# Patient Record
Sex: MALE | Race: WHITE | NOT HISPANIC OR LATINO | Employment: OTHER | URBAN - METROPOLITAN AREA
[De-identification: names, ages, dates, MRNs, and addresses within clinical notes are randomized per-mention and may not be internally consistent; named-entity substitution may affect disease eponyms.]

---

## 2017-10-13 ENCOUNTER — TELEPHONE (OUTPATIENT)
Dept: MEDICAL GROUP | Facility: PHYSICIAN GROUP | Age: 72
End: 2017-10-13

## 2017-10-13 NOTE — TELEPHONE ENCOUNTER
Patient dropped off a list of providers that we need to get records from prior to him establishing care with .  I have printed releases for patient to complete and sign and left them at the  for him.  LVM informing patient this needs to be done.

## 2017-10-25 ENCOUNTER — OFFICE VISIT (OUTPATIENT)
Dept: MEDICAL GROUP | Facility: PHYSICIAN GROUP | Age: 72
End: 2017-10-25
Payer: MEDICARE

## 2017-10-25 VITALS
HEART RATE: 70 BPM | WEIGHT: 200 LBS | RESPIRATION RATE: 16 BRPM | TEMPERATURE: 98.4 F | BODY MASS INDEX: 25.67 KG/M2 | DIASTOLIC BLOOD PRESSURE: 80 MMHG | OXYGEN SATURATION: 93 % | HEIGHT: 74 IN | SYSTOLIC BLOOD PRESSURE: 118 MMHG

## 2017-10-25 DIAGNOSIS — Z12.11 SCREENING FOR COLON CANCER: ICD-10-CM

## 2017-10-25 DIAGNOSIS — Z12.5 SCREENING FOR PROSTATE CANCER: ICD-10-CM

## 2017-10-25 DIAGNOSIS — E78.5 DYSLIPIDEMIA: ICD-10-CM

## 2017-10-25 DIAGNOSIS — I49.9 CARDIAC ARRHYTHMIA, UNSPECIFIED CARDIAC ARRHYTHMIA TYPE: ICD-10-CM

## 2017-10-25 DIAGNOSIS — M17.0 PRIMARY OSTEOARTHRITIS OF BOTH KNEES: ICD-10-CM

## 2017-10-25 PROCEDURE — 99204 OFFICE O/P NEW MOD 45 MIN: CPT | Performed by: INTERNAL MEDICINE

## 2017-10-25 ASSESSMENT — PATIENT HEALTH QUESTIONNAIRE - PHQ9: CLINICAL INTERPRETATION OF PHQ2 SCORE: 0

## 2017-10-25 NOTE — LETTER
[facility]  Hellen Ayala M.D.  [address]  [place] [address]  Fax: [phone]   Authorization for Release/Disclosure of   Protected Health Information   Name: BOO CAVANAUGH : 1945 SSN: [id number]   Address: [address] [place] [address]  [place] [address] Phone:    [phone] (home)    I authorize the entity listed below to release/disclose the PHI below to:   [facility]/Hellen Ayala M.D. and Hellen Ayala M.D.   Provider or Entity Name:     Address   City, State, Zip   Phone:      Fax:     Reason for request: continuity of care   Information to be released:    [  ] LAST COLONOSCOPY,  including any PATH REPORT and follow-up  [  ] LAST FIT/COLOGUARD RESULT [  ] LAST DEXA  [  ] LAST MAMMOGRAM  [  ] LAST PAP  [  ] LAST LABS [  ] RETINA EXAM REPORT  [  ] IMMUNIZATION RECORDS  [  ] Release all info      [  ] Check here and initial the line next to each item to release ALL health information INCLUDING  _____ Care and treatment for drug and / or alcohol abuse  _____ HIV testing, infection status, or AIDS  _____ Genetic Testing    DATES OF SERVICE OR TIME PERIOD TO BE DISCLOSED: _____________  I understand and acknowledge that:  * This Authorization may be revoked at any time by you in writing, except if your health information has already been used or disclosed.  * Your health information that will be used or disclosed as a result of you signing this authorization could be re-disclosed by the recipient. If this occurs, your re-disclosed health information may no longer be protected by State or Federal laws.  * You may refuse to sign this Authorization. Your refusal will not affect your ability to obtain treatment.  * This Authorization becomes effective upon signing and will  on (date) __________.      If no date is indicated, this Authorization will  one (1) year from the signature date.    Name: Boo Cavanaugh    Signature:   Date:     10/25/2017       PLEASE FAX REQUESTED RECORDS BACK TO:  [phone]

## 2017-10-25 NOTE — PROGRESS NOTES
"Subjective:  Boo is a 72 y.o. male with the following   Past Medical History:   Diagnosis Date   • Arrhythmia    • OA (osteoarthritis) of knee     bilateral    • Prostate enlargement       Family History   Problem Relation Age of Onset   • Cancer Father    • Cancer Brother 50     prostate cancer     The patient is on the following medications: No current outpatient prescriptions on file.    HPI; 72-year-old male patient is here to establish care he just moved from [place], has had chronic bilateral severe knee osteoarthritis, requesting referral to orthopedic for possible surgery, denies having recent fall or injuries. Per patient he has had history of intermittent cardiac skip beats for number of years has had a follow-up normal cardiac stress test, currently denies any palpitation or chest pain.    ROS:  See HPI    Blood pressure 118/80, pulse 70, temperature 36.9 °C (98.4 °F), resp. rate 16, height 1.873 m (6' 1.75\"), weight 90.7 kg (200 lb), SpO2 93 %.on RA  Objective:  Patient is well appearing and in no acute distress.  Pharynx is clear.  Neck is soft and supple with no cervical or supraclavicular lymphadenopathy, thyromegaly or masses, no JVD.  Lungs clear to auscultation bilaterally with normal respiratory effort. Abdomen soft, non-tender on palpation,not distended. Heart regular rate and rhythm without murmur. Extremities without any clubbing, cyanosis, or edema.    Assessment and Plan:  1. BPH/ elevated PSA; per patient has had urology consultation in [place] , he has refused biopsy.       2. bilateral knee osteoarthritis; per patient was diagnosed with severe osteoarthritis of the knees, suggested knee surgery, would like to have a referral.      3. Arrhythmia; nonspecific, occasional skipped beats, history of normal cardiac stress test, currently cardiac rhythm and rate are within normal range.      4.History of Dyslipidemia   Patient is advised on preventive and supportive care regarding cardiac " arrhythmia, BPH, ...., alternative therapies, referral to orthopedic.  Patient is advised on diet and lifestyle modification, daily walking ,exercise and weight loss, check basic labs as instructed and return for physical in 4 weeks..   Please note that this dictation was created using voice recognition software. I have worked with consultants from the vendor as well as technical experts from [facility] to optimize the interface. I have made every reasonable attempt to correct obvious errors, but I expect that there are errors of grammar and possibly content that I did not discover before finalizing the note.

## 2017-10-28 ENCOUNTER — HOSPITAL ENCOUNTER (OUTPATIENT)
Facility: MEDICAL CENTER | Age: 72
End: 2017-10-28
Attending: INTERNAL MEDICINE
Payer: MEDICARE

## 2017-10-28 ENCOUNTER — HOSPITAL ENCOUNTER (OUTPATIENT)
Dept: LAB | Facility: MEDICAL CENTER | Age: 72
End: 2017-10-28
Attending: INTERNAL MEDICINE
Payer: MEDICARE

## 2017-10-28 DIAGNOSIS — M17.0 PRIMARY OSTEOARTHRITIS OF BOTH KNEES: ICD-10-CM

## 2017-10-28 DIAGNOSIS — E78.5 DYSLIPIDEMIA: ICD-10-CM

## 2017-10-28 DIAGNOSIS — I49.9 CARDIAC ARRHYTHMIA, UNSPECIFIED CARDIAC ARRHYTHMIA TYPE: ICD-10-CM

## 2017-10-28 DIAGNOSIS — Z12.11 SCREENING FOR COLON CANCER: ICD-10-CM

## 2017-10-28 LAB
25(OH)D3 SERPL-MCNC: 19 NG/ML (ref 30–100)
ALBUMIN SERPL BCP-MCNC: 4.4 G/DL (ref 3.2–4.9)
ALBUMIN/GLOB SERPL: 1.6 G/DL
ALP SERPL-CCNC: 62 U/L (ref 30–99)
ALT SERPL-CCNC: 18 U/L (ref 2–50)
ANION GAP SERPL CALC-SCNC: 8 MMOL/L (ref 0–11.9)
AST SERPL-CCNC: 20 U/L (ref 12–45)
BASOPHILS # BLD AUTO: 1.1 % (ref 0–1.8)
BASOPHILS # BLD: 0.08 K/UL (ref 0–0.12)
BILIRUB SERPL-MCNC: 1.2 MG/DL (ref 0.1–1.5)
BUN SERPL-MCNC: 22 MG/DL (ref 8–22)
CALCIUM SERPL-MCNC: 9.7 MG/DL (ref 8.5–10.5)
CHLORIDE SERPL-SCNC: 109 MMOL/L (ref 96–112)
CHOLEST SERPL-MCNC: 239 MG/DL (ref 100–199)
CO2 SERPL-SCNC: 26 MMOL/L (ref 20–33)
CREAT SERPL-MCNC: 1.22 MG/DL (ref 0.5–1.4)
EOSINOPHIL # BLD AUTO: 0.2 K/UL (ref 0–0.51)
EOSINOPHIL NFR BLD: 2.9 % (ref 0–6.9)
ERYTHROCYTE [DISTWIDTH] IN BLOOD BY AUTOMATED COUNT: 46.3 FL (ref 35.9–50)
GFR SERPL CREATININE-BSD FRML MDRD: 58 ML/MIN/1.73 M 2
GLOBULIN SER CALC-MCNC: 2.8 G/DL (ref 1.9–3.5)
GLUCOSE SERPL-MCNC: 83 MG/DL (ref 65–99)
HCT VFR BLD AUTO: 49.6 % (ref 42–52)
HDLC SERPL-MCNC: 55 MG/DL
HGB BLD-MCNC: 16.8 G/DL (ref 14–18)
IMM GRANULOCYTES # BLD AUTO: 0.01 K/UL (ref 0–0.11)
IMM GRANULOCYTES NFR BLD AUTO: 0.1 % (ref 0–0.9)
LDLC SERPL CALC-MCNC: 166 MG/DL
LYMPHOCYTES # BLD AUTO: 1.97 K/UL (ref 1–4.8)
LYMPHOCYTES NFR BLD: 28.2 % (ref 22–41)
MAGNESIUM SERPL-MCNC: 2.3 MG/DL (ref 1.5–2.5)
MCH RBC QN AUTO: 32.2 PG (ref 27–33)
MCHC RBC AUTO-ENTMCNC: 33.9 G/DL (ref 33.7–35.3)
MCV RBC AUTO: 95.2 FL (ref 81.4–97.8)
MONOCYTES # BLD AUTO: 0.76 K/UL (ref 0–0.85)
MONOCYTES NFR BLD AUTO: 10.9 % (ref 0–13.4)
NEUTROPHILS # BLD AUTO: 3.97 K/UL (ref 1.82–7.42)
NEUTROPHILS NFR BLD: 56.8 % (ref 44–72)
NRBC # BLD AUTO: 0 K/UL
NRBC BLD AUTO-RTO: 0 /100 WBC
PLATELET # BLD AUTO: 224 K/UL (ref 164–446)
PMV BLD AUTO: 11.3 FL (ref 9–12.9)
POTASSIUM SERPL-SCNC: 4.5 MMOL/L (ref 3.6–5.5)
PROT SERPL-MCNC: 7.2 G/DL (ref 6–8.2)
RBC # BLD AUTO: 5.21 M/UL (ref 4.7–6.1)
SODIUM SERPL-SCNC: 143 MMOL/L (ref 135–145)
T4 FREE SERPL-MCNC: 0.86 NG/DL (ref 0.53–1.43)
TRIGL SERPL-MCNC: 90 MG/DL (ref 0–149)
TSH SERPL DL<=0.005 MIU/L-ACNC: 3.09 UIU/ML (ref 0.3–3.7)
WBC # BLD AUTO: 7 K/UL (ref 4.8–10.8)

## 2017-10-28 PROCEDURE — 80053 COMPREHEN METABOLIC PANEL: CPT

## 2017-10-28 PROCEDURE — 85025 COMPLETE CBC W/AUTO DIFF WBC: CPT

## 2017-10-28 PROCEDURE — 83735 ASSAY OF MAGNESIUM: CPT | Mod: GA

## 2017-10-28 PROCEDURE — 82274 ASSAY TEST FOR BLOOD FECAL: CPT

## 2017-10-28 PROCEDURE — 83695 ASSAY OF LIPOPROTEIN(A): CPT

## 2017-10-28 PROCEDURE — 80061 LIPID PANEL: CPT

## 2017-10-28 PROCEDURE — 84443 ASSAY THYROID STIM HORMONE: CPT

## 2017-10-28 PROCEDURE — 84439 ASSAY OF FREE THYROXINE: CPT

## 2017-10-28 PROCEDURE — 36415 COLL VENOUS BLD VENIPUNCTURE: CPT | Mod: GA

## 2017-10-28 PROCEDURE — 82306 VITAMIN D 25 HYDROXY: CPT | Mod: GA

## 2017-10-31 LAB — LPA SERPL-MCNC: 14 MG/DL

## 2017-11-01 ENCOUNTER — TELEPHONE (OUTPATIENT)
Dept: MEDICAL GROUP | Facility: PHYSICIAN GROUP | Age: 72
End: 2017-11-01

## 2017-11-01 LAB — HEMOCCULT STL QL IA: NEGATIVE

## 2017-11-01 NOTE — TELEPHONE ENCOUNTER
----- Message from Hellen Ayala M.D. sent at 11/1/2017  4:24 PM PDT -----  Please inform patient of negative stool test.

## 2017-11-06 ENCOUNTER — OFFICE VISIT (OUTPATIENT)
Dept: MEDICAL GROUP | Facility: PHYSICIAN GROUP | Age: 72
End: 2017-11-06
Payer: MEDICARE

## 2017-11-06 ENCOUNTER — HOSPITAL ENCOUNTER (OUTPATIENT)
Facility: MEDICAL CENTER | Age: 72
End: 2017-11-06
Attending: NURSE PRACTITIONER
Payer: MEDICARE

## 2017-11-06 VITALS
TEMPERATURE: 98.2 F | WEIGHT: 201 LBS | HEIGHT: 74 IN | RESPIRATION RATE: 16 BRPM | HEART RATE: 92 BPM | OXYGEN SATURATION: 93 % | BODY MASS INDEX: 25.8 KG/M2 | SYSTOLIC BLOOD PRESSURE: 120 MMHG | DIASTOLIC BLOOD PRESSURE: 68 MMHG

## 2017-11-06 DIAGNOSIS — N30.01 ACUTE CYSTITIS WITH HEMATURIA: ICD-10-CM

## 2017-11-06 LAB
APPEARANCE UR: CLEAR
BILIRUB UR STRIP-MCNC: NORMAL MG/DL
COLOR UR AUTO: YELLOW
GLUCOSE UR STRIP.AUTO-MCNC: NORMAL MG/DL
KETONES UR STRIP.AUTO-MCNC: NORMAL MG/DL
LEUKOCYTE ESTERASE UR QL STRIP.AUTO: NORMAL
NITRITE UR QL STRIP.AUTO: NORMAL
PH UR STRIP.AUTO: 5.5 [PH] (ref 5–8)
PROT UR QL STRIP: NORMAL MG/DL
RBC UR QL AUTO: NORMAL
SP GR UR STRIP.AUTO: 1.01
UROBILINOGEN UR STRIP-MCNC: 0.2 MG/DL

## 2017-11-06 PROCEDURE — 81002 URINALYSIS NONAUTO W/O SCOPE: CPT | Performed by: NURSE PRACTITIONER

## 2017-11-06 PROCEDURE — 99214 OFFICE O/P EST MOD 30 MIN: CPT | Performed by: NURSE PRACTITIONER

## 2017-11-06 PROCEDURE — 99000 SPECIMEN HANDLING OFFICE-LAB: CPT | Performed by: NURSE PRACTITIONER

## 2017-11-06 PROCEDURE — 87086 URINE CULTURE/COLONY COUNT: CPT

## 2017-11-06 RX ORDER — SULFAMETHOXAZOLE AND TRIMETHOPRIM 800; 160 MG/1; MG/1
1 TABLET ORAL EVERY 12 HOURS
Qty: 28 TAB | Refills: 0 | Status: SHIPPED | OUTPATIENT
Start: 2017-11-06 | End: 2017-11-07

## 2017-11-06 ASSESSMENT — PAIN SCALES - GENERAL: PAINLEVEL: NO PAIN

## 2017-11-06 NOTE — PROGRESS NOTES
"Chief Complaint   Patient presents with   • UTI     x 1 day        HISTORY OF PRESENT ILLNESS: Patient is a 72 y.o. male established patient who presents today to discuss UTI.    Acute cystitis with hematuria  This is a new problem. Started 7 days ago. Denies burning, fever, chills, myalgia, n/v. +frequency and \"full\" feeling or pressure in his bladder. States he had a UTI in July treated with bactrim.      Patient Active Problem List    Diagnosis Date Noted   • Acute cystitis with hematuria 2017       Allergies:Penicillins    Current Outpatient Prescriptions   Medication Sig Dispense Refill   • sulfamethoxazole-trimethoprim (BACTRIM DS) 800-160 MG tablet Take 1 Tab by mouth every 12 hours for 14 days. 28 Tab 0     No current facility-administered medications for this visit.        Social History   Substance Use Topics   • Smoking status: Never Smoker   • Smokeless tobacco: Never Used   • Alcohol use 4.2 oz/week     7 Shots of liquor per week      Comment: A drink per night.        Family Status   Relation Status   • Mother    • Father    • Brother Alive     Family History   Problem Relation Age of Onset   • Cancer Father    • Cancer Brother 50     prostate cancer       Review of Systems:   Constitutional:  Negative for fever, chills, weight loss and malaise/fatigue.   HEENT:  Negative for ear pain, nosebleeds, congestion, sore throat and neck pain.    Eyes:  Negative for blurred vision.   Respiratory:  Negative for cough, sputum production, shortness of breath and wheezing.    Cardiovascular:  Negative for chest pain, palpitations, orthopnea and leg swelling.   Gastrointestinal:  Negative for heartburn, nausea, vomiting and abdominal pain.   Genitourinary:  Negative for dysuria, positive urgency and frequency.   Musculoskeletal:  Negative for myalgias, back pain and joint pain.   Skin:  Negative for rash and itching.   Neurological:  Negative for dizziness, tingling, tremors, sensory change, " "focal weakness and headaches.   Endo/Heme/Allergies:  Does not bruise/bleed easily.   Psychiatric/Behavioral:  Negative for depression, suicidal ideas and memory loss.  The patient is not nervous/anxious and does not have insomnia.    All other systems reviewed and are negative except as in HPI.    Exam:  Blood pressure 120/68, pulse 92, temperature 36.8 °C (98.2 °F), resp. rate 16, height 1.873 m (6' 1.74\"), weight 91.2 kg (201 lb), SpO2 93 %.  General:  Normal appearing. No distress.  Pulmonary:  Clear to ausculation.  Normal effort. No rales, ronchi, or wheezing.  Cardiovascular:  Regular rate and rhythm without murmur. Carotid and radial pulses are intact and equal bilaterally.  Abdomen:  Soft, nontender, nondistended. Normal bowel sounds. Liver and spleen are not palpable. +right cva tenderness, +suprapubic tenderness.  Neurologic:  Grossly nonfocal  Skin:  Warm and dry.  No obvious lesions.  Musculoskeletal:  Normal gait. No extremity cyanosis, clubbing, or edema.  Psych:  Normal mood and affect. Alert and oriented x3. Judgment and insight is normal.      PLAN:    1. Acute cystitis with hematuria  Patient is clinically take Bactrim as he has approximately 2 weeks' worth of medication left at home. Plan to culture urine. Point-of-care urinalysis was positive for leukocytes, nitrates, blood.  Patient is not currently sexually active and has not been for multiple years as such we will not test him for sexually transmitted diseases. Patient does have history of BPH.  - Urine Culture; Future  - sulfamethoxazole-trimethoprim (BACTRIM DS) 800-160 MG tablet; Take 1 Tab by mouth every 12 hours for 14 days.  Dispense: 28 Tab; Refill: 0    Follow-up as needed. Patient is encouraged to be seen in the emergency room for chest pain, palpitations, shortness of breath, dizziness, severe abdominal pain or other concerning symptoms.      Please note that this dictation was created using voice recognition software. I have made " every reasonable attempt to correct obvious errors, but I expect that there are errors of grammar and possibly content that I did not discover before finalizing the note.    Assessment/Plan:  1. Acute cystitis with hematuria  Urine Culture    sulfamethoxazole-trimethoprim (BACTRIM DS) 800-160 MG tablet          I have placed the below orders and discussed them with an approved delegating provider. The MA is performing the below orders under the direction of Dr. Kerns.

## 2017-11-06 NOTE — ASSESSMENT & PLAN NOTE
"This is a new problem. Started 7 days ago. Denies burning, fever, chills, myalgia, n/v. +frequency and \"full\" feeling or pressure in his bladder. States he had a UTI in July treated with bactrim.  "

## 2017-11-06 NOTE — PATIENT INSTRUCTIONS
Urinary Tract Infection  A urinary tract infection (UTI) can occur any place along the urinary tract. The tract includes the kidneys, ureters, bladder, and urethra. A type of germ called bacteria often causes a UTI. UTIs are often helped with antibiotic medicine.   HOME CARE   · If given, take antibiotics as told by your doctor. Finish them even if you start to feel better.  · Drink enough fluids to keep your pee (urine) clear or pale yellow.  · Avoid tea, drinks with caffeine, and bubbly (carbonated) drinks.  · Pee often. Avoid holding your pee in for a long time.  · Pee before and after having sex (intercourse).  · Wipe from front to back after you poop (bowel movement) if you are a woman. Use each tissue only once.  GET HELP RIGHT AWAY IF:   · You have back pain.  · You have lower belly (abdominal) pain.  · You have chills.  · You feel sick to your stomach (nauseous).  · You throw up (vomit).  · Your burning or discomfort with peeing does not go away.  · You have a fever.  · Your symptoms are not better in 3 days.  MAKE SURE YOU:   · Understand these instructions.  · Will watch your condition.  · Will get help right away if you are not doing well or get worse.     This information is not intended to replace advice given to you by your health care provider. Make sure you discuss any questions you have with your health care provider.     Document Released: 06/05/2009 Document Revised: 01/08/2016 Document Reviewed: 07/18/2013  [employer] Interactive Patient Education ©2016 [employer]

## 2017-11-07 ENCOUNTER — OFFICE VISIT (OUTPATIENT)
Dept: MEDICAL GROUP | Facility: PHYSICIAN GROUP | Age: 72
End: 2017-11-07
Payer: MEDICARE

## 2017-11-07 VITALS
RESPIRATION RATE: 16 BRPM | HEIGHT: 74 IN | WEIGHT: 201 LBS | OXYGEN SATURATION: 93 % | HEART RATE: 82 BPM | TEMPERATURE: 101.6 F | BODY MASS INDEX: 25.8 KG/M2 | DIASTOLIC BLOOD PRESSURE: 70 MMHG | SYSTOLIC BLOOD PRESSURE: 126 MMHG

## 2017-11-07 DIAGNOSIS — R10.9 FLANK PAIN: ICD-10-CM

## 2017-11-07 DIAGNOSIS — R09.81 NOSE CONGESTED: ICD-10-CM

## 2017-11-07 DIAGNOSIS — R50.9 FEVER, UNSPECIFIED FEVER CAUSE: ICD-10-CM

## 2017-11-07 DIAGNOSIS — N39.0 RECURRENT UTI: ICD-10-CM

## 2017-11-07 DIAGNOSIS — N30.01 ACUTE CYSTITIS WITH HEMATURIA: ICD-10-CM

## 2017-11-07 PROCEDURE — 99214 OFFICE O/P EST MOD 30 MIN: CPT | Performed by: INTERNAL MEDICINE

## 2017-11-07 RX ORDER — CIPROFLOXACIN 500 MG/1
500 TABLET, FILM COATED ORAL 2 TIMES DAILY
Qty: 28 TAB | Refills: 0 | Status: ON HOLD | OUTPATIENT
Start: 2017-11-07 | End: 2017-11-12

## 2017-11-07 RX ORDER — TAMSULOSIN HYDROCHLORIDE 0.4 MG/1
0.4 CAPSULE ORAL
COMMUNITY
End: 2017-11-27 | Stop reason: SDUPTHER

## 2017-11-07 RX ORDER — MAGNESIUM OXIDE 400 MG/1
400 TABLET ORAL EVERY EVENING
COMMUNITY
End: 2018-06-07

## 2017-11-08 LAB
BACTERIA UR CULT: NORMAL
SIGNIFICANT IND 70042: NORMAL
SOURCE SOURCE: NORMAL

## 2017-11-09 ENCOUNTER — TELEPHONE (OUTPATIENT)
Dept: MEDICAL GROUP | Facility: PHYSICIAN GROUP | Age: 72
End: 2017-11-09

## 2017-11-09 NOTE — TELEPHONE ENCOUNTER
----- Message from ZARIA Guajardo sent at 11/9/2017 11:23 AM PST -----  Please call pt and give lab results: Urine culture was negative for infection.

## 2017-11-10 ENCOUNTER — HOSPITAL ENCOUNTER (INPATIENT)
Facility: MEDICAL CENTER | Age: 72
LOS: 3 days | DRG: 726 | End: 2017-11-13
Attending: EMERGENCY MEDICINE | Admitting: HOSPITALIST
Payer: MEDICARE

## 2017-11-10 ENCOUNTER — TELEPHONE (OUTPATIENT)
Dept: MEDICAL GROUP | Facility: PHYSICIAN GROUP | Age: 72
End: 2017-11-10

## 2017-11-10 ENCOUNTER — RESOLUTE PROFESSIONAL BILLING HOSPITAL PROF FEE (OUTPATIENT)
Dept: HOSPITALIST | Facility: MEDICAL CENTER | Age: 72
End: 2017-11-10
Payer: MEDICARE

## 2017-11-10 ENCOUNTER — APPOINTMENT (OUTPATIENT)
Dept: RADIOLOGY | Facility: MEDICAL CENTER | Age: 72
DRG: 726 | End: 2017-11-10
Attending: EMERGENCY MEDICINE
Payer: MEDICARE

## 2017-11-10 DIAGNOSIS — N13.39 OTHER HYDRONEPHROSIS: ICD-10-CM

## 2017-11-10 DIAGNOSIS — N17.9 ACUTE KIDNEY INJURY (HCC): ICD-10-CM

## 2017-11-10 DIAGNOSIS — R33.9 URINARY RETENTION: ICD-10-CM

## 2017-11-10 DIAGNOSIS — N30.01 ACUTE CYSTITIS WITH HEMATURIA: ICD-10-CM

## 2017-11-10 PROBLEM — N13.9 OBSTRUCTIVE UROPATHY: Status: ACTIVE | Noted: 2017-11-10

## 2017-11-10 LAB
ALBUMIN SERPL BCP-MCNC: 4.1 G/DL (ref 3.2–4.9)
ALBUMIN/GLOB SERPL: 1.4 G/DL
ALP SERPL-CCNC: 134 U/L (ref 30–99)
ALT SERPL-CCNC: 42 U/L (ref 2–50)
ANION GAP SERPL CALC-SCNC: 11 MMOL/L (ref 0–11.9)
APPEARANCE UR: CLEAR
AST SERPL-CCNC: 33 U/L (ref 12–45)
BACTERIA #/AREA URNS HPF: NEGATIVE /HPF
BASOPHILS # BLD AUTO: 0.3 % (ref 0–1.8)
BASOPHILS # BLD: 0.03 K/UL (ref 0–0.12)
BILIRUB SERPL-MCNC: 0.8 MG/DL (ref 0.1–1.5)
BILIRUB UR QL STRIP.AUTO: NEGATIVE
BUN SERPL-MCNC: 26 MG/DL (ref 8–22)
CALCIUM SERPL-MCNC: 9.4 MG/DL (ref 8.5–10.5)
CHLORIDE SERPL-SCNC: 101 MMOL/L (ref 96–112)
CO2 SERPL-SCNC: 24 MMOL/L (ref 20–33)
COLOR UR: YELLOW
CREAT SERPL-MCNC: 2.04 MG/DL (ref 0.5–1.4)
CULTURE IF INDICATED INDCX: NO UA CULTURE
EOSINOPHIL # BLD AUTO: 0.25 K/UL (ref 0–0.51)
EOSINOPHIL NFR BLD: 2.8 % (ref 0–6.9)
EPI CELLS #/AREA URNS HPF: NEGATIVE /HPF
ERYTHROCYTE [DISTWIDTH] IN BLOOD BY AUTOMATED COUNT: 43.6 FL (ref 35.9–50)
GFR SERPL CREATININE-BSD FRML MDRD: 32 ML/MIN/1.73 M 2
GLOBULIN SER CALC-MCNC: 2.9 G/DL (ref 1.9–3.5)
GLUCOSE SERPL-MCNC: 97 MG/DL (ref 65–99)
GLUCOSE UR STRIP.AUTO-MCNC: NEGATIVE MG/DL
HCT VFR BLD AUTO: 47.2 % (ref 42–52)
HGB BLD-MCNC: 16.3 G/DL (ref 14–18)
HYALINE CASTS #/AREA URNS LPF: ABNORMAL /LPF
IMM GRANULOCYTES # BLD AUTO: 0.04 K/UL (ref 0–0.11)
IMM GRANULOCYTES NFR BLD AUTO: 0.5 % (ref 0–0.9)
KETONES UR STRIP.AUTO-MCNC: NEGATIVE MG/DL
LEUKOCYTE ESTERASE UR QL STRIP.AUTO: NEGATIVE
LYMPHOCYTES # BLD AUTO: 1.51 K/UL (ref 1–4.8)
LYMPHOCYTES NFR BLD: 17.1 % (ref 22–41)
MCH RBC QN AUTO: 32.2 PG (ref 27–33)
MCHC RBC AUTO-ENTMCNC: 34.5 G/DL (ref 33.7–35.3)
MCV RBC AUTO: 93.3 FL (ref 81.4–97.8)
MICRO URNS: ABNORMAL
MONOCYTES # BLD AUTO: 1.36 K/UL (ref 0–0.85)
MONOCYTES NFR BLD AUTO: 15.4 % (ref 0–13.4)
NEUTROPHILS # BLD AUTO: 5.66 K/UL (ref 1.82–7.42)
NEUTROPHILS NFR BLD: 63.9 % (ref 44–72)
NITRITE UR QL STRIP.AUTO: NEGATIVE
NRBC # BLD AUTO: 0 K/UL
NRBC BLD AUTO-RTO: 0 /100 WBC
PH UR STRIP.AUTO: 5.5 [PH]
PLATELET # BLD AUTO: 163 K/UL (ref 164–446)
PMV BLD AUTO: 10.9 FL (ref 9–12.9)
POTASSIUM SERPL-SCNC: 4.1 MMOL/L (ref 3.6–5.5)
PROT SERPL-MCNC: 7 G/DL (ref 6–8.2)
PROT UR QL STRIP: NEGATIVE MG/DL
RBC # BLD AUTO: 5.06 M/UL (ref 4.7–6.1)
RBC # URNS HPF: ABNORMAL /HPF
RBC UR QL AUTO: ABNORMAL
SODIUM SERPL-SCNC: 136 MMOL/L (ref 135–145)
SP GR UR STRIP.AUTO: 1.01
UROBILINOGEN UR STRIP.AUTO-MCNC: 0.2 MG/DL
WBC # BLD AUTO: 8.9 K/UL (ref 4.8–10.8)
WBC #/AREA URNS HPF: ABNORMAL /HPF

## 2017-11-10 PROCEDURE — 36415 COLL VENOUS BLD VENIPUNCTURE: CPT

## 2017-11-10 PROCEDURE — 84300 ASSAY OF URINE SODIUM: CPT

## 2017-11-10 PROCEDURE — 82570 ASSAY OF URINE CREATININE: CPT

## 2017-11-10 PROCEDURE — 84156 ASSAY OF PROTEIN URINE: CPT

## 2017-11-10 PROCEDURE — 81001 URINALYSIS AUTO W/SCOPE: CPT

## 2017-11-10 PROCEDURE — 96360 HYDRATION IV INFUSION INIT: CPT

## 2017-11-10 PROCEDURE — 80053 COMPREHEN METABOLIC PANEL: CPT

## 2017-11-10 PROCEDURE — 303105 HCHG CATHETER EXTRA

## 2017-11-10 PROCEDURE — 82436 ASSAY OF URINE CHLORIDE: CPT

## 2017-11-10 PROCEDURE — 51702 INSERT TEMP BLADDER CATH: CPT

## 2017-11-10 PROCEDURE — 99223 1ST HOSP IP/OBS HIGH 75: CPT | Mod: AI | Performed by: HOSPITALIST

## 2017-11-10 PROCEDURE — 770006 HCHG ROOM/CARE - MED/SURG/GYN SEMI*

## 2017-11-10 PROCEDURE — 84133 ASSAY OF URINE POTASSIUM: CPT

## 2017-11-10 PROCEDURE — 700105 HCHG RX REV CODE 258: Performed by: EMERGENCY MEDICINE

## 2017-11-10 PROCEDURE — 74176 CT ABD & PELVIS W/O CONTRAST: CPT

## 2017-11-10 PROCEDURE — 99285 EMERGENCY DEPT VISIT HI MDM: CPT

## 2017-11-10 PROCEDURE — 85025 COMPLETE CBC W/AUTO DIFF WBC: CPT

## 2017-11-10 RX ORDER — ACETAMINOPHEN 325 MG/1
650 TABLET ORAL EVERY 6 HOURS PRN
Status: DISCONTINUED | OUTPATIENT
Start: 2017-11-10 | End: 2017-11-13 | Stop reason: HOSPADM

## 2017-11-10 RX ORDER — BISACODYL 10 MG
10 SUPPOSITORY, RECTAL RECTAL
Status: DISCONTINUED | OUTPATIENT
Start: 2017-11-10 | End: 2017-11-13 | Stop reason: HOSPADM

## 2017-11-10 RX ORDER — SODIUM CHLORIDE 9 MG/ML
1000 INJECTION, SOLUTION INTRAVENOUS ONCE
Status: COMPLETED | OUTPATIENT
Start: 2017-11-10 | End: 2017-11-11

## 2017-11-10 RX ORDER — POLYETHYLENE GLYCOL 3350 17 G/17G
1 POWDER, FOR SOLUTION ORAL
Status: DISCONTINUED | OUTPATIENT
Start: 2017-11-10 | End: 2017-11-13 | Stop reason: HOSPADM

## 2017-11-10 RX ORDER — TAMSULOSIN HYDROCHLORIDE 0.4 MG/1
0.4 CAPSULE ORAL
Status: DISCONTINUED | OUTPATIENT
Start: 2017-11-11 | End: 2017-11-13 | Stop reason: HOSPADM

## 2017-11-10 RX ORDER — AMOXICILLIN 250 MG
2 CAPSULE ORAL 2 TIMES DAILY
Status: DISCONTINUED | OUTPATIENT
Start: 2017-11-10 | End: 2017-11-13 | Stop reason: HOSPADM

## 2017-11-10 RX ADMIN — SODIUM CHLORIDE 1000 ML: 9 INJECTION, SOLUTION INTRAVENOUS at 21:45

## 2017-11-10 ASSESSMENT — PAIN SCALES - GENERAL
PAINLEVEL_OUTOF10: 3
PAINLEVEL_OUTOF10: 6

## 2017-11-10 ASSESSMENT — ENCOUNTER SYMPTOMS
DIARRHEA: 0
SORE THROAT: 0
COUGH: 0
FEVER: 0
NAUSEA: 0
VOMITING: 0
FLANK PAIN: 1

## 2017-11-10 NOTE — TELEPHONE ENCOUNTER
Pt came into the office with extreme pain saying its not getting better. I spoke with Dr. Carnes, Dr. Carnes said he should go to the hospital since pt is not getting better on the antibiotics. Pt stated he will go to the emergency room

## 2017-11-11 ENCOUNTER — APPOINTMENT (OUTPATIENT)
Dept: RADIOLOGY | Facility: MEDICAL CENTER | Age: 72
DRG: 726 | End: 2017-11-11
Attending: INTERNAL MEDICINE
Payer: MEDICARE

## 2017-11-11 ENCOUNTER — TELEPHONE (OUTPATIENT)
Dept: CARDIOLOGY | Facility: MEDICAL CENTER | Age: 72
End: 2017-11-11

## 2017-11-11 PROBLEM — I48.92 ATRIAL FLUTTER (HCC): Status: ACTIVE | Noted: 2017-11-11

## 2017-11-11 LAB
ANION GAP SERPL CALC-SCNC: 11 MMOL/L (ref 0–11.9)
BASOPHILS # BLD AUTO: 0.2 % (ref 0–1.8)
BASOPHILS # BLD: 0.02 K/UL (ref 0–0.12)
BUN SERPL-MCNC: 23 MG/DL (ref 8–22)
CALCIUM SERPL-MCNC: 9.1 MG/DL (ref 8.5–10.5)
CHLORIDE SERPL-SCNC: 105 MMOL/L (ref 96–112)
CHLORIDE UR-SCNC: 37 MMOL/L
CO2 SERPL-SCNC: 24 MMOL/L (ref 20–33)
CREAT SERPL-MCNC: 1.69 MG/DL (ref 0.5–1.4)
CREAT UR-MCNC: 65.8 MG/DL
EKG IMPRESSION: NORMAL
EKG IMPRESSION: NORMAL
EOSINOPHIL # BLD AUTO: 0.2 K/UL (ref 0–0.51)
EOSINOPHIL NFR BLD: 2.3 % (ref 0–6.9)
ERYTHROCYTE [DISTWIDTH] IN BLOOD BY AUTOMATED COUNT: 42.8 FL (ref 35.9–50)
ERYTHROCYTE [DISTWIDTH] IN BLOOD BY AUTOMATED COUNT: 43.4 FL (ref 35.9–50)
GFR SERPL CREATININE-BSD FRML MDRD: 40 ML/MIN/1.73 M 2
GLUCOSE SERPL-MCNC: 86 MG/DL (ref 65–99)
HCT VFR BLD AUTO: 42.8 % (ref 42–52)
HCT VFR BLD AUTO: 44.8 % (ref 42–52)
HGB BLD-MCNC: 14.7 G/DL (ref 14–18)
HGB BLD-MCNC: 15.4 G/DL (ref 14–18)
IMM GRANULOCYTES # BLD AUTO: 0.07 K/UL (ref 0–0.11)
IMM GRANULOCYTES NFR BLD AUTO: 0.8 % (ref 0–0.9)
LV EJECT FRACT  99904: 65
LV EJECT FRACT MOD 2C 99903: 56.06
LV EJECT FRACT MOD 4C 99902: 52.94
LV EJECT FRACT MOD BP 99901: 56.5
LYMPHOCYTES # BLD AUTO: 1.38 K/UL (ref 1–4.8)
LYMPHOCYTES NFR BLD: 16 % (ref 22–41)
MCH RBC QN AUTO: 31.8 PG (ref 27–33)
MCH RBC QN AUTO: 32.1 PG (ref 27–33)
MCHC RBC AUTO-ENTMCNC: 34.3 G/DL (ref 33.7–35.3)
MCHC RBC AUTO-ENTMCNC: 34.4 G/DL (ref 33.7–35.3)
MCV RBC AUTO: 92.6 FL (ref 81.4–97.8)
MCV RBC AUTO: 93.3 FL (ref 81.4–97.8)
MONOCYTES # BLD AUTO: 1.46 K/UL (ref 0–0.85)
MONOCYTES NFR BLD AUTO: 17 % (ref 0–13.4)
NEUTROPHILS # BLD AUTO: 5.48 K/UL (ref 1.82–7.42)
NEUTROPHILS NFR BLD: 63.7 % (ref 44–72)
NRBC # BLD AUTO: 0 K/UL
NRBC BLD AUTO-RTO: 0 /100 WBC
PLATELET # BLD AUTO: 164 K/UL (ref 164–446)
PLATELET # BLD AUTO: 175 K/UL (ref 164–446)
PMV BLD AUTO: 10.7 FL (ref 9–12.9)
PMV BLD AUTO: 10.7 FL (ref 9–12.9)
POTASSIUM SERPL-SCNC: 4 MMOL/L (ref 3.6–5.5)
POTASSIUM UR-SCNC: 21.6 MMOL/L
PROT UR-MCNC: 5.5 MG/DL (ref 0–15)
RBC # BLD AUTO: 4.62 M/UL (ref 4.7–6.1)
RBC # BLD AUTO: 4.8 M/UL (ref 4.7–6.1)
SODIUM SERPL-SCNC: 140 MMOL/L (ref 135–145)
SODIUM UR-SCNC: 33 MMOL/L
WBC # BLD AUTO: 6.9 K/UL (ref 4.8–10.8)
WBC # BLD AUTO: 8.6 K/UL (ref 4.8–10.8)

## 2017-11-11 PROCEDURE — 93306 TTE W/DOPPLER COMPLETE: CPT

## 2017-11-11 PROCEDURE — 93010 ELECTROCARDIOGRAM REPORT: CPT | Mod: 77 | Performed by: INTERNAL MEDICINE

## 2017-11-11 PROCEDURE — 93005 ELECTROCARDIOGRAM TRACING: CPT | Performed by: HOSPITALIST

## 2017-11-11 PROCEDURE — 99233 SBSQ HOSP IP/OBS HIGH 50: CPT | Performed by: INTERNAL MEDICINE

## 2017-11-11 PROCEDURE — 85027 COMPLETE CBC AUTOMATED: CPT

## 2017-11-11 PROCEDURE — 85025 COMPLETE CBC W/AUTO DIFF WBC: CPT

## 2017-11-11 PROCEDURE — 80048 BASIC METABOLIC PNL TOTAL CA: CPT

## 2017-11-11 PROCEDURE — 93010 ELECTROCARDIOGRAM REPORT: CPT | Performed by: INTERNAL MEDICINE

## 2017-11-11 PROCEDURE — 93306 TTE W/DOPPLER COMPLETE: CPT | Mod: 26 | Performed by: INTERNAL MEDICINE

## 2017-11-11 PROCEDURE — 51798 US URINE CAPACITY MEASURE: CPT

## 2017-11-11 PROCEDURE — 93005 ELECTROCARDIOGRAM TRACING: CPT | Performed by: INTERNAL MEDICINE

## 2017-11-11 PROCEDURE — 700111 HCHG RX REV CODE 636 W/ 250 OVERRIDE (IP): Performed by: HOSPITALIST

## 2017-11-11 PROCEDURE — 36415 COLL VENOUS BLD VENIPUNCTURE: CPT

## 2017-11-11 PROCEDURE — 770020 HCHG ROOM/CARE - TELE (206)

## 2017-11-11 PROCEDURE — 76775 US EXAM ABDO BACK WALL LIM: CPT

## 2017-11-11 PROCEDURE — 700102 HCHG RX REV CODE 250 W/ 637 OVERRIDE(OP): Performed by: HOSPITALIST

## 2017-11-11 PROCEDURE — A9270 NON-COVERED ITEM OR SERVICE: HCPCS | Performed by: HOSPITALIST

## 2017-11-11 PROCEDURE — 700105 HCHG RX REV CODE 258: Performed by: HOSPITALIST

## 2017-11-11 RX ORDER — DILTIAZEM HYDROCHLORIDE 5 MG/ML
0.25 INJECTION INTRAVENOUS ONCE
Status: COMPLETED | OUTPATIENT
Start: 2017-11-11 | End: 2017-11-11

## 2017-11-11 RX ORDER — MORPHINE SULFATE 4 MG/ML
2 INJECTION, SOLUTION INTRAMUSCULAR; INTRAVENOUS
Status: DISCONTINUED | OUTPATIENT
Start: 2017-11-11 | End: 2017-11-13 | Stop reason: HOSPADM

## 2017-11-11 RX ORDER — SODIUM CHLORIDE 9 MG/ML
500 INJECTION, SOLUTION INTRAVENOUS ONCE
Status: COMPLETED | OUTPATIENT
Start: 2017-11-11 | End: 2017-11-11

## 2017-11-11 RX ADMIN — STANDARDIZED SENNA CONCENTRATE AND DOCUSATE SODIUM 2 TABLET: 8.6; 5 TABLET, FILM COATED ORAL at 21:43

## 2017-11-11 RX ADMIN — METOPROLOL TARTRATE 25 MG: 25 TABLET ORAL at 04:57

## 2017-11-11 RX ADMIN — DILTIAZEM HYDROCHLORIDE 22.15 MG: 5 INJECTION INTRAVENOUS at 04:58

## 2017-11-11 RX ADMIN — SODIUM CHLORIDE 1000 ML: 9 INJECTION, SOLUTION INTRAVENOUS at 05:03

## 2017-11-11 RX ADMIN — SODIUM CHLORIDE 500 ML: 9 INJECTION, SOLUTION INTRAVENOUS at 03:22

## 2017-11-11 RX ADMIN — ACETAMINOPHEN 650 MG: 325 TABLET, FILM COATED ORAL at 21:43

## 2017-11-11 RX ADMIN — METOPROLOL TARTRATE 25 MG: 25 TABLET ORAL at 21:43

## 2017-11-11 RX ADMIN — TAMSULOSIN HYDROCHLORIDE 0.4 MG: 0.4 CAPSULE ORAL at 07:42

## 2017-11-11 ASSESSMENT — PAIN SCALES - GENERAL
PAINLEVEL_OUTOF10: 0
PAINLEVEL_OUTOF10: 2
PAINLEVEL_OUTOF10: 0
PAINLEVEL_OUTOF10: 2
PAINLEVEL_OUTOF10: 0

## 2017-11-11 ASSESSMENT — ENCOUNTER SYMPTOMS
BACK PAIN: 1
DIAPHORESIS: 0
TINGLING: 0
HEADACHES: 0
MYALGIAS: 0
DIARRHEA: 0
SORE THROAT: 0
CHILLS: 0
NAUSEA: 0
SHORTNESS OF BREATH: 0
BRUISES/BLEEDS EASILY: 1
CHILLS: 1
FOCAL WEAKNESS: 0
ABDOMINAL PAIN: 0
VOMITING: 1
HEARTBURN: 0
FEVER: 0
DEPRESSION: 0
PALPITATIONS: 0
CONSTIPATION: 0
DIZZINESS: 0
COUGH: 0
PHOTOPHOBIA: 0
WHEEZING: 0
FEVER: 1

## 2017-11-11 ASSESSMENT — PATIENT HEALTH QUESTIONNAIRE - PHQ9
SUM OF ALL RESPONSES TO PHQ QUESTIONS 1-9: 0
2. FEELING DOWN, DEPRESSED, IRRITABLE, OR HOPELESS: NOT AT ALL
SUM OF ALL RESPONSES TO PHQ9 QUESTIONS 1 AND 2: 0
SUM OF ALL RESPONSES TO PHQ9 QUESTIONS 1 AND 2: 0
SUM OF ALL RESPONSES TO PHQ QUESTIONS 1-9: 0
2. FEELING DOWN, DEPRESSED, IRRITABLE, OR HOPELESS: NOT AT ALL
1. LITTLE INTEREST OR PLEASURE IN DOING THINGS: NOT AT ALL
1. LITTLE INTEREST OR PLEASURE IN DOING THINGS: NOT AT ALL

## 2017-11-11 ASSESSMENT — LIFESTYLE VARIABLES
EVER_SMOKED: NEVER
ALCOHOL_USE: NO
DO YOU DRINK ALCOHOL: NO

## 2017-11-11 NOTE — PROGRESS NOTES
"Report received from ED. Pt arrived shortly after to unit on crystal- walked to bed with a steady gait.     AOx4. No complaints on pain at this time.   States that he feels \"uncomfortable\" with savage in.     Lungs sound diminished. No reports of SOB or chest pain.     Bowels sound- normoactive x4.   LBM- 11-10 in the morning.     Savage assessed- stat lock in place- putting out small amount of yellow, clear urine.     Skin assessed- see other skin check note.     VS unstable- /103  afebrile and on room air.     Pt oriented to room and call light. POC discussed and all questions answered at this time. Bed in lowest position, treaded socks on     "

## 2017-11-11 NOTE — CARE PLAN
Problem: Urinary Elimination:  Goal: Ability to reestablish a normal urinary elimination pattern will improve  Noyola in place draining bloody urine    Problem: Knowledge Deficit  Goal: Knowledge of disease process/condition, treatment plan, diagnostic tests, and medications will improve  POC discussed with the patient and family. All questions and concerns addressed and answered. Patient is involved in POC and verbalizes the understanding of the disease process, medications, tests and treatments. Questions on POC encouraged throughout care.

## 2017-11-11 NOTE — CONSULTS
DATE OF SERVICE:  11/11/2017    REFERRING PHYSICIAN:  Jazmine Mccall MD    REASON FOR CONSULT:  Atrial flutter.    HISTORY OF PRESENT ILLNESS:  This is a 72-year-old white male came in with   obstructive uropathy, has had some hematuria.  The patient currently has a   Noyola that is draining some blood-stained urine.  He currently has improved   his symptoms of right flank pain.  He has had some mild dyspnea on exertion.    Denies any palpitations, denies any chest pain or shortness of breath.  Not on   anticoagulation long-term.  He was noted to be in atrial flutter with   controlled ventricular response, has not been on anticoagulation.  CHADS-VASc   score is 1 based on age.  Denies hypertension, diabetes, stroke, or congestive   heart failure.    PAST MEDICAL HISTORY:  Unspecified arrhythmia, has not seen a cardiologist,   history of osteoarthritis, history of prostate enlargement, history of   obstructive uropathy.  He has had a cyst removed on his wrist.    OUTPATIENT MEDICATIONS:  Include Flomax, magnesium oxide and Cipro.    ALLERGIES:  HE HAS ALLERGIES TO PENICILLIN.    SOCIAL HISTORY:  The patient is a therapist, works part time.  Denies   significant alcohol use.  Denies smoking.  He has 2 children and is not   .    REVIEW OF SYSTEMS:  CONSTITUTIONAL:  No fever, chills or sweat.  PULMONARY:  No chronic cough or hemoptysis.  GASTROINTESTINAL:  No change in bowel movements, diarrhea or constipation.  CARDIAC:  Denies any palpitations, denies any chest pain, some mild dyspnea on   exertion.  RHEUMATOLOGIC:  Has some arthritis in his knees.  UROLOGIC:  Some BPH symptoms and now some hematuria.    CURRENT INPATIENT MEDICATIONS:  Include Flomax, magnesium. Was on Lovenox and   Miralax.    PHYSICAL EXAMINATION:  GENERAL:  A white male in no acute distress.  VITAL SIGNS:  Blood pressure is 120/80, pulse 81, respirations 16.  NECK:  JVD is flat.  Carotids 2+, no bruits.  LUNGS:  Clear.  CARDIAC:  Irregularly  irregular, normal S1, S2 without murmurs, rubs or   gallops.  ABDOMEN:  Soft, nontender without hepatosplenomegaly.  EXTREMITIES:  Without clubbing, cyanosis or edema.  NEUROLOGIC:  Nonfocal.  PSYCHIATRIC:  Alert and oriented x3.  Mood and affect are appropriate.  SKIN:  Turgor is normal.  PSYCHIATRIC:  Normal.  RHEUMATOLOGIC:  Some arthritis in the knees.    LABORATORY DATA:  Showed chemistry, sodium 140, potassium 4.0, chloride 101,   bicarb 24, BUN 23, creatinine 1.69.  Previously when he came in, creatinine   was 2.04.  LFTs, mild elevation of alkaline phosphatase.  CBC, white count   6.9, hemoglobin 15.4, hematocrit 44.8, platelet count 164,000.  TFTs are   unremarkable.  Negative urine culture.  Small amount of blood initially on his   UA.    Electrocardiogram shows typical atrial flutter with some PVCs.  Nonspecific   ST-T wave changes.  Rate of 114.    CT scan shows moderate hydroureteronephrosis at the level of the bladder   consistent with bladder outlet obstruction.    ASSESSMENT:  Atrial flutter, unknown length of being in atrial flutter,   minimally symptomatic, although does have some dyspnea on exertion.    RECOMMENDATIONS:  At this point would be:  1.  Atrial flutter plan to rate control with beta blockers.  Start anticoagulation when the   patient's hematuria resolves.  If the patient continues to be in atrial   flutter, consideration for outpatient cardiac electrophysiology study for   ablation of atrial flutter.  2.  Obstructive uropathy with some hematuria.  3.  Check echocardiogram.  4.  TFTs were normal.       ____________________________________     MD FOREIGN MENJIVAR / NTS    DD:  11/11/2017 09:05:21  DT:  11/11/2017 09:48:06    D#:  [id number]  Job#:  [id number]

## 2017-11-11 NOTE — PROGRESS NOTES
Patient transported to unit by this nurse and CNA on zoll monitor.  Patient stable, increased heart rate and BP.  Skin check complete.  Medications administered per MAR.  IV is running NS @ 100 mL/hour, on room air.  Will continue to monitor.

## 2017-11-11 NOTE — PROGRESS NOTES
[facility] Hospitalist Progress Note    Date of Service: 2017    Chief Complaint  72 y.o. male admitted 11/10/2017 with obstructive uropathy and inability to void.    Interval Problem Update  Renal function is better with savage in place  He has blood in his urine on lovenox  He had atrial flutter overnight    Consultants/Specialty  Cardiology    Disposition  home        Review of Systems   Constitutional: Negative for chills, diaphoresis and fever.   HENT: Negative for congestion.    Respiratory: Negative for cough, shortness of breath and wheezing.    Cardiovascular: Negative for chest pain and leg swelling.   Gastrointestinal: Negative for abdominal pain, constipation, diarrhea and heartburn.   Genitourinary: Positive for dysuria and hematuria.   Skin: Negative for itching and rash.   Neurological: Negative for dizziness and headaches.   Endo/Heme/Allergies: Bruises/bleeds easily.      Physical Exam  Laboratory/Imaging   Hemodynamics  Temp (24hrs), Av.6 °C (97.8 °F), Min:36 °C (96.8 °F), Max:37.2 °C (99 °F)   Temperature: 36 °C (96.8 °F)  Pulse  Av  Min: 75  Max: 128    Blood Pressure : 120/80      Respiratory      Respiration: 18, Pulse Oximetry: 95 %        RUL Breath Sounds: Clear, RML Breath Sounds: Diminished, RLL Breath Sounds: Clear, SCOTTY Breath Sounds: Diminished, LLL Breath Sounds: Diminished    Fluids    Intake/Output Summary (Last 24 hours) at 17 1555  Last data filed at 17 1300   Gross per 24 hour   Intake              800 ml   Output             3400 ml   Net            -2600 ml       Nutrition  Orders Placed This Encounter   Procedures   • Diet Order     Standing Status:   Standing     Number of Occurrences:   1     Order Specific Question:   Diet:     Answer:   Regular [1]     Physical Exam   Constitutional: He is oriented to person, place, and time. No distress.   HENT:   Mouth/Throat: Oropharynx is clear and moist.   Eyes: No scleral icterus.   Neck: Neck supple.    Cardiovascular: Normal rate and intact distal pulses.  Exam reveals gallop and S3.    No murmur heard.  Pulmonary/Chest: No stridor.   Abdominal: Bowel sounds are normal. He exhibits no distension. There is no tenderness. There is no rebound.   Musculoskeletal: He exhibits no edema.   Neurological: He is alert and oriented to person, place, and time. Coordination normal.   Skin: Skin is dry. No rash noted. He is not diaphoretic. No erythema.   Psychiatric: His behavior is normal.   Nursing note and vitals reviewed.      Recent Labs      11/10/17   1737  11/11/17   0357   WBC  8.9  6.9   RBC  5.06  4.80   HEMOGLOBIN  16.3  15.4   HEMATOCRIT  47.2  44.8   MCV  93.3  93.3   MCH  32.2  32.1   MCHC  34.5  34.4   RDW  43.6  43.4   PLATELETCT  163*  164   MPV  10.9  10.7     Recent Labs      11/10/17   1737  11/11/17   0357   SODIUM  136  140   POTASSIUM  4.1  4.0   CHLORIDE  101  105   CO2  24  24   GLUCOSE  97  86   BUN  26*  23*   CREATININE  2.04*  1.69*   CALCIUM  9.4  9.1                      Assessment/Plan     * Obstructive uropathy- (present on admission)   Assessment & Plan    Noyola placed with hematuria on lovenox weight based  Creatinine improving, will follow labs  No anemia present  Will call urology in AM        Atrial flutter (CMS-HCC)   Assessment & Plan    Discussed with cardiology Dr. Mendoza today, metoprolol for rate control working well  Will follow up echocardiogram results        KAILEE (acute kidney injury) (CMS-HCC)- (present on admission)   Assessment & Plan    Improving with catheter in place            Reviewed items::  Labs reviewed and Medications reviewed  Noyola catheter::  Urinary Tract Retention or Urinary Tract Obstruction  DVT prophylaxis pharmacological::  Contraindicated - High bleeding risk  DVT prophylaxis - mechanical:  SCDs  Ulcer Prophylaxis::  Not indicated      Hematuria has worsened today, DR. Cr will see the patient, will order a renal ultrasound and start continuous bladder  irrigation and check hemoglobin q 12 hours

## 2017-11-11 NOTE — ED PROVIDER NOTES
"ED Provider Note    Scribed for Saravanan Camarillo M.D. by Luis Min. 11/10/2017, 9:12 PM.    Primary care provider: Hellen Ayala M.D.  Means of arrival: Walk In  History obtained from: Patient  History limited by: None     CHIEF COMPLAINT  Chief Complaint   Patient presents with   • Flank Pain     Right flank pain, seen by PCP on Tuesday and given oral abx. Seen again on Wed by PCP and oral abx changes. States symptoms are worsening.      HPI  Boo Cavanaugh is a 72 y.o. male who presents to the Emergency Department with right flank pain. The patient had an onset of non radiating right flank pain five days ago. He describes his pain as \"dull\" with occasional \"sharp\" shooting pain. His right flank pain has been progressively worsening in severity in the last five days. Last night, the patient's right pain became severe and he rates his current pain as 8/10 in severity.  His right flank pain is exacerbated by eating and by drinking large amounts of fluids.   He saw his primary care physician earlier this week for his right flank pain, who prescribed the patient 14 day regime of Bactrim. He has taken the Bactrim as prescribed.  The patient reports a history of similar symptoms present over his left flank in July 2017 that was treated with Bactrim.    The patient denies any leg pain, leg swelling, nausea, vomiting, diarrhea, dysuria, fever, cough, or sore throat.     REVIEW OF SYSTEMS  Review of Systems   Constitutional: Negative for fever.   HENT: Negative for sore throat.    Respiratory: Negative for cough.    Cardiovascular: Negative for leg swelling.   Gastrointestinal: Negative for diarrhea, nausea and vomiting.   Genitourinary: Positive for flank pain (Right ). Negative for dysuria.   All other systems reviewed and are negative.    PAST MEDICAL HISTORY   has a past medical history of Arrhythmia; OA (osteoarthritis) of knee; and Prostate enlargement.    SURGICAL HISTORY  patient denies any surgical " "history    SOCIAL HISTORY  Social History   Substance Use Topics   • Smoking status: Never Smoker   • Smokeless tobacco: Never Used   • Alcohol use 4.2 oz/week     7 Shots of liquor per week      Comment: A drink per night.       History   Drug Use No     FAMILY HISTORY  Family History   Problem Relation Age of Onset   • Cancer Father    • Cancer Brother 50     prostate cancer     CURRENT MEDICATIONS  Magnesium   Bactrim     ALLERGIES  Allergies   Allergen Reactions   • Penicillins Rash     Rash as a child.      PHYSICAL EXAM  VITAL SIGNS: /97   Pulse 80   Temp 36.6 °C (97.8 °F)   Resp 16   Ht 1.88 m (6' 2\")   Wt 88.5 kg (195 lb)   SpO2 95%   BMI 25.04 kg/m²   Vitals reviewed.  Constitutional: Well developed, Well nourished, No acute distress, Non-toxic appearance.   HENT: Normocephalic, Atraumatic, Bilateral external ears normal, Oropharynx moist, No oral exudates, Nose normal.   Eyes: PERRL, EOMI, Conjunctiva normal, No discharge.   Neck: Normal range of motion, No tenderness, Supple, No stridor.   Cardiovascular: Normal heart rate, Normal rhythm, No murmurs, No rubs, No gallops.   Thorax & Lungs: Normal breath sounds, No respiratory distress, No wheezing, No chest tenderness.   Abdomen: Bowel sounds normal, Soft, Right periumbilical and right lower quadrant tenderness  Skin: Warm, Dry, No erythema, No rash.   Back: No tenderness, No CVA tenderness.   Musculoskeletal: Good range of motion in all major joints. No edema. No tenderness to palpation or major deformities noted.   Neurologic: Alert, Normal motor function, Normal sensory function, No focal deficits noted.   Psychiatric: Affect normal    LABS  Results for orders placed or performed during the hospital encounter of 11/10/17   CBC WITH DIFFERENTIAL   Result Value Ref Range    WBC 8.9 4.8 - 10.8 K/uL    RBC 5.06 4.70 - 6.10 M/uL    Hemoglobin 16.3 14.0 - 18.0 g/dL    Hematocrit 47.2 42.0 - 52.0 %    MCV 93.3 81.4 - 97.8 fL    MCH 32.2 27.0 - " 33.0 pg    MCHC 34.5 33.7 - 35.3 g/dL    RDW 43.6 35.9 - 50.0 fL    Platelet Count 163 (L) 164 - 446 K/uL    MPV 10.9 9.0 - 12.9 fL    Neutrophils-Polys 63.90 44.00 - 72.00 %    Lymphocytes 17.10 (L) 22.00 - 41.00 %    Monocytes 15.40 (H) 0.00 - 13.40 %    Eosinophils 2.80 0.00 - 6.90 %    Basophils 0.30 0.00 - 1.80 %    Immature Granulocytes 0.50 0.00 - 0.90 %    Nucleated RBC 0.00 /100 WBC    Neutrophils (Absolute) 5.66 1.82 - 7.42 K/uL    Lymphs (Absolute) 1.51 1.00 - 4.80 K/uL    Monos (Absolute) 1.36 (H) 0.00 - 0.85 K/uL    Eos (Absolute) 0.25 0.00 - 0.51 K/uL    Baso (Absolute) 0.03 0.00 - 0.12 K/uL    Immature Granulocytes (abs) 0.04 0.00 - 0.11 K/uL    NRBC (Absolute) 0.00 K/uL   URINALYSIS CULTURE, IF INDICATED   Result Value Ref Range    Color Yellow     Character Clear     Specific Gravity 1.007 <1.035    Ph 5.5 5.0 - 8.0    Glucose Negative Negative mg/dL    Ketones Negative Negative mg/dL    Protein Negative Negative mg/dL    Bilirubin Negative Negative    Urobilinogen, Urine 0.2 Negative    Nitrite Negative Negative    Leukocyte Esterase Negative Negative    Occult Blood Moderate (A) Negative    Micro Urine Req Microscopic     Culture Indicated No UA Culture   CMP   Result Value Ref Range    Sodium 136 135 - 145 mmol/L    Potassium 4.1 3.6 - 5.5 mmol/L    Chloride 101 96 - 112 mmol/L    Co2 24 20 - 33 mmol/L    Anion Gap 11.0 0.0 - 11.9    Glucose 97 65 - 99 mg/dL    Bun 26 (H) 8 - 22 mg/dL    Creatinine 2.04 (H) 0.50 - 1.40 mg/dL    Calcium 9.4 8.5 - 10.5 mg/dL    AST(SGOT) 33 12 - 45 U/L    ALT(SGPT) 42 2 - 50 U/L    Alkaline Phosphatase 134 (H) 30 - 99 U/L    Total Bilirubin 0.8 0.1 - 1.5 mg/dL    Albumin 4.1 3.2 - 4.9 g/dL    Total Protein 7.0 6.0 - 8.2 g/dL    Globulin 2.9 1.9 - 3.5 g/dL    A-G Ratio 1.4 g/dL   URINE MICROSCOPIC (W/UA)   Result Value Ref Range    WBC 0-2 (A) /hpf    RBC 2-5 (A) /hpf    Bacteria Negative None /hpf    Epithelial Cells Negative /hpf    Hyaline Cast 0-2 /lpf    ESTIMATED GFR   Result Value Ref Range    GFR If  39 (A) >60 mL/min/1.73 m 2    GFR If Non  32 (A) >60 mL/min/1.73 m 2      All labs reviewed by me.    RADIOLOGY  CT-RENAL COLIC EVALUATION(A/P W/O)   Final Result      1.  Moderate hydroureteronephrosis to the level of the bladder. Bladder distention. Findings are suggestive of bladder outlet obstruction, possibly related to prostate enlargement.      2.  Cholelithiasis.      3.  Diverticulosis.      4.  Atherosclerosis.        The radiologist's interpretation of all radiological studies have been reviewed by me.    COURSE & MEDICAL DECISION MAKING  Pertinent Labs & Imaging studies reviewed. (See chart for details)    Reviewed patient's old chart from baseline labs.    9:12 PM Patient seen and examined at bedside. The patient presents with right flank pain, and the differential diagnosis includes but is not limited to renal colic, pyelonephritis, urinary retention, colitis, musculoskeletal pain, shingles. Ordered for urine microscopic, CBC, GFR, urinalysis, CMP, CT Renal to evaluate. Patient was resuscitated with IV fluids for dehydration.     10:42 PM Recheck: Patient re-evaluated at beside. The patient was updated of his lab results. Noyola catheter will be placed.       History of present urinary retention with hydronephrosis and hydroureter.  His bladder distention.  Urinalysis is negative.  He has an elevated BUN/creatinine suggestive of acute kidney injury.  Urinalysis does not show signs of acute UTI is not certain on antibiotics.  He is updated to the plan.  Started on IV fluids and will be admitted for further workup and treatment.    Care transferred to the hospitalist.    FINAL IMPRESSION  1. Urinary retention    2. Other hydronephrosis    3. Acute kidney injury (CMS-[facility])          I, Luis Min (Scrmallika), am scribing for, and in the presence of, Saravanan Camarillo M.D..    Electronically signed by: Luis Min (Mariano),  11/10/2017    ISaravanan M.D. personally performed the services described in this documentation, as scribed by Luis Min in my presence, and it is both accurate and complete.    The note accurately reflects work and decisions made by me.  Saravanan Camarillo  11/10/2017  11:23 PM

## 2017-11-11 NOTE — ED NOTES
Pt aox4, c/o right flank pain x 5 days worsening yesterday. Pt denies n/v, abdominal pain, cp, so at time of assessment. Pt reports normal Bms and urinary frequency r/t bph. Pt sitting comfortably in a chair at bedside.

## 2017-11-11 NOTE — PROGRESS NOTES
Pt c/o slight discomfort and distention in the bladder area despite emptying 1800 ml from the Noyola. Urine output is still bloody with clots. Ordered the bladder scan. Notified Dr. Mccall.

## 2017-11-11 NOTE — ASSESSMENT & PLAN NOTE
Noyola placed with hematuria on lovenox weight based  Creatinine improving, will follow labs  No anemia present  Will call urology in AM

## 2017-11-11 NOTE — PROGRESS NOTES
Pt VS still unstable with HR at 128.     Upon palpating pt's pulse- this RN felt an irregular beat.    Hospitals paged- STAT EKG ordered

## 2017-11-11 NOTE — ED NOTES
Ambulatory to triage with .   Chief Complaint   Patient presents with   • Flank Pain     Right flank pain, seen by PCP on Tuesday and given oral abx. Seen again on Wed by PCP and oral abx changes. States symptoms are worsening.    oral temp at home of 101.

## 2017-11-11 NOTE — ASSESSMENT & PLAN NOTE
Improved with savage catheter  Will discontinue savage today  Will need follow up for creatinine monitoring

## 2017-11-11 NOTE — PROGRESS NOTES
Patient states that in ED he was told he will have the urology consult his case. Patient still has bloody output and discomfort in the lower abdomen. Pt also states that he had difficulty with passing a BM due to pain.   Spoke to Dr. Mccall concerning this. Per Dr. Mccall, patient's cardiac condition needs to be stabilized first and then possibly see urology in the outpatient setting.

## 2017-11-11 NOTE — PROGRESS NOTES
Notified by RN that patient developed new tachycardia since admission, EKG showed flutter waves and rates in the 120s sustained.  Will transfer to telemetry, give one time weight based diltiazem, start on BB, and lovenox.

## 2017-11-11 NOTE — CARE PLAN
Problem: Communication  Goal: The ability to communicate needs accurately and effectively will improve  Outcome: PROGRESSING AS EXPECTED    Intervention: [place] patient and significant other/support system to call light to alert staff of needs  Patient oriented to surroundings and unit policies/routines.  Educated and understands the use of the call button.  Patient calls appropriately.      Problem: Infection  Goal: Will remain free from infection  Outcome: PROGRESSING AS EXPECTED    Intervention: Implement standard precautions and perform hand washing before and after patient contact  Patient educated and understands the importance of proper hand hygiene for himself, his visitors, and his care team.

## 2017-11-11 NOTE — PROGRESS NOTES
STAT EKG showed Atrial Fib/Flutter    Hospitalist paged again with rhythm updates- transfer orders put in.     Report given to TELE 7 RN, RN and tech to floor and transferred pt in his bed. Belongings at bedside

## 2017-11-11 NOTE — H&P
Hospital Medicine History and Physical    Date of Service  11/10/2017    Chief Complaint  Chief Complaint   Patient presents with   • Flank Pain     Right flank pain, seen by PCP on Tuesday and given oral abx. Seen again on Wed by PCP and oral abx changes. States symptoms are worsening.        History of Presenting Illness  72 y.o. male who presented on 11/10/2017 with Right lower back pain. The patient reports that he has a history of BPH and was followed by urology in [place] but has not yet established care with a specialist here in [facility]. Approximately one week ago, he began to have right sided flank pain. This is associated with subjective fevers, chills, and vomiting. He was seen by his primary care physician and was started on antibiotic therapy for presumed urinary tract infection. Despite this, the patient did not improve. His pain worsened overnight and became quite sharp. Intensity was an 8 out of 10. He has had occasional hematuria with greater difficulty passing urine and does not feel like he empties his bladder completely. He's also had a headache and poor appetite. Upon assessment in the ER, radiologic imaging shows hydronephrosis and there is laboratory evidence of acute kidney injury.      Primary Care Physician  Hellen Ayala M.D.    Consultants  None    Code Status  Full    Review of Systems  Review of Systems   Constitutional: Positive for chills, fever and malaise/fatigue.   HENT: Negative for congestion and sore throat.    Eyes: Negative for photophobia.   Respiratory: Negative for cough, shortness of breath and wheezing.    Cardiovascular: Negative for chest pain and palpitations.   Gastrointestinal: Positive for vomiting. Negative for abdominal pain, diarrhea and nausea.   Genitourinary: Negative for dysuria.   Musculoskeletal: Positive for back pain. Negative for myalgias.   Skin: Negative.    Neurological: Negative for dizziness, tingling, focal weakness and headaches.    Psychiatric/Behavioral: Negative for depression and suicidal ideas.        Past Medical History  Past Medical History:   Diagnosis Date   • Arrhythmia    • OA (osteoarthritis) of knee     bilateral    • Prostate enlargement        Surgical History  No past surgical history on file.    Medications  No current facility-administered medications on file prior to encounter.      Current Outpatient Prescriptions on File Prior to Encounter   Medication Sig Dispense Refill   • tamsulosin (FLOMAX) 0.4 MG capsule Take 0.4 mg by mouth ONE-HALF HOUR AFTER BREAKFAST.     • magnesium oxide (MAG-OX) 400 MG Tab Take 400 mg by mouth every day.     • ciprofloxacin (CIPRO) 500 MG Tab Take 1 Tab by mouth 2 times a day. 28 Tab 0       Family History  Family History   Problem Relation Age of Onset   • Cancer Father    • Cancer Brother 50     prostate cancer       Social History  Social History   Substance Use Topics   • Smoking status: Never Smoker   • Smokeless tobacco: Never Used   • Alcohol use 4.2 oz/week     7 Shots of liquor per week      Comment: A drink per night.        Allergies  Allergies   Allergen Reactions   • Penicillins Rash     Rash as a child.         Physical Exam  Laboratory   Hemodynamics  Temp (24hrs), Av.6 °C (97.9 °F), Min:36.6 °C (97.8 °F), Max:36.7 °C (98 °F)   Temperature: 36.6 °C (97.8 °F)  Pulse  Av.5  Min: 75  Max: 80    Blood Pressure : 122/97      Respiratory      Respiration: 16, Pulse Oximetry: 95 %             Physical Exam   Constitutional: He is oriented to person, place, and time. No distress.   HENT:   Head: Normocephalic and atraumatic.   Right Ear: External ear normal.   Left Ear: External ear normal.   Eyes: EOM are normal. Right eye exhibits no discharge. Left eye exhibits no discharge.   Neck: Neck supple. No JVD present.   Cardiovascular: Normal rate, regular rhythm and normal heart sounds.    Pulmonary/Chest: Effort normal and breath sounds normal. No respiratory distress. He  exhibits no tenderness.   Abdominal: Soft. Bowel sounds are normal. He exhibits no distension. There is no tenderness.   Musculoskeletal: He exhibits no edema.   Neurological: He is alert and oriented to person, place, and time. No cranial nerve deficit.   Skin: Skin is dry. He is not diaphoretic. No erythema.   Psychiatric: He has a normal mood and affect. His behavior is normal.   Nursing note and vitals reviewed.      Recent Labs      11/10/17   1737   WBC  8.9   RBC  5.06   HEMOGLOBIN  16.3   HEMATOCRIT  47.2   MCV  93.3   MCH  32.2   MCHC  34.5   RDW  43.6   PLATELETCT  163*   MPV  10.9     Recent Labs      11/10/17   1737   SODIUM  136   POTASSIUM  4.1   CHLORIDE  101   CO2  24   GLUCOSE  97   BUN  26*   CREATININE  2.04*   CALCIUM  9.4     Recent Labs      11/10/17   1737   ALTSGPT  42   ASTSGOT  33   ALKPHOSPHAT  134*   TBILIRUBIN  0.8   GLUCOSE  97                 No results found for: TROPONINI    Imaging  Ct-renal Colic Evaluation(a/p W/o)    Result Date: 11/10/2017  11/10/2017 9:44 PM HISTORY/REASON FOR EXAM:  FLANK PAIN. Right flank pain TECHNIQUE/EXAM DESCRIPTION AND NUMBER OF VIEWS: CT scan renal/colic without contrast. 5 mm helical images of the abdomen and pelvis were obtained from the diaphragmatic domes through the pubic symphysis. Low dose optimization technique was utilized for this CT exam including automated exposure control and adjustment of the mA and/or kV according to patient size. COMPARISON: None. FINDINGS: The visualized lung bases are unremarkable. The unopacified liver, spleen, adrenal glands, and pancreas are unremarkable. There is moderate bilateral hydroureteronephrosis, right slightly greater then left. A hypodense lesion in the lower pole the left kidney is consistent with a simple cyst. The ureters are dilated to the level of the bladder. No renal or ureteral calculi are identified. There are several layering calculi in the gallbladder. There are scattered diverticula in the  descending and sigmoid colon. The appendix is normal in appearance. There are scattered arterial calcifications. The bladder is distended. The prostate gland appears enlarged. No significant free fluid is identified. No adenopathy. Degenerative changes are in the spine and acromioclavicular joints.     1.  Moderate hydroureteronephrosis to the level of the bladder. Bladder distention. Findings are suggestive of bladder outlet obstruction, possibly related to prostate enlargement. 2.  Cholelithiasis. 3.  Diverticulosis. 4.  Atherosclerosis.     Assessment/Plan     Anticipate that patient will needGreater than 2 midnights for management of the discussed medical issues.    This dictation was created using voice recognition software. The accuracy of the dictation is limited to the abilities of the software. I expect there may be some errors of grammar and possibly content.    * Obstructive uropathy- (present on admission)   Assessment & Plan    Patient has a known history of BPH and is on Flomax at home. He has been followed by a urologist in [facility] but has not yet established care with one in [place]. CT imaging is consistent with postobstructive uropathy. Patient is feeling improved since the Noyola catheter was placed although he does now have some discomfort in the penis itself. I will continue to monitor urine output with Noyola catheter in place. I will also continue his home Flomax. Patient will be receiving gentle fluids overnight. We will plan to discuss this patient with urology in the morning, patient may potentially be able to go home with a Noyola catheter in place for urology follow-up if his renal function improves. I will continue to offer him symptomatic management for pain and nausea.        KAILEE (acute kidney injury) (CMS-HCC)- (present on admission)   Assessment & Plan    CT imaging is consistent with postobstructive uropathy. Review of the medical records show only minimal decrease in function on a  recent chemistry from last month and no indication of chronic disease. However, I will check urine electrolytes for completeness. His urinalysis is negative and I have low suspicion for active infection. Noyola catheter has been placed and I expect improvement as the obstruction has now been alleviated. I will continue him on IV fluids overnight and repeat renal function in the morning. I will avoid nephrotoxic medications for now.          Prophylaxis: Sequential compression devices for DVT prophylaxis, no PPI indicated, stool softeners ordered

## 2017-11-11 NOTE — ED NOTES
Med rec completed by interviewing patient at bedside  Allergies reviewed and updated  No ABX within last 30 days

## 2017-11-11 NOTE — PROGRESS NOTES
Pt states there is intense intermediate pain in the bladder area like if if he tries to urinate but it's painful. Bloody with clots discharge still. No pain meds besides Tylenol. Pt insists on re-addressing this concern with MD. Ned qiu.

## 2017-11-11 NOTE — PROGRESS NOTES
Received bedside report from PM nurse. Assumed patient care. Chart reviewed. Pt was resting in bed. A&O x 4. Noyola in place draining to gravity with red with clots output. Patient denies pain. Assisted to bathroom for a BM. POC updated with pt and on the patient communication board. Bed locked and in the lowest position. Call light within reach. Tele box on. Will continue to monitor.

## 2017-11-12 ENCOUNTER — PATIENT OUTREACH (OUTPATIENT)
Dept: HEALTH INFORMATION MANAGEMENT | Facility: OTHER | Age: 72
End: 2017-11-12

## 2017-11-12 PROBLEM — N13.9 OBSTRUCTIVE UROPATHY: Status: RESOLVED | Noted: 2017-11-10 | Resolved: 2017-11-12

## 2017-11-12 LAB
BASOPHILS # BLD AUTO: 0.2 % (ref 0–1.8)
BASOPHILS # BLD AUTO: 0.9 % (ref 0–1.8)
BASOPHILS # BLD: 0.03 K/UL (ref 0–0.12)
BASOPHILS # BLD: 0.12 K/UL (ref 0–0.12)
EOSINOPHIL # BLD AUTO: 0.24 K/UL (ref 0–0.51)
EOSINOPHIL # BLD AUTO: 0.41 K/UL (ref 0–0.51)
EOSINOPHIL NFR BLD: 1.8 % (ref 0–6.9)
EOSINOPHIL NFR BLD: 3.2 % (ref 0–6.9)
ERYTHROCYTE [DISTWIDTH] IN BLOOD BY AUTOMATED COUNT: 41.8 FL (ref 35.9–50)
ERYTHROCYTE [DISTWIDTH] IN BLOOD BY AUTOMATED COUNT: 42.8 FL (ref 35.9–50)
HCT VFR BLD AUTO: 41.9 % (ref 42–52)
HCT VFR BLD AUTO: 41.9 % (ref 42–52)
HGB BLD-MCNC: 14.5 G/DL (ref 14–18)
HGB BLD-MCNC: 14.9 G/DL (ref 14–18)
IMM GRANULOCYTES # BLD AUTO: 0.04 K/UL (ref 0–0.11)
IMM GRANULOCYTES NFR BLD AUTO: 0.3 % (ref 0–0.9)
LYMPHOCYTES # BLD AUTO: 2.01 K/UL (ref 1–4.8)
LYMPHOCYTES # BLD AUTO: 2.08 K/UL (ref 1–4.8)
LYMPHOCYTES NFR BLD: 14.9 % (ref 22–41)
LYMPHOCYTES NFR BLD: 16.4 % (ref 22–41)
MANUAL DIFF BLD: NORMAL
MCH RBC QN AUTO: 31.9 PG (ref 27–33)
MCH RBC QN AUTO: 32.7 PG (ref 27–33)
MCHC RBC AUTO-ENTMCNC: 34.6 G/DL (ref 33.7–35.3)
MCHC RBC AUTO-ENTMCNC: 35.6 G/DL (ref 33.7–35.3)
MCV RBC AUTO: 92.1 FL (ref 81.4–97.8)
MCV RBC AUTO: 92.3 FL (ref 81.4–97.8)
MONOCYTES # BLD AUTO: 1.3 K/UL (ref 0–0.85)
MONOCYTES # BLD AUTO: 1.7 K/UL (ref 0–0.85)
MONOCYTES NFR BLD AUTO: 13.4 % (ref 0–13.4)
MONOCYTES NFR BLD AUTO: 9.6 % (ref 0–13.4)
MORPHOLOGY BLD-IMP: NORMAL
NEUTROPHILS # BLD AUTO: 8.39 K/UL (ref 1.82–7.42)
NEUTROPHILS # BLD AUTO: 9.83 K/UL (ref 1.82–7.42)
NEUTROPHILS NFR BLD: 66.5 % (ref 44–72)
NEUTROPHILS NFR BLD: 72.8 % (ref 44–72)
NRBC # BLD AUTO: 0 K/UL
NRBC # BLD AUTO: 0 K/UL
NRBC BLD AUTO-RTO: 0 /100 WBC
NRBC BLD AUTO-RTO: 0 /100 WBC
PLATELET # BLD AUTO: 163 K/UL (ref 164–446)
PLATELET # BLD AUTO: 184 K/UL (ref 164–446)
PLATELET BLD QL SMEAR: NORMAL
PMV BLD AUTO: 10.7 FL (ref 9–12.9)
PMV BLD AUTO: 10.8 FL (ref 9–12.9)
RBC # BLD AUTO: 4.54 M/UL (ref 4.7–6.1)
RBC # BLD AUTO: 4.55 M/UL (ref 4.7–6.1)
RBC BLD AUTO: PRESENT
SMUDGE CELLS BLD QL SMEAR: NORMAL
VARIANT LYMPHS BLD QL SMEAR: NORMAL
WBC # BLD AUTO: 12.7 K/UL (ref 4.8–10.8)
WBC # BLD AUTO: 13.5 K/UL (ref 4.8–10.8)

## 2017-11-12 PROCEDURE — 99232 SBSQ HOSP IP/OBS MODERATE 35: CPT | Performed by: INTERNAL MEDICINE

## 2017-11-12 PROCEDURE — 85027 COMPLETE CBC AUTOMATED: CPT

## 2017-11-12 PROCEDURE — 51798 US URINE CAPACITY MEASURE: CPT

## 2017-11-12 PROCEDURE — A4338 INDWELLING CATHETER LATEX: HCPCS | Performed by: INTERNAL MEDICINE

## 2017-11-12 PROCEDURE — A9270 NON-COVERED ITEM OR SERVICE: HCPCS | Performed by: HOSPITALIST

## 2017-11-12 PROCEDURE — 36415 COLL VENOUS BLD VENIPUNCTURE: CPT

## 2017-11-12 PROCEDURE — 90670 PCV13 VACCINE IM: CPT | Performed by: INTERNAL MEDICINE

## 2017-11-12 PROCEDURE — 700102 HCHG RX REV CODE 250 W/ 637 OVERRIDE(OP): Performed by: HOSPITALIST

## 2017-11-12 PROCEDURE — 770020 HCHG ROOM/CARE - TELE (206)

## 2017-11-12 PROCEDURE — 3E0234Z INTRODUCTION OF SERUM, TOXOID AND VACCINE INTO MUSCLE, PERCUTANEOUS APPROACH: ICD-10-PCS | Performed by: INTERNAL MEDICINE

## 2017-11-12 PROCEDURE — 85025 COMPLETE CBC W/AUTO DIFF WBC: CPT

## 2017-11-12 PROCEDURE — 700111 HCHG RX REV CODE 636 W/ 250 OVERRIDE (IP): Performed by: INTERNAL MEDICINE

## 2017-11-12 PROCEDURE — 85007 BL SMEAR W/DIFF WBC COUNT: CPT

## 2017-11-12 PROCEDURE — 90471 IMMUNIZATION ADMIN: CPT

## 2017-11-12 RX ADMIN — METOPROLOL TARTRATE 25 MG: 25 TABLET ORAL at 09:04

## 2017-11-12 RX ADMIN — PNEUMOCOCCAL 13-VALENT CONJUGATE VACCINE 0.5 ML: 2.2; 2.2; 2.2; 2.2; 2.2; 4.4; 2.2; 2.2; 2.2; 2.2; 2.2; 2.2; 2.2 INJECTION, SUSPENSION INTRAMUSCULAR at 03:04

## 2017-11-12 RX ADMIN — STANDARDIZED SENNA CONCENTRATE AND DOCUSATE SODIUM 2 TABLET: 8.6; 5 TABLET, FILM COATED ORAL at 09:04

## 2017-11-12 RX ADMIN — METOPROLOL TARTRATE 25 MG: 25 TABLET ORAL at 20:34

## 2017-11-12 RX ADMIN — ACETAMINOPHEN 650 MG: 325 TABLET, FILM COATED ORAL at 20:34

## 2017-11-12 RX ADMIN — TAMSULOSIN HYDROCHLORIDE 0.4 MG: 0.4 CAPSULE ORAL at 09:04

## 2017-11-12 ASSESSMENT — PAIN SCALES - GENERAL
PAINLEVEL_OUTOF10: 0

## 2017-11-12 ASSESSMENT — ENCOUNTER SYMPTOMS
SHORTNESS OF BREATH: 0
HEADACHES: 0
DIZZINESS: 0
HEMOPTYSIS: 0
ORTHOPNEA: 0
SPUTUM PRODUCTION: 0
SORE THROAT: 0
PALPITATIONS: 0
CLAUDICATION: 0
HEARTBURN: 0
PND: 0
ABDOMINAL PAIN: 0
WHEEZING: 0
NAUSEA: 0
BRUISES/BLEEDS EASILY: 0
FEVER: 0
COUGH: 0
CONSTIPATION: 0

## 2017-11-12 ASSESSMENT — LIFESTYLE VARIABLES: DO YOU DRINK ALCOHOL: NO

## 2017-11-12 NOTE — PROGRESS NOTES
[facility] Hospitalist Progress Note    Date of Service: 2017    Chief Complaint  72 y.o. male admitted 11/10/2017 with obstructive uropathy and inability to void.    Interval Problem Update  Renal function is better with savage in place  Hematuria is now resolved today  Urology has consulted and agrees to try voiding trials    Consultants/Specialty  Cardiology    Disposition  home        Review of Systems   Constitutional: Negative for fever.   HENT: Negative for congestion and sore throat.    Respiratory: Negative for cough and wheezing.    Cardiovascular: Negative for chest pain and leg swelling.   Gastrointestinal: Negative for abdominal pain, constipation, heartburn and nausea.   Genitourinary: Negative for dysuria and hematuria.   Skin: Negative for itching.   Neurological: Negative for dizziness and headaches.   Endo/Heme/Allergies: Does not bruise/bleed easily.      Physical Exam  Laboratory/Imaging   Hemodynamics  Temp (24hrs), Av.6 °C (97.9 °F), Min:36.4 °C (97.6 °F), Max:36.9 °C (98.5 °F)   Temperature: 36.7 °C (98 °F)  Pulse  Av.1  Min: 66  Max: 128    Blood Pressure : 141/90      Respiratory      Respiration: 17, Pulse Oximetry: 96 %        RUL Breath Sounds: Clear, RML Breath Sounds: Clear, RLL Breath Sounds: Clear, SCOTTY Breath Sounds: Clear, LLL Breath Sounds: Clear    Fluids    Intake/Output Summary (Last 24 hours) at 17 1545  Last data filed at 17 1251   Gross per 24 hour   Intake             2490 ml   Output             2950 ml   Net             -460 ml       Nutrition  Orders Placed This Encounter   Procedures   • Diet Order     Standing Status:   Standing     Number of Occurrences:   1     Order Specific Question:   Diet:     Answer:   Regular [1]     Physical Exam   Constitutional: He is oriented to person, place, and time. No distress.   HENT:   Mouth/Throat: Oropharynx is clear and moist.   Eyes: Conjunctivae are normal. No scleral icterus.   Neck: Neck supple.    Cardiovascular: Normal rate and intact distal pulses.  Exam reveals gallop and S3.    No murmur heard.  Pulmonary/Chest: No stridor. No respiratory distress. He has no wheezes. He has no rales.   Abdominal: Soft. Bowel sounds are normal. There is no tenderness. There is no rebound.   Musculoskeletal: He exhibits no edema.   Neurological: He is alert and oriented to person, place, and time. Coordination normal.   Skin: Skin is warm and dry. No rash noted. He is not diaphoretic. No erythema.   Psychiatric: His behavior is normal.   Nursing note and vitals reviewed.      Recent Labs      11/11/17   0357  11/11/17   1620  11/12/17   0350   WBC  6.9  8.6  12.7*   RBC  4.80  4.62*  4.54*   HEMOGLOBIN  15.4  14.7  14.5   HEMATOCRIT  44.8  42.8  41.9*   MCV  93.3  92.6  92.3   MCH  32.1  31.8  31.9   MCHC  34.4  34.3  34.6   RDW  43.4  42.8  42.8   PLATELETCT  164  175  163*   MPV  10.7  10.7  10.8     Recent Labs      11/10/17   1737  11/11/17   0357   SODIUM  136  140   POTASSIUM  4.1  4.0   CHLORIDE  101  105   CO2  24  24   GLUCOSE  97  86   BUN  26*  23*   CREATININE  2.04*  1.69*   CALCIUM  9.4  9.1                      Assessment/Plan     Atrial flutter (CMS-HCC)   Assessment & Plan    Continue metoprolol, rate well controlled on this  Will restart xarelto as hematuria now resolved        KAILEE (acute kidney injury) (CMS-HCC)- (present on admission)   Assessment & Plan    Improved with savage catheter  Will discontinue savage today  Will need follow up for creatinine monitoring            Reviewed items::  Labs reviewed and Medications reviewed  Savage catheter::  Urinary Tract Retention or Urinary Tract Obstruction  DVT prophylaxis pharmacological::  Contraindicated - High bleeding risk  DVT prophylaxis - mechanical:  SCDs  Ulcer Prophylaxis::  Not indicated      Hematuria has worsened today, DR. Cr will see the patient, will order a renal ultrasound and start continuous bladder irrigation and check hemoglobin q 12  hours

## 2017-11-12 NOTE — PROGRESS NOTES
Discontinued savage per active order. Output of 1200 noted. Balloon intact with 6 ml residual. Pt complaints of slight discomfort with removal, urinal bedside. Pt educated to notify nurse when urinating.

## 2017-11-12 NOTE — PROGRESS NOTES
Cardiology Progress Note               Author: Leonie Prescott Date & Time created: 2017  10:42 AM     Interval History:  72 years old male presented with obstructive uropathy, has had some hematuria. Never seen a cardiologist, denies any hypertension, diabetes, or CAD.     Consultation: atrial flutter    17  /86  HR SR  66-83       Labs reviewed    Echocardiography Laboratory  2017  No prior study is available for comparison.   Normal left ventricular chamber size.  Normal left ventricular wall thickness.  Normal left ventricular systolic function.  Left ventricular ejection fraction is visually estimated to be 65%.  Trace mitral regurgitation.  Trace tricuspid regurgitation.      Review of Systems   Respiratory: Negative for cough, hemoptysis, sputum production, shortness of breath and wheezing.    Cardiovascular: Negative for chest pain, palpitations, orthopnea, claudication, leg swelling and PND.   Genitourinary: Positive for hematuria (clearing up).   Neurological: Negative for dizziness.       Physical Exam   Constitutional: He is oriented to person, place, and time. He appears well-developed and well-nourished.   HENT:   Head: Normocephalic.   Neck: Normal range of motion. No JVD present.   Cardiovascular: Normal rate, regular rhythm and normal heart sounds.    No murmur heard.  Pulmonary/Chest: Effort normal and breath sounds normal. No respiratory distress. He has no wheezes.   Abdominal: Bowel sounds are normal.   Genitourinary:   Genitourinary Comments: Noyola in place     Musculoskeletal: He exhibits no edema or tenderness.   Neurological: He is alert and oriented to person, place, and time.   Skin: Skin is warm and dry.   Psychiatric: His behavior is normal. Judgment normal.   Nursing note and vitals reviewed.      Hemodynamics:  Temp (24hrs), Av.6 °C (97.9 °F), Min:36.4 °C (97.6 °F), Max:36.9 °C (98.5 °F)  Temperature: 36.7 °C (98 °F)  Pulse  Av.6  Min: 66  Max: 128    Blood Pressure : 142/86     Respiratory:    Respiration: 17, Pulse Oximetry: 96 %        RUL Breath Sounds: Clear, RML Breath Sounds: Clear, RLL Breath Sounds: Clear, SCOTTY Breath Sounds: Clear, LLL Breath Sounds: Clear  Fluids:     Weight: 89.7 kg (197 lb 12 oz)  GI/Nutrition:  Orders Placed This Encounter   Procedures   • Diet Order     Standing Status:   Standing     Number of Occurrences:   1     Order Specific Question:   Diet:     Answer:   Regular [1]     Lab Results:  Recent Labs      11/11/17   0357  11/11/17   1620  11/12/17   0350   WBC  6.9  8.6  12.7*   RBC  4.80  4.62*  4.54*   HEMOGLOBIN  15.4  14.7  14.5   HEMATOCRIT  44.8  42.8  41.9*   MCV  93.3  92.6  92.3   MCH  32.1  31.8  31.9   MCHC  34.4  34.3  34.6   RDW  43.4  42.8  42.8   PLATELETCT  164  175  163*   MPV  10.7  10.7  10.8     Recent Labs      11/10/17   1737  11/11/17   0357   SODIUM  136  140   POTASSIUM  4.1  4.0   CHLORIDE  101  105   CO2  24  24   GLUCOSE  97  86   BUN  26*  23*   CREATININE  2.04*  1.69*   CALCIUM  9.4  9.1                         Medical Decision Making, by Problem:  Active Hospital Problems    Diagnosis   • *Obstructive uropathy [N13.9]   • Atrial flutter (CMS-[facility]) [I48.92]   • KAILEE (acute kidney injury) (CMS-HCC) [N17.9]       Plan:  1. Atrial flutter  - currently in NSR  - rate controlled on metoprolol 25mg BID  - will defer to primary team to start anticoagulation when hematuria resolves    Follow up as outpatient, cardiology will sign off. Please call us with any questions.    Future Appointments  Date Time Provider Department Center   11/27/2017 2:00 PM Hellen Ayala M.D. [facility] None         Quality-Core Measures

## 2017-11-12 NOTE — CARE PLAN
Problem: Safety  Goal: Will remain free from injury  Outcome: PROGRESSING AS EXPECTED  Bed locked in low position, call light in reach, treaded socks on.\

## 2017-11-12 NOTE — PROGRESS NOTES
Received bedside report from night shift nurse. Pt resting in bed, no complaints of pain at this time. Bed in lowest position. Call light within reach. Will continue to monitor.

## 2017-11-12 NOTE — PROGRESS NOTES
Pt continues to rest in bed, daughter at bedside. No complaints of pain or any distress noted at this time. Bed in lowest position. Call light within reach. Will continue to monitor.

## 2017-11-12 NOTE — CARE PLAN
Problem: Urinary Elimination:  Goal: Ability to reestablish a normal urinary elimination pattern will improve  Outcome: PROGRESSING AS EXPECTED  Understands to call if retention assumed, savage catheter patency maintained, cath care completed.

## 2017-11-12 NOTE — PROGRESS NOTES
"S: Pt with no changes overnight.  Urine clearing.    O: Blood pressure 141/90, pulse 70, temperature 36.7 °C (98 °F), resp. rate 17, height 1.88 m (6' 2\"), weight 89.7 kg (197 lb 12 oz), SpO2 96 %.    Intake/Output Summary (Last 24 hours) at 11/12/17 1318  Last data filed at 11/12/17 1251   Gross per 24 hour   Intake             2250 ml   Output             2950 ml   Net             -700 ml     Pt alert, NAD  abd soft, NT/ND  Urine clear    Recent Labs      11/11/17   0357  11/11/17   1620  11/12/17   0350   WBC  6.9  8.6  12.7*   RBC  4.80  4.62*  4.54*   HEMOGLOBIN  15.4  14.7  14.5   HEMATOCRIT  44.8  42.8  41.9*   MCV  93.3  92.6  92.3   MCH  32.1  31.8  31.9   MCHC  34.4  34.3  34.6   RDW  43.4  42.8  42.8   PLATELETCT  164  175  163*   MPV  10.7  10.7  10.8     Recent Labs      11/10/17   1737  11/11/17   0357   SODIUM  136  140   POTASSIUM  4.1  4.0   CHLORIDE  101  105   CO2  24  24   GLUCOSE  97  86   BUN  26*  23*   CREATININE  2.04*  1.69*   CALCIUM  9.4  9.1       Assessment:   73 yo M with BPH and urinary retention - recent gross hematuria - resolved now.    Plan:   1.  BPH/retention - cont Flomax, void trial  2.  Hydro - f/u 2 weeks with QUIRINO in office  3.  Hematuria - outpatient cysto  "

## 2017-11-12 NOTE — CARE PLAN
Problem: Pain Management  Goal: Pain level will decrease to patient's comfort goal  Outcome: PROGRESSING AS EXPECTED  Pt stated having no pain this morning.     Problem: Safety  Goal: Will remain free from falls  Outcome: PROGRESSING AS EXPECTED  Pt educated on asking for assistance prior to getting up. Pt ambulated, gait is steady.

## 2017-11-12 NOTE — PROGRESS NOTES
Received bedside report from Flakita GAINES .  Pt is a/o x 4.  POC discussed w/ pt, verbalizes understanding,  Bed is locked, low, belongings in reach, call light in reach.  Of note is savage cath w/ hematuria , fluids enc and at bedside, resting and reading at present'

## 2017-11-12 NOTE — PROGRESS NOTES
.telemetry shift summary .18/.06/.36  SR  66-83   Has rested off and on, good fluid intake, urine now clear yellow in savage cath.  No complaints of pain, reading at present

## 2017-11-12 NOTE — PROGRESS NOTES
Urology at bedside. No orders for CBI at this time. Orders to monitor the output and for clots. Pt refused the belladonna suppository with urology consult as well.

## 2017-11-12 NOTE — PROGRESS NOTES
"Discussed with Dr. Mccall the bladder concerns. Per Dr. Mccall, start CBI and urology will consult in the am. Explained to the patient what CBI procedure is. Pt refuses, states \"I don't see that the benefit is higher than the risk. I would rather wait for urology to give me their opinion.\" Pt also refused Morphine and Pamela Payal suppository.   "

## 2017-11-12 NOTE — CONSULTS
DATE OF SERVICE:  11/11/2017    REQUESTING PHYSICIAN:  Jazmine Mccall MD    CONSULTING PHYSICIAN:  Cristofer Cr MD    CONSULTING SERVICE:  Urology.    HISTORY OF PRESENT ILLNESS:  The patient is a 72-year-old male with a history   of BPH and seen an urologist in [place], [place], but recently moved to [place].    He has had evaluation for elevated PSA and BPH several times in the past with   ultrasounds and cystoscopies.  He had been on Flomax in the past without much   improvement in his urinary symptoms.  Patient was admitted through the ER with   right flank pain and imaging showed bilateral hydronephrosis and a distended   bladder.  Patient had a Noyola catheter placed.  He had been on Flomax already   recently.  Patient states that his flank pain has improved since he has been   in the hospital.  Patient was on blood thinners for atrial fibrillation and   then developed gross hematuria with some clots today prompting urology   evaluation.  Patient denies any previous history of any gross hematuria and   has had no history of bladder cancer.  He has had cystoscopies in the past for   evaluation of his BPH.    PAST MEDICAL HISTORY:  Significant for BPH, osteoarthritis and a cardiac   arrhythmia.    PAST SURGICAL HISTORY:  None.    OUTPATIENT MEDICATIONS:  Flomax 0.4 mg daily.  He is also on Cipro recently   for his flank pain as well.    REVIEW OF SYSTEMS:  Patient denies any fevers, chills, chest pain, shortness   of breath, nausea, vomiting.  Does endorse right flank pain recently and   difficulty voiding, but no gross hematuria or dysuria or any bowel changes.    PHYSICAL EXAMINATION:  VITAL SIGNS:  Show a temperature 36.5, pulse 73, blood pressure 110/86,   respiratory rate 16 and saturations 95% on room air.  GENERAL:  Patient is alert, in no acute distress.  Breathing is nonlabored.    Pulse is regular.  ABDOMEN:  Soft, nontender, nondistended.  No CVA tenderness.  GENITOURINARY:  Patient has a Noyola catheter in  place draining medium pink   urine with no clots in it.  Patient moves all extremities normally.  NEUROLOGICAL:  Grossly intact.    LABORATORY DATA:  Show white blood cell count of 8.6, hemoglobin of 14.7,   hematocrit 42%, and platelets of 175.  Sodium is 140, potassium 4.0, chloride   105, bicarbonate 24, BUN 23 and creatinine 1.69 with a glucose of 86.    IMAGING:  Patient had a CT of the abdomen and pelvis without contrast showing   moderate bilateral hydroureteronephrosis all the way down to the bladder with   distended bladder and enlarged prostate.  This was prior to his Noyola catheter   placement.  Patient has just now had renal ultrasound to evaluate for the   hydronephrosis and for his bladder as well showed some moderate left   hydronephrosis, no hydro on the right side with a small amount of clot visible   inside the bladder with an enlarged prostate.    ASSESSMENT AND PLAN:  This is a 72-year-old male with gross hematuria after   starting anticoagulation with Noyola catheter in place, also with recent   urinary retention and bilateral hydroureteronephrosis, resolved on one side   with a Noyola catheter in place, but is still present on the contralateral   side.    RECOMMENDATIONS:  Continue the Noyola catheter at least overnight, irrigate as   needed for clots.  No need for continuous bladder irrigation at this point.    Patient will eventually need outpatient cystoscopy to rule out bladder cancer   and possibly renal scan to evaluate for the persistent hydronephrosis despite   the bladder being drained with a Noyola catheter.  This has all been discussed   with the patient as well as with his nurse.       ____________________________________     MD SELENE Alvarez / SILVIA    DD:  11/11/2017 18:26:26  DT:  11/11/2017 19:59:24    D#:  [id number]  Job#:  [id number]

## 2017-11-13 VITALS
HEART RATE: 44 BPM | SYSTOLIC BLOOD PRESSURE: 140 MMHG | HEIGHT: 74 IN | BODY MASS INDEX: 24.47 KG/M2 | TEMPERATURE: 97.6 F | DIASTOLIC BLOOD PRESSURE: 71 MMHG | WEIGHT: 190.7 LBS | RESPIRATION RATE: 18 BRPM | OXYGEN SATURATION: 90 %

## 2017-11-13 LAB
ANION GAP SERPL CALC-SCNC: 8 MMOL/L (ref 0–11.9)
APPEARANCE UR: ABNORMAL
BACTERIA #/AREA URNS HPF: NEGATIVE /HPF
BASOPHILS # BLD AUTO: 0.4 % (ref 0–1.8)
BASOPHILS # BLD: 0.05 K/UL (ref 0–0.12)
BILIRUB UR QL STRIP.AUTO: NEGATIVE
BUN SERPL-MCNC: 13 MG/DL (ref 8–22)
CALCIUM SERPL-MCNC: 8.5 MG/DL (ref 8.5–10.5)
CHLORIDE SERPL-SCNC: 101 MMOL/L (ref 96–112)
CO2 SERPL-SCNC: 23 MMOL/L (ref 20–33)
COLOR UR: ABNORMAL
CREAT SERPL-MCNC: 0.96 MG/DL (ref 0.5–1.4)
EOSINOPHIL # BLD AUTO: 0.24 K/UL (ref 0–0.51)
EOSINOPHIL NFR BLD: 1.9 % (ref 0–6.9)
EPI CELLS #/AREA URNS HPF: ABNORMAL /HPF
ERYTHROCYTE [DISTWIDTH] IN BLOOD BY AUTOMATED COUNT: 42.2 FL (ref 35.9–50)
GFR SERPL CREATININE-BSD FRML MDRD: >60 ML/MIN/1.73 M 2
GLUCOSE SERPL-MCNC: 96 MG/DL (ref 65–99)
GLUCOSE UR STRIP.AUTO-MCNC: NEGATIVE MG/DL
HCT VFR BLD AUTO: 41.2 % (ref 42–52)
HGB BLD-MCNC: 14.2 G/DL (ref 14–18)
IMM GRANULOCYTES # BLD AUTO: 0.08 K/UL (ref 0–0.11)
IMM GRANULOCYTES NFR BLD AUTO: 0.6 % (ref 0–0.9)
KETONES UR STRIP.AUTO-MCNC: NEGATIVE MG/DL
LEUKOCYTE ESTERASE UR QL STRIP.AUTO: ABNORMAL
LYMPHOCYTES # BLD AUTO: 2.05 K/UL (ref 1–4.8)
LYMPHOCYTES NFR BLD: 16.4 % (ref 22–41)
MCH RBC QN AUTO: 31.7 PG (ref 27–33)
MCHC RBC AUTO-ENTMCNC: 34.5 G/DL (ref 33.7–35.3)
MCV RBC AUTO: 92 FL (ref 81.4–97.8)
MICRO URNS: ABNORMAL
MONOCYTES # BLD AUTO: 1.78 K/UL (ref 0–0.85)
MONOCYTES NFR BLD AUTO: 14.2 % (ref 0–13.4)
NEUTROPHILS # BLD AUTO: 8.33 K/UL (ref 1.82–7.42)
NEUTROPHILS NFR BLD: 66.5 % (ref 44–72)
NITRITE UR QL STRIP.AUTO: NEGATIVE
NRBC # BLD AUTO: 0 K/UL
NRBC BLD AUTO-RTO: 0 /100 WBC
PH UR STRIP.AUTO: 6 [PH]
PLATELET # BLD AUTO: 176 K/UL (ref 164–446)
PMV BLD AUTO: 10.2 FL (ref 9–12.9)
POTASSIUM SERPL-SCNC: 3.7 MMOL/L (ref 3.6–5.5)
PROT UR QL STRIP: 300 MG/DL
RBC # BLD AUTO: 4.48 M/UL (ref 4.7–6.1)
RBC # URNS HPF: >150 /HPF
RBC UR QL AUTO: ABNORMAL
SODIUM SERPL-SCNC: 132 MMOL/L (ref 135–145)
SP GR UR STRIP.AUTO: 1.01
UROBILINOGEN UR STRIP.AUTO-MCNC: 0.2 MG/DL
WBC # BLD AUTO: 12.5 K/UL (ref 4.8–10.8)
WBC #/AREA URNS HPF: ABNORMAL /HPF

## 2017-11-13 PROCEDURE — 36415 COLL VENOUS BLD VENIPUNCTURE: CPT

## 2017-11-13 PROCEDURE — 99239 HOSP IP/OBS DSCHRG MGMT >30: CPT | Performed by: INTERNAL MEDICINE

## 2017-11-13 PROCEDURE — 700102 HCHG RX REV CODE 250 W/ 637 OVERRIDE(OP): Performed by: HOSPITALIST

## 2017-11-13 PROCEDURE — 85025 COMPLETE CBC W/AUTO DIFF WBC: CPT

## 2017-11-13 PROCEDURE — 306015 LOCK STAT FOLEY: Performed by: INTERNAL MEDICINE

## 2017-11-13 PROCEDURE — 81001 URINALYSIS AUTO W/SCOPE: CPT

## 2017-11-13 PROCEDURE — A9270 NON-COVERED ITEM OR SERVICE: HCPCS | Performed by: HOSPITALIST

## 2017-11-13 PROCEDURE — 80048 BASIC METABOLIC PNL TOTAL CA: CPT

## 2017-11-13 RX ADMIN — TAMSULOSIN HYDROCHLORIDE 0.4 MG: 0.4 CAPSULE ORAL at 08:27

## 2017-11-13 NOTE — DISCHARGE PLANNING
Medical Social Work    SW faxed Xarelto Rx to [facility]'s pharmacy to check for PA/coverage.

## 2017-11-13 NOTE — PROGRESS NOTES
Notified Dr. Mccall that coude catheter was d/c'ed today. Order to attempt to insert a Noyola of whatever size works.

## 2017-11-13 NOTE — PROGRESS NOTES
Patient discharged. Discharge instructions, personal belongings and prescriptions in possession of a patient. PIV and tele monitor removed.  Copy of discharge instructions in the patient chart, signed and reviewed. Patient verbalizes the understanding of the discharge instructions. Questions / concerns addressed prior to leaving the unit. Patient is instructed to follow up with PCP, urology and cardiology. Pt is discharged with a Noyola. Leg bag placed. Extra bags (LARGE, SMALL) given, stat locks and alcohol wipes provided. Thorough education given to patient and family on safety and how to clean it. Transported via walking, pt refused transport. Patient is discharged to home. Family is present.

## 2017-11-13 NOTE — DISCHARGE SUMMARY
CHIEF COMPLAINT ON ADMISSION  Chief Complaint   Patient presents with   • Flank Pain     Right flank pain, seen by PCP on Tuesday and given oral abx. Seen again on Wed by PCP and oral abx changes. States symptoms are worsening.        CODE STATUS  Full Code    HPI & HOSPITAL COURSE  This is a 72 y.o. male here with right flank pain and acute renal failure from urinary outlet obstruction. He was admitted and savage catheter was placed. His renal function improved to normal. He developed atrial flutter and cardiology was consulted on the case. He was started on xarelto for anticoagulation and had hematuria with this but it quickly resolved spontaneously. He continued to have bladder spasms but his right flank pain was much improved. He had voiding trials performed but required savage catheter reinsertion. The patient and his daughter were provided extensive education prior to discharge.    Therefore, he is discharged in good and stable condition with close outpatient follow-up.    SPECIFIC OUTPATIENT FOLLOW-UP  Urology Dr. Cr in 2 weeks  Cardiology Dr. Moore in 4 weeks    DISCHARGE PROBLEM LIST  Principal Problem (Resolved):    Obstructive uropathy POA: Yes  Active Problems:    KAILEE (acute kidney injury) (CMS-[facility]) POA: Yes    Atrial flutter (CMS-[facility]) POA: Unknown      FOLLOW UP  Future Appointments  Date Time Provider Department Center   11/27/2017 2:00 PM Hellen Ayala M.D. [facility] None     Hellen Ayala M.D.  [address]  [place] [address]  [phone]    Schedule an appointment as soon as possible for a visit in 1 week  Hospital follow-up appointment with PCP      MEDICATIONS ON DISCHARGE   Boo Cavanaugh   Home Medication Instructions BLANCA:[id number]    Printed on:11/13/17 0738   Medication Information                      magnesium oxide (MAG-OX) 400 MG Tab  Take 400 mg by mouth every day.             metoprolol (LOPRESSOR) 25 MG Tab  Take 1 Tab by mouth 2 Times a Day.             rivaroxaban (XARELTO) 20  MG Tab tablet  Take 1 Tab by mouth with dinner.             tamsulosin (FLOMAX) 0.4 MG capsule  Take 0.4 mg by mouth ONE-HALF HOUR AFTER BREAKFAST.                 DIET  Orders Placed This Encounter   Procedures   • Diet Order     Standing Status:   Standing     Number of Occurrences:   1     Order Specific Question:   Diet:     Answer:   Regular [1]       ACTIVITY  As tolerated.  Weight bearing as tolerated      CONSULTATIONS  Cardiology Dr. Mendoza  Urology Dr. Cr    PROCEDURES  Echocardiogram showed ejection fraction of 65% with no significant valvular abnormality    CT scan pelvis showed Moderate hydroureteronephrosis to the level of the bladder. Bladder distention. Findings are suggestive of bladder outlet obstruction, possibly related to prostate enlargement.    Renal ultrasound showed Moderate left hydronephrosis. No stone identified.with enlarged prostate.    LABORATORY  Lab Results   Component Value Date/Time    SODIUM 140 11/11/2017 03:57 AM    POTASSIUM 4.0 11/11/2017 03:57 AM    CHLORIDE 105 11/11/2017 03:57 AM    CO2 24 11/11/2017 03:57 AM    GLUCOSE 86 11/11/2017 03:57 AM    BUN 23 (H) 11/11/2017 03:57 AM    CREATININE 1.69 (H) 11/11/2017 03:57 AM        Lab Results   Component Value Date/Time    WBC 12.5 (H) 11/13/2017 04:06 AM    HEMOGLOBIN 14.2 11/13/2017 04:06 AM    HEMATOCRIT 41.2 (L) 11/13/2017 04:06 AM    PLATELETCT 176 11/13/2017 04:06 AM        Total time of the discharge process exceeds 45 minutes

## 2017-11-13 NOTE — PROGRESS NOTES
Received bedside report from PM nurse. Assumed patient care. Chart reviewed. Pt was resting in bed. A&O x 4.Noyola draining to gravity, urine yellow with red flecks. Patient states spasm pain in the flank area. POC updated with pt and on the patient communication board. Bed locked and in the lowest position. Call light within reach. Tele box on. Will continue to monitor.

## 2017-11-13 NOTE — PROGRESS NOTES
.telemetry shift summary .20/.10/.40  SR PVCs  62-90  Has rested well, good UOP, less red sediment noted.  Denies pain

## 2017-11-13 NOTE — PROGRESS NOTES
"Urology Progress Note      Overnight Events: None    S: No fevers, chills. Failed voiding trial yesterday and savage catheter placed. Having spasms right flank with deep breath.    O:   Blood pressure 140/71, pulse (!) 44, temperature 36.4 °C (97.6 °F), resp. rate 18, height 1.88 m (6' 2.02\"), weight 86.5 kg (190 lb 11.2 oz), SpO2 90 %.  Recent Labs      11/10/17   1737  11/11/17   0357  11/13/17   0806   SODIUM  136  140  132*   POTASSIUM  4.1  4.0  3.7   CHLORIDE  101  105  101   CO2  24  24  23   GLUCOSE  97  86  96   BUN  26*  23*  13   CREATININE  2.04*  1.69*  0.96   CALCIUM  9.4  9.1  8.5     Recent Labs      11/12/17   0350  11/12/17   1611  11/13/17   0406   WBC  12.7*  13.5*  12.5*   RBC  4.54*  4.55*  4.48*   HEMOGLOBIN  14.5  14.9  14.2   HEMATOCRIT  41.9*  41.9*  41.2*   MCV  92.3  92.1  92.0   MCH  31.9  32.7  31.7   MCHC  34.6  35.6*  34.5   RDW  42.8  41.8  42.2   PLATELETCT  163*  184  176   MPV  10.8  10.7  10.2         Intake/Output Summary (Last 24 hours) at 11/13/17 1145  Last data filed at 11/13/17 1000   Gross per 24 hour   Intake             1590 ml   Output             5620 ml   Net            -4030 ml       Exam:  Abdomen soft, benign.   Urine: pink with no clots      A/P:    Active Hospital Problems    Diagnosis   • Atrial flutter (CMS-[facility]) [I48.92]   • KAILEE (acute kidney injury) (CMS-[facility]) [N17.9]       Urology ok with DC home once cleared by medicine  Will go home with savage catheter and f/u with Dr Cr in 2 weeks for voiding trial and cystoscopy  Repeat renal US in 2 weeks  "

## 2017-11-13 NOTE — DIETARY
Nutrition Services:    Poor PO on Nutrition Admit Screen. Pt is currently on a regular diet and per chart pt PO % of meals. Ht: 188 cm, Wt: 86.5 kg, BMI 24.47.  Consult RD as needed. RD will re-screen weekly.      RD available prn

## 2017-11-13 NOTE — PROGRESS NOTES
Noyola Catheter is inserted per orders of Dr. Mccall. 18 Fr Coude. Inserted with difficulty. Dr. Mccall is aware. Discharge cancelled until tomorrow for education and ride accommodations.

## 2017-11-13 NOTE — DISCHARGE INSTRUCTIONS
Discharge Instructions    Discharged to home by car with relative. Discharged via wheelchair, hospital escort: Yes.  Special equipment needed: Not Applicable    Be sure to schedule a follow-up appointment with your primary care doctor or any specialists as instructed.     Discharge Plan:   Diet Plan: Discussed  Activity Level: Discussed  Confirmed Follow up Appointment: Appointment Scheduled  Confirmed Symptoms Management: Discussed  Medication Reconciliation Updated: Yes  Pneumococcal Vaccine Given - only chart on this line when given: Given (See MAR)  Influenza Vaccine Indication: Patient Refuses    I understand that a diet low in cholesterol, fat, and sodium is recommended for good health. Unless I have been given specific instructions below for another diet, I accept this instruction as my diet prescription.   Other diet: Regular    Special Instructions: None    · Is patient discharged on Warfarin / Coumadin?   No     · Is patient Post Blood Transfusion?  No    Depression / Suicide Risk    As you are discharged from this [facility] facility, it is important to learn how to keep safe from harming yourself.    Recognize the warning signs:  · Abrupt changes in personality, positive or negative- including increase in energy   · Giving away possessions  · Change in eating patterns- significant weight changes-  positive or negative  · Change in sleeping patterns- unable to sleep or sleeping all the time   · Unwillingness or inability to communicate  · Depression  · Unusual sadness, discouragement and loneliness  · Talk of wanting to die  · Neglect of personal appearance   · Rebelliousness- reckless behavior  · Withdrawal from people/activities they love  · Confusion- inability to concentrate     If you or a loved one observes any of these behaviors or has concerns about self-harm, here's what you can do:  · Talk about it- your feelings and reasons for harming yourself  · Remove any means that you might use to hurt  yourself (examples: pills, rope, extension cords, firearm)  · Get professional help from the community (Mental Health, Substance Abuse, psychological counseling)  · Do not be alone:Call your Safe Contact- someone whom you trust who will be there for you.  · Call your local CRISIS HOTLINE 889-1093 or 911-933-8489  · Call your local Children's Mobile Crisis Response Team [place] (528) 999-4160 or www.[employer]  · Call the toll free National Suicide Prevention Hotlines   · National Suicide Prevention Lifeline 513-729-XSDT (0872)  · [place] Hope Line Network 800-SUICIDE (002-3092)    Benign Prostatic Hyperplasia  An enlarged prostate (benign prostatic hyperplasia) is common in older men. You may experience the following:  · Weak urine stream.  · Dribbling.  · Feeling like the bladder has not emptied completely.  · Difficulty starting urination.  · Getting up frequently at night to urinate.  · Urinating more frequently during the day.  HOME CARE INSTRUCTIONS   Monitor your prostatic hyperplasia for any changes. The following actions may help to alleviate any discomfort you are experiencing:  · Give yourself time when you urinate.  · Stay away from alcohol.  · Avoid beverages containing caffeine, such as coffee, tea, and betina, because they can make the problem worse.  · Avoid decongestants, antihistamines, and some prescription medicines that can make the problem worse.  · Follow up with your health care provider for further treatment as recommended.  SEEK MEDICAL CARE IF:  · You are experiencing progressive difficulty voiding.  · Your urine stream is progressively getting narrower.  · You are awaking from sleep with the urge to void more frequently.  · You are constantly feeling the need to void.  · You experience loss of urine, especially in small amounts.  SEEK IMMEDIATE MEDICAL CARE IF:   · You develop increased pain with urination or are unable to urinate.  · You develop severe abdominal pain,  vomiting, a high fever, or fainting.  · You develop back pain or blood in your urine.  MAKE SURE YOU:   · Understand these instructions.  · Will watch your condition.  · Will get help right away if you are not doing well or get worse.     This information is not intended to replace advice given to you by your health care provider. Make sure you discuss any questions you have with your health care provider.     Document Released: 12/18/2006 Document Revised: 01/08/2016 Document Reviewed: 05/20/2014  [employer] Interactive Patient Education ©2016 [employer]    Atrial Fibrillation  Atrial fibrillation is a type of irregular heart rhythm (arrhythmia). During atrial fibrillation, the upper chambers of the heart (atria) quiver continuously in a chaotic pattern. This causes an irregular and often rapid heart rate.   Atrial fibrillation is the result of the heart becoming overloaded with disorganized signals that tell it to beat. These signals are normally released one at a time by a part of the right atrium called the sinoatrial node. They then travel from the atria to the lower chambers of the heart (ventricles), causing the atria and ventricles to contract and pump blood as they pass. In atrial fibrillation, parts of the atria outside of the sinoatrial node also release these signals. This results in two problems. First, the atria receive so many signals that they do not have time to fully contract. Second, the ventricles, which can only receive one signal at a time, beat irregularly and out of rhythm with the atria.   There are three types of atrial fibrillation:   · Paroxysmal. Paroxysmal atrial fibrillation starts suddenly and stops on its own within a week.  · Persistent. Persistent atrial fibrillation lasts for more than a week. It may stop on its own or with treatment.  · Permanent. Permanent atrial fibrillation does not go away. Episodes of atrial fibrillation may lead to permanent atrial fibrillation.  Atrial  fibrillation can prevent your heart from pumping blood normally. It increases your risk of stroke and can lead to heart failure.   CAUSES   · Heart conditions, including a heart attack, heart failure, coronary artery disease, and heart valve conditions.    · Inflammation of the sac that surrounds the heart (pericarditis).  · Blockage of an artery in the lungs (pulmonary embolism).  · Pneumonia or other infections.  · Chronic lung disease.  · Thyroid problems, especially if the thyroid is overactive (hyperthyroidism).  · Caffeine, excessive alcohol use, and use of some illegal drugs.    · Use of some medicines, including certain decongestants and diet pills.  · Heart surgery.    · Birth defects.    Sometimes, no cause can be found. When this happens, the atrial fibrillation is called lone atrial fibrillation. The risk of complications from atrial fibrillation increases if you have lone atrial fibrillation and you are age 60 years or older.  RISK FACTORS  · Heart failure.  · Coronary artery disease.  · Diabetes mellitus.    · High blood pressure (hypertension).    · Obesity.    · Other arrhythmias.    · Increased age.  SIGNS AND SYMPTOMS   · A feeling that your heart is beating rapidly or irregularly.    · A feeling of discomfort or pain in your chest.    · Shortness of breath.    · Sudden light-headedness or weakness.    · Getting tired easily when exercising.    · Urinating more often than normal (mainly when atrial fibrillation first begins).    In paroxysmal atrial fibrillation, symptoms may start and suddenly stop.  DIAGNOSIS   Your health care provider may be able to detect atrial fibrillation when taking your pulse. Your health care provider may have you take a test called an ambulatory electrocardiogram (ECG). An ECG records your heartbeat patterns over a 24-hour period. You may also have other tests, such as:  · Transthoracic echocardiogram (TTE). During echocardiography, sound waves are used to evaluate how  blood flows through your heart.  · Transesophageal echocardiogram (BAILEY).  · Stress test. There is more than one type of stress test. If a stress test is needed, ask your health care provider about which type is best for you.  · Chest X-ray exam.  · Blood tests.  · Computed tomography (CT).  TREATMENT   Treatment may include:  · Treating any underlying conditions. For example, if you have an overactive thyroid, treating the condition may correct atrial fibrillation.  · Taking medicine. Medicines may be given to control a rapid heart rate or to prevent blood clots, heart failure, or a stroke.  · Having a procedure to correct the rhythm of the heart:  ¨ Electrical cardioversion. During electrical cardioversion, a controlled, low-energy shock is delivered to the heart through your skin. If you have chest pain, very low blood pressure, or sudden heart failure, this procedure may need to be done as an emergency.  ¨ Catheter ablation. During this procedure, heart tissues that send the signals that cause atrial fibrillation are destroyed.  ¨ Surgical ablation. During this surgery, thin lines of heart tissue that carry the abnormal signals are destroyed. This procedure can either be an open-heart surgery or a minimally invasive surgery. With the minimally invasive surgery, small cuts are made to access the heart instead of a large opening.  ¨ Pulmonary venous isolation. During this surgery, tissue around the veins that carry blood from the lungs (pulmonary veins) is destroyed. This tissue is thought to carry the abnormal signals.  HOME CARE INSTRUCTIONS   · Take medicines only as directed by your health care provider. Some medicines can make atrial fibrillation worse or recur.  · If blood thinners were prescribed by your health care provider, take them exactly as directed. Too much blood-thinning medicine can cause bleeding. If you take too little, you will not have the needed protection against stroke and other  problems.  · Perform blood tests at home if directed by your health care provider. Perform blood tests exactly as directed.  · Quit smoking if you smoke.  · Do not drink alcohol.  · Do not drink caffeinated beverages such as coffee, soda, and some teas. You may drink decaffeinated coffee, soda, or tea.    · Maintain a healthy weight. Do not use diet pills unless your health care provider approves. They may make heart problems worse.    · Follow diet instructions as directed by your health care provider.  · Exercise regularly as directed by your health care provider.  · Keep all follow-up visits as directed by your health care provider. This is important.  PREVENTION   The following substances can cause atrial fibrillation to recur:   · Caffeinated beverages.  · Alcohol.  · Certain medicines, especially those used for breathing problems.  · Certain herbs and herbal medicines, such as those containing ephedra or ginseng.  · Illegal drugs, such as cocaine and amphetamines.  Sometimes medicines are given to prevent atrial fibrillation from recurring. Proper treatment of any underlying condition is also important in helping prevent recurrence.   SEEK MEDICAL CARE IF:  · You notice a change in the rate, rhythm, or strength of your heartbeat.  · You suddenly begin urinating more frequently.  · You tire more easily when exerting yourself or exercising.  SEEK IMMEDIATE MEDICAL CARE IF:   · You have chest pain, abdominal pain, sweating, or weakness.  · You feel nauseous.  · You have shortness of breath.  · You suddenly have swollen feet and ankles.  · You feel dizzy.  · Your face or limbs feel numb or weak.  · You have a change in your vision or speech.  MAKE SURE YOU:   · Understand these instructions.  · Will watch your condition.  · Will get help right away if you are not doing well or get worse.     This information is not intended to replace advice given to you by your health care provider. Make sure you discuss any  questions you have with your health care provider.     Document Released: 12/18/2006 Document Revised: 01/08/2016 Document Reviewed: 04/13/2016  [employer] Interactive Patient Education ©2016 [employer]      Metoprolol extended-release tablets  What is this medicine?  METOPROLOL (me TOE proe lole) is a beta-blocker. Beta-blockers reduce the workload on the heart and help it to beat more regularly. This medicine is used to treat high blood pressure and to prevent chest pain. It is also used to after a heart attack and to prevent an additional heart attack from occurring.  This medicine may be used for other purposes; ask your health care provider or pharmacist if you have questions.  COMMON BRAND NAME(S): Toprol XUAN  What should I tell my health care provider before I take this medicine?  They need to know if you have any of these conditions:  -diabetes  -heart or vessel disease like slow heart rate, worsening heart failure, heart block, sick sinus syndrome or Raynaud's disease  -kidney disease  -liver disease  -lung or breathing disease, like asthma or emphysema  -pheochromocytoma  -thyroid disease  -an unusual or allergic reaction to metoprolol, other beta-blockers, medicines, foods, dyes, or preservatives  -pregnant or trying to get pregnant  -breast-feeding  How should I use this medicine?  Take this medicine by mouth with a glass of water. Follow the directions on the prescription label. Do not crush or chew. Take this medicine with or immediately after meals. Take your doses at regular intervals. Do not take more medicine than directed. Do not stop taking this medicine suddenly. This could lead to serious heart-related effects.  Talk to your pediatrician regarding the use of this medicine in children. While this drug may be prescribed for children as young as 6 years for selected conditions, precautions do apply.  Overdosage: If you think you have taken too much of this medicine contact a poison control center  or emergency room at once.  NOTE: This medicine is only for you. Do not share this medicine with others.  What if I miss a dose?  If you miss a dose, take it as soon as you can. If it is almost time for your next dose, take only that dose. Do not take double or extra doses.  What may interact with this medicine?  Do not take this medicine with any of the following medications:  -sotalol  This medicine may also interact with the following medications:  -clonidine  -digoxin  -dobutamine  -epinephrine  -isoproterenol  -medicine to control heart rhythm like quinidine, propafenone  -medicine for depression like monoamine oxidase (MAO) inhibitors, fluoxetine, and paroxetine  -medicine for blood pressure like calcium channel blockers  -reserpine  This list may not describe all possible interactions. Give your health care provider a list of all the medicines, herbs, non-prescription drugs, or dietary supplements you use. Also tell them if you smoke, drink alcohol, or use illegal drugs. Some items may interact with your medicine.  What should I watch for while using this medicine?  Visit your doctor or health care professional for regular check ups. Contact your doctor right away if your symptoms worsen. Check your blood pressure and pulse rate regularly. Ask your health care professional what your blood pressure and pulse rate should be, and when you should contact them.  You may get drowsy or dizzy. Do not drive, use machinery, or do anything that needs mental alertness until you know how this medicine affects you. Do not sit or stand up quickly, especially if you are an older patient. This reduces the risk of dizzy or fainting spells. Contact your doctor if these symptoms continue. Alcohol may interfere with the effect of this medicine. Avoid alcoholic drinks.  What side effects may I notice from receiving this medicine?  Side effects that you should report to your doctor or health care professional as soon as  possible:  -allergic reactions like skin rash, itching or hives  -cold or numb hands or feet  -depression  -difficulty breathing  -faint  -fever with sore throat  -irregular heartbeat, chest pain  -rapid weight gain  -swollen legs or ankles  Side effects that usually do not require medical attention (report to your doctor or health care professional if they continue or are bothersome):  -anxiety or nervousness  -change in sex drive or performance  -dry skin  -headache  -nightmares or trouble sleeping  -short term memory loss  -stomach upset or diarrhea  -unusually tired  This list may not describe all possible side effects. Call your doctor for medical advice about side effects. You may report side effects to FDA at 7-891-FDA-5362.  Where should I keep my medicine?  Keep out of the reach of children.  Store at room temperature between 15 and 30 degrees C (59 and 86 degrees F). Throw away any unused medicine after the expiration date.  NOTE: This sheet is a summary. It may not cover all possible information. If you have questions about this medicine, talk to your doctor, pharmacist, or health care provider.  © 2014, [employer]. (2/27/2009 5:08:10 PM)    Rivaroxaban oral tablets  What is this medicine?  RIVAROXABAN (ri va DANIEL a ban) is an anticoagulant (blood thinner). It is used to treat blood clots in the lungs or in the veins. It is also used after knee or hip surgeries to prevent blood clots. It is also used to lower the chance of stroke in people with a medical condition called atrial fibrillation.  This medicine may be used for other purposes; ask your health care provider or pharmacist if you have questions.  COMMON BRAND NAME(S): Xarelto  What should I tell my health care provider before I take this medicine?  They need to know if you have any of these conditions:  -bleeding disorders  -bleeding in the brain  -blood in your stools (black or tarry stools) or if you have blood in your vomit  -history  of stomach bleeding  -kidney disease  -liver disease  -low blood counts, like low white cell, platelet, or red cell counts  -recent or planned spinal or epidural procedure  -take medicines that treat or prevent blood clots  -an unusual or allergic reaction to rivaroxaban, other medicines, foods, dyes, or preservatives  -pregnant or trying to get pregnant  -breast-feeding  How should I use this medicine?  Take this medicine by mouth with a glass of water. Follow the directions on the prescription label. Take your medicine at regular intervals. Do not take it more often than directed. Do not stop taking except on your doctor's advice.  If you are taking this medicine after hip or knee replacement surgery, take it with or without food.  If you are taking this medicine for atrial fibrillation, take it with your evening meal. If you are taking this medicine to treat blood clots, take it with food at the same time each day. If you are unable to swallow your tablet, you may crush the tablet and mix it in applesauce. Then, immediately eat the applesauce. You should eat more food right after you eat the applesauce containing the crushed tablet.  Talk to your pediatrician regarding the use of this medicine in children. Special care may be needed.  Overdosage: If you think you've taken too much of this medicine contact a poison control center or emergency room at once.  Overdosage: If you think you have taken too much of this medicine contact a poison control center or emergency room at once.  NOTE: This medicine is only for you. Do not share this medicine with others.  What if I miss a dose?  If you take your medicine once a day and miss a dose, take the missed dose as soon as you remember. If you take your medicine twice a day and miss a dose, take the missed dose immediately. In this instance, 2 tablets may be taken at the same time. The next day you should take 1 tablet twice a day as directed.  What may interact with this  medicine?  -aspirin and aspirin-like medicines  -certain antibiotics like erythromycin, azithromycin, and clarithromycin  -certain medicines for fungal infections like ketoconazole and itraconazole  -certain medicines for irregular heart beat like amiodarone, quinidine, dronedarone  -certain medicines for seizures like carbamazepine, phenytoin  -certain medicines that treat or prevent blood clots like warfarin, enoxaparin, and dalteparin   -conivaptan  -diltiazem  -felodipine  -indinavir  -lopinavir; ritonavir  -NSAIDS, medicines for pain and inflammation, like ibuprofen or naproxen  -ranolazine  -rifampin  -ritonavir  -Rachel's wort  -verapamil  This list may not describe all possible interactions. Give your health care provider a list of all the medicines, herbs, non-prescription drugs, or dietary supplements you use. Also tell them if you smoke, drink alcohol, or use illegal drugs. Some items may interact with your medicine.  What should I watch for while using this medicine?  Do not stop taking this medicine without first talking to your doctor. Stopping this medicine may increase your risk of having a stroke. Be sure to refill your prescription before you run out of medicine.  This medicine may increase your risk to bruise or bleed. Call your doctor or health care professional if you notice any unusual bleeding.  Be careful brushing and flossing your teeth or using a toothpick because you may bleed more easily. If you have any dental work done, tell your dentist you are receiving this medicine.  What side effects may I notice from receiving this medicine?  Side effects that you should report to your doctor or health care professional as soon as possible:  -allergic reactions like skin rash, itching or hives, swelling of the face, lips, or tongue  -back pain  -bloody or black, tarry stools  -changes in vision  -confusion, trouble speaking or understanding  -red or dark-brown urine  -redness, blistering,  peeling or loosening of the skin, including inside the mouth  -severe headaches  -spitting up blood or brown material that looks like coffee grounds  -sudden numbness or weakness of the face, arm or leg  -trouble walking, dizziness, loss of balance or coordination  -unusual bruising or bleeding from the eye, gums, or nose   Side effects that usually do not require medical attention (Report these to your doctor or health care professional if they continue or are bothersome.):  -dizziness  -muscle pain  This list may not describe all possible side effects. Call your doctor for medical advice about side effects. You may report side effects to FDA at 2-503-ZVP-0057.  Where should I keep my medicine?  Keep out of the reach of children.  Store at room temperature between 15 and 30 degrees C (59 and 86 degrees F). Throw away any unused medicine after the expiration date.  NOTE: This sheet is a summary. It may not cover all possible information. If you have questions about this medicine, talk to your doctor, pharmacist, or health care provider.  © 2014, [employer]. (3/17/2014 3:32:09 PM)        Noyola Catheter Care, Adult  A Noyola catheter is a soft, flexible tube that is placed into the bladder to drain urine. A Noyola catheter may be inserted if:  · You leak urine or are not able to control when you urinate (urinary incontinence).  · You are not able to urinate when you need to (urinary retention).  · You had prostate surgery or surgery on the genitals.  · You have certain medical conditions, such as multiple sclerosis, dementia, or a spinal cord injury.  If you are going home with a Noyola catheter in place, follow the instructions below.  TAKING CARE OF THE CATHETER  1. Wash your hands with soap and water.  2. Using mild soap and warm water on a clean washcloth:  ¨ Clean the area on your body closest to the catheter insertion site using a circular motion, moving away from the catheter. Never wipe toward the  catheter because this could sweep bacteria up into the urethra and cause infection.  ¨ Remove all traces of soap. Pat the area dry with a clean towel. For males, reposition the foreskin.  3. Attach the catheter to your leg so there is no tension on the catheter. Use adhesive tape or a leg strap. If you are using adhesive tape, remove any sticky residue left behind by the previous tape you used.  4. Keep the drainage bag below the level of the bladder, but keep it off the floor.  5. Check throughout the day to be sure the catheter is working and urine is draining freely. Make sure the tubing does not become kinked.  6. Do not pull on the catheter or try to remove it. Pulling could damage internal tissues.  TAKING CARE OF THE DRAINAGE BAGS  You will be given two drainage bags to take home. One is a large overnight drainage bag, and the other is a smaller leg bag that fits underneath clothing. You may wear the overnight bag at any time, but you should never wear the smaller leg bag at night. Follow the instructions below for how to empty, change, and clean your drainage bags.  Emptying the Drainage Bag  You must empty your drainage bag when it is  -½ full or at least 2-3 times a day.  2. Wash your hands with soap and water.  3. Keep the drainage bag below your hips, below the level of your bladder. This stops urine from going back into the tubing and into your bladder.  4. Hold the dirty bag over the toilet or a clean container.  5. Open the pour spout at the bottom of the bag and empty the urine into the toilet or container. Do not let the pour spout touch the toilet, container, or any other surface. Doing so can place bacteria on the bag, which can cause an infection.  6. Clean the pour spout with a gauze pad or cotton ball that has rubbing alcohol on it.  7. Close the pour spout.  8. Attach the bag to your leg with adhesive tape or a leg strap.  9. Wash your hands well.  Changing the Drainage Bag  Change your  drainage bag once a month or sooner if it starts to smell bad or look dirty. Below are steps to follow when changing the drainage bag.  2. Wash your hands with soap and water.  3. Pinch off the rubber catheter so that urine does not spill out.  4. Disconnect the catheter tube from the drainage tube at the connection valve. Do not let the tubes touch any surface.  5. Clean the end of the catheter tube with an alcohol wipe. Use a different alcohol wipe to clean the end of the drainage tube.  6. Connect the catheter tube to the drainage tube of the clean drainage bag.  7. Attach the new bag to the leg with adhesive tape or a leg strap. Avoid attaching the new bag too tightly.  8. Wash your hands well.  Cleaning the Drainage Bag  2. Wash your hands with soap and water.  3. Wash the bag in warm, soapy water.  4. Rinse the bag thoroughly with warm water.  5. Fill the bag with a solution of white vinegar and water (1 cup vinegar to 1 qt warm water [.2 L vinegar to 1 L warm water]). Close the bag and soak it for 30 minutes in the solution.  6. Rinse the bag with warm water.  7. Hang the bag to dry with the pour spout open and hanging downward.  8. Store the clean bag (once it is dry) in a clean plastic bag.  9. Wash your hands well.  PREVENTING INFECTION  · Wash your hands before and after handling your catheter.  · Take showers daily and wash the area where the catheter enters your body. Do not take baths. Replace wet leg straps with dry ones, if this applies.  · Do not use powders, sprays, or lotions on the genital area. Only use creams, lotions, or ointments as directed by your caregiver.  · For females, wipe from front to back after each bowel movement.  · Drink enough fluids to keep your urine clear or pale yellow unless you have a fluid restriction.  · Do not let the drainage bag or tubing touch or lie on the floor.  · Wear cotton underwear to absorb moisture and to keep your .  SEEK MEDICAL CARE IF:    · Your urine is cloudy or smells unusually bad.  · Your catheter becomes clogged.  · You are not draining urine into the bag or your bladder feels full.  · Your catheter starts to leak.  SEEK IMMEDIATE MEDICAL CARE IF:   · You have pain, swelling, redness, or pus where the catheter enters the body.  · You have pain in the abdomen, legs, lower back, or bladder.  · You have a fever.  · You see blood fill the catheter, or your urine is pink or red.  · You have nausea, vomiting, or chills.  · Your catheter gets pulled out.  MAKE SURE YOU:   · Understand these instructions.  · Will watch your condition.  · Will get help right away if you are not doing well or get worse.     This information is not intended to replace advice given to you by your health care provider. Make sure you discuss any questions you have with your health care provider.     Document Released: 12/18/2006 Document Revised: 05/04/2015 Document Reviewed: 12/09/2013  ITN Interactive Patient Education ©2016 [employer]    Indwelling Urinary Catheter Care  You have been given a flexible tube (catheter) used to drain the bladder. Catheters are often used when a person has difficulty urinating due to blockage, bleeding, infection, or inability to control bladder or bowel movements (incontinence). A catheter requires daily care to prevent infection and blockage.  HOME CARE INSTRUCTIONS   Do the following to reduce the risk of infection. Antibiotic medicines cannot prevent infections.  Limit the number of bacteria entering your bladder  · Wash your hands for 2 minutes with soapy water before and after handling the catheter.  · Wash your bottom and the entire catheter twice daily, as well as after each bowel movement. Wash the tip of the penis or just above the vaginal opening with soap and warm water, rinse, and then wash the rectal area. Always wash from front to back.  · When changing from the leg bag to overnight bag or from the overnight bag to  leg bag, thoroughly clean the end of the catheter where it connects to the tubing with an alcohol wipe.  · Clean the leg bag and overnight bag daily after use. Replace your drainage bags weekly.  · Always keep the tubing and bag below the level of your bladder. This allows your urine to drain properly. Lifting the bag or tubing above the level of your bladder will cause dirty urine to flow back into your bladder. If you must briefly lift the bag higher than your bladder, pinch the catheter or tubing to prevent backflow.  · Drink enough water and fluids to keep your urine clear or pale yellow, or as directed by your caregiver. This will flush bacteria out of the bladder.  Protect tissues from injury  · Attach the catheter to your leg so there is no tension on the catheter. Use adhesive tape or a leg strap. If you are using adhesive tape, remove any sticky residue left behind by the previous tape you used.  · Place your leg bag on your lower leg. Fasten the straps securely and comfortably.  · Do not remove the catheter yourself unless you have been instructed how to do so.  Keep the urinary pathway open  · Check throughout the day to be sure your catheter is working and urine is draining freely. Make sure the tubing does not become kinked.  · Do not let the drainage bag overfill.  SEEK IMMEDIATE MEDICAL CARE IF:   · The catheter becomes blocked. Urine is not draining.  · Urine is leaking.  · You have any pain.  · You have a fever.  Document Released: 12/18/2006 Document Revised: 12/04/2013 Document Reviewed: 05/19/2011  [employer]® Patient Information ©2014 [employer].

## 2017-11-13 NOTE — PROGRESS NOTES
Received bedside report from Flakita GAINES .  Pt is a/o x 4.  POC discussed w/ pt, verbalizes understanding,  Bed is locked, low, belongings in reach, call light in reach.  Sitting in bed, good uop, via savage, slight red sediment noted, denies pain at present.

## 2017-11-14 NOTE — PROGRESS NOTES
Subjective:   Boo Cavanaugh is a 72 y.o. male here today for feeling sick, fever  71 y/o M with no significant pMH presented of feeling worse. He was in the clinic 11/06. For increase frequency, bladder pressure, flank pain on the left side. He was treated with bactrim for possible UTI. He tells me that pain is better, but he feels worse overall. Urine culture negative. No n/v. Flank pain better. He is febrile. He denies history of nephrolithiasis. He denies hematuria, leg swelling. He denies diarrhea, headache, chest pain, cough. He has no CVA tenderness. He has had similar episode in July, and he was treated with antibiotic.       Current medicines (including changes today)  Current Outpatient Prescriptions   Medication Sig Dispense Refill   • tamsulosin (FLOMAX) 0.4 MG capsule Take 0.4 mg by mouth ONE-HALF HOUR AFTER BREAKFAST.     • magnesium oxide (MAG-OX) 400 MG Tab Take 400 mg by mouth every day.     • metoprolol (LOPRESSOR) 25 MG Tab Take 1 Tab by mouth 2 Times a Day. 60 Tab 3   • rivaroxaban (XARELTO) 20 MG Tab tablet Take 1 Tab by mouth with dinner. 30 Tab 3     No current facility-administered medications for this visit.      He  has a past medical history of Arrhythmia; OA (osteoarthritis) of knee; and Prostate enlargement.    Current Outpatient Prescriptions   Medication Sig Dispense Refill   • tamsulosin (FLOMAX) 0.4 MG capsule Take 0.4 mg by mouth ONE-HALF HOUR AFTER BREAKFAST.     • magnesium oxide (MAG-OX) 400 MG Tab Take 400 mg by mouth every day.     • metoprolol (LOPRESSOR) 25 MG Tab Take 1 Tab by mouth 2 Times a Day. 60 Tab 3   • rivaroxaban (XARELTO) 20 MG Tab tablet Take 1 Tab by mouth with dinner. 30 Tab 3     No current facility-administered medications for this visit.        Allergies as of 11/07/2017 - Reviewed 11/07/2017   Allergen Reaction Noted   • Penicillins Rash 10/25/2017       Social History     Social History   • Marital status: Single     Spouse name: N/A   • Number of children:  "N/A   • Years of education: N/A     Occupational History   • Not on file.     Social History Main Topics   • Smoking status: Never Smoker   • Smokeless tobacco: Never Used   • Alcohol use 4.2 oz/week     7 Shots of liquor per week      Comment: A drink per night.    • Drug use: No   • Sexual activity: No     Other Topics Concern   • Not on file     Social History Narrative   • No narrative on file        Family History   Problem Relation Age of Onset   • Cancer Father    • Cancer Brother 50     prostate cancer       No past surgical history on file.    ROS  All systems reviewed are negative except for HPI         Objective:     Blood pressure 126/70, pulse 82, temperature (!) 38.7 °C (101.6 °F), resp. rate 16, height 1.88 m (6' 2\"), weight 91.2 kg (201 lb), SpO2 93 %. Body mass index is 25.81 kg/m².   Physical Exam:  Constitutional: Alert, no distress.  Skin: Warm, dry, good turgor, no rashes in visible areas.  Eye: Equal, round and reactive, conjunctiva clear, lids normal.  ENMT: Lips without lesions, good dentition, oropharynx clear.  Neck: Trachea midline, no masses, no thyromegaly. No cervical or supraclavicular lymphadenopathy  Respiratory: Unlabored respiratory effort, lungs clear to auscultation, no wheezes, no ronchi.  Cardiovascular: Normal S1, S2, no murmur, no edema.  Abdomen: Soft, non-tender, no masses, no hepatosplenomegaly.  Psych: Alert and oriented x3, normal affect and mood.        Assessment and Plan:   The following treatment plan was discussed    1. Acute cystitis with hematuria  2. Fever, unspecified fever cause  3. Nose congested  4. Flank pain  5. Recurrent UTI  I will treat with ciprofloxacin. I'm concerned about nephrolithiasis. Pt refuses to go to ER at this time. No CVA tenderness, but he has flank pain. He is febrile. Urine culture to follow. I advise that if he does not feel better in 48 hours to go to ER for further eval. I advise to drink plenty of fluid. Even if he has " nephrolithiasis hopefully is not obstructive at this time. Follow up with urology     - REFERRAL TO UROLOGY    Total 21 minutes face-to-face time spent with patient, with greater than 50% of the total time discussing patient's issues and symptoms as listed above in assessment and plan, as well as managing coordination of care for future evaluation and treatment.      Followup: No Follow-up on file.

## 2017-11-15 ENCOUNTER — OFFICE VISIT (OUTPATIENT)
Dept: MEDICAL GROUP | Facility: PHYSICIAN GROUP | Age: 72
End: 2017-11-15
Payer: MEDICARE

## 2017-11-15 VITALS
RESPIRATION RATE: 14 BRPM | OXYGEN SATURATION: 95 % | HEIGHT: 74 IN | TEMPERATURE: 98.8 F | HEART RATE: 92 BPM | BODY MASS INDEX: 24.64 KG/M2 | DIASTOLIC BLOOD PRESSURE: 72 MMHG | WEIGHT: 192 LBS | SYSTOLIC BLOOD PRESSURE: 110 MMHG

## 2017-11-15 DIAGNOSIS — N40.0 BENIGN PROSTATIC HYPERPLASIA WITHOUT LOWER URINARY TRACT SYMPTOMS: ICD-10-CM

## 2017-11-15 DIAGNOSIS — N39.0 RECURRENT UTI: ICD-10-CM

## 2017-11-15 DIAGNOSIS — I48.92 ATRIAL FLUTTER, UNSPECIFIED TYPE (HCC): ICD-10-CM

## 2017-11-15 DIAGNOSIS — R50.9 FEVER, UNSPECIFIED FEVER CAUSE: ICD-10-CM

## 2017-11-15 DIAGNOSIS — M62.838 MUSCLE SPASM: ICD-10-CM

## 2017-11-15 DIAGNOSIS — N17.9 AKI (ACUTE KIDNEY INJURY) (HCC): ICD-10-CM

## 2017-11-15 DIAGNOSIS — Q62.11 HYDRONEPHROSIS WITH URETEROPELVIC JUNCTION (UPJ) OBSTRUCTION: ICD-10-CM

## 2017-11-15 DIAGNOSIS — R10.9 FLANK PAIN: ICD-10-CM

## 2017-11-15 DIAGNOSIS — N30.01 ACUTE CYSTITIS WITH HEMATURIA: ICD-10-CM

## 2017-11-15 PROBLEM — R09.81 NOSE CONGESTED: Status: RESOLVED | Noted: 2017-11-07 | Resolved: 2017-11-15

## 2017-11-15 PROCEDURE — 99214 OFFICE O/P EST MOD 30 MIN: CPT | Performed by: INTERNAL MEDICINE

## 2017-11-15 NOTE — PROGRESS NOTES
Subjective:   Boo Cavanaugh is a 72 y.o. male here today for post discharge follow up    Pt was examined initially 11/06/2017 for chills, congested, dysuria. He was treated with bactrium. I saw him the next day 11/07/2017. He looked sick, complain flank pain. I was concerned for pyelonephritis. Urine culture pending, vs kidney stone. Pt walked in in our clinic 11/10/2017. He was feeling worse. I advise to go to ER. He was found to have  Moderate hydroureteronephrosis to the level of the bladder. Bladder distention. He was placed on flomax   During hospital stay he was found to have a flutter. He has follow up with urologist and cardiologist. He reports that he still feels slightly shortness of breath during exertion. He still feels tired but significantly better from the time he went to the hospital. Flank pain resolved. GFR nl     Current medicines (including changes today)  Current Outpatient Prescriptions   Medication Sig Dispense Refill   • metoprolol (LOPRESSOR) 25 MG Tab Take 1 Tab by mouth 2 Times a Day. 60 Tab 3   • rivaroxaban (XARELTO) 20 MG Tab tablet Take 1 Tab by mouth with dinner. 30 Tab 3   • tamsulosin (FLOMAX) 0.4 MG capsule Take 0.4 mg by mouth ONE-HALF HOUR AFTER BREAKFAST.     • magnesium oxide (MAG-OX) 400 MG Tab Take 400 mg by mouth every day.       No current facility-administered medications for this visit.      He  has a past medical history of Arrhythmia; OA (osteoarthritis) of knee; and Prostate enlargement.    Current Outpatient Prescriptions   Medication Sig Dispense Refill   • metoprolol (LOPRESSOR) 25 MG Tab Take 1 Tab by mouth 2 Times a Day. 60 Tab 3   • rivaroxaban (XARELTO) 20 MG Tab tablet Take 1 Tab by mouth with dinner. 30 Tab 3   • tamsulosin (FLOMAX) 0.4 MG capsule Take 0.4 mg by mouth ONE-HALF HOUR AFTER BREAKFAST.     • magnesium oxide (MAG-OX) 400 MG Tab Take 400 mg by mouth every day.       No current facility-administered medications for this visit.        Allergies as of  "11/15/2017 - Reviewed 11/15/2017   Allergen Reaction Noted   • Penicillins Rash 10/25/2017       Social History     Social History   • Marital status: Single     Spouse name: N/A   • Number of children: N/A   • Years of education: N/A     Occupational History   • Not on file.     Social History Main Topics   • Smoking status: Never Smoker   • Smokeless tobacco: Never Used   • Alcohol use 4.2 oz/week     7 Shots of liquor per week      Comment: A drink per night.    • Drug use: No   • Sexual activity: No     Other Topics Concern   • Not on file     Social History Narrative   • No narrative on file        Family History   Problem Relation Age of Onset   • Cancer Father    • Cancer Brother 50     prostate cancer       History reviewed. No pertinent surgical history.    ROS  All systems reviewed are negative except for HPI       Objective:     Blood pressure 110/72, pulse 92, temperature 37.1 °C (98.8 °F), resp. rate 14, height 1.88 m (6' 2\"), weight 87.1 kg (192 lb), SpO2 95 %. Body mass index is 24.65 kg/m².   Physical Exam:  Constitutional: Alert, no distress.  Skin: Warm, dry, good turgor, no rashes in visible areas.  Eye: Equal, round and reactive, conjunctiva clear, lids normal.  ENMT: Lips without lesions, good dentition, oropharynx clear.  Neck: Trachea midline, no masses, no thyromegaly. No cervical or supraclavicular lymphadenopathy  Respiratory: Unlabored respiratory effort, lungs clear to auscultation, no wheezes, no ronchi.  Cardiovascular: regular, S1, S2, no murmur, no edema.  Abdomen: Soft, non-tender, no masses, no hepatosplenomegaly.  Psych: Alert and oriented x3, normal affect and mood.        Assessment and Plan:   The following treatment plan was discussed    1. Fever, unspecified fever cause  2. Flank pain  3. KAILEE (acute kidney injury) (CMS-HCC)  4. Acute cystitis with hematuria  5. Recurrent UTI  Due to prostate obstruction. I encouraged follow-up with urology. He likely need cytoscopy, possible " TURP??   Continue flomax.     6. Atrial flutter, unspecified type (CMS-HCC)  Follow up with cardiology . Stable. Slight symptomatic. Echo normal     7. Benign prostatic hyperplasia without lower urinary tract symptoms  Follow up with urologist. Currently on catheter. I advise to discuss with urology for catheter supply     8. Hydronephrosis with ureteropelvic junction (UPJ) obstruction  Resolved, GFR nl. Continue to monitor     9. Muscle spasm  Continue magnesium pills       Followup: Return in about 4 weeks (around 12/13/2017), or if symptoms worsen or fail to improve.

## 2017-11-22 ENCOUNTER — TELEPHONE (OUTPATIENT)
Dept: MEDICAL GROUP | Facility: PHYSICIAN GROUP | Age: 72
End: 2017-11-22

## 2017-11-22 NOTE — TELEPHONE ENCOUNTER
, we have only seen patient once.  Appears he sees urology should he discuss refill and dose with them or just continue 2 tabs QD?  Please advise.

## 2017-11-22 NOTE — TELEPHONE ENCOUNTER
----- Message from Kandice Thomas sent at 11/22/2017 11:09 AM PST -----  Regarding: RX refill and question  Patient needs a refill for Tamsulosin 0.4MG and he also had a question regarding how he was supposed to be taking it.     Originally it was twice a day and that's what he's been doing, but his discharge paperwork from the hospital says one a day. He just wants to know how he should be taking it. Please give the patient a call.    If its once a day he has enough through Monday, if its twice a day he'll run out on Friday.     Currently he has an appointment scheduled with Dr. Ayala for Monday.     Thank you,  Kandice

## 2017-11-22 NOTE — TELEPHONE ENCOUNTER
Patient states that he does not notice a difference.  He does have a follow up with urology on 11/28/17.  Patient will continue taking 1 tab QD and discuss further with  on Monday during appt.

## 2017-11-27 ENCOUNTER — OFFICE VISIT (OUTPATIENT)
Dept: MEDICAL GROUP | Facility: PHYSICIAN GROUP | Age: 72
End: 2017-11-27
Payer: MEDICARE

## 2017-11-27 VITALS
DIASTOLIC BLOOD PRESSURE: 60 MMHG | HEART RATE: 70 BPM | WEIGHT: 195 LBS | TEMPERATURE: 97.8 F | HEIGHT: 74 IN | RESPIRATION RATE: 16 BRPM | SYSTOLIC BLOOD PRESSURE: 108 MMHG | OXYGEN SATURATION: 94 % | BODY MASS INDEX: 25.03 KG/M2

## 2017-11-27 DIAGNOSIS — N40.1 BENIGN PROSTATIC HYPERPLASIA WITH URINARY OBSTRUCTION: ICD-10-CM

## 2017-11-27 DIAGNOSIS — Z87.898 HISTORY OF URINARY RETENTION: ICD-10-CM

## 2017-11-27 DIAGNOSIS — E55.9 VITAMIN D DEFICIENCY: ICD-10-CM

## 2017-11-27 DIAGNOSIS — N17.9 ACUTE RENAL FAILURE, UNSPECIFIED ACUTE RENAL FAILURE TYPE (HCC): ICD-10-CM

## 2017-11-27 DIAGNOSIS — N13.8 BENIGN PROSTATIC HYPERPLASIA WITH URINARY OBSTRUCTION: ICD-10-CM

## 2017-11-27 DIAGNOSIS — Z00.00 ANNUAL PHYSICAL EXAM: ICD-10-CM

## 2017-11-27 PROCEDURE — G0438 PPPS, INITIAL VISIT: HCPCS | Performed by: INTERNAL MEDICINE

## 2017-11-27 RX ORDER — TAMSULOSIN HYDROCHLORIDE 0.4 MG/1
0.4 CAPSULE ORAL
Qty: 90 CAP | Refills: 3 | Status: SHIPPED | OUTPATIENT
Start: 2017-11-27 | End: 2018-03-28

## 2017-11-27 NOTE — PROGRESS NOTES
Subjective: Boo Cavanaugh is a 72 y.o. male      Past Medical History:   Diagnosis Date   • Arrhythmia    • OA (osteoarthritis) of knee     bilateral    • Prostate enlargement        Allergies: Penicillins      Current Outpatient Prescriptions:   •  metoprolol (LOPRESSOR) 25 MG Tab, Take 1 Tab by mouth 2 Times a Day., Disp: 60 Tab, Rfl: 3  •  rivaroxaban (XARELTO) 20 MG Tab tablet, Take 1 Tab by mouth with dinner., Disp: 30 Tab, Rfl: 3  •  tamsulosin (FLOMAX) 0.4 MG capsule, Take 0.4 mg by mouth ONE-HALF HOUR AFTER BREAKFAST., Disp: , Rfl:   •  magnesium oxide (MAG-OX) 400 MG Tab, Take 400 mg by mouth every day., Disp: , Rfl:     Social History     Social History   • Marital status: Single     Spouse name: N/A   • Number of children: N/A   • Years of education: N/A     Occupational History   • Not on file.     Social History Main Topics   • Smoking status: Never Smoker   • Smokeless tobacco: Never Used   • Alcohol use 4.2 oz/week     7 Shots of liquor per week      Comment: A drink per night.    • Drug use: No   • Sexual activity: No     Other Topics Concern   • Not on file     Social History Narrative   • No narrative on file       Family History   Problem Relation Age of Onset   • Cancer Father    • Cancer Brother 50     prostate cancer       HPI: Patient is here today for annual physical exam. Per patient he was recently admitted to [facility] [place] for acute onset of urinary retention, suprapubic pain and flank pain, status post multiple workup, urinary catheter placement and antibiotic therapy. Per patient his symptoms have resolved continues to have urinary catheter in place, already made outpatient follow-up with urologist.       Review of Systems   Constitutional: Negative for fever, chills, weight loss, malaise/fatigue and diaphoresis.   HENT: Negative for ear pain, congestion, sore throat and neck pain.   Eyes: Negative for blurred vision, double vision and pain.   Respiratory: Negative for cough,  "sputum production, shortness of breath and wheezing.   Cardiovascular: Negative for palpitations, orthopnea, claudication, leg swelling and PND.   Gastrointestinal: Negative for heartburn, nausea, vomiting, abdominal pain, diarrhea, constipation and blood in stool.   Genitourinary: Negative. Negative for dysuria, urgency and frequency.   Musculoskeletal: Negative for myalgias and back pain.   Skin: Negative for itching and rash.   Neurological: Negative for dizziness, tremors, loss of consciousness, weakness and headaches.   Endo/Heme/Allergies: Negative for polydipsia. Does not bruise/bleed easily.   Psychiatric/Behavioral: Negative for depression, hallucinations and memory loss. The patient does not have insomnia.      /60   Pulse 70   Temp 36.6 °C (97.8 °F)   Resp 16   Ht 1.88 m (6' 2\")   Wt 88.5 kg (195 lb)   SpO2 94%   BMI 25.04 kg/m²      Objective:   Obese and Well-appearing male. In no acute distress.   HEENT: PERRLA, EOMI, Conjunctiva WNL, TMs are clear bilaterally. Nares and pharynx is clear. Neck is soft and supple with no cervical lymphadenopathy, thyromegaly or masses, no JVD. No supraclavicular adenopathy  Lungs are clear auscultation bilaterally. Normal respiratory effort. Cardiac regular rate and rhythm without murmur.   Abdomen is protuberant. Soft with positive bowel sounds. Nontender, nondistended with no hepatosplenomegaly or masses.  No Rebound   ; urinary catheter in place there is normal external male genitalia. Testes are descended bilaterally. There is no inguinal hernias. Rectal with no masses and normal tone.  Prostate very enlarged, smooth and nontender.  Stool Guiac negative.   Extremities no clubbing cyanosis or edema. 2+ distal pulses. Full Range of motion x4.Back no CVA tenderness.   Neuro: alert and oriented x3. Cranial nerves are intact. No focality. No gross motor or sensory deficits. 2+ DTRs. Negative Romberg and Pronator drift. Affect is normal.      Assessment and " plan:  1. Hospital discharge follow-up ;   acute onset of urinary retention / UTI /acute renal failure/ very enlarged prostate/ N[place]ola catheter in place     Ref. Range 11/10/2017 17:37 11/11/2017 03:57 11/13/2017 08:06   Creatinine Latest Ref Range: 0.50 - 1.40 mg/dL 2.04 (H) 1.69 (H) 0.96   GFR If  Latest Ref Range: >60 mL/min/1.73 m 2 39 (A) 49 (A) >60   GFR If Non  Latest Ref Range: >60 mL/min/1.73 m 2 32 (A) 40 (A) >60     Renal ultrasound;     FINDINGS:  The right kidney measures 12.50 cm.  No right hydronephrosis is present.  There is a small septated cyst in the superior medial aspect of the right kidney measuring 1.1 cm in diameter.  The left kidney measures 12.45 cm.  There is moderate left hydronephrosis.  There is a small simple cyst in the inferior pole of the left kidney measuring 1.6 cm in diameter.      There are no abnormal calcifications.    The bladder is decompressed with a Noyola catheter.  There a low level echoes in the urine within the bladder which may represent hemorrhage or thrombus.  The prostate is enlarged measuring 6.6 x 5.8 x 5.2 cm.     Impression       1.  Moderate left hydronephrosis. No stone identified.    2.  No right hydronephrosis.    3.  Echogenic material within the bladder which represent hemorrhage or clot.    4.  Enlarged prostate gland.        2. bilateral knee osteoarthritis; Already scheduled for orthopedic consultation.        3. Arrhythmia; nonspecific, occasional skipped beats, history of normal cardiac stress test, currently cardiac rhythm and rate are within normal range.  Echocardiogram;   CONCLUSIONS  No prior study is available for comparison.   Normal left ventricular chamber size.  Normal left ventricular wall thickness.  Normal left ventricular systolic function.  Left ventricular ejection fraction is visually estimated to be 65%.  Trace mitral regurgitation.  Trace tricuspid regurgitation.       Ref. Range 10/28/2017 08:13   TSH  Latest Ref Range: 0.300 - 3.700 uIU/mL 3.090   Free T-4 Latest Ref Range: 0.53 - 1.43 ng/dL 0.86           4. Vit D deficiency   Ref. Range 10/28/2017 08:13   25-Hydroxy   Vitamin D 25 Latest Ref Range: 30 - 100 ng/mL 19 (L)       Patient is advised on preventive and supportive care regarding urinary retention, refilled Flomax, follow-up with urology consultation, vitamin D3-5000 units daily, monitor GFR and vitamin D level.      Please note that this dictation was created using voice recognition software. I have worked with consultants from the vendor as well as technical experts from [employer] [facility] [employer] to optimize the interface. I have made every reasonable attempt to correct obvious errors, but I expect that there are errors of grammar and possibly content that I did not discover before finalizing the note.

## 2017-11-29 ENCOUNTER — OFFICE VISIT (OUTPATIENT)
Dept: CARDIOLOGY | Facility: MEDICAL CENTER | Age: 72
End: 2017-11-29
Payer: MEDICARE

## 2017-11-29 VITALS
HEART RATE: 80 BPM | DIASTOLIC BLOOD PRESSURE: 80 MMHG | HEIGHT: 74 IN | SYSTOLIC BLOOD PRESSURE: 110 MMHG | WEIGHT: 194 LBS | OXYGEN SATURATION: 94 % | BODY MASS INDEX: 24.9 KG/M2

## 2017-11-29 DIAGNOSIS — N40.0 BENIGN PROSTATIC HYPERPLASIA WITHOUT LOWER URINARY TRACT SYMPTOMS: ICD-10-CM

## 2017-11-29 DIAGNOSIS — I48.92 ATRIAL FLUTTER, UNSPECIFIED TYPE (HCC): ICD-10-CM

## 2017-11-29 PROCEDURE — 99214 OFFICE O/P EST MOD 30 MIN: CPT | Performed by: NURSE PRACTITIONER

## 2017-11-29 PROCEDURE — 93000 ELECTROCARDIOGRAM COMPLETE: CPT | Performed by: NURSE PRACTITIONER

## 2017-11-29 ASSESSMENT — ENCOUNTER SYMPTOMS
CLAUDICATION: 0
SORE THROAT: 0
NAUSEA: 0
DIZZINESS: 0
ORTHOPNEA: 0
SPEECH CHANGE: 0
PSYCHIATRIC NEGATIVE: 1
COUGH: 0
VOMITING: 0
HEADACHES: 0
MUSCULOSKELETAL NEGATIVE: 1
FOCAL WEAKNESS: 0
HEARTBURN: 0
PALPITATIONS: 0
LOSS OF CONSCIOUSNESS: 0
DOUBLE VISION: 0
CHILLS: 0
FEVER: 0
ABDOMINAL PAIN: 0
BLURRED VISION: 0
SHORTNESS OF BREATH: 0
WHEEZING: 0
WEIGHT LOSS: 0
SEIZURES: 0
SPUTUM PRODUCTION: 0
PND: 0
FLANK PAIN: 0

## 2017-11-29 NOTE — LETTER
[facility]   [address], [address]  [place] [address]  Phone: [phone]  Fax: [phone]              Boo Cavanaugh  1945    Encounter Date: 11/29/2017    CARLOS Li          PROGRESS NOTE:  No notes on file      Hellen Ayala M.D.  [address]   [facility] [address]  [facility] [address]  VIA In Basket     Enoch Mendoza M.D.  [address]  [address]  [facility] [address]  VIA In Basket

## 2017-11-30 LAB — EKG IMPRESSION: NORMAL

## 2017-11-30 NOTE — PROGRESS NOTES
Subjective:   Boo Cavanaugh is a 72 y.o. male who presents today with complaints of the question of recurrence of atrial flutter. He also needs a TURP to be performed by Dr Cr of [facility] in the next few weeks. He cannot urinate and his US demonstrated hydronephrosis therefore he is to undergo TURP.  HPI & HOSPITAL COURSE  This is a 72 y.o. male here with right flank pain and acute renal failure from urinary outlet obstruction. He was admitted and savage catheter was placed. His renal function improved to normal. He developed atrial flutter and cardiology was consulted on the case. He was started on xarelto for anticoagulation and had hematuria with this but it quickly resolved spontaneously. He continued to have bladder spasms but his right flank pain was much improved. He had voiding trials performed but required savage catheter reinsertion. The patient and his daughter were provided extensive education prior to discharge.    EKG today he is in SR. He is not aware of arrhythmias although before hospitalization he did note occasional heart rates in the 110 bpm range when he was quiet. He has been on Xarelto since discharge on 11/14/17. He was in SR on 11/12/17 by hospital notes.  Past Medical History:   Diagnosis Date   • Arrhythmia    • OA (osteoarthritis) of knee     bilateral    • Prostate enlargement      History reviewed. No pertinent surgical history.  Family History   Problem Relation Age of Onset   • Cancer Father    • Cancer Brother 50     prostate cancer     History   Smoking Status   • Never Smoker   Smokeless Tobacco   • Never Used     Allergies   Allergen Reactions   • Penicillins Rash     Rash as a child.      Outpatient Encounter Prescriptions as of 11/29/2017   Medication Sig Dispense Refill   • tamsulosin (FLOMAX) 0.4 MG capsule Take 1 Cap by mouth ONE-HALF HOUR AFTER BREAKFAST. 90 Cap 3   • metoprolol (LOPRESSOR) 25 MG Tab Take 1 Tab by mouth 2 Times a Day. 60 Tab 3   • rivaroxaban  "(XARELTO) 20 MG Tab tablet Take 1 Tab by mouth with dinner. 30 Tab 3   • magnesium oxide (MAG-OX) 400 MG Tab Take 400 mg by mouth every day.       No facility-administered encounter medications on file as of 11/29/2017.      Review of Systems   Constitutional: Negative for chills, fever, malaise/fatigue and weight loss.   HENT: Negative for congestion and sore throat.    Eyes: Negative for blurred vision and double vision.   Respiratory: Negative for cough, sputum production, shortness of breath and wheezing.    Cardiovascular: Negative for chest pain, palpitations, orthopnea, claudication, leg swelling and PND.   Gastrointestinal: Negative for abdominal pain, heartburn, nausea and vomiting.   Genitourinary: Positive for dysuria. Negative for flank pain and frequency.        Savage cath in place.   Musculoskeletal: Negative.    Skin: Negative.    Neurological: Negative for dizziness, speech change, focal weakness, seizures, loss of consciousness and headaches.   Endo/Heme/Allergies: Negative.    Psychiatric/Behavioral: Negative.         Objective:   /80   Pulse 80   Ht 1.88 m (6' 2.02\")   Wt 88 kg (194 lb)   SpO2 94%   BMI 24.90 kg/m²     Physical Exam   Constitutional: He is oriented to person, place, and time. He appears well-developed and well-nourished.   HENT:   Head: Normocephalic and atraumatic.   Eyes: Pupils are equal, round, and reactive to light.   Neck: Normal range of motion. Neck supple. No thyromegaly present.   Cardiovascular: Normal rate and regular rhythm.  Exam reveals no gallop and no friction rub.    No murmur heard.  Pulmonary/Chest: Effort normal and breath sounds normal. No respiratory distress. He has no wheezes. He has no rales.   Abdominal: Soft. Bowel sounds are normal. He exhibits no distension. There is no tenderness. There is no guarding.   Genitourinary:   Genitourinary Comments: Indwelling savage cath in place.   Musculoskeletal: Normal range of motion. He exhibits no edema. "   Neurological: He is alert and oriented to person, place, and time.   Skin: Skin is warm and dry.   Psychiatric: He has a normal mood and affect.       Assessment:     1. Atrial flutter, unspecified type (CMS-HCC)  EKG   2. Benign prostatic hyperplasia without lower urinary tract symptoms         Medical Decision Making:  Today's Assessment / Status / Plan:     1. AFL reverted on BB in hospital on 11/12/17, in SR presently.  Needs surgery and Xarelto will need to be discontinued per Dr Cr 3 days prior and will need to be off agent after surgery until hematuria controlled.  Will discuss with DR Mendoza.  2. BPH unable to void has catheter in place at present.  Surgery recommended.  RTc 4 weeks with Dr Mendoza.    Collaborating MD  Dr STOKES

## 2017-12-04 ENCOUNTER — HOSPITAL ENCOUNTER (OUTPATIENT)
Dept: LAB | Facility: MEDICAL CENTER | Age: 72
End: 2017-12-04
Attending: UROLOGY
Payer: MEDICARE

## 2017-12-04 LAB
APPEARANCE UR: CLEAR
BACTERIA #/AREA URNS HPF: NEGATIVE /HPF
BILIRUB UR QL STRIP.AUTO: NEGATIVE
COLOR UR: YELLOW
EPI CELLS #/AREA URNS HPF: NEGATIVE /HPF
GLUCOSE UR STRIP.AUTO-MCNC: NEGATIVE MG/DL
HYALINE CASTS #/AREA URNS LPF: ABNORMAL /LPF
KETONES UR STRIP.AUTO-MCNC: NEGATIVE MG/DL
LEUKOCYTE ESTERASE UR QL STRIP.AUTO: ABNORMAL
MICRO URNS: ABNORMAL
NITRITE UR QL STRIP.AUTO: NEGATIVE
PH UR STRIP.AUTO: 6 [PH]
PROT UR QL STRIP: NEGATIVE MG/DL
RBC # URNS HPF: ABNORMAL /HPF
RBC UR QL AUTO: ABNORMAL
SP GR UR STRIP.AUTO: 1.02
UROBILINOGEN UR STRIP.AUTO-MCNC: 0.2 MG/DL
WBC #/AREA URNS HPF: ABNORMAL /HPF

## 2017-12-04 PROCEDURE — 87077 CULTURE AEROBIC IDENTIFY: CPT

## 2017-12-04 PROCEDURE — 87086 URINE CULTURE/COLONY COUNT: CPT

## 2017-12-04 PROCEDURE — 87186 SC STD MICRODIL/AGAR DIL: CPT

## 2017-12-04 PROCEDURE — 81001 URINALYSIS AUTO W/SCOPE: CPT

## 2017-12-05 ENCOUNTER — HOSPITAL ENCOUNTER (OUTPATIENT)
Dept: LAB | Facility: MEDICAL CENTER | Age: 72
End: 2017-12-05
Attending: INTERNAL MEDICINE
Payer: MEDICARE

## 2017-12-05 DIAGNOSIS — N17.9 ACUTE RENAL FAILURE, UNSPECIFIED ACUTE RENAL FAILURE TYPE (HCC): ICD-10-CM

## 2017-12-05 LAB
ANION GAP SERPL CALC-SCNC: 6 MMOL/L (ref 0–11.9)
BUN SERPL-MCNC: 24 MG/DL (ref 8–22)
CALCIUM SERPL-MCNC: 9.8 MG/DL (ref 8.5–10.5)
CHLORIDE SERPL-SCNC: 105 MMOL/L (ref 96–112)
CO2 SERPL-SCNC: 26 MMOL/L (ref 20–33)
CREAT SERPL-MCNC: 1.2 MG/DL (ref 0.5–1.4)
GFR SERPL CREATININE-BSD FRML MDRD: 60 ML/MIN/1.73 M 2
GLUCOSE SERPL-MCNC: 103 MG/DL (ref 65–99)
POTASSIUM SERPL-SCNC: 4.6 MMOL/L (ref 3.6–5.5)
SODIUM SERPL-SCNC: 137 MMOL/L (ref 135–145)

## 2017-12-05 PROCEDURE — 80048 BASIC METABOLIC PNL TOTAL CA: CPT

## 2017-12-05 PROCEDURE — 36415 COLL VENOUS BLD VENIPUNCTURE: CPT

## 2017-12-06 ENCOUNTER — APPOINTMENT (OUTPATIENT)
Dept: ADMISSIONS | Facility: MEDICAL CENTER | Age: 72
End: 2017-12-06
Attending: UROLOGY
Payer: MEDICARE

## 2017-12-06 LAB
BACTERIA UR CULT: ABNORMAL
SIGNIFICANT IND 70042: ABNORMAL
SOURCE SOURCE: ABNORMAL

## 2017-12-07 ENCOUNTER — HOSPITAL ENCOUNTER (OUTPATIENT)
Facility: MEDICAL CENTER | Age: 72
End: 2017-12-07
Attending: UROLOGY | Admitting: UROLOGY
Payer: MEDICARE

## 2017-12-07 VITALS
RESPIRATION RATE: 16 BRPM | BODY MASS INDEX: 24.67 KG/M2 | HEART RATE: 65 BPM | HEIGHT: 74 IN | WEIGHT: 192.24 LBS | TEMPERATURE: 98.9 F | OXYGEN SATURATION: 98 % | SYSTOLIC BLOOD PRESSURE: 126 MMHG | DIASTOLIC BLOOD PRESSURE: 80 MMHG

## 2017-12-07 PROCEDURE — 700101 HCHG RX REV CODE 250

## 2017-12-07 PROCEDURE — 160036 HCHG PACU - EA ADDL 30 MINS PHASE I: Performed by: UROLOGY

## 2017-12-07 PROCEDURE — 500879 HCHG PACK, CYSTO: Performed by: UROLOGY

## 2017-12-07 PROCEDURE — 160009 HCHG ANES TIME/MIN: Performed by: UROLOGY

## 2017-12-07 PROCEDURE — 160025 RECOVERY II MINUTES (STATS): Performed by: UROLOGY

## 2017-12-07 PROCEDURE — 700111 HCHG RX REV CODE 636 W/ 250 OVERRIDE (IP)

## 2017-12-07 PROCEDURE — 160048 HCHG OR STATISTICAL LEVEL 1-5: Performed by: UROLOGY

## 2017-12-07 PROCEDURE — 160046 HCHG PACU - 1ST 60 MINS PHASE II: Performed by: UROLOGY

## 2017-12-07 PROCEDURE — A4338 INDWELLING CATHETER LATEX: HCPCS | Performed by: UROLOGY

## 2017-12-07 PROCEDURE — 160029 HCHG SURGERY MINUTES - 1ST 30 MINS LEVEL 4: Performed by: UROLOGY

## 2017-12-07 PROCEDURE — 501329 HCHG SET, CYSTO IRRIG Y TUR: Performed by: UROLOGY

## 2017-12-07 PROCEDURE — A9270 NON-COVERED ITEM OR SERVICE: HCPCS

## 2017-12-07 PROCEDURE — 700102 HCHG RX REV CODE 250 W/ 637 OVERRIDE(OP)

## 2017-12-07 PROCEDURE — 160041 HCHG SURGERY MINUTES - EA ADDL 1 MIN LEVEL 4: Performed by: UROLOGY

## 2017-12-07 PROCEDURE — 160035 HCHG PACU - 1ST 60 MINS PHASE I: Performed by: UROLOGY

## 2017-12-07 PROCEDURE — 160002 HCHG RECOVERY MINUTES (STAT): Performed by: UROLOGY

## 2017-12-07 PROCEDURE — 500042 HCHG BAG, URINARY DRAINAGE (CLOSED): Performed by: UROLOGY

## 2017-12-07 RX ORDER — MIDAZOLAM HYDROCHLORIDE 1 MG/ML
INJECTION INTRAMUSCULAR; INTRAVENOUS
Status: DISPENSED
Start: 2017-12-07 | End: 2017-12-07

## 2017-12-07 RX ORDER — OXYCODONE HCL 5 MG/5 ML
SOLUTION, ORAL ORAL
Status: COMPLETED
Start: 2017-12-07 | End: 2017-12-07

## 2017-12-07 RX ORDER — SODIUM CHLORIDE, SODIUM LACTATE, POTASSIUM CHLORIDE, CALCIUM CHLORIDE 600; 310; 30; 20 MG/100ML; MG/100ML; MG/100ML; MG/100ML
INJECTION, SOLUTION INTRAVENOUS CONTINUOUS
Status: DISCONTINUED | OUTPATIENT
Start: 2017-12-07 | End: 2017-12-07 | Stop reason: HOSPADM

## 2017-12-07 RX ORDER — CIPROFLOXACIN 500 MG/1
500 TABLET, FILM COATED ORAL 2 TIMES DAILY
Status: ON HOLD | COMMUNITY
Start: 2017-11-07 | End: 2017-12-07

## 2017-12-07 RX ORDER — LIDOCAINE HYDROCHLORIDE 10 MG/ML
0.5 INJECTION, SOLUTION INFILTRATION; PERINEURAL
Status: DISCONTINUED | OUTPATIENT
Start: 2017-12-07 | End: 2017-12-07 | Stop reason: HOSPADM

## 2017-12-07 RX ORDER — HYDROCODONE BITARTRATE AND ACETAMINOPHEN 5; 325 MG/1; MG/1
1 TABLET ORAL EVERY 4 HOURS PRN
Qty: 10 TAB | Refills: 0 | Status: SHIPPED | OUTPATIENT
Start: 2017-12-07 | End: 2017-12-12

## 2017-12-07 RX ORDER — LIDOCAINE AND PRILOCAINE 25; 25 MG/G; MG/G
1 CREAM TOPICAL
Status: DISCONTINUED | OUTPATIENT
Start: 2017-12-07 | End: 2017-12-07 | Stop reason: HOSPADM

## 2017-12-07 RX ORDER — CIPROFLOXACIN 500 MG/1
500 TABLET, FILM COATED ORAL 2 TIMES DAILY
Qty: 10 TAB | Refills: 0 | Status: SHIPPED | OUTPATIENT
Start: 2017-12-07 | End: 2017-12-12

## 2017-12-07 RX ORDER — HYDROCODONE BITARTRATE AND ACETAMINOPHEN 5; 325 MG/1; MG/1
1 TABLET ORAL EVERY 4 HOURS PRN
Status: DISCONTINUED | OUTPATIENT
Start: 2017-12-07 | End: 2017-12-07 | Stop reason: HOSPADM

## 2017-12-07 RX ADMIN — SODIUM CHLORIDE, SODIUM LACTATE, POTASSIUM CHLORIDE, CALCIUM CHLORIDE: 600; 310; 30; 20 INJECTION, SOLUTION INTRAVENOUS at 07:00

## 2017-12-07 RX ADMIN — OXYCODONE HYDROCHLORIDE 5 MG: 5 SOLUTION ORAL at 09:45

## 2017-12-07 ASSESSMENT — PAIN SCALES - GENERAL
PAINLEVEL_OUTOF10: 0
PAINLEVEL_OUTOF10: 4
PAINLEVEL_OUTOF10: 0

## 2017-12-07 NOTE — OP REPORT
DATE OF SERVICE:  12/07/2017    PREOPERATIVE DIAGNOSES:  Benign prostatic hypertrophy and urinary retention.    POSTOPERATIVE DIAGNOSES:  Benign prostatic hypertrophy and urinary retention.    PROCEDURE PERFORMED:  GreenLight laser photovaporization of the prostate.    SURGEON:  Cristofer Cr MD    ANESTHESIOLOGIST:  Manfred Alcantar MD    ANESTHESIA:  General.    INDICATIONS FOR PROCEDURE:  The patient is a 72-year-old male with history of   BPH, now with recent urinary retention, despite Flomax.  Office cystoscopy   showed a trilobar prostate.  After discussing treatment options, he has   elected for a GreenLight laser photovaporization of the prostate.    DESCRIPTION OF PROCEDURE:  After obtaining informed consent, the patient was   brought to the operating room.  After the induction of general anesthesia,   patient was placed in dorsal lithotomy position and his genitalia were prepped   and draped in sterile fashion.  Patient received ciprofloxacin as antibiotic   prophylaxis prior to starting the procedure, after which the cystoscope with   visual obturator was passed per urethra into the bladder under direct vision.    The bladder was moderately trabeculated, otherwise normal in appearance.    There was medium sized median lobe of the prostate and bilateral lateral lobes   touching midline.  The laser bridge was passed through and a GreenLight   fiber, after which the obstructing prostatic tissue was vaporized starting at   the median lobe and working then on the lateral lobes from the bladder neck   back to just proximal to the verumontanum.  Care was taken not to vaporize any   tissue into the bladder or further distal than the verumontanum.  There was a   good open channel at the end of the procedure and good hemostasis.    Vaporization was first started at 80 and then increased power to 100 towards   the end of the procedure.  There was minimal bleeding at the end of procedure,   bladder was left  full, the scope was removed and a 22-[place] 2-way Noyola   catheter was placed with 10 mL in the balloon, irrigated clear and hooked up   to down drain.  The patient was then awoken from anesthesia and taken to   recovery room in stable condition.    COMPLICATIONS:  None.    ESTIMATED BLOOD LOSS:  50 mL.    SPECIMENS:  None.    DRAINS:  A 22-[place] 2-way N[place]ola catheter.    PLAN:  For the patient to go home with his Noyola catheter and have it removed   as an outpatient.       ____________________________________     MD SELENE Alvarez / NTS    DD:  12/07/2017 08:59:45  DT:  12/07/2017 09:25:12    D#:  [id number]  Job#:  [id number]

## 2017-12-07 NOTE — PROGRESS NOTES
Pt's VSS, reported to Dr. Alcantar, Dr. Alcantar states it is okay for pt to take metoprolol and flomax this morning

## 2017-12-07 NOTE — PROGRESS NOTES
The Medication Reconciliation process has been completed by interviewing the patient who did not take his metoprolol yet this morning but has it with him.     Allergies have been reviewed  Antibiotic use in 30 days - none    Home Pharm[facility]:  [facility]

## 2017-12-07 NOTE — OR NURSING
"Pt has already had savage for appx the last month at home. All discharge info about cath care given verbally to pt. Pt provided leg bag, \"wants to keep large bag for discharge. Given leg bag if needed.  "

## 2017-12-07 NOTE — OR SURGEON
Immediate Post OP Note    PreOp Diagnosis: BPH, urinary retention    PostOp Diagnosis: same    Procedure(s):  GREEN LIGHT LASER PHOTOSELECTIVE VAPORIZATION OF THE PROSTATE - Wound Class: Clean Contaminated    Surgeon(s):  Cristofer Cr M.D.    Anesthesiologist/Type of Anesthesia:  Anesthesiologist: Manfred Alcantar M.D./General    Surgical Staff:  Circulator: Annmarie Valles R.N.  Scrub Person: Earl Swift; Marlon Rojo    Specimens:  None    Estimated Blood Loss: 50ml    Findings: BPH with trilobar hypertrophy    Complications: none    Drain:  22Fr savage    Plan is for the pt to go home today with the savage catheter.    12/7/2017 8:50 AM Cristofer Cr

## 2017-12-07 NOTE — OR NURSING
Savage cath secured to leg with coban, per his request.  Pt refused secure device that comes with savage cath.

## 2017-12-07 NOTE — DISCHARGE INSTRUCTIONS
ACTIVITY: Rest and take it easy for the first 24 hours.  A responsible adult is recommended to remain with you during that time.  It is normal to feel sleepy.  We encourage you to not do anything that requires balance, judgment or coordination.    MILD FLU-LIKE SYMPTOMS ARE NORMAL. YOU MAY EXPERIENCE GENERALIZED MUSCLE ACHES, THROAT IRRITATION, HEADACHE AND/OR SOME NAUSEA.    FOR 24 HOURS DO NOT:  Drive, operate machinery or run household appliances.  Drink beer or alcoholic beverages.   Make important decisions or sign legal documents.    SPECIAL INSTRUCTIONS:   *Call if fever greater than 101F, pain not controlled with meds, or if catheter stops draining*  *Call Dr Cr's office to schedule catheter removal*  DIET: To avoid nausea, slowly advance diet as tolerated, avoiding spicy or greasy foods for the first day.  Add more substantial food to your diet according to your physician's instructions.  Babies can be fed formula or breast milk as soon as they are hungry.  INCREASE FLUIDS AND FIBER TO AVOID CONSTIPATION.    SURGICAL DRESSING/BATHING: *Follow any special instructions given to you by Dr. Cr*    FOLLOW-UP APPOINTMENT:  A follow-up appointment should be arranged with your doctor; call to schedule.    You should CALL YOUR PHYSICIAN if you develop:  Fever greater than 101 degrees F.  Pain not relieved by medication, or persistent nausea or vomiting.  Excessive bleeding (blood soaking through dressing) or unexpected drainage from the wound.  Extreme redness or swelling around the incision site, drainage of pus or foul smelling drainage.  Inability to urinate or empty your bladder within 8 hours.  Problems with breathing or chest pain.    You should call 911 if you develop problems with breathing or chest pain.  If you are unable to contact your doctor or surgical center, you should go to the nearest emergency room or urgent care center.  Physician's telephone #: *Dr. Cr [phone]*    If any  questions arise, call your doctor.  If your doctor is not available, please feel free to call the Surgical Center at [phone].  The Center is open Monday through Friday from 7AM to 7PM.  You can also call the HEALTH HOTLINE open 24 hours/day, 7 days/week and speak to a nurse at [phone], or toll free at [phone].    A registered nurse may call you a few days after your surgery to see how you are doing after your procedure.    MEDICATIONS: Resume taking daily medication.  Take prescribed pain medication with food.  If no medication is prescribed, you may take non-aspirin pain medication if needed.  PAIN MEDICATION CAN BE VERY CONSTIPATING.  Take a stool softener or laxative such as senokot, pericolace, or milk of magnesia if needed.    Prescription given for *Norco and Cipro *.  Last pain medication given at *9:45am*.    If your physician has prescribed pain medication that includes Acetaminophen (Tylenol), do not take additional Acetaminophen (Tylenol) while taking the prescribed medication.    Depression / Suicide Risk    As you are discharged from this [facility] facility, it is important to learn how to keep safe from harming yourself.    Recognize the warning signs:  · Abrupt changes in personality, positive or negative- including increase in energy   · Giving away possessions  · Change in eating patterns- significant weight changes-  positive or negative  · Change in sleeping patterns- unable to sleep or sleeping all the time   · Unwillingness or inability to communicate  · Depression  · Unusual sadness, discouragement and loneliness  · Talk of wanting to die  · Neglect of personal appearance   · Rebelliousness- reckless behavior  · Withdrawal from people/activities they love  · Confusion- inability to concentrate     If you or a loved one observes any of these behaviors or has concerns about self-harm, here's what you can do:  · Talk about it- your feelings and reasons for harming  yourself  · Remove any means that you might use to hurt yourself (examples: pills, rope, extension cords, firearm)  · Get professional help from the community (Mental Health, Substance Abuse, psychological counseling)  · Do not be alone:Call your Safe Contact- someone whom you trust who will be there for you.  · Call your local CRISIS HOTLINE 230-3646 or 184-215-6473  · Call your local Children's Mobile Crisis Response Team [place] (713) 478-8363 or www.Make My plate  · Call the toll free National Suicide Prevention Hotlines   · National Suicide Prevention Lifeline 599-087-HWYT (9373)  · National Hope Line Network 800-SUICIDE (216-9094)

## 2017-12-11 ENCOUNTER — TELEPHONE (OUTPATIENT)
Dept: CARDIOLOGY | Facility: MEDICAL CENTER | Age: 72
End: 2017-12-11

## 2017-12-11 NOTE — TELEPHONE ENCOUNTER
----- Message from Raquel Mendes sent at 12/11/2017  1:42 PM PST -----  Regarding: Question about when to restart blood thinners  NIXON/Aib    Patient needs to find out when he should restart his blood thinners and can be reached at [phone].

## 2017-12-11 NOTE — TELEPHONE ENCOUNTER
Today's call is Monday: Pt had surgery on prostate 12/7, green light laser for enlarged prostate he states. Pt stopped xarelto Sunday night 1 week +1 day ago (12/3). Pt had significant bleeding in urine but no bleeding since Saturday. Pt was seen by urology Dr Cr today and catheter was removed. They told him to call here and ask if ok to restart xarelto since they are ok with it?

## 2017-12-12 ENCOUNTER — OFFICE VISIT (OUTPATIENT)
Dept: CARDIOLOGY | Facility: MEDICAL CENTER | Age: 72
End: 2017-12-12
Payer: MEDICARE

## 2017-12-12 VITALS
SYSTOLIC BLOOD PRESSURE: 118 MMHG | WEIGHT: 201 LBS | DIASTOLIC BLOOD PRESSURE: 76 MMHG | BODY MASS INDEX: 25.8 KG/M2 | HEIGHT: 74 IN | HEART RATE: 64 BPM

## 2017-12-12 DIAGNOSIS — I48.92 ATRIAL FLUTTER, UNSPECIFIED TYPE (HCC): ICD-10-CM

## 2017-12-12 LAB — EKG IMPRESSION: NORMAL

## 2017-12-12 PROCEDURE — 99214 OFFICE O/P EST MOD 30 MIN: CPT | Performed by: NURSE PRACTITIONER

## 2017-12-12 PROCEDURE — 93000 ELECTROCARDIOGRAM COMPLETE: CPT | Performed by: INTERNAL MEDICINE

## 2017-12-12 ASSESSMENT — ENCOUNTER SYMPTOMS
ABDOMINAL PAIN: 0
FEVER: 0
BLOOD IN STOOL: 0
SINUS PAIN: 0
SPUTUM PRODUCTION: 0
WHEEZING: 0
CONSTIPATION: 0
NAUSEA: 0
TINGLING: 0
SHORTNESS OF BREATH: 0
DIARRHEA: 0
NERVOUS/ANXIOUS: 0
SPEECH CHANGE: 0
WEAKNESS: 0
SORE THROAT: 0
DIZZINESS: 0
FOCAL WEAKNESS: 0
FLANK PAIN: 0
HEADACHES: 0
VOMITING: 0
PALPITATIONS: 0
HEMOPTYSIS: 0
ORTHOPNEA: 0
SENSORY CHANGE: 0
COUGH: 0
DEPRESSION: 0
PND: 0
DIAPHORESIS: 0
CHILLS: 0
INSOMNIA: 1

## 2017-12-12 NOTE — PROGRESS NOTES
"Subjective:     Boo Cavanaugh Jr. is a 72 y.o. male who presents today in follow up regarding his typical atrial flutter & plan for AC. He is s/p laser prostate procedure on 12/7/17 for which he stopped his xarelto for 1 week prior.  He initially had gross hematuria, but that cleared up a lot on Saturday after the savgae was discontinued & he only has a minimal amount of blood in his urine now.  He has not yet restarted his xarelto.    Today his clinic EKG shows SR rate 64.  He has not had any abnormal s/s that would lead him to believe that he has had more atrial flutter.  He has started walking at night but only goes about 1/4 mile due to his progressive osteoarthritis in both knees.  He denies CP, SOB, palpitations, dizziness, & malaise.     Past Medical History:   Diagnosis Date   • Arrhythmia     \"flutter\", Dr. Mendoza cardiologist   • Enlarged prostate with urinary obstruction    • OA (osteoarthritis) of knee     bilateral    • Prostate enlargement    • Urinary bladder disorder 12/06/2017    savage in place   • Urinary retention      Past Surgical History:   Procedure Laterality Date   • TURP-VAPOR  12/7/2017    Procedure: TURP-VAPOR - GREEN LIGHT PHOTOSELECTIVE;  Surgeon: Cristofer Cr M.D.;  Location: [facility];  Service: Urology   • GANGLION EXCISION Right 1966    wrist   • TONSILLECTOMY      as a child     Family History   Problem Relation Age of Onset   • Cancer Father    • Cancer Brother 50     prostate cancer     History   Smoking Status   • Never Smoker   Smokeless Tobacco   • Never Used     Allergies   Allergen Reactions   • Penicillins Rash     Rash as a child.      Outpatient Encounter Prescriptions as of 12/12/2017   Medication Sig Dispense Refill   • Cholecalciferol (VITAMIN D3) 5000 units Cap Take 1 Cap by mouth every day.     • tamsulosin (FLOMAX) 0.4 MG capsule Take 1 Cap by mouth ONE-HALF HOUR AFTER BREAKFAST. 90 Cap 3   • metoprolol (LOPRESSOR) 25 MG Tab Take 1 Tab by mouth 2 " "Times a Day. 60 Tab 3   • magnesium oxide (MAG-OX) 400 MG Tab Take 400 mg by mouth every evening.     • [DISCONTINUED] ciprofloxacin (CIPRO) 500 MG Tab Take 1 Tab by mouth 2 times a day. (Patient not taking: Reported on 12/12/2017) 10 Tab 0   • [DISCONTINUED] hydrocodone-acetaminophen (NORCO) 5-325 MG Tab per tablet Take 1 Tab by mouth every four hours as needed. (Patient not taking: Reported on 12/12/2017) 10 Tab 0     No facility-administered encounter medications on file as of 12/12/2017.      Review of Systems   Constitutional: Negative for chills, diaphoresis, fever and malaise/fatigue.   HENT: Negative for congestion, nosebleeds, sinus pain and sore throat.    Respiratory: Negative for cough, hemoptysis, sputum production, shortness of breath and wheezing.    Cardiovascular: Negative for chest pain, palpitations, orthopnea, leg swelling and PND.   Gastrointestinal: Negative for abdominal pain, blood in stool, constipation, diarrhea, melena, nausea and vomiting.   Genitourinary: Positive for hematuria. Negative for dysuria, flank pain, frequency and urgency.        Discomfort from procedure   Musculoskeletal: Positive for joint pain.        Knee pain     Neurological: Negative for dizziness, tingling, sensory change, speech change, focal weakness, weakness and headaches.   Psychiatric/Behavioral: Negative for depression. The patient has insomnia. The patient is not nervous/anxious.       Objective:   /76   Pulse 64   Ht 1.88 m (6' 2\")   Wt 91.2 kg (201 lb)   BMI 25.81 kg/m²     Physical Exam   Constitutional: He is oriented to person, place, and time. He appears well-developed and well-nourished. No distress.   HENT:   Head: Normocephalic and atraumatic.   Right Ear: External ear normal.   Left Ear: External ear normal.   Nose: Nose normal.   Mouth/Throat: Oropharynx is clear and moist.   Eyes: Pupils are equal, round, and reactive to light. Right eye exhibits no discharge. Left eye exhibits no " discharge. No scleral icterus.   Neck: Normal range of motion. Neck supple. No JVD present.   Cardiovascular: Normal rate, regular rhythm, normal heart sounds and intact distal pulses.  Exam reveals no gallop and no friction rub.    No murmur heard.  Pulmonary/Chest: Effort normal and breath sounds normal. No stridor. No respiratory distress. He has no wheezes. He has no rales.   Abdominal: Soft. Bowel sounds are normal. He exhibits no distension. There is no tenderness. There is no rebound and no guarding.   Musculoskeletal: Normal range of motion. He exhibits no edema.   Neurological: He is alert and oriented to person, place, and time.   Skin: Skin is warm and dry. No rash noted. He is not diaphoretic. No erythema. No pallor.   Psychiatric: He has a normal mood and affect. His behavior is normal. Judgment and thought content normal.   Nursing note and vitals reviewed.    Assessment:     1. Atrial flutter, unspecified type (CMS-[facility])  EKG     Medical Decision Making:  Today's Assessment / Status / Plan:     1.  Typical atrial flutter   -At this point there are a few options to consider. He is in SR, but could be having pAflutter. We discussed the possibility of an a-flutter ablation.  I reviewed his [id number] score with him, calculated at 2 (1 point HTN, 1 point age) which gives him a 2.2% risk per year of stroke.  We could put him back on xarelto & monitor closely for recurrence, but the patient wants to eventually get off of AC. Therefore, I gave him the option of staying off the xarelto for now & doing a ziopatch to assess for more a-flutter.  If he is in fact having more atrial flutter, we would restart AC at that time & discuss RFA with Dr. Mendoza.  He would like to proceed with staying off AC & seeing what the ziopatch reveals.    I requested he f/u with me 4 weeks after the ziopatch is placed for discussion of the results.  He will call or come in in the interim if s/s develop or if he has questions or  concerns.    Case discussed with Dr. Mendoza.      Collaborating MD:  Dr. Zacarias Mcclendon

## 2017-12-20 ENCOUNTER — HOSPITAL ENCOUNTER (OUTPATIENT)
Dept: LAB | Facility: MEDICAL CENTER | Age: 72
End: 2017-12-20
Attending: NURSE PRACTITIONER
Payer: MEDICARE

## 2017-12-20 ENCOUNTER — HOSPITAL ENCOUNTER (OUTPATIENT)
Dept: LAB | Facility: MEDICAL CENTER | Age: 72
End: 2017-12-20
Attending: INTERNAL MEDICINE
Payer: MEDICARE

## 2017-12-20 DIAGNOSIS — E55.9 VITAMIN D DEFICIENCY: ICD-10-CM

## 2017-12-20 DIAGNOSIS — I48.92 ATRIAL FLUTTER, UNSPECIFIED TYPE (HCC): ICD-10-CM

## 2017-12-20 LAB
25(OH)D3 SERPL-MCNC: 34 NG/ML (ref 30–100)
MAGNESIUM SERPL-MCNC: 2.2 MG/DL (ref 1.5–2.5)

## 2017-12-20 PROCEDURE — 83735 ASSAY OF MAGNESIUM: CPT | Mod: GA

## 2017-12-20 PROCEDURE — 36415 COLL VENOUS BLD VENIPUNCTURE: CPT

## 2017-12-20 PROCEDURE — 82306 VITAMIN D 25 HYDROXY: CPT

## 2018-01-03 ENCOUNTER — NON-PROVIDER VISIT (OUTPATIENT)
Dept: CARDIOLOGY | Facility: MEDICAL CENTER | Age: 73
End: 2018-01-03
Attending: NURSE PRACTITIONER
Payer: MEDICARE

## 2018-01-03 ENCOUNTER — TELEPHONE (OUTPATIENT)
Dept: CARDIOLOGY | Facility: MEDICAL CENTER | Age: 73
End: 2018-01-03

## 2018-01-03 DIAGNOSIS — I47.29 NSVT (NONSUSTAINED VENTRICULAR TACHYCARDIA) (HCC): ICD-10-CM

## 2018-01-03 DIAGNOSIS — I48.92 ATRIAL FLUTTER, UNSPECIFIED TYPE (HCC): ICD-10-CM

## 2018-01-15 ENCOUNTER — OFFICE VISIT (OUTPATIENT)
Dept: CARDIOLOGY | Facility: MEDICAL CENTER | Age: 73
End: 2018-01-15
Payer: MEDICARE

## 2018-01-15 VITALS
BODY MASS INDEX: 26.18 KG/M2 | DIASTOLIC BLOOD PRESSURE: 70 MMHG | HEIGHT: 74 IN | WEIGHT: 204 LBS | HEART RATE: 62 BPM | OXYGEN SATURATION: 92 % | SYSTOLIC BLOOD PRESSURE: 110 MMHG

## 2018-01-15 DIAGNOSIS — N40.0 BENIGN PROSTATIC HYPERPLASIA WITHOUT LOWER URINARY TRACT SYMPTOMS: ICD-10-CM

## 2018-01-15 DIAGNOSIS — I49.3 FREQUENT UNIFOCAL PVCS: ICD-10-CM

## 2018-01-15 DIAGNOSIS — I48.92 ATRIAL FLUTTER, UNSPECIFIED TYPE (HCC): ICD-10-CM

## 2018-01-15 LAB — EKG IMPRESSION: NORMAL

## 2018-01-15 PROCEDURE — 99213 OFFICE O/P EST LOW 20 MIN: CPT | Performed by: INTERNAL MEDICINE

## 2018-01-15 PROCEDURE — 93000 ELECTROCARDIOGRAM COMPLETE: CPT | Performed by: INTERNAL MEDICINE

## 2018-01-15 NOTE — LETTER
"     [facility]   [address], [address]  [place] [address]  Phone: [phone]  Fax: [phone]              Boo Cavanaugh Jr.  1945    Encounter Date: 1/15/2018    Enoch Mendoza M.D.          PROGRESS NOTE:  Subjective:   Boo Cavanaugh Jr. is a 72 y.o. male who presents today s/p urologic procedure. Has zio on. Had atrial flutter no real clinical recurrence. Some skips. Not on anticoagulation. Feels well.     Chief Complaint: atrial flutter    Past Medical History:   Diagnosis Date   • Arrhythmia     \"flutter\", Dr. Mendoza cardiologist   • Enlarged prostate with urinary obstruction    • OA (osteoarthritis) of knee     bilateral    • Prostate enlargement    • Urinary bladder disorder 12/06/2017    savage in place   • Urinary retention      Past Surgical History:   Procedure Laterality Date   • TURP-VAPOR  12/7/2017    Procedure: TURP-VAPOR - GREEN LIGHT PHOTOSELECTIVE;  Surgeon: Cristofer Cr M.D.;  Location: SURGERY [facility];  Service: Urology   • GANGLION EXCISION Right 1966    wrist   • TONSILLECTOMY      as a child     Family History   Problem Relation Age of Onset   • Cancer Father    • Cancer Brother 50     prostate cancer     History   Smoking Status   • Never Smoker   Smokeless Tobacco   • Never Used     Allergies   Allergen Reactions   • Penicillins Rash     Rash as a child.      Outpatient Encounter Prescriptions as of 1/15/2018   Medication Sig Dispense Refill   • Cholecalciferol (VITAMIN D3) 5000 units Cap Take 1 Cap by mouth every day.     • metoprolol (LOPRESSOR) 25 MG Tab Take 1 Tab by mouth 2 Times a Day. 60 Tab 3   • magnesium oxide (MAG-OX) 400 MG Tab Take 400 mg by mouth every evening.     • tamsulosin (FLOMAX) 0.4 MG capsule Take 1 Cap by mouth ONE-HALF HOUR AFTER BREAKFAST. (Patient not taking: Reported on 1/15/2018) 90 Cap 3     No facility-administered encounter medications on file as of 1/15/2018.      ROS     Objective:   /70  " " Pulse 62   Ht 1.88 m (6' 2.02\")   Wt 92.5 kg (204 lb)   SpO2 92%   BMI 26.18 kg/m²      Physical Exam    Assessment:     1. Atrial flutter, unspecified type (CMS-HCC)  EKG   2. Benign prostatic hyperplasia without lower urinary tract symptoms         Medical Decision Making:  Today's Assessment / Status / Plan:   1. Atrial flutter await zio results.  2. Beta blockers mild exercise intolerance will try taping off.  3. F/U with me in 6 weeks.      Hellen Ayala M.D.  [address]  [place] [address]  VIA In Basket                 "

## 2018-01-15 NOTE — PROGRESS NOTES
"Subjective:   Boo Cavanaugh Jr. is a 72 y.o. male who presents today s/p urologic procedure. Has zio on. Had atrial flutter no real clinical recurrence. Some skips. Not on anticoagulation. Feels well.     Chief Complaint: atrial flutter    Past Medical History:   Diagnosis Date   • Arrhythmia     \"flutter\", Dr. Mendoza cardiologist   • Enlarged prostate with urinary obstruction    • OA (osteoarthritis) of knee     bilateral    • Prostate enlargement    • Urinary bladder disorder 12/06/2017    savage in place   • Urinary retention      Past Surgical History:   Procedure Laterality Date   • TURP-VAPOR  12/7/2017    Procedure: TURP-VAPOR - GREEN LIGHT PHOTOSELECTIVE;  Surgeon: Cristofer Cr M.D.;  Location: SURGERY [facility];  Service: Urology   • GANGLION EXCISION Right 1966    wrist   • TONSILLECTOMY      as a child     Family History   Problem Relation Age of Onset   • Cancer Father    • Cancer Brother 50     prostate cancer     History   Smoking Status   • Never Smoker   Smokeless Tobacco   • Never Used     Allergies   Allergen Reactions   • Penicillins Rash     Rash as a child.      Outpatient Encounter Prescriptions as of 1/15/2018   Medication Sig Dispense Refill   • Cholecalciferol (VITAMIN D3) 5000 units Cap Take 1 Cap by mouth every day.     • metoprolol (LOPRESSOR) 25 MG Tab Take 1 Tab by mouth 2 Times a Day. 60 Tab 3   • magnesium oxide (MAG-OX) 400 MG Tab Take 400 mg by mouth every evening.     • tamsulosin (FLOMAX) 0.4 MG capsule Take 1 Cap by mouth ONE-HALF HOUR AFTER BREAKFAST. (Patient not taking: Reported on 1/15/2018) 90 Cap 3     No facility-administered encounter medications on file as of 1/15/2018.      ROS     Objective:   /70   Pulse 62   Ht 1.88 m (6' 2.02\")   Wt 92.5 kg (204 lb)   SpO2 92%   BMI 26.18 kg/m²     Physical Exam    Assessment:     1. Atrial flutter, unspecified type (CMS-HCC)  EKG   2. Benign prostatic hyperplasia without lower urinary tract symptoms   "       Medical Decision Making:  Today's Assessment / Status / Plan:   1. Atrial flutter await zio results.  2. Beta blockers mild exercise intolerance will try taping off.  3. F/U with me in 6 weeks.

## 2018-01-31 ENCOUNTER — TELEPHONE (OUTPATIENT)
Dept: CARDIOLOGY | Facility: MEDICAL CENTER | Age: 73
End: 2018-01-31

## 2018-01-31 PROCEDURE — 0296T PR EXT ECG > 48HR TO 21 DAY RCRD W/CONECT INTL RCRD: CPT | Performed by: INTERNAL MEDICINE

## 2018-01-31 PROCEDURE — 0298T PR EXT ECG > 48HR TO 21 DAY REVIEW AND INTERPRETATN: CPT | Performed by: INTERNAL MEDICINE

## 2018-02-01 NOTE — TELEPHONE ENCOUNTER
----- Message from ZARIA Feng sent at 1/31/2018  3:27 PM PST -----  Does Reji have any time available sometime soon to see this patient? Just curious...

## 2018-02-01 NOTE — TELEPHONE ENCOUNTER
Ravindra michaels made ds 2/2 240, call to let us know if he can come in then, Ely on Amandas desk

## 2018-02-02 ENCOUNTER — OFFICE VISIT (OUTPATIENT)
Dept: CARDIOLOGY | Facility: MEDICAL CENTER | Age: 73
End: 2018-02-02
Payer: MEDICARE

## 2018-02-02 VITALS
SYSTOLIC BLOOD PRESSURE: 122 MMHG | BODY MASS INDEX: 25.8 KG/M2 | OXYGEN SATURATION: 95 % | DIASTOLIC BLOOD PRESSURE: 72 MMHG | HEIGHT: 74 IN | HEART RATE: 83 BPM | WEIGHT: 201 LBS

## 2018-02-02 DIAGNOSIS — I48.3 TYPICAL ATRIAL FLUTTER (HCC): ICD-10-CM

## 2018-02-02 DIAGNOSIS — I49.3 FREQUENT UNIFOCAL PVCS: ICD-10-CM

## 2018-02-02 PROCEDURE — 99214 OFFICE O/P EST MOD 30 MIN: CPT | Performed by: INTERNAL MEDICINE

## 2018-02-02 NOTE — PROGRESS NOTES
"Subjective:   Boo Cavanaugh Jr. is a 72 y.o. male who presents today with previous sustained flutter and frequent outflow tract PVC's. Felt poorly with beta blockers. Has about 8 % on Holter. No syncope. No a flutter seen. Has had PVC's for sometime. Has worsened. No chest pressure. Nl echo.    Chief Complaint: palps    Past Medical History:   Diagnosis Date   • Arrhythmia     \"flutter\", Dr. Mendoza cardiologist   • Enlarged prostate with urinary obstruction    • OA (osteoarthritis) of knee     bilateral    • Prostate enlargement    • Urinary bladder disorder 12/06/2017    savage in place   • Urinary retention      Past Surgical History:   Procedure Laterality Date   • TURP-VAPOR  12/7/2017    Procedure: TURP-VAPOR - GREEN LIGHT PHOTOSELECTIVE;  Surgeon: Cristofer Cr M.D.;  Location: SURGERY [facility];  Service: Urology   • GANGLION EXCISION Right 1966    wrist   • TONSILLECTOMY      as a child     Family History   Problem Relation Age of Onset   • Cancer Father    • Cancer Brother 50     prostate cancer     History   Smoking Status   • Never Smoker   Smokeless Tobacco   • Never Used     Allergies   Allergen Reactions   • Penicillins Rash     Rash as a child.      Outpatient Encounter Prescriptions as of 2/2/2018   Medication Sig Dispense Refill   • Cholecalciferol (VITAMIN D3) 5000 units Cap Take 1 Cap by mouth every day.     • magnesium oxide (MAG-OX) 400 MG Tab Take 400 mg by mouth every evening.     • tamsulosin (FLOMAX) 0.4 MG capsule Take 1 Cap by mouth ONE-HALF HOUR AFTER BREAKFAST. (Patient not taking: Reported on 1/15/2018) 90 Cap 3   • metoprolol (LOPRESSOR) 25 MG Tab Take 1 Tab by mouth 2 Times a Day. 60 Tab 3     No facility-administered encounter medications on file as of 2/2/2018.      ROS     Objective:   /72   Pulse 83   Ht 1.88 m (6' 2.02\")   Wt 91.2 kg (201 lb)   SpO2 95%   BMI 25.80 kg/m²     Physical Exam   Constitutional: He is oriented to person, place, and time. He appears " well-developed. No distress.   HENT:   Mouth/Throat: Mucous membranes are normal.   Eyes: Conjunctivae and EOM are normal.   Neck: No JVD present. No tracheal deviation present. No thyroid mass and no thyromegaly present.   Cardiovascular: Normal rate, regular rhythm and intact distal pulses.    No murmur heard.  Pulmonary/Chest: Effort normal and breath sounds normal. No respiratory distress. He exhibits no tenderness.   Abdominal: Soft. There is no tenderness.   Musculoskeletal: Normal range of motion. He exhibits no edema.   Neurological: He is alert and oriented to person, place, and time. He has normal strength. He displays no tremor.   Skin: Skin is warm and dry. He is not diaphoretic.   Psychiatric: He has a normal mood and affect. His behavior is normal.   Vitals reviewed.      Assessment:     1. Frequent unifocal PVCs     2. Typical atrial flutter (CMS-HCC)         Medical Decision Making:  Today's Assessment / Status / Plan:   1. PVC RVOT presumed discussed living with them, AA meds or catheter RFA . He will think about options.  2. Atrial flutter no obvious recurrence.  3. F/U with me in 6 weeks.

## 2018-03-06 ENCOUNTER — OFFICE VISIT (OUTPATIENT)
Dept: CARDIOLOGY | Facility: MEDICAL CENTER | Age: 73
End: 2018-03-06
Payer: MEDICARE

## 2018-03-06 VITALS
BODY MASS INDEX: 26.18 KG/M2 | WEIGHT: 204 LBS | SYSTOLIC BLOOD PRESSURE: 122 MMHG | DIASTOLIC BLOOD PRESSURE: 70 MMHG | HEART RATE: 75 BPM | HEIGHT: 74 IN | OXYGEN SATURATION: 94 %

## 2018-03-06 DIAGNOSIS — I49.3 FREQUENT UNIFOCAL PVCS: ICD-10-CM

## 2018-03-06 DIAGNOSIS — N39.0 RECURRENT UTI: ICD-10-CM

## 2018-03-06 DIAGNOSIS — I48.3 TYPICAL ATRIAL FLUTTER (HCC): ICD-10-CM

## 2018-03-06 LAB — EKG IMPRESSION: NORMAL

## 2018-03-06 PROCEDURE — 93000 ELECTROCARDIOGRAM COMPLETE: CPT | Performed by: INTERNAL MEDICINE

## 2018-03-06 PROCEDURE — 99214 OFFICE O/P EST MOD 30 MIN: CPT | Performed by: INTERNAL MEDICINE

## 2018-03-06 NOTE — LETTER
"     [facility]   [address], [address]  [place] [address]  Phone: [phone]  Fax: [phone]              Boo Cavanaugh Jr.  1945    Encounter Date: 3/6/2018    Enoch Mendoza M.D.          PROGRESS NOTE:  Subjective:   Boo Cavanaugh Jr. is a 72 y.o. male who presents today with previous flutter in setting of a urologic issue. Minimal palps. PVC's not real symptomatic. May have had a short episode of flutter    Chief Complaint: atrial flutter and PVC's    Past Medical History:   Diagnosis Date   • Arrhythmia     \"flutter\", Dr. Mendoza cardiologist   • Enlarged prostate with urinary obstruction    • OA (osteoarthritis) of knee     bilateral    • Prostate enlargement    • Urinary bladder disorder 12/06/2017    savage in place   • Urinary retention      Past Surgical History:   Procedure Laterality Date   • TURP-VAPOR  12/7/2017    Procedure: TURP-VAPOR - GREEN LIGHT PHOTOSELECTIVE;  Surgeon: Cristofer Cr M.D.;  Location: SURGERY [facility];  Service: Urology   • GANGLION EXCISION Right 1966    wrist   • TONSILLECTOMY      as a child     Family History   Problem Relation Age of Onset   • Cancer Father    • Cancer Brother 50     prostate cancer     History   Smoking Status   • Never Smoker   Smokeless Tobacco   • Never Used     Allergies   Allergen Reactions   • Penicillins Rash     Rash as a child.      Outpatient Encounter Prescriptions as of 3/6/2018   Medication Sig Dispense Refill   • Cholecalciferol (VITAMIN D3) 5000 units Cap Take 1 Cap by mouth every day.     • magnesium oxide (MAG-OX) 400 MG Tab Take 400 mg by mouth every evening.     • tamsulosin (FLOMAX) 0.4 MG capsule Take 1 Cap by mouth ONE-HALF HOUR AFTER BREAKFAST. (Patient not taking: Reported on 1/15/2018) 90 Cap 3   • metoprolol (LOPRESSOR) 25 MG Tab Take 1 Tab by mouth 2 Times a Day. 60 Tab 3     No facility-administered encounter medications on file as of 3/6/2018.      ROS     Objective:   " "  /70   Pulse 75   Ht 1.88 m (6' 2.02\")   Wt 92.5 kg (204 lb)   SpO2 94%   BMI 26.18 kg/m²      Physical Exam   Constitutional: He is oriented to person, place, and time. He appears well-developed. No distress.   HENT:   Mouth/Throat: Mucous membranes are normal.   Eyes: Conjunctivae and EOM are normal.   Neck: No JVD present. No tracheal deviation present. No thyroid mass and no thyromegaly present.   Cardiovascular: Normal rate, regular rhythm and intact distal pulses.    No murmur heard.  Pulmonary/Chest: Effort normal and breath sounds normal. No respiratory distress. He exhibits no tenderness.   Abdominal: Soft. There is no tenderness.   Musculoskeletal: Normal range of motion. He exhibits no edema.   Neurological: He is alert and oriented to person, place, and time. He has normal strength. He displays no tremor.   Skin: Skin is warm and dry. He is not diaphoretic.   Psychiatric: He has a normal mood and affect. His behavior is normal.   Vitals reviewed.      Assessment:     1. Typical atrial flutter (CMS-HCC)     2. Frequent unifocal PVCs     3. Recurrent UTI         Medical Decision Making:  Today's Assessment / Status / Plan:   1. Atrial flutter no obvious recurrence. Get a Kardia.  2. PVC's stable on beta blockers.  3. F/U with me in 6 months.      Hellen Ayala M.D.  [address]  [place] [phone]  VIA In Basket                 "

## 2018-03-06 NOTE — PROGRESS NOTES
"Subjective:   Boo Cavanaugh Jr. is a 72 y.o. male who presents today with previous flutter in setting of a urologic issue. Minimal palps. PVC's not real symptomatic. May have had a short episode of flutter    Chief Complaint: atrial flutter and PVC's    Past Medical History:   Diagnosis Date   • Arrhythmia     \"flutter\", Dr. Mendoza cardiologist   • Enlarged prostate with urinary obstruction    • OA (osteoarthritis) of knee     bilateral    • Prostate enlargement    • Urinary bladder disorder 12/06/2017    savage in place   • Urinary retention      Past Surgical History:   Procedure Laterality Date   • TURP-VAPOR  12/7/2017    Procedure: TURP-VAPOR - GREEN LIGHT PHOTOSELECTIVE;  Surgeon: Cristofer Cr M.D.;  Location: SURGERY [facility];  Service: Urology   • GANGLION EXCISION Right 1966    wrist   • TONSILLECTOMY      as a child     Family History   Problem Relation Age of Onset   • Cancer Father    • Cancer Brother 50     prostate cancer     History   Smoking Status   • Never Smoker   Smokeless Tobacco   • Never Used     Allergies   Allergen Reactions   • Penicillins Rash     Rash as a child.      Outpatient Encounter Prescriptions as of 3/6/2018   Medication Sig Dispense Refill   • Cholecalciferol (VITAMIN D3) 5000 units Cap Take 1 Cap by mouth every day.     • magnesium oxide (MAG-OX) 400 MG Tab Take 400 mg by mouth every evening.     • tamsulosin (FLOMAX) 0.4 MG capsule Take 1 Cap by mouth ONE-HALF HOUR AFTER BREAKFAST. (Patient not taking: Reported on 1/15/2018) 90 Cap 3   • metoprolol (LOPRESSOR) 25 MG Tab Take 1 Tab by mouth 2 Times a Day. 60 Tab 3     No facility-administered encounter medications on file as of 3/6/2018.      ROS     Objective:   /70   Pulse 75   Ht 1.88 m (6' 2.02\")   Wt 92.5 kg (204 lb)   SpO2 94%   BMI 26.18 kg/m²     Physical Exam   Constitutional: He is oriented to person, place, and time. He appears well-developed. No distress.   HENT:   Mouth/Throat: Mucous membranes " are normal.   Eyes: Conjunctivae and EOM are normal.   Neck: No JVD present. No tracheal deviation present. No thyroid mass and no thyromegaly present.   Cardiovascular: Normal rate, regular rhythm and intact distal pulses.    No murmur heard.  Pulmonary/Chest: Effort normal and breath sounds normal. No respiratory distress. He exhibits no tenderness.   Abdominal: Soft. There is no tenderness.   Musculoskeletal: Normal range of motion. He exhibits no edema.   Neurological: He is alert and oriented to person, place, and time. He has normal strength. He displays no tremor.   Skin: Skin is warm and dry. He is not diaphoretic.   Psychiatric: He has a normal mood and affect. His behavior is normal.   Vitals reviewed.      Assessment:     1. Typical atrial flutter (CMS-HCC)     2. Frequent unifocal PVCs     3. Recurrent UTI         Medical Decision Making:  Today's Assessment / Status / Plan:   1. Atrial flutter no obvious recurrence. Get a Kardia.  2. PVC's stable.  3. F/U with me in 6 months.

## 2018-03-28 ENCOUNTER — OFFICE VISIT (OUTPATIENT)
Dept: CARDIOLOGY | Facility: MEDICAL CENTER | Age: 73
End: 2018-03-28
Payer: MEDICARE

## 2018-03-28 ENCOUNTER — TELEPHONE (OUTPATIENT)
Dept: CARDIOLOGY | Facility: MEDICAL CENTER | Age: 73
End: 2018-03-28

## 2018-03-28 VITALS
HEIGHT: 74 IN | DIASTOLIC BLOOD PRESSURE: 84 MMHG | SYSTOLIC BLOOD PRESSURE: 142 MMHG | HEART RATE: 78 BPM | WEIGHT: 204 LBS | OXYGEN SATURATION: 94 % | BODY MASS INDEX: 26.18 KG/M2

## 2018-03-28 DIAGNOSIS — I49.3 PVC (PREMATURE VENTRICULAR CONTRACTION): ICD-10-CM

## 2018-03-28 DIAGNOSIS — E78.5 HYPERLIPIDEMIA LDL GOAL <100: ICD-10-CM

## 2018-03-28 DIAGNOSIS — I48.4 ATYPICAL ATRIAL FLUTTER (HCC): ICD-10-CM

## 2018-03-28 PROBLEM — I48.92 ATRIAL FLUTTER (HCC): Chronic | Status: ACTIVE | Noted: 2017-11-11

## 2018-03-28 PROCEDURE — 93000 ELECTROCARDIOGRAM COMPLETE: CPT | Performed by: NURSE PRACTITIONER

## 2018-03-28 PROCEDURE — 99214 OFFICE O/P EST MOD 30 MIN: CPT | Performed by: NURSE PRACTITIONER

## 2018-03-28 ASSESSMENT — ENCOUNTER SYMPTOMS
ORTHOPNEA: 0
DIAPHORESIS: 0
FEVER: 0
FOCAL WEAKNESS: 0
HEMOPTYSIS: 0
BRUISES/BLEEDS EASILY: 0
HEADACHES: 0
DEPRESSION: 0
SHORTNESS OF BREATH: 0
COUGH: 0
PALPITATIONS: 1
WEAKNESS: 0
SPUTUM PRODUCTION: 0
SENSORY CHANGE: 0
PND: 0
WHEEZING: 0
INSOMNIA: 1
CONSTIPATION: 0
BLOOD IN STOOL: 0
NAUSEA: 0
DIARRHEA: 0
ABDOMINAL PAIN: 0
NERVOUS/ANXIOUS: 0
DIZZINESS: 0
SPEECH CHANGE: 0
CHILLS: 0
VOMITING: 0

## 2018-03-28 NOTE — TELEPHONE ENCOUNTER
The referral has been sent to scheduling.      [facility] Pulmonology- [phone]

## 2018-03-28 NOTE — LETTER
"     [facility]   [address], [address]  [place] [address]  Phone: [phone]  Fax: [phone]              Boo Cavanaugh Jr.  1945    Encounter Date: 3/28/2018    ZARIA Feng          PROGRESS NOTE:  Chief Complaint   Patient presents with   • Atrial Flutter     F/V DX:PVC / FLUTTER     Subjective:     Boo aCvanaugh Jr. is a 72 y.o. male patient of Dr. Mendoza who presents today for follow up of his atrial flutter & PVCs.    He has a history of previous sustained typical flutter and frequent unifocal outflow tract PVC's. Felt poorly with beta blockers. Has had PVC's for sometime, which have worsened. No recurrence as of last month when he saw Dr. Mendoza.  Was offered AAD vs catheter ablation vs no intervention at that time, but wanted to think about it. Saw Dr. Mendoza in early March & had no recurrence of a-flutter with asymptomatic PVC's.  Was advised to follow up in 6 months at that time.    Kardia strips reviewed.  One 30s episode of ST rate 150s not provoked by anxiety, stress, or exercise.  Felt fluttering in his neck & mild SOB with it.  Otherwise has been having frequent PVCs, including bigem & trigem.  EKG shows SR 78. No ectopy.  Feeling palpitations, otherwise has no complaints.  VS WNL.  BPs at home are running < 120.  Last lab work done 6 months ago was WNL. K 4.6, Mg 2.2. Thyroid studies normal. Elevated LDL & Tchol.  NUFGP3ATHT 1 (age). Stroke risk reviewed.  He has been taking a baby ASA.    Past Medical History:   Diagnosis Date   • Arrhythmia     \"flutter\", Dr. Mendoza cardiologist   • Enlarged prostate with urinary obstruction    • OA (osteoarthritis) of knee     bilateral    • Prostate enlargement    • Urinary bladder disorder 12/06/2017    savage in place   • Urinary retention      Past Surgical History:   Procedure Laterality Date   • TURP-VAPOR  12/7/2017    Procedure: TURP-VAPOR - GREEN LIGHT PHOTOSELECTIVE;  Surgeon: Cristofer STOKES" ERNESTINE Cr;  Location: SURGERY [facility];  Service: Urology   • GANGLION EXCISION Right 1966    wrist   • TONSILLECTOMY      as a child     Family History   Problem Relation Age of Onset   • Cancer Father    • Cancer Brother 50     prostate cancer     Social History     Social History   • Marital status: Single     Spouse name: N/A   • Number of children: N/A   • Years of education: N/A     Occupational History   • Not on file.     Social History Main Topics   • Smoking status: Never Smoker   • Smokeless tobacco: Never Used   • Alcohol use No   • Drug use: No   • Sexual activity: No     Other Topics Concern   • Not on file     Social History Narrative   • No narrative on file     Allergies   Allergen Reactions   • Penicillins Rash     Rash as a child.      Outpatient Encounter Prescriptions as of 3/28/2018   Medication Sig Dispense Refill   • Cholecalciferol (VITAMIN D3) 5000 units Cap Take 1 Cap by mouth every day.     • magnesium oxide (MAG-OX) 400 MG Tab Take 400 mg by mouth every evening.     • [DISCONTINUED] tamsulosin (FLOMAX) 0.4 MG capsule Take 1 Cap by mouth ONE-HALF HOUR AFTER BREAKFAST. (Patient not taking: Reported on 1/15/2018) 90 Cap 3     No facility-administered encounter medications on file as of 3/28/2018.      Review of Systems   Constitutional: Negative for chills, diaphoresis, fever and malaise/fatigue.   HENT: Negative for nosebleeds.    Respiratory: Negative for cough, hemoptysis, sputum production, shortness of breath and wheezing.    Cardiovascular: Positive for palpitations. Negative for chest pain, orthopnea, leg swelling and PND.   Gastrointestinal: Negative for abdominal pain, blood in stool, constipation, diarrhea, melena, nausea and vomiting.   Genitourinary: Negative for dysuria, frequency, hematuria and urgency.   Neurological: Negative for dizziness, sensory change, speech change, focal weakness, weakness and headaches.   Endo/Heme/Allergies: Does not bruise/bleed easily.    "  Psychiatric/Behavioral: Negative for depression. The patient has insomnia. The patient is not nervous/anxious.    All other systems reviewed and are negative.     Objective:   /84   Pulse 78   Ht 1.88 m (6' 2.02\")   Wt 92.5 kg (204 lb)   SpO2 94%   BMI 26.18 kg/m²      Physical Exam   Constitutional: He is oriented to person, place, and time. He appears well-developed and well-nourished. No distress.   HENT:   Head: Normocephalic and atraumatic.   Eyes: EOM are normal. Pupils are equal, round, and reactive to light.   Neck: Normal range of motion. Neck supple. No JVD present.   Cardiovascular: Normal rate, regular rhythm, normal heart sounds and intact distal pulses.  Exam reveals no gallop and no friction rub.    No murmur heard.  Pulmonary/Chest: Effort normal. No accessory muscle usage. No tachypnea. No respiratory distress. He has decreased breath sounds in the right upper field, the right middle field, the right lower field, the left upper field and the left lower field. He has no wheezes. He has no rales.   Abdominal: Soft. Bowel sounds are normal. He exhibits no distension. There is no tenderness.   Musculoskeletal: Normal range of motion. He exhibits no edema.   Neurological: He is alert and oriented to person, place, and time.   Skin: Skin is warm and dry. No rash noted. He is not diaphoretic. No erythema. No pallor.   Psychiatric: He has a normal mood and affect. His behavior is normal. Judgment and thought content normal.   Nursing note and vitals reviewed.    Assessment:     1. PVC (premature ventricular contraction)  EKG   2. Atypical atrial flutter (CMS-HCC)  EKG    REFERRAL TO PULMONOLOGY    REFERRAL TO SLEEP STUDIES     Medical Decision Making:  Today's Assessment / Status / Plan:     Hold off on BB for now due to previous intolerance.  Check BMP, Mg.  Recheck lipid profile. He would like to try diet & exercise to help decrease before trying a statin.  Has family hx of stroke.  SHERRY " workup referral placed.  Continue monitoring rhythm with Kardia.    RTC in 3 months with Dr. Mendoza.        Collaborating MD:  Dr. Mart Ayala M.D.  [address] [place]   [place] [address]  VIA In Basket

## 2018-03-28 NOTE — PROGRESS NOTES
"Chief Complaint   Patient presents with   • Atrial Flutter     F/V DX:PVC / FLUTTER     Subjective:     Boo Cavanaugh Jr. is a 72 y.o. male patient of Dr. Mendoza who presents today for follow up of his atrial flutter & PVCs.    He has a history of previous sustained typical flutter and frequent unifocal outflow tract PVC's. Felt poorly with beta blockers. Has had PVC's for sometime, which have worsened. No recurrence as of last month when he saw Dr. Mendoza.  Was offered AAD vs catheter ablation vs no intervention at that time, but wanted to think about it. Saw Dr. Mendoza in early March & had no recurrence of a-flutter with asymptomatic PVC's.  Was advised to follow up in 6 months at that time.    Kardia strips reviewed.  One 30s episode of ST rate 150s not provoked by anxiety, stress, or exercise.  Felt fluttering in his neck & mild SOB with it.  Otherwise has been having frequent PVCs, including bigem & trigem.  EKG shows SR 78. No ectopy.  Feeling palpitations, otherwise has no complaints.  VS WNL.  BPs at home are running < 120.  Last lab work done 6 months ago was WNL. K 4.6, Mg 2.2. Thyroid studies normal. Elevated LDL & Tchol.  [id number] 1 (age). Stroke risk reviewed.  He has been taking a baby ASA.    Past Medical History:   Diagnosis Date   • Arrhythmia     \"flutter\", Dr. Mendoza cardiologist   • Enlarged prostate with urinary obstruction    • OA (osteoarthritis) of knee     bilateral    • Prostate enlargement    • Urinary bladder disorder 12/06/2017    savage in place   • Urinary retention      Past Surgical History:   Procedure Laterality Date   • TURP-VAPOR  12/7/2017    Procedure: TURP-VAPOR - GREEN LIGHT PHOTOSELECTIVE;  Surgeon: Cristofer Cr M.D.;  Location: SURGERY [facility];  Service: Urology   • GANGLION EXCISION Right 1966    wrist   • TONSILLECTOMY      as a child     Family History   Problem Relation Age of Onset   • Cancer Father    • Cancer Brother 50     prostate cancer     Social History " "    Social History   • Marital status: Single     Spouse name: N/A   • Number of children: N/A   • Years of education: N/A     Occupational History   • Not on file.     Social History Main Topics   • Smoking status: Never Smoker   • Smokeless tobacco: Never Used   • Alcohol use No   • Drug use: No   • Sexual activity: No     Other Topics Concern   • Not on file     Social History Narrative   • No narrative on file     Allergies   Allergen Reactions   • Penicillins Rash     Rash as a child.      Outpatient Encounter Prescriptions as of 3/28/2018   Medication Sig Dispense Refill   • Cholecalciferol (VITAMIN D3) 5000 units Cap Take 1 Cap by mouth every day.     • magnesium oxide (MAG-OX) 400 MG Tab Take 400 mg by mouth every evening.     • [DISCONTINUED] tamsulosin (FLOMAX) 0.4 MG capsule Take 1 Cap by mouth ONE-HALF HOUR AFTER BREAKFAST. (Patient not taking: Reported on 1/15/2018) 90 Cap 3     No facility-administered encounter medications on file as of 3/28/2018.      Review of Systems   Constitutional: Negative for chills, diaphoresis, fever and malaise/fatigue.   HENT: Negative for nosebleeds.    Respiratory: Negative for cough, hemoptysis, sputum production, shortness of breath and wheezing.    Cardiovascular: Positive for palpitations. Negative for chest pain, orthopnea, leg swelling and PND.   Gastrointestinal: Negative for abdominal pain, blood in stool, constipation, diarrhea, melena, nausea and vomiting.   Genitourinary: Negative for dysuria, frequency, hematuria and urgency.   Neurological: Negative for dizziness, sensory change, speech change, focal weakness, weakness and headaches.   Endo/Heme/Allergies: Does not bruise/bleed easily.   Psychiatric/Behavioral: Negative for depression. The patient has insomnia. The patient is not nervous/anxious.    All other systems reviewed and are negative.     Objective:   /84   Pulse 78   Ht 1.88 m (6' 2.02\")   Wt 92.5 kg (204 lb)   SpO2 94%   BMI 26.18 kg/m² "     Physical Exam   Constitutional: He is oriented to person, place, and time. He appears well-developed and well-nourished. No distress.   HENT:   Head: Normocephalic and atraumatic.   Eyes: EOM are normal. Pupils are equal, round, and reactive to light.   Neck: Normal range of motion. Neck supple. No JVD present.   Cardiovascular: Normal rate, regular rhythm, normal heart sounds and intact distal pulses.  Exam reveals no gallop and no friction rub.    No murmur heard.  Pulmonary/Chest: Effort normal. No accessory muscle usage. No tachypnea. No respiratory distress. He has decreased breath sounds in the right upper field, the right middle field, the right lower field, the left upper field and the left lower field. He has no wheezes. He has no rales.   Abdominal: Soft. Bowel sounds are normal. He exhibits no distension. There is no tenderness.   Musculoskeletal: Normal range of motion. He exhibits no edema.   Neurological: He is alert and oriented to person, place, and time.   Skin: Skin is warm and dry. No rash noted. He is not diaphoretic. No erythema. No pallor.   Psychiatric: He has a normal mood and affect. His behavior is normal. Judgment and thought content normal.   Nursing note and vitals reviewed.    Assessment:     1. PVC (premature ventricular contraction)  EKG   2. Atypical atrial flutter (CMS-HCC)  EKG    REFERRAL TO PULMONOLOGY    REFERRAL TO SLEEP STUDIES     Medical Decision Making:  Today's Assessment / Status / Plan:     Hold off on BB for now due to previous intolerance.  Check BMP, Mg.  Recheck lipid profile. He would like to try diet & exercise to help decrease before trying a statin.  Has family hx of stroke.  SHERRY workup referral placed.  Continue monitoring rhythm with Kardia.    RTC in 3 months with Dr. Mendoza.        Collaborating MD:  Dr. Mart Desai

## 2018-03-29 LAB — EKG IMPRESSION: NORMAL

## 2018-04-05 ENCOUNTER — HOSPITAL ENCOUNTER (OUTPATIENT)
Dept: LAB | Facility: MEDICAL CENTER | Age: 73
End: 2018-04-05
Attending: NURSE PRACTITIONER
Payer: MEDICARE

## 2018-04-05 DIAGNOSIS — I49.3 PVC (PREMATURE VENTRICULAR CONTRACTION): ICD-10-CM

## 2018-04-05 DIAGNOSIS — E78.5 HYPERLIPIDEMIA LDL GOAL <100: ICD-10-CM

## 2018-04-05 DIAGNOSIS — I48.4 ATYPICAL ATRIAL FLUTTER (HCC): ICD-10-CM

## 2018-04-05 LAB
ANION GAP SERPL CALC-SCNC: 5 MMOL/L (ref 0–11.9)
BUN SERPL-MCNC: 21 MG/DL (ref 8–22)
CALCIUM SERPL-MCNC: 9.8 MG/DL (ref 8.5–10.5)
CHLORIDE SERPL-SCNC: 106 MMOL/L (ref 96–112)
CHOLEST SERPL-MCNC: 210 MG/DL (ref 100–199)
CO2 SERPL-SCNC: 29 MMOL/L (ref 20–33)
CREAT SERPL-MCNC: 1.19 MG/DL (ref 0.5–1.4)
GLUCOSE SERPL-MCNC: 85 MG/DL (ref 65–99)
HDLC SERPL-MCNC: 46 MG/DL
LDLC SERPL CALC-MCNC: 147 MG/DL
MAGNESIUM SERPL-MCNC: 2.2 MG/DL (ref 1.5–2.5)
POTASSIUM SERPL-SCNC: 4.2 MMOL/L (ref 3.6–5.5)
SODIUM SERPL-SCNC: 140 MMOL/L (ref 135–145)
TRIGL SERPL-MCNC: 85 MG/DL (ref 0–149)

## 2018-04-05 PROCEDURE — 80061 LIPID PANEL: CPT

## 2018-04-05 PROCEDURE — 80048 BASIC METABOLIC PNL TOTAL CA: CPT

## 2018-04-05 PROCEDURE — 83735 ASSAY OF MAGNESIUM: CPT

## 2018-04-05 PROCEDURE — 36415 COLL VENOUS BLD VENIPUNCTURE: CPT

## 2018-06-07 ENCOUNTER — OFFICE VISIT (OUTPATIENT)
Dept: MEDICAL GROUP | Facility: PHYSICIAN GROUP | Age: 73
End: 2018-06-07
Payer: MEDICARE

## 2018-06-07 VITALS
TEMPERATURE: 98.1 F | HEART RATE: 76 BPM | RESPIRATION RATE: 16 BRPM | OXYGEN SATURATION: 94 % | WEIGHT: 204 LBS | SYSTOLIC BLOOD PRESSURE: 126 MMHG | DIASTOLIC BLOOD PRESSURE: 62 MMHG | HEIGHT: 74 IN | BODY MASS INDEX: 26.18 KG/M2

## 2018-06-07 DIAGNOSIS — I48.3 TYPICAL ATRIAL FLUTTER (HCC): Chronic | ICD-10-CM

## 2018-06-07 DIAGNOSIS — E78.00 ELEVATED LDL CHOLESTEROL LEVEL: ICD-10-CM

## 2018-06-07 DIAGNOSIS — Q62.11 HYDRONEPHROSIS WITH URETEROPELVIC JUNCTION (UPJ) OBSTRUCTION: ICD-10-CM

## 2018-06-07 DIAGNOSIS — N40.0 BENIGN PROSTATIC HYPERPLASIA WITHOUT LOWER URINARY TRACT SYMPTOMS: ICD-10-CM

## 2018-06-07 DIAGNOSIS — Z12.11 SCREEN FOR COLON CANCER: ICD-10-CM

## 2018-06-07 PROBLEM — N39.0 RECURRENT UTI: Status: RESOLVED | Noted: 2017-11-07 | Resolved: 2018-06-07

## 2018-06-07 PROBLEM — I49.3 FREQUENT UNIFOCAL PVCS: Chronic | Status: RESOLVED | Noted: 2018-01-15 | Resolved: 2018-06-07

## 2018-06-07 PROBLEM — M62.838 MUSCLE SPASM: Status: RESOLVED | Noted: 2017-11-15 | Resolved: 2018-06-07

## 2018-06-07 PROCEDURE — 99214 OFFICE O/P EST MOD 30 MIN: CPT | Performed by: INTERNAL MEDICINE

## 2018-06-07 NOTE — PROGRESS NOTES
Subjective:   Boo Cavanaugh Jr. is a 73 y.o. male here today for chronic medical management, a-flutter     Hydronephrosis with ureteropelvic junction (UPJ) obstruction  Due to BPH. He had TURP. Since then he is doing better. Follows with urology, Dr. Cr. He tells me that he has slight kidney damage residual from previous obstruction.     Benign prostatic hyperplasia without lower urinary tract symptoms  PSA 6. Follows with urology. Repeat one lower. Urinary symptoms as frequency and nocturia had improved since TUPR.     Atrial flutter (CMS-HCC) (HCC)  Follows with cardiology. He is hesitant for ablation at this time. Echocardiogram unremarkable (04/2018). Cannot tolerate BB. CAHDVASC2 score 1. I advise to start asa 81 mg daily. Paroxysmal. Not symptomatic. Follow up with cardiology     Elevated LDL cholesterol level  He had refuses statin in the past, he would like to manage with healthy lifestyle. Continue to monitor          Current medicines (including changes today)  No current outpatient prescriptions on file.     No current facility-administered medications for this visit.      He  has a past medical history of Arrhythmia; Enlarged prostate with urinary obstruction; OA (osteoarthritis) of knee; Prostate enlargement; Urinary bladder disorder (12/06/2017); and Urinary retention.    No current outpatient prescriptions on file.     No current facility-administered medications for this visit.        Allergies as of 06/07/2018 - Reviewed 06/07/2018   Allergen Reaction Noted   • Penicillins Rash 10/25/2017       Social History     Social History   • Marital status: Single     Spouse name: N/A   • Number of children: N/A   • Years of education: N/A     Occupational History   • Not on file.     Social History Main Topics   • Smoking status: Never Smoker   • Smokeless tobacco: Never Used   • Alcohol use No   • Drug use: No   • Sexual activity: No      Comment: 2 daughters      Other Topics Concern   • Not on file  "    Social History Narrative   • No narrative on file        Family History   Problem Relation Age of Onset   • Heart Disease Mother 65     MI   • Cancer Father    • Cancer Brother 50     prostate cancer   • Diabetes Neg Hx    • Hyperlipidemia Neg Hx    • Alcohol/Drug Neg Hx        Past Surgical History:   Procedure Laterality Date   • TURP-VAPOR  12/7/2017    Procedure: TURP-VAPOR - GREEN LIGHT PHOTOSELECTIVE;  Surgeon: Cristofer Cr M.D.;  Location: SURGERY [facility];  Service: Urology   • GANGLION EXCISION Right 1966    wrist   • TONSILLECTOMY      as a child       ROS   All systems reviewed are negative except for HPI     Objective:     Blood pressure 126/62, pulse 76, temperature 36.7 °C (98.1 °F), resp. rate 16, height 1.88 m (6' 2\"), weight 92.5 kg (204 lb), SpO2 94 %. Body mass index is 26.19 kg/m².   Physical Exam:  Constitutional: Alert, no distress.  Skin: Warm, dry, good turgor, no rashes in visible areas.  Eye: Equal, round and reactive, conjunctiva clear, lids normal.  ENMT: Lips without lesions, good dentition, oropharynx clear.  Neck: Trachea midline, no masses, no thyromegaly. No cervical or supraclavicular lymphadenopathy  Respiratory: Unlabored respiratory effort, lungs clear to auscultation, no wheezes, no ronchi.  Cardiovascular: Normal S1, S2, no murmur, no edema.  Abdomen: Soft, non-tender, no masses, no hepatosplenomegaly.  Psych: Alert and oriented x3, normal affect and mood.        Assessment and Plan:   The following treatment plan was discussed    1. Typical atrial flutter (HCC)  Follow up with cardiology. Start asa 81 mg daily   - COMP METABOLIC PANEL; Future  - CBC WITH DIFFERENTIAL; Future    2. Benign prostatic hyperplasia without lower urinary tract symptoms  Follow up with urology. Continue to monitor PSA   - CBC WITH DIFFERENTIAL; Future  - URINALYSIS; Future    3. Hydronephrosis with ureteropelvic junction (UPJ) obstruction  Resolved. Continue to follow up with urology "     4. Screen for colon cancer  - REFERRAL TO GI FOR COLONOSCOPY    5. Elevated LDL cholesterol level  Continue to monitor   - COMP METABOLIC PANEL; Future  - LIPID PROFILE; Future      Followup: Return in about 6 months (around 12/7/2018), or if symptoms worsen or fail to improve, for Short, follow up on lab review.

## 2018-06-07 NOTE — ASSESSMENT & PLAN NOTE
He had refuses statin in the past, he would like to manage with healthy lifestyle. Continue to monitor

## 2018-06-07 NOTE — ASSESSMENT & PLAN NOTE
Due to BPH. He had TURP. Since then he is doing better. Follows with urology, Dr. Cr. He tells me that he has slight kidney damage residual from previous obstruction.

## 2018-06-07 NOTE — ASSESSMENT & PLAN NOTE
PSA 6. Follows with urology. Repeat one lower. Urinary symptoms as frequency and nocturia had improved since TUPR.

## 2018-06-07 NOTE — ASSESSMENT & PLAN NOTE
Follows with cardiology. He is hesitant for ablation at this time. Echocardiogram unremarkable (04/2018). Cannot tolerate BB. CAHDVASC2 score 1. I advise to start asa 81 mg daily. Paroxysmal. Not symptomatic. Follow up with cardiology

## 2018-07-09 ENCOUNTER — TELEPHONE (OUTPATIENT)
Dept: CARDIOLOGY | Facility: MEDICAL CENTER | Age: 73
End: 2018-07-09

## 2018-07-09 ENCOUNTER — OFFICE VISIT (OUTPATIENT)
Dept: CARDIOLOGY | Facility: MEDICAL CENTER | Age: 73
End: 2018-07-09
Payer: MEDICARE

## 2018-07-09 VITALS
HEART RATE: 62 BPM | DIASTOLIC BLOOD PRESSURE: 80 MMHG | WEIGHT: 204 LBS | OXYGEN SATURATION: 95 % | HEIGHT: 74 IN | SYSTOLIC BLOOD PRESSURE: 120 MMHG | BODY MASS INDEX: 26.18 KG/M2

## 2018-07-09 DIAGNOSIS — I48.92 ATRIAL FLUTTER, UNSPECIFIED TYPE (HCC): ICD-10-CM

## 2018-07-09 DIAGNOSIS — I49.3 PVC (PREMATURE VENTRICULAR CONTRACTION): ICD-10-CM

## 2018-07-09 PROCEDURE — 99214 OFFICE O/P EST MOD 30 MIN: CPT | Performed by: INTERNAL MEDICINE

## 2018-07-09 NOTE — TELEPHONE ENCOUNTER
Message     The referral to Sleep Studies will be sent to [facility]. The contact number is [phone].

## 2018-07-09 NOTE — PROGRESS NOTES
"Chief Complaint   Patient presents with   • Atrial Flutter       Subjective:   Boo Cavanaugh Jr. is a 73 y.o. male who presents today with h/o atrial flutter. Zio with documented recurrence. Still has not gotten call back for sleep study. PVC's.     Past Medical History:   Diagnosis Date   • Arrhythmia     \"flutter\", Dr. Mendoza cardiologist   • Enlarged prostate with urinary obstruction    • OA (osteoarthritis) of knee     bilateral    • Prostate enlargement    • Urinary bladder disorder 12/06/2017    savage in place   • Urinary retention      Past Surgical History:   Procedure Laterality Date   • TURP-VAPOR  12/7/2017    Procedure: TURP-VAPOR - GREEN LIGHT PHOTOSELECTIVE;  Surgeon: Cristofer Cr M.D.;  Location: SURGERY [facility];  Service: Urology   • GANGLION EXCISION Right 1966    wrist   • TONSILLECTOMY      as a child     Family History   Problem Relation Age of Onset   • Heart Disease Mother 65     MI   • Cancer Father    • Cancer Brother 50     prostate cancer   • Diabetes Neg Hx    • Hyperlipidemia Neg Hx    • Alcohol/Drug Neg Hx      Social History     Social History   • Marital status: Single     Spouse name: N/A   • Number of children: N/A   • Years of education: N/A     Occupational History   • Not on file.     Social History Main Topics   • Smoking status: Never Smoker   • Smokeless tobacco: Never Used   • Alcohol use No   • Drug use: No   • Sexual activity: No      Comment: 2 daughters      Other Topics Concern   • Not on file     Social History Narrative   • No narrative on file     Allergies   Allergen Reactions   • Penicillins Rash     Rash as a child.      No outpatient encounter prescriptions on file as of 7/9/2018.     No facility-administered encounter medications on file as of 7/9/2018.      ROS     Objective:   /80   Pulse 62   Ht 1.88 m (6' 2\")   Wt 92.5 kg (204 lb)   SpO2 95%   BMI 26.19 kg/m²     Physical Exam   Constitutional: He is oriented to person, place, and time. He " appears well-developed and well-nourished.   HENT:   Head: Normocephalic and atraumatic.   Eyes: EOM are normal.   Neck: Normal range of motion. Neck supple.   Cardiovascular: Normal rate, regular rhythm and intact distal pulses.   Occasional extrasystoles are present. Exam reveals no gallop and no friction rub.    No murmur heard.  Pulmonary/Chest: Effort normal and breath sounds normal.   Abdominal: Soft.   Musculoskeletal: Normal range of motion. He exhibits no edema.   Neurological: He is alert and oriented to person, place, and time.   Skin: Skin is warm and dry.   Psychiatric: He has a normal mood and affect. His behavior is normal. Judgment and thought content normal.       Assessment:     1. Atrial flutter, unspecified type (HCC)  EKG    REFERRAL TO SLEEP STUDIES   2. PVC (premature ventricular contraction)         Medical Decision Making:  Today's Assessment / Status / Plan:   1. Atrial flutter re documented on Kardia. Flutter leaning towards RFA.  2. Anticoagulation CHADS-Vasc 1. He does not want to start now.  3. Reorder sleep study.

## 2018-07-09 NOTE — TELEPHONE ENCOUNTER
Patient to call me if he decides to schedule the flutter ablation w/BAILEY, no medications to hold.

## 2018-07-09 NOTE — LETTER
"     [facility]   [address], [address]  [place] [address]  Phone: [phone]  Fax: [phone]              Boo Cavanaugh Jr.  1945    Encounter Date: 7/9/2018    Enoch Mendoza M.D.          PROGRESS NOTE:  Chief Complaint   Patient presents with   • Atrial Flutter       Subjective:   Boo Cavanaugh Jr. is a 73 y.o. male who presents today with h/o atrial flutter. Zio with documented recurrence. Still has not gotten call back for sleep study. PVC's.     Past Medical History:   Diagnosis Date   • Arrhythmia     \"flutter\", Dr. Mendoza cardiologist   • Enlarged prostate with urinary obstruction    • OA (osteoarthritis) of knee     bilateral    • Prostate enlargement    • Urinary bladder disorder 12/06/2017    savage in place   • Urinary retention      Past Surgical History:   Procedure Laterality Date   • TURP-VAPOR  12/7/2017    Procedure: TURP-VAPOR - GREEN LIGHT PHOTOSELECTIVE;  Surgeon: Cristofer Cr M.D.;  Location: SURGERY [facility];  Service: Urology   • GANGLION EXCISION Right 1966    wrist   • TONSILLECTOMY      as a child     Family History   Problem Relation Age of Onset   • Heart Disease Mother 65     MI   • Cancer Father    • Cancer Brother 50     prostate cancer   • Diabetes Neg Hx    • Hyperlipidemia Neg Hx    • Alcohol/Drug Neg Hx      Social History     Social History   • Marital status: Single     Spouse name: N/A   • Number of children: N/A   • Years of education: N/A     Occupational History   • Not on file.     Social History Main Topics   • Smoking status: Never Smoker   • Smokeless tobacco: Never Used   • Alcohol use No   • Drug use: No   • Sexual activity: No      Comment: 2 daughters      Other Topics Concern   • Not on file     Social History Narrative   • No narrative on file     Allergies   Allergen Reactions   • Penicillins Rash     Rash as a child.      No outpatient encounter prescriptions on file as of 7/9/2018.     No " "facility-administered encounter medications on file as of 7/9/2018.      ROS     Objective:   /80   Pulse 62   Ht 1.88 m (6' 2\")   Wt 92.5 kg (204 lb)   SpO2 95%   BMI 26.19 kg/m²      Physical Exam   Constitutional: He is oriented to person, place, and time. He appears well-developed and well-nourished.   HENT:   Head: Normocephalic and atraumatic.   Eyes: EOM are normal.   Neck: Normal range of motion. Neck supple.   Cardiovascular: Normal rate, regular rhythm and intact distal pulses.   Occasional extrasystoles are present. Exam reveals no gallop and no friction rub.    No murmur heard.  Pulmonary/Chest: Effort normal and breath sounds normal.   Abdominal: Soft.   Musculoskeletal: Normal range of motion. He exhibits no edema.   Neurological: He is alert and oriented to person, place, and time.   Skin: Skin is warm and dry.   Psychiatric: He has a normal mood and affect. His behavior is normal. Judgment and thought content normal.       Assessment:     1. Atrial flutter, unspecified type (HCC)  EKG    REFERRAL TO SLEEP STUDIES   2. PVC (premature ventricular contraction)         Medical Decision Making:  Today's Assessment / Status / Plan:   1. Atrial flutter re documented on Kardia. Schedule flutter RFA.  2. Anticoagulation CHADS-Vasc 1. He does not want to start now.  The risks, benefits, and alternatives to atrial flutter ablation were discussed in great detail, specific risks mentioned including bleeding, infection, arteriovenous fistula related to  sheath placement, cardiac perforation with possible tamponade requiring pericardiocentesis or possibly open heart surgery. In addition, pneumothorax and hemothorax were mentioned with right internal jugular venous access. I also discussed the  possibility of permanent pacemaker placement due to to inadvertent AV block. In addition the risk of death, stroke and myocardial infarction were discussed. Success rate for typical atrial flutter at approximately " 90%. Total complication rate estimated to be approximately 1%.  The patient verbalized understanding of these potential complications and wishes to proceed with this procedure.   The risks, benefits, and alternatives to transesophageal echocardiogram with IV sedation were discussed with the patient in specific detail, including oropharyngeal and esophageal traumas including hoarseness and dysphagia after the procedure. Rare cases demonstrating serious or fatal complications associated with transesophageal echocardiogram have been reported in the adult population, including cardiac, pulmonary and bleeding complications in less than 1% of people. Patients with an identified intracardiac thrombus are at increased risk for embolic events and this appears to be reduced with anticoagulant therapy. The patient verbalized understandings about these  possible complications and wishes to proceed with this procedure        Mindy Carnes M.D.  [address]  [place] [address]  VIA In Basket

## 2018-07-27 ENCOUNTER — HOSPITAL ENCOUNTER (OUTPATIENT)
Dept: LAB | Facility: MEDICAL CENTER | Age: 73
End: 2018-07-27
Attending: UROLOGY
Payer: MEDICARE

## 2018-07-27 PROCEDURE — 84154 ASSAY OF PSA FREE: CPT

## 2018-07-27 PROCEDURE — 36415 COLL VENOUS BLD VENIPUNCTURE: CPT

## 2018-07-27 PROCEDURE — 84153 ASSAY OF PSA TOTAL: CPT

## 2018-07-29 LAB
PSA FREE MFR SERPL: 20 %
PSA FREE SERPL-MCNC: 0.9 NG/ML
PSA SERPL-MCNC: 4.5 NG/ML (ref 0–4)

## 2018-09-14 ENCOUNTER — TELEPHONE (OUTPATIENT)
Dept: MEDICAL GROUP | Facility: PHYSICIAN GROUP | Age: 73
End: 2018-09-14

## 2018-09-14 DIAGNOSIS — I48.92 ATRIAL FLUTTER, UNSPECIFIED TYPE (HCC): ICD-10-CM

## 2018-09-14 DIAGNOSIS — I49.3 PVC (PREMATURE VENTRICULAR CONTRACTION): ICD-10-CM

## 2018-10-24 ENCOUNTER — HOSPITAL ENCOUNTER (OUTPATIENT)
Dept: LAB | Facility: MEDICAL CENTER | Age: 73
End: 2018-10-24
Attending: INTERNAL MEDICINE
Payer: MEDICARE

## 2018-10-24 DIAGNOSIS — I48.3 TYPICAL ATRIAL FLUTTER (HCC): Chronic | ICD-10-CM

## 2018-10-24 DIAGNOSIS — E78.00 ELEVATED LDL CHOLESTEROL LEVEL: ICD-10-CM

## 2018-10-24 DIAGNOSIS — N40.0 BENIGN PROSTATIC HYPERPLASIA WITHOUT LOWER URINARY TRACT SYMPTOMS: ICD-10-CM

## 2018-10-24 LAB
ALBUMIN SERPL BCP-MCNC: 4.4 G/DL (ref 3.2–4.9)
ALBUMIN/GLOB SERPL: 1.6 G/DL
ALP SERPL-CCNC: 73 U/L (ref 30–99)
ALT SERPL-CCNC: 16 U/L (ref 2–50)
ANION GAP SERPL CALC-SCNC: 11 MMOL/L (ref 0–11.9)
APPEARANCE UR: CLEAR
AST SERPL-CCNC: 17 U/L (ref 12–45)
BACTERIA #/AREA URNS HPF: NEGATIVE /HPF
BASOPHILS # BLD AUTO: 0.7 % (ref 0–1.8)
BASOPHILS # BLD: 0.05 K/UL (ref 0–0.12)
BILIRUB SERPL-MCNC: 1.4 MG/DL (ref 0.1–1.5)
BILIRUB UR QL STRIP.AUTO: NEGATIVE
BUN SERPL-MCNC: 21 MG/DL (ref 8–22)
CALCIUM SERPL-MCNC: 9.8 MG/DL (ref 8.5–10.5)
CHLORIDE SERPL-SCNC: 106 MMOL/L (ref 96–112)
CHOLEST SERPL-MCNC: 221 MG/DL (ref 100–199)
CO2 SERPL-SCNC: 22 MMOL/L (ref 20–33)
COLOR UR: YELLOW
CREAT SERPL-MCNC: 1.31 MG/DL (ref 0.5–1.4)
EOSINOPHIL # BLD AUTO: 0.13 K/UL (ref 0–0.51)
EOSINOPHIL NFR BLD: 1.8 % (ref 0–6.9)
EPI CELLS #/AREA URNS HPF: NEGATIVE /HPF
ERYTHROCYTE [DISTWIDTH] IN BLOOD BY AUTOMATED COUNT: 43.4 FL (ref 35.9–50)
FASTING STATUS PATIENT QL REPORTED: NORMAL
GLOBULIN SER CALC-MCNC: 2.7 G/DL (ref 1.9–3.5)
GLUCOSE SERPL-MCNC: 102 MG/DL (ref 65–99)
GLUCOSE UR STRIP.AUTO-MCNC: NEGATIVE MG/DL
HCT VFR BLD AUTO: 52 % (ref 42–52)
HDLC SERPL-MCNC: 47 MG/DL
HGB BLD-MCNC: 17.6 G/DL (ref 14–18)
HYALINE CASTS #/AREA URNS LPF: ABNORMAL /LPF
IMM GRANULOCYTES # BLD AUTO: 0.02 K/UL (ref 0–0.11)
IMM GRANULOCYTES NFR BLD AUTO: 0.3 % (ref 0–0.9)
KETONES UR STRIP.AUTO-MCNC: ABNORMAL MG/DL
LDLC SERPL CALC-MCNC: 157 MG/DL
LEUKOCYTE ESTERASE UR QL STRIP.AUTO: NEGATIVE
LYMPHOCYTES # BLD AUTO: 1.83 K/UL (ref 1–4.8)
LYMPHOCYTES NFR BLD: 24.9 % (ref 22–41)
MCH RBC QN AUTO: 31.6 PG (ref 27–33)
MCHC RBC AUTO-ENTMCNC: 33.8 G/DL (ref 33.7–35.3)
MCV RBC AUTO: 93.4 FL (ref 81.4–97.8)
MICRO URNS: ABNORMAL
MONOCYTES # BLD AUTO: 0.72 K/UL (ref 0–0.85)
MONOCYTES NFR BLD AUTO: 9.8 % (ref 0–13.4)
NEUTROPHILS # BLD AUTO: 4.61 K/UL (ref 1.82–7.42)
NEUTROPHILS NFR BLD: 62.5 % (ref 44–72)
NITRITE UR QL STRIP.AUTO: NEGATIVE
NRBC # BLD AUTO: 0 K/UL
NRBC BLD-RTO: 0 /100 WBC
PH UR STRIP.AUTO: 5.5 [PH]
PLATELET # BLD AUTO: 249 K/UL (ref 164–446)
PMV BLD AUTO: 10.4 FL (ref 9–12.9)
POTASSIUM SERPL-SCNC: 3.8 MMOL/L (ref 3.6–5.5)
PROT SERPL-MCNC: 7.1 G/DL (ref 6–8.2)
PROT UR QL STRIP: NEGATIVE MG/DL
RBC # BLD AUTO: 5.57 M/UL (ref 4.7–6.1)
RBC # URNS HPF: ABNORMAL /HPF
RBC UR QL AUTO: ABNORMAL
SODIUM SERPL-SCNC: 139 MMOL/L (ref 135–145)
SP GR UR STRIP.AUTO: 1.02
TRIGL SERPL-MCNC: 87 MG/DL (ref 0–149)
UROBILINOGEN UR STRIP.AUTO-MCNC: 0.2 MG/DL
WBC # BLD AUTO: 7.4 K/UL (ref 4.8–10.8)
WBC #/AREA URNS HPF: ABNORMAL /HPF

## 2018-10-24 PROCEDURE — 85025 COMPLETE CBC W/AUTO DIFF WBC: CPT

## 2018-10-24 PROCEDURE — 80053 COMPREHEN METABOLIC PANEL: CPT

## 2018-10-24 PROCEDURE — 80061 LIPID PANEL: CPT

## 2018-10-24 PROCEDURE — 36415 COLL VENOUS BLD VENIPUNCTURE: CPT

## 2018-10-24 PROCEDURE — 81001 URINALYSIS AUTO W/SCOPE: CPT

## 2018-10-31 ENCOUNTER — TELEPHONE (OUTPATIENT)
Dept: CARDIOLOGY | Facility: MEDICAL CENTER | Age: 73
End: 2018-10-31

## 2018-10-31 NOTE — TELEPHONE ENCOUNTER
Patient scheduled for flutter ablation w/BAILEY on 11-28-18 at [facility] with Dr. Mendoza. ALIYAH to Dr. Mendoza

## 2018-11-21 ENCOUNTER — TELEPHONE (OUTPATIENT)
Dept: CARDIOLOGY | Facility: MEDICAL CENTER | Age: 73
End: 2018-11-21

## 2018-11-21 NOTE — TELEPHONE ENCOUNTER
I have sent that to the  and made sure it is a clear note in your chart for all to see.       ===View-only below this line===      ----- Message -----     From: Boo Cavanaugh Jr.     Sent: 11/20/2018  7:05 PM PST       To: Enoch Mendoza M.D.  Subject: Procedure Question    I am having problems with my phone number [phone] and don’t want to miss any confirmation or other calls. Please be sure all involved in my surgery Nov 28 can reach me on my cell phone [phone].     Thanks      Boo

## 2018-11-21 NOTE — TELEPHONE ENCOUNTER
----- Message from Abi Morton R.N. sent at 11/21/2018  9:02 AM PST -----  Regarding: FW: Procedure Question  Contact: [phone]  Are you scheduling anything for him?       ----- Message -----  From: Mahsa Wilcox, Med Ass't  Sent: 11/21/2018   8:39 AM  To: Abi Morton R.N., Luigi Guillen  Subject: FW: Procedure Question                           Would this go to you Luigi?  ----- Message -----  From: Boo Cavanaugh Jr.  Sent: 11/20/2018   7:05 PM  To: Monty Mccormick/Adama  Subject: Procedure Question                               I am having problems with my phone number [phone] and don’t want to miss any confirmation or other calls. Please be sure all involved in my surgery Nov 28 can reach me on my cell phone [phone].     Thanks      Boo

## 2018-11-27 DIAGNOSIS — Z01.810 PRE-OPERATIVE CARDIOVASCULAR EXAMINATION: ICD-10-CM

## 2018-11-27 DIAGNOSIS — Z01.812 PRE-OPERATIVE LABORATORY EXAMINATION: ICD-10-CM

## 2018-11-27 LAB
ALBUMIN SERPL BCP-MCNC: 4.3 G/DL (ref 3.2–4.9)
ALBUMIN/GLOB SERPL: 1.5 G/DL
ALP SERPL-CCNC: 74 U/L (ref 30–99)
ALT SERPL-CCNC: 23 U/L (ref 2–50)
ANION GAP SERPL CALC-SCNC: 11 MMOL/L (ref 0–11.9)
AST SERPL-CCNC: 19 U/L (ref 12–45)
BASOPHILS # BLD AUTO: 0.6 % (ref 0–1.8)
BASOPHILS # BLD: 0.04 K/UL (ref 0–0.12)
BILIRUB SERPL-MCNC: 0.8 MG/DL (ref 0.1–1.5)
BUN SERPL-MCNC: 21 MG/DL (ref 8–22)
CALCIUM SERPL-MCNC: 9.9 MG/DL (ref 8.5–10.5)
CHLORIDE SERPL-SCNC: 108 MMOL/L (ref 96–112)
CO2 SERPL-SCNC: 22 MMOL/L (ref 20–33)
CREAT SERPL-MCNC: 1.1 MG/DL (ref 0.5–1.4)
EKG IMPRESSION: NORMAL
EOSINOPHIL # BLD AUTO: 0.08 K/UL (ref 0–0.51)
EOSINOPHIL NFR BLD: 1.2 % (ref 0–6.9)
ERYTHROCYTE [DISTWIDTH] IN BLOOD BY AUTOMATED COUNT: 43.8 FL (ref 35.9–50)
GLOBULIN SER CALC-MCNC: 2.8 G/DL (ref 1.9–3.5)
GLUCOSE SERPL-MCNC: 90 MG/DL (ref 65–99)
HCT VFR BLD AUTO: 51.7 % (ref 42–52)
HGB BLD-MCNC: 18 G/DL (ref 14–18)
IMM GRANULOCYTES # BLD AUTO: 0.01 K/UL (ref 0–0.11)
IMM GRANULOCYTES NFR BLD AUTO: 0.1 % (ref 0–0.9)
INR PPP: 0.98 (ref 0.87–1.13)
LYMPHOCYTES # BLD AUTO: 1.57 K/UL (ref 1–4.8)
LYMPHOCYTES NFR BLD: 22.7 % (ref 22–41)
MCH RBC QN AUTO: 32.5 PG (ref 27–33)
MCHC RBC AUTO-ENTMCNC: 34.8 G/DL (ref 33.7–35.3)
MCV RBC AUTO: 93.3 FL (ref 81.4–97.8)
MONOCYTES # BLD AUTO: 0.69 K/UL (ref 0–0.85)
MONOCYTES NFR BLD AUTO: 10 % (ref 0–13.4)
NEUTROPHILS # BLD AUTO: 4.53 K/UL (ref 1.82–7.42)
NEUTROPHILS NFR BLD: 65.4 % (ref 44–72)
NRBC # BLD AUTO: 0 K/UL
NRBC BLD-RTO: 0 /100 WBC
PLATELET # BLD AUTO: 254 K/UL (ref 164–446)
PMV BLD AUTO: 9.9 FL (ref 9–12.9)
POTASSIUM SERPL-SCNC: 4.1 MMOL/L (ref 3.6–5.5)
PROT SERPL-MCNC: 7.1 G/DL (ref 6–8.2)
PROTHROMBIN TIME: 13.1 SEC (ref 12–14.6)
RBC # BLD AUTO: 5.54 M/UL (ref 4.7–6.1)
SODIUM SERPL-SCNC: 141 MMOL/L (ref 135–145)
WBC # BLD AUTO: 6.9 K/UL (ref 4.8–10.8)

## 2018-11-27 PROCEDURE — 80053 COMPREHEN METABOLIC PANEL: CPT

## 2018-11-27 PROCEDURE — 85610 PROTHROMBIN TIME: CPT

## 2018-11-27 PROCEDURE — 36415 COLL VENOUS BLD VENIPUNCTURE: CPT

## 2018-11-27 PROCEDURE — 93005 ELECTROCARDIOGRAM TRACING: CPT

## 2018-11-27 PROCEDURE — 85025 COMPLETE CBC W/AUTO DIFF WBC: CPT

## 2018-11-27 PROCEDURE — 93010 ELECTROCARDIOGRAM REPORT: CPT | Performed by: INTERNAL MEDICINE

## 2018-11-27 RX ORDER — MULTIVITAMIN WITH IRON
TABLET ORAL
COMMUNITY
Start: 2018-12-02 | End: 2018-12-20

## 2018-11-28 ENCOUNTER — APPOINTMENT (OUTPATIENT)
Dept: CARDIOLOGY | Facility: MEDICAL CENTER | Age: 73
End: 2018-11-28
Attending: INTERNAL MEDICINE
Payer: MEDICARE

## 2018-11-28 ENCOUNTER — HOSPITAL ENCOUNTER (OUTPATIENT)
Facility: MEDICAL CENTER | Age: 73
End: 2018-11-28
Attending: INTERNAL MEDICINE | Admitting: INTERNAL MEDICINE
Payer: MEDICARE

## 2018-11-28 VITALS
DIASTOLIC BLOOD PRESSURE: 87 MMHG | TEMPERATURE: 97.3 F | BODY MASS INDEX: 24.9 KG/M2 | SYSTOLIC BLOOD PRESSURE: 170 MMHG | WEIGHT: 194 LBS | HEART RATE: 69 BPM | OXYGEN SATURATION: 96 % | RESPIRATION RATE: 16 BRPM | HEIGHT: 74 IN

## 2018-11-28 DIAGNOSIS — I48.3 TYPICAL ATRIAL FLUTTER (HCC): ICD-10-CM

## 2018-11-28 PROBLEM — I47.10 SVT (SUPRAVENTRICULAR TACHYCARDIA) (HCC): Status: ACTIVE | Noted: 2018-11-28

## 2018-11-28 LAB
EKG IMPRESSION: NORMAL
LV EJECT FRACT  99904: 55

## 2018-11-28 PROCEDURE — 700111 HCHG RX REV CODE 636 W/ 250 OVERRIDE (IP)

## 2018-11-28 PROCEDURE — 93325 DOPPLER ECHO COLOR FLOW MAPG: CPT

## 2018-11-28 PROCEDURE — 93621 COMP EP EVL L PAC&REC C SINS: CPT

## 2018-11-28 PROCEDURE — 160002 HCHG RECOVERY MINUTES (STAT)

## 2018-11-28 PROCEDURE — 93621 COMP EP EVL L PAC&REC C SINS: CPT | Mod: 26 | Performed by: INTERNAL MEDICINE

## 2018-11-28 PROCEDURE — 93655 ICAR CATH ABLTJ DSCRT ARRHYT: CPT | Performed by: INTERNAL MEDICINE

## 2018-11-28 PROCEDURE — 93010 ELECTROCARDIOGRAM REPORT: CPT | Performed by: INTERNAL MEDICINE

## 2018-11-28 PROCEDURE — 93653 COMPRE EP EVAL TX SVT: CPT | Performed by: INTERNAL MEDICINE

## 2018-11-28 PROCEDURE — 700101 HCHG RX REV CODE 250

## 2018-11-28 PROCEDURE — 93623 PRGRMD STIMJ&PACG IV RX NFS: CPT

## 2018-11-28 PROCEDURE — 305383 HCHG CARTO3 REF PATCH

## 2018-11-28 PROCEDURE — 93623 PRGRMD STIMJ&PACG IV RX NFS: CPT | Mod: 26 | Performed by: INTERNAL MEDICINE

## 2018-11-28 PROCEDURE — 93613 INTRACARDIAC EPHYS 3D MAPG: CPT

## 2018-11-28 PROCEDURE — C1730 CATH, EP, 19 OR FEW ELECT: HCPCS

## 2018-11-28 PROCEDURE — 304952 HCHG R 2 PADS

## 2018-11-28 PROCEDURE — 00537 ANES CARDIAC EP PROCEDURES: CPT

## 2018-11-28 PROCEDURE — 93655 ICAR CATH ABLTJ DSCRT ARRHYT: CPT

## 2018-11-28 PROCEDURE — C1894 INTRO/SHEATH, NON-LASER: HCPCS

## 2018-11-28 PROCEDURE — 93005 ELECTROCARDIOGRAM TRACING: CPT | Performed by: INTERNAL MEDICINE

## 2018-11-28 PROCEDURE — 93653 COMPRE EP EVAL TX SVT: CPT

## 2018-11-28 PROCEDURE — 93613 INTRACARDIAC EPHYS 3D MAPG: CPT | Performed by: INTERNAL MEDICINE

## 2018-11-28 PROCEDURE — C1732 CATH, EP, DIAG/ABL, 3D/VECT: HCPCS

## 2018-11-28 PROCEDURE — C1731 CATH, EP, 20 OR MORE ELEC: HCPCS

## 2018-11-28 RX ORDER — MIDAZOLAM HYDROCHLORIDE 1 MG/ML
INJECTION INTRAMUSCULAR; INTRAVENOUS
Status: DISPENSED
Start: 2018-11-28 | End: 2018-11-28

## 2018-11-28 RX ORDER — LIDOCAINE HYDROCHLORIDE 20 MG/ML
INJECTION, SOLUTION INFILTRATION; PERINEURAL
Status: COMPLETED
Start: 2018-11-28 | End: 2018-11-28

## 2018-11-28 RX ORDER — ONDANSETRON 2 MG/ML
4 INJECTION INTRAMUSCULAR; INTRAVENOUS
Status: DISCONTINUED | OUTPATIENT
Start: 2018-11-28 | End: 2018-11-28 | Stop reason: HOSPADM

## 2018-11-28 RX ORDER — OXYCODONE HYDROCHLORIDE AND ACETAMINOPHEN 5; 325 MG/1; MG/1
1-2 TABLET ORAL EVERY 4 HOURS PRN
Status: DISCONTINUED | OUTPATIENT
Start: 2018-11-28 | End: 2018-11-28 | Stop reason: HOSPADM

## 2018-11-28 RX ORDER — ACETAMINOPHEN 325 MG/1
650 TABLET ORAL EVERY 4 HOURS PRN
Status: DISCONTINUED | OUTPATIENT
Start: 2018-11-28 | End: 2018-11-28 | Stop reason: HOSPADM

## 2018-11-28 RX ORDER — ISOPROTERENOL HYDROCHLORIDE 0.2 MG/ML
INJECTION, SOLUTION INTRAVENOUS
Status: DISCONTINUED
Start: 2018-11-28 | End: 2018-11-28 | Stop reason: HOSPADM

## 2018-11-28 RX ORDER — MIDAZOLAM HYDROCHLORIDE 1 MG/ML
INJECTION INTRAMUSCULAR; INTRAVENOUS
Status: DISCONTINUED
Start: 2018-11-28 | End: 2018-11-28 | Stop reason: HOSPADM

## 2018-11-28 RX ORDER — HYDRALAZINE HYDROCHLORIDE 20 MG/ML
5 INJECTION INTRAMUSCULAR; INTRAVENOUS
Status: DISCONTINUED | OUTPATIENT
Start: 2018-11-28 | End: 2018-11-28 | Stop reason: HOSPADM

## 2018-11-28 RX ORDER — HALOPERIDOL 5 MG/ML
1 INJECTION INTRAMUSCULAR
Status: DISCONTINUED | OUTPATIENT
Start: 2018-11-28 | End: 2018-11-28 | Stop reason: HOSPADM

## 2018-11-28 RX ORDER — HEPARIN SODIUM,PORCINE 1000/ML
VIAL (ML) INJECTION
Status: DISPENSED
Start: 2018-11-28 | End: 2018-11-28

## 2018-11-28 RX ORDER — HEPARIN SODIUM,PORCINE 1000/ML
VIAL (ML) INJECTION
Status: DISCONTINUED
Start: 2018-11-28 | End: 2018-11-28 | Stop reason: HOSPADM

## 2018-11-28 RX ORDER — DIPHENHYDRAMINE HYDROCHLORIDE 50 MG/ML
12.5 INJECTION INTRAMUSCULAR; INTRAVENOUS
Status: DISCONTINUED | OUTPATIENT
Start: 2018-11-28 | End: 2018-11-28 | Stop reason: HOSPADM

## 2018-11-28 RX ORDER — SODIUM CHLORIDE, SODIUM LACTATE, POTASSIUM CHLORIDE, CALCIUM CHLORIDE 600; 310; 30; 20 MG/100ML; MG/100ML; MG/100ML; MG/100ML
INJECTION, SOLUTION INTRAVENOUS CONTINUOUS
Status: DISCONTINUED | OUTPATIENT
Start: 2018-11-28 | End: 2018-11-28 | Stop reason: HOSPADM

## 2018-11-28 RX ORDER — LABETALOL HYDROCHLORIDE 5 MG/ML
5 INJECTION, SOLUTION INTRAVENOUS
Status: DISCONTINUED | OUTPATIENT
Start: 2018-11-28 | End: 2018-11-28 | Stop reason: HOSPADM

## 2018-11-28 RX ORDER — ISOPROTERENOL HYDROCHLORIDE 0.2 MG/ML
INJECTION, SOLUTION INTRAVENOUS
Status: DISPENSED
Start: 2018-11-28 | End: 2018-11-28

## 2018-11-28 RX ADMIN — SODIUM CHLORIDE, SODIUM LACTATE, POTASSIUM CHLORIDE, CALCIUM CHLORIDE: 600; 310; 30; 20 INJECTION, SOLUTION INTRAVENOUS at 06:35

## 2018-11-28 RX ADMIN — HEPARIN SODIUM 4000 UNITS: 200 INJECTION, SOLUTION INTRAVENOUS at 07:48

## 2018-11-28 RX ADMIN — LIDOCAINE HYDROCHLORIDE: 20 INJECTION, SOLUTION INFILTRATION; PERINEURAL at 07:47

## 2018-11-28 ASSESSMENT — ENCOUNTER SYMPTOMS
DEPRESSION: 0
HEMOPTYSIS: 0
CHILLS: 0
BRUISES/BLEEDS EASILY: 0
NERVOUS/ANXIOUS: 0
WHEEZING: 0
LOSS OF CONSCIOUSNESS: 0
BLURRED VISION: 0
EYE DISCHARGE: 0
SPEECH CHANGE: 0
PALPITATIONS: 1
FEVER: 0
MYALGIAS: 0
EYE PAIN: 0
ABDOMINAL PAIN: 0
COUGH: 0
VOMITING: 0
NAUSEA: 0

## 2018-11-28 ASSESSMENT — PAIN SCALES - GENERAL
PAINLEVEL_OUTOF10: 0

## 2018-11-28 NOTE — DISCHARGE INSTRUCTIONS
ACTIVITY: Rest and take it easy for the first 24 hours.  A responsible adult is recommended to remain with you during that time.  It is normal to feel sleepy.  We encourage you to not do anything that requires balance, judgment or coordination.    MILD FLU-LIKE SYMPTOMS ARE NORMAL. YOU MAY EXPERIENCE GENERALIZED MUSCLE ACHES, THROAT IRRITATION, HEADACHE AND/OR SOME NAUSEA.    FOR 24 HOURS DO NOT:  Drive, operate machinery or run household appliances.  Drink beer or alcoholic beverages.   Make important decisions or sign legal documents.    SPECIAL INSTRUCTIONS: follow up with primary care physician as needed  Follow up with Dr rod in a month call set up an appointment [phone]  Keep the dressing clean dry intact for 24 hours, you may remove dressing after 24 hours.  If you experience chest pain, shortness of breath call 911 return to ER   Resume your home medications    DIET: To avoid nausea, slowly advance diet as tolerated, avoiding spicy or greasy foods for the first day.  Add more substantial food to your diet according to your physician's instructions.  Babies can be fed formula or breast milk as soon as they are hungry.  INCREASE FLUIDS AND FIBER TO AVOID CONSTIPATION.    SURGICAL DRESSING/BATHING: keep dressing clean dry intact for 24 hours, remove dressing after 24 hours.    FOLLOW-UP APPOINTMENT:  A follow-up appointment should be arranged with your doctor in [phone]; call to schedule.    You should CALL YOUR PHYSICIAN if you develop:  Fever greater than 101 degrees F.  Pain not relieved by medication, or persistent nausea or vomiting.  Excessive bleeding (blood soaking through dressing) or unexpected drainage from the wound.  Extreme redness or swelling around the incision site, drainage of pus or foul smelling drainage.  Inability to urinate or empty your bladder within 8 hours.  Problems with breathing or chest pain.    You should call 911 if you develop problems with breathing or chest pain.  If you  are unable to contact your doctor or surgical center, you should go to the nearest emergency room or urgent care center.  Physician's telephone #: [phone]    If any questions arise, call your doctor.  If your doctor is not available, please feel free to call the Surgical Center at [phone]  The Center is open Monday through Friday from 7AM to 7PM.  You can also call the HEALTH HOTLINE open 24 hours/day, 7 days/week and speak to a nurse at [phone], or toll free at [phone].    A registered nurse may call you a few days after your surgery to see how you are doing after your procedure.    MEDICATIONS: Resume taking daily medication.  Take prescribed pain medication with food.  If no medication is prescribed, you may take non-aspirin pain medication if needed.  PAIN MEDICATION CAN BE VERY CONSTIPATING.  Take a stool softener or laxative such as senokot, pericolace, or milk of magnesia if needed.    If your physician has prescribed pain medication that includes Acetaminophen (Tylenol), do not take additional Acetaminophen (Tylenol) while taking the prescribed medication.    Depression / Suicide Risk    As you are discharged from this [facility] facility, it is important to learn how to keep safe from harming yourself.    Recognize the warning signs:  · Abrupt changes in personality, positive or negative- including increase in energy   · Giving away possessions  · Change in eating patterns- significant weight changes-  positive or negative  · Change in sleeping patterns- unable to sleep or sleeping all the time   · Unwillingness or inability to communicate  · Depression  · Unusual sadness, discouragement and loneliness  · Talk of wanting to die  · Neglect of personal appearance   · Rebelliousness- reckless behavior  · Withdrawal from people/activities they love  · Confusion- inability to concentrate     If you or a loved one observes any of these behaviors or has concerns about self-harm, here's what  "you can do:  · Talk about it- your feelings and reasons for harming yourself  · Remove any means that you might use to hurt yourself (examples: pills, rope, extension cords, firearm)  · Get professional help from the community (Mental Health, Substance Abuse, psychological counseling)  · Do not be alone:Call your Safe Contact- someone whom you trust who will be there for you.  · Call your local CRISIS HOTLINE 280-1919 or 730-170-6151  · Call your local Children's Mobile Crisis Response Team [place] (637) 452-2863 or wwwProper Cloth  · Call the toll free National Suicide Prevention Hotlines   · National Suicide Prevention Lifeline 799-598-NTBY (6773)  National Hope Line Network 800-SUICIDE (571-9343)  Post Angiogram Groin Care Instructions     INSTRUCTIONS  2. Examine (look and feel) the site of your incision site TODAY so you can recognize changes that should be called to your doctor (see below).  3. Avoid straining either by lifting or pulling objects for 4-5 days. Avoid lifting over 5 pounds.   4. For at least 72 hours, if you should sneeze or cough, please hold pressure over your groin area.  5. If you should begin to have oozing from the catheterization site, please hold firm pressure and call your doctor's office immediately.  6. If profuse bleeding occurs from the catheterization site, hold firm pressure and call \"154\" immediately for assistance.  7. Remove bandage after 24 hours.     ACTIVITY  2. Limit activity as instructed by your doctor.  3. No driving or very limited driving with frequent stops for one week.   4. If you must take a long car ride, stop every hour and walk around the car.   5. Warm showers or baths are permitted after the bandage is removed. Avoid hot showers, baths, hot tubs, and swimming for one week.    PLEASE CALL YOUR DOCTOR IF:  1. Temperature elevation occurs.  2. Catheterization site becomes reddened or begins to drain.   3. Bruising appears to be new or not resolving. " The bruise may move down your leg. This is normal.  4. The small round lump in the groin increases in size.  5. Any leg numbness, aching, or discomfort (immediately).  6. Increasing discomfort in the leg at the insertion site.  7. Chest pains, even if relieved by Nitroglycerin.    MISCELLANEOUS INSTRUCTIONS  1. Bruising may occur as a result of heart catheterization. Some of the discoloration may travel down the leg, going from blue to green in color.  2. A small round lump under the catheterization site will remain for up to six weeks.  3. If any questions arise call your physician's office. You can also call the HEALTH HOTLINE open 24 hours/day, 7 days/week and speak to a nurse at [phone], or toll free at [phone].   4. You should call 911 if you develop problems with breathing or chest pain.    FOR PROBLEMS CALL MINA rod AT: [phone]  aI acknowledge receipt and understanding of these Home Care instructions.  ·

## 2018-11-28 NOTE — H&P
Physician H&P    Patient ID:  Boo Cavanaugh Jr.  [id number]  73 y.o. male  1945    History:  Primary Diagnosis: Recurrent typical atrial flutter for catheter ablation    HPI:  Previously seen by me during a urologic event with hematuria with sustained atrial flutter.  This broke spontaneously.  Patient has documented recurrent atrial flutter and symptomatic atrial flutter with his Kardia.  Never had atrial flutter for greater than 2 hours per patient.  Patient has normal echocardiogram normal TFTs here for a flutter ablation.    Past Medical History:  has a past medical history of Arrhythmia; Arthritis; Breath shortness; Enlarged prostate with urinary obstruction; OA (osteoarthritis) of knee; Prostate enlargement; Urinary bladder disorder (12/06/2017); and Urinary retention.  Past Surgical History:  has a past surgical history that includes tonsillectomy; ganglion excision (Right, 1966); and turp-vapor (12/7/2017).  Past Social History:  reports that he has never smoked. He has never used smokeless tobacco. He reports that he drinks about 4.2 oz of alcohol per week . He reports that he does not use drugs.  Past Family History:   Family History   Problem Relation Age of Onset   • Heart Disease Mother 65        MI   • Cancer Father    • Cancer Brother 50        prostate cancer   • Diabetes Neg Hx    • Hyperlipidemia Neg Hx    • Alcohol/Drug Neg Hx      Allergies: Penicillins    Current Medications:  Prior to Admission medications    Medication Sig Start Date End Date Taking? Authorizing Provider   Magnesium 250 MG Tab Take  by mouth.   Yes Physician Outpatient       Review of Systems:  Review of Systems   Constitutional: Negative for chills and fever.   HENT: Negative for congestion.    Eyes: Negative for blurred vision, pain and discharge.   Respiratory: Negative for cough, hemoptysis and wheezing.    Cardiovascular: Positive for palpitations. Negative for chest pain.   Gastrointestinal: Negative for abdominal  "pain, nausea and vomiting.   Musculoskeletal: Negative for joint pain and myalgias.   Skin: Negative for itching and rash.   Neurological: Negative for speech change and loss of consciousness.   Endo/Heme/Allergies: Does not bruise/bleed easily.   Psychiatric/Behavioral: Negative for depression. The patient is not nervous/anxious.    All other systems reviewed and are negative.    Blood pressure 147/94, pulse 88, temperature 36.7 °C (98 °F), temperature source Temporal, resp. rate 18, height 1.88 m (6' 2\"), weight 88 kg (194 lb), SpO2 93 %.    Physical Examination:  Physical Exam   Constitutional: He is oriented to person, place, and time. He appears well-developed. No distress.   HENT:   Mouth/Throat: Mucous membranes are normal.   Eyes: Conjunctivae and EOM are normal.   Neck: No JVD present. No tracheal deviation present. No thyroid mass and no thyromegaly present.   Cardiovascular: Normal rate, regular rhythm and intact distal pulses.    No murmur heard.  Pulmonary/Chest: Effort normal and breath sounds normal. No respiratory distress. He exhibits no tenderness.   Abdominal: Soft. There is no tenderness.   Musculoskeletal: Normal range of motion. He exhibits no edema.   Neurological: He is alert and oriented to person, place, and time. He has normal strength. He displays no tremor.   Skin: Skin is warm and dry. He is not diaphoretic.   Psychiatric: He has a normal mood and affect. His behavior is normal.   Vitals reviewed.      Impression:  1.  Typical atrial flutter with recurrence documented on monitor.  Patient here for catheter ablation and transesophageal echocardiogram.  The risks, benefits, and alternatives to atrial flutter ablation were discussed in great detail, specific risks mentioned including bleeding, infection, arteriovenous fistula related to  sheath placement, cardiac perforation with possible tamponade requiring pericardiocentesis or possibly open heart surgery. In addition, pneumothorax and " hemothorax were mentioned with right internal jugular venous access. I also discussed the  possibility of permanent pacemaker placement due to to inadvertent AV block. In addition the risk of death, stroke and myocardial infarction were discussed. Success rate for typical atrial flutter at approximately 90%. Total complication rate estimated to be approximately 1%.  The patient verbalized understanding of these potential complications and wishes to proceed with this procedure.   The risks, benefits, and alternatives to transesophageal echocardiogram with IV sedation were discussed with the patient in specific detail, including oropharyngeal and esophageal traumas including hoarseness and dysphagia after the procedure. Rare cases demonstrating serious or fatal complications associated with transesophageal echocardiogram have been reported in the adult population, including cardiac, pulmonary and bleeding complications in less than 1% of people. Patients with an identified intracardiac thrombus are at increased risk for embolic events and this appears to be reduced with anticoagulant therapy. The patient verbalized understandings about these  possible complications and wishes to proceed with this procedure          Pre Procedure Assessment:  Pre-Procedure Assessment - Applicable for patients receiving sedation by non-anesthesia practitioner.  Previous drug history, other anesthetic experiences, potential anesthetic problems and choice of anesthesia assessed, discussed with patient.     No prior complications.  Results of relevant diagnostic studies reviewed.    Plan of Sedation:  Patient assessed immediately prior to sedationPatient deemed appropriate candidate for conscious sedation options and plans discussed with patient / family    ASA 2 - Patient with mild systemic disease

## 2018-11-28 NOTE — OR NURSING
1020 patient arrived from IR s/p a.flutter ablation, patient wide awake, denies pain, s.o.b, nausea vomiting, right groin access site,  Gauze and tegaderm for dressing no bleeding no hematoma, vital signs stable.  1050 post  EKG done   1100 patient given water tolerating well.  1200 patient resting, family at the bed side.  1230 patient given lunch tray tolerated well.  1300 right groin dressing clean no bleeding no hematoma.  1425 criteria met to discharge patient home.  1430 discharge instructions given to patient, patient and family verbalize understanding of the orders, iv discontinued tip intact, right groin dressing clean dry intact, patient not in any discomfort or distress.

## 2018-11-28 NOTE — CATH LAB
Electrophysiology Procedure Note  [facility]    Procedure(s) Performed:   Supraventricular tachycardia ablation (atrial flutter ablation)   Electroanatomical 3D mapping with CARTO  CS/LA pacing and recording  Transesophageal echocardiogram  Additional ablation of secondary arrhythmia  Program stimulation following drug infusion    Indication: Recurrent typical atrial flutter    : Enoch Mendoza M.D.    Anesthesia: General anesthesia    Anesthesiologist: Dr JEWEL Us    Specimen(s) Removed: None    Estimated Blood Loss:  20cc    Complications: None    Pre-procedure ECG: Sinus rhythm    Post Procedure ECG: Sinus rhythm    Description of Procedure:    After informed written consent, the patient was brought to the EP lab in the fasting, non-sedated state. General anaesthesia given to the patient. BAILEY excluded left atrial and appendage thrombus. The patient was prepped and draped in the usual sterile fashion. Femoral venous access was obtained using the modified Seldinger technique. In the right femoral vein, 3 sheaths (6,7,8 Fr) were inserted over 0.35” guidewires. A deflectable decapolar catheter was advanced to the CS position. A duodecapolar halo catheter was advanced into the right atrium and positioned along the tricuspid annulus. One 8Fr saline irrigated ablation catheter with 3.5mm distal electrode and location sensor for the CARTO system ([employer] ST SF F/J) was inserted into an 8Fr sheath for electroanatomical 3D mapping and radiofrequency ablation. During ablation we saw evidence of bilateral isthmus block.  During ablation the patient went into typical counterclockwise flutter which also terminated during ablation.  Also during mapping the patient went into a narrow complex SVT recurrent with short VA less than 50 ms.  Response to ventricular entrainment was VAV.  PVCs did not advance the A when placed onto the His.  Post flutter ablation, we could reproducibly induce this  typical slow/fast AVNRT.  Slow pathway ablation was performed with same catheter.  Slow junctional beats were seen.  Post ablation we waited 30 minutes patient's device per up to 2 mcg/min.  Patient was noninducible for AVNRT.  Cycle length of the AVNRT was 510 ms.  We went back and looked at the flutter line and there was reconnection at the IVC location.  Repeat ablation was performed with bilateral isthmus block.  This continued despite waiting 20 minutes.    At the end of the procedure, the catheter and sheaths were removed, and hemostasis was achieved by manual compression. Following recovery from anesthesia, the patient was transferred to the PACU in good condition.    Baseline Rhythm: Sinus rhythm  CL a 53 msec    Intervals:   msec   HV 40 msec  AVNBCL 350 msec.  Dual AV maggie pathways  VA conduction via the retrograde AV node.  VA block cycle length 340 ms.    Arrhythmias:  1. Sustained typical counterclockwise flutter atrial flutter,  ms ms with intact AV conduction seen spontaneously during isthmus ablatio.  2. 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  Also inducible with isoproterenol 2 mcg per minute with A1 A2 stimuli.  Maneuvers confirmed this etiology.    Mapping:  Electroanatomical 3D (CARTO FAM) map of CS and right atrium around the cavotricuspid isthmus was created during CS pacing. His cloud performed  Ablation:  Radiofrequency energy was applied using 3.5 mm saline irrigated catheter, power 40 W, total 19 RF applications, RF time 1185 seconds to create a cavotricuspid isthmus line between the tricuspid annulus and Eustachian ridge/inferior vena cava to produce bidirectional isthmus conduction block.  Radiofrequency ablation performed at the slow pathway at 30 W with slow junctional beats.  120 seconds of ablation.    Post Ablation Testin. Non inducible for atrial flutter or AV node reentry tachycardia (single echoes) with CS pacing post ablation.  2. Trans-isthmus conduction  time pacing from proximal  msec.  3. Trans-isthmus conduction time pacing from lateral to the isthmus line 160 msec.  4. SD interval 180 msec, QRS duration 105 msec, RR interval 1000 msec, QT interval 432 msec    Fluoroscopy Time: 6.9 min    Impressions/Plan:   1. Spontaneous typical counterclockwise right atrial flutter.  2. Successful catheter mediated ablation of right atrial flutter with bilateral isthmus block.  3.  Spontaneous AVNRT and inducible AVNRT.  4.  Slow pathway ablation.  3. Non inducible post ablation.

## 2018-11-29 PROBLEM — Z78.9 NON-SMOKER: Status: ACTIVE | Noted: 2018-11-29

## 2018-11-29 PROBLEM — G47.33 OSA (OBSTRUCTIVE SLEEP APNEA): Status: ACTIVE | Noted: 2018-11-29

## 2018-11-29 NOTE — PROGRESS NOTES
"CC: Referred by Dr. Mendoza to evaluate for possible sleep apnea    HPI:    Mr. Boo Cavanaugh Jr. is a 73-year-old retired man kindly referred by Dr. Enoch Mendoza for evaluation of possible obstructive sleep apnea syndrome.  Dr. Mendoza was concerned that the apnea may be contributing to the patient's issues with atrial dysrhythmias including atrial flutter.  He underwent an ablation 2 days ago.    The patient generally goes to bed about 10 PM and wakes up between 5 AM and 6 AM.  He does not have a regular bed partner.  He estimates that he is sleep latency may be about 5-15 minutes.  He may read or watch TV just prior to turn out the lights and attempting to go to sleep.  On the average he estimates that he wakes up 2-3 times and experiences 1-2 episodes of nocturia.  Symptoms include waking up too early in the morning return to sleep, infrequent napping, rare sleepiness during the day, and tiredness during the day.  He denies falling asleep accidentally.  He denies symptoms that would suggest narcolepsy such as hypnagogic or hypnopompic hallucinations, cataplexy, or sleep paralysis. Remembers just one episode of waking up suddenly.    He denies nocturnal shortness of breath but has been told that he snores in the supine position.  He experiences restless legs most nights a pattern that has occurred \"all my life\". Usually sleeps about 6-6.5 hours. Occasionally, has sleep maintenance insomnia. Has slept alone since 2008.    He denies any leg or arm jerking or twitching during sleep.  He has no symptoms to suggest parasomnias.  Aside from a daughter with insomnia sleep problems do not run in his family.    Significant comorbidities and modifying factors include lifelong non-smoker, atrial dysrhythmias, atrial flutter, status post ablation, BPH, status post TURP, elevated LDL cholesterol, GERD, hypertension, restless legs syndrome and back pain.    Patient Active Problem List    Diagnosis Date Noted   • SHERRY (obstructive " "sleep apnea) 11/29/2018   • Non-smoker 11/29/2018   • SVT (supraventricular tachycardia) (HCC) 11/28/2018   • PVC (premature ventricular contraction) 07/09/2018   • Elevated LDL cholesterol level 06/07/2018   • Benign prostatic hyperplasia without lower urinary tract symptoms 11/15/2017   • Hydronephrosis with ureteropelvic junction (UPJ) obstruction 11/15/2017   • Atrial flutter (HCC) 11/11/2017       Past Medical History:   Diagnosis Date   • Arrhythmia     \"flutter\", Dr. Mendoza cardiologist   • Arthritis     left thumb   • Breath shortness    • Cardiac arrhythmia    • Enlarged prostate with urinary obstruction    • GERD (gastroesophageal reflux disease)    • Hyperlipidemia    • Hypertension    • OA (osteoarthritis) of knee     bilateral    • Prostate enlargement    • Restless leg syndrome    • Tonsillitis    • Urinary bladder disorder 12/06/2017    savage in place   • Urinary retention         Past Surgical History:   Procedure Laterality Date   • TURP-VAPOR  12/7/2017    Procedure: TURP-VAPOR - GREEN LIGHT PHOTOSELECTIVE;  Surgeon: Cristofer Cr M.D.;  [facility];  Service: Urology   • GANGLION EXCISION Right 1966    wrist   • TONSILLECTOMY      as a child       Family History   Problem Relation Age of Onset   • Heart Disease Mother 65        MI   • Cancer Father    • Cancer Brother 50        prostate cancer   • Stroke Brother    • Diabetes Neg Hx    • Hyperlipidemia Neg Hx    • Alcohol/Drug Neg Hx        Social History     Social History   • Marital status: Single     Spouse name: N/A   • Number of children: N/A   • Years of education: N/A     Occupational History   • Not on file.     Social History Main Topics   • Smoking status: Never Smoker   • Smokeless tobacco: Never Used   • Alcohol use Yes      Comment: 0.5 per month   • Drug use: No   • Sexual activity: No      Comment: 2 daughters      Other Topics Concern   • Not on file     Social History Narrative   • No narrative on file " "      Current Outpatient Prescriptions   Medication Sig Dispense Refill   • Magnesium 250 MG Tab Take  by mouth.       No current facility-administered medications for this visit.     \"CURRENT RX\"    ALLERGIES: Penicillins    ROS    Constitutional: Denies fever, chills, sweats,  weight loss, fatigue.  Eyes: Wears glasses and has had vision loss but denies eye pain, drainage, double vision  Ears/Nose/Mouth/Throat: Denies earache, positive for difficulty hearing, denies the following rhinitis/nasal congestion, injury, recurrent sore throat, persistent hoarseness, decayed teeth/toothaches, positive ringing or buzzing in the ears.  Cardiovascular: Denies chest pain, positive for tightness, denies the following palpitations, swelling in legs/feet, fainting, difficulty breathing when lying down but gets better when sitting up.   Respiratory: Positive for shortness of breath, denies the following cough, sputum, wheezing, painful breathing, coughing up blood.   Sleep: per HPI  Gastrointestinal: Denies difficulty swallowing, nausea, abdominal pain, constipation but complains of diarrhea and heartburn   genitourinary: Denies  blood in urine, discharge, frequent urination.   Musculoskeletal: Positive for all the following painful joints, sore muscles, back pain.   Integumentary: Denies rashes, lumps, color changes.   Neurological: Denies frequent headaches,weakness, dizziness.    PHYSICAL EXAM  Overweight but not obese    /80 (BP Location: Left arm, Patient Position: Sitting, BP Cuff Size: Adult)   Pulse 64   Resp 16   Ht 1.88 m (6' 2\")   Wt 91.6 kg (202 lb)   SpO2 94%   BMI 25.94 kg/m²   Appearance: Well-nourished, well-developed, no acute distress  Eyes:  PERRLA, EOMI; glasses  ENMT: without lesions, deformities;hearing grossly intact; tongue normal, posterior pharynx without erythema or exudate; Mallampati classification:   Neck: Supple, trachea midline, no masses  Respiratory effort:  No intercostal retractions " or use of accessory muscles  Lung auscultation:  No wheezes rhonchi rubs or rales  Cardiac: No murmurs, rubs, or gallops; regular rhythm, normal rate; no edema  Abdomen:  No tenderness, no organomegaly  Musculoskeletal:  Grossly normal; gait and station normal; digits and nails normal  Skin:  No rashes, petechiae, cyanosis  Neurologic: without focal signs; oriented to person, time, place, and purpose; judgement intact  Psychiatric:  No depression, anxiety, agitation        PROBLEMS:  1. SHERRY (obstructive sleep apnea)    - Overnight Home Sleep Study; Future    2. Typical atrial flutter (HCC)      3. Benign prostatic hyperplasia without lower urinary tract symptoms      4. Elevated LDL cholesterol level      5. Non-smoker      6. Need for influenza vaccination        PLAN:   The patient has signs and symptoms consistent with obstructive sleep apnea hypopnea syndrome.  We will schedule him to have a home sleep study to evaluate his complaints.    The risks of untreated sleep apnea were discussed with the patient at length. Patients with SHERRY are at increased risk of cardiovascular disease including coronary artery disease, systemic arterial hypertension, pulmonary arterial hypertension, cardiac arrythmias, and stroke. SHERRY patients have an increased risk of motor vehicle accidents, type 2 diabetes, chronic kidney disease, and non-alcoholic liver disease. The patient was advised to avoid driving a motor vehicle when drowsy.    Positive airway pressure, such as CPAP, is considered first-line and preferred therapy for sleep apnea and may reverse both symptoms and risks.     Regarding his myriad of other medical problems, he will follow-up with his healthcare practitioners and follow their recommendations including  vaccinations.    Return for after sleep study.                      Answers for HPI/ROS submitted by the patient on 11/29/2018   Year of your last physical exam: 2018  Results of exam: See record  Occupation :  Retired  Height: 6 foot 2  Current weight: 194  6 months ago: 200  At age 21: 190  What is the reason for your visit today?: Evaluate Lung function and sleep apnea  Name of person referring you to the Sleep Center: Dr. Mendoza  Have you ever been hospitalized?: Yes  Reason, year, and hospital in which you were hospitalized:: 2017, Prostate surgery, [facility]//2018 A flutter ablasion [facility]  Have you ever had problems with anesthesia?: No  Have you experienced post-operative delirium?: No  Any complications with surgery?: No  What year did you receive your last Flu shot?: Unknown  What year did you receive you last Pneumonia shot?: Unknown  Have you had a TB skin test? If so, please list the year and result:: Unknown  Have you had Allergy skin testing? If so, please list the year and result:: No  Please briefly describe your sleep problem and how old you were when it began.: Referred by Dr. Mendoza to evaluate  How does this affect your daily life and activities? Please also rate how serious of a problem this is (1 = Not at all, 10 = Very Serious).: May contribute to heart issues  Have you had any previous evaluations, examinations, or treatment for this sleep problem or any other problems with your sleep? If so, please describe the evaluation, treatment, and results.: No  Have you used any medications (prescribed or otherwise) to help your sleep problem? If yes, include name, amount, frequency, and the prescribing physician.: No  Do you ever change work shifts? If yes, describe how often (never, infrequently, regularly).: No  What time do you usually go to bed and wake up on: Weekdays? Weekends?: About 10 PM and wake at 5-6 AM  Do you have a regular bed partner?: No  How many minutes does it usually take to fall asleep at night after turning off the lights?: 5-15  What do you ordinarily do just prior to turning out the lights and attempting to go to sleep (e.g., reading, TV, baths, etc.)?: Read, TV  On average, how many  "times do you wake up during the night?: 2-3  On average, how many times do you wake up to use the bathroom?: 1-2  Do you often wake up too early in the morning and are unable to return to sleep?: Yes  On average, how many hours of sleep do you get per night?: 1 yr avg per fitbit is 6.5 hrs  How do you usually awaken?  Alarm, spontaneously, or other?: spontaneously  Is it difficult for you to awaken and get out of bed after sleeping? (Not at all, Sometimes, Very): No  Do you nap or return to bed after arising?: Infrequently  Are you bothered by sleepiness during the day?: Rarely  Do you feel that you get too much sleep at night?: No  Do you feel that you get too little sleep at night?: Yes  Do you usually feel tired during the day? If so, what do you attribute this to?: Depends on activity during day  Do you find yourself falling asleep when you don't mean to? : No  Have you ever suddenly fallen?: No  Have you ever experienced sudden body weakness?: No  Have you ever experienced weakness or paralysis upon going to sleep?: No  Have you ever experienced weakness or paralysis upon awakening from sleep?: No  Have you ever experienced seeing things or hearing voices/noises: That weren't real? On going to sleep? During the night? On awakening from sleep? During the day?: No  Do you have difficulty breathing at night? If yes, briefly describe.: No  Have you been told you snore while asleep? If so, does it disturb a bed partner (or someone in the same room), or someone in the next room?: At times when sleep on back  Have you ever experienced doing something without being aware of the action? If yes, please describe.: No  Have you ever experienced upon lying in bed before sleep or on awakening from sleep: Restlessness of legs, \"nervous legs\", \"creeping crawling\" sensation of legs, or twitching of legs?: Yes  How many times per week does this occur, and how many minutes does the sensation last?: Most nights  Does anything " relieve the sensations (e.g., getting out of bed, medication, massage)?: No  At what age did this first occur, and how many years has this occurred?: all my life  Have you ever been told that your arms or legs jerk or twitch while you are asleep? If yes, how many times per night does this occur?: No  Do you know, or have you ever been told that you do any of the following while sleeping: talk, walk, grit teeth, wet the bed, wake up screaming or seemingly afraid, have disturbing dreams, have unusual movements, wake up with headaches, (males) have erections? If yes to any of these, please indicate how many times per week, age started, last occurrence, treatment received.: No  Has anyone in your family been known to have any sleep problems? If yes, please list the type of problem (e.g., trouble getting to sleep, too sleepy, bed wetting, etc.), the relationship of this person to you, and the treatment received.: trouble sleeping - daughter

## 2018-11-30 ENCOUNTER — SLEEP CENTER VISIT (OUTPATIENT)
Dept: SLEEP MEDICINE | Facility: MEDICAL CENTER | Age: 73
End: 2018-11-30
Payer: MEDICARE

## 2018-11-30 VITALS
HEIGHT: 74 IN | BODY MASS INDEX: 25.93 KG/M2 | OXYGEN SATURATION: 94 % | SYSTOLIC BLOOD PRESSURE: 130 MMHG | RESPIRATION RATE: 16 BRPM | DIASTOLIC BLOOD PRESSURE: 80 MMHG | HEART RATE: 64 BPM | WEIGHT: 202 LBS

## 2018-11-30 DIAGNOSIS — I48.3 TYPICAL ATRIAL FLUTTER (HCC): Chronic | ICD-10-CM

## 2018-11-30 DIAGNOSIS — E78.00 ELEVATED LDL CHOLESTEROL LEVEL: ICD-10-CM

## 2018-11-30 DIAGNOSIS — N40.0 BENIGN PROSTATIC HYPERPLASIA WITHOUT LOWER URINARY TRACT SYMPTOMS: ICD-10-CM

## 2018-11-30 DIAGNOSIS — Z23 NEED FOR INFLUENZA VACCINATION: ICD-10-CM

## 2018-11-30 DIAGNOSIS — G47.33 OSA (OBSTRUCTIVE SLEEP APNEA): ICD-10-CM

## 2018-11-30 DIAGNOSIS — Z78.9 NON-SMOKER: ICD-10-CM

## 2018-11-30 PROCEDURE — 99204 OFFICE O/P NEW MOD 45 MIN: CPT | Performed by: INTERNAL MEDICINE

## 2018-12-13 ENCOUNTER — HOME STUDY (OUTPATIENT)
Dept: SLEEP MEDICINE | Facility: MEDICAL CENTER | Age: 73
End: 2018-12-13
Attending: INTERNAL MEDICINE
Payer: MEDICARE

## 2018-12-13 DIAGNOSIS — G47.33 OSA (OBSTRUCTIVE SLEEP APNEA): ICD-10-CM

## 2018-12-13 PROCEDURE — G0399 HOME SLEEP TEST/TYPE 3 PORTA: HCPCS | Performed by: FAMILY MEDICINE

## 2018-12-17 NOTE — PROCEDURES
DIAGNOSTIC HOME SLEEP TEST (HST) REPORT       PATIENT ID:  NAME:  Boo Cavanaugh  MRN:               [id number]  YOB: 1945  DATE OF STUDY: 12/13/18      Impression:     This study shows evidence of:     1.Mild obstructive sleep apnea with  Respiratory Event Index (RAJEEV) of 8.5 per hour and worse in supine sleep with RAJEEV at 18.2. These findings are based on the recording time (flow evaluation time). It is not possible with this device to determine a traditional apnea+hypopnea index (AHI) for total sleep time since EEG channels are not available.     2.  O2 Sat. jason was 85%, avg O2 was 91 % and baseline O2 at 95 %. O2 sat was below 89% for 9 min of the flow evaluation time. Avg HR 60 BPM.      TECHNICAL DESCRIPTION:  [employer] Device used was a type-III home study device. Home sleep study recording included: Airflow recording by nasal pressure transducer; Respiratory Effort by abdominal Respiratory Bands; O2 by finger oximetry. A position sensor and a snore channel was also used.    Scoring Criteria: A modification of the the AASM Manual for the Scoring of Sleep and Associated Events, 2012, was used.   Obstructive apnea was scored by cessation of airflow for at least 10 seconds with continuing respiratory effort.  Central apnea was scored by cessation of airflow for at least 10 seconds with no effort.  Hypopnea was scored by a 30% or more reduction in airflow for at least 10 seconds accompanied by an arterial oxygen desaturation of 3% or more.  (For Medicare patients, hypopneas were scored by a 30% or more reduction in airflow for at least 10 seconds accompanied by an arterial oxygen saturation of 4% or more, as required by their insurance, CMS.        General sleep summary: . Total recording time is 428 minutes and total flow evaluation time is 421 minutes. The patient spent 184 minutes in the supine position and 237 minutes in the nonsupine position.    Respiratory events:    Apneas: 35  (Obstructive apnea index 4.7/hr, Central apnea index 0.1 /hr, mixed 0.1 /hour)  Hypopneas: 25    Recommendations:    1. CPAP titration study vs Auto CPAP trial .   2.   In general patients with sleep apnea are advised to avoid alcohol and sedatives and to not operate a motor vehicle while drowsy. In some cases alternative treatment options may prove effective in resolving sleep apnea in these options include upper airway surgery, the use of a dental orthotic or weight loss and positional therapy. Clinical correlation is required.         Iza Renae MD

## 2018-12-21 ENCOUNTER — SLEEP CENTER VISIT (OUTPATIENT)
Dept: SLEEP MEDICINE | Facility: MEDICAL CENTER | Age: 73
End: 2018-12-21
Payer: MEDICARE

## 2018-12-21 ENCOUNTER — TELEPHONE (OUTPATIENT)
Dept: CARDIOLOGY | Facility: MEDICAL CENTER | Age: 73
End: 2018-12-21

## 2018-12-21 VITALS
BODY MASS INDEX: 25.93 KG/M2 | HEART RATE: 71 BPM | WEIGHT: 202 LBS | DIASTOLIC BLOOD PRESSURE: 78 MMHG | HEIGHT: 74 IN | SYSTOLIC BLOOD PRESSURE: 120 MMHG | OXYGEN SATURATION: 92 % | RESPIRATION RATE: 16 BRPM

## 2018-12-21 DIAGNOSIS — R06.02 SOB (SHORTNESS OF BREATH): ICD-10-CM

## 2018-12-21 DIAGNOSIS — I47.10 SVT (SUPRAVENTRICULAR TACHYCARDIA): ICD-10-CM

## 2018-12-21 DIAGNOSIS — G47.33 OSA (OBSTRUCTIVE SLEEP APNEA): ICD-10-CM

## 2018-12-21 PROCEDURE — 99214 OFFICE O/P EST MOD 30 MIN: CPT | Performed by: NURSE PRACTITIONER

## 2018-12-21 ASSESSMENT — ENCOUNTER SYMPTOMS
EYE PAIN: 0
COUGH: 0
NECK PAIN: 0
MEMORY LOSS: 0
CARDIOVASCULAR NEGATIVE: 1
SPUTUM PRODUCTION: 0
GASTROINTESTINAL NEGATIVE: 1
HALLUCINATIONS: 0
FALLS: 0
CHILLS: 0
WEIGHT LOSS: 0
SHORTNESS OF BREATH: 1
WEAKNESS: 0
INSOMNIA: 1
NERVOUS/ANXIOUS: 0
EYE DISCHARGE: 0
DIAPHORESIS: 0
BACK PAIN: 0
BRUISES/BLEEDS EASILY: 0
NEUROLOGICAL NEGATIVE: 1
FEVER: 0
WHEEZING: 0
DEPRESSION: 0
MYALGIAS: 0
HEMOPTYSIS: 0

## 2018-12-21 ASSESSMENT — LIFESTYLE VARIABLES: SUBSTANCE_ABUSE: 0

## 2018-12-21 NOTE — PROGRESS NOTES
"Chief Complaint   Patient presents with   • Results     HST results         HPI: This patient is a 73 y.o. male, who presents for home sleep study results.   Patient was initially referred for consultation by his cardiologist Dr. Mendoza.  The patient has history of atrial dysrhythmias including a flutter and SVT.  He underwent ablation in November.  Patient was seen in consultation with Dr. Reyna November 30, 2018.  Home sleep study confirms mild sleep apnea with an RDI of 8.5, supine RAJEEV 18.2, jason O2 sat of 85%. Testing was reviewed in detail with the patient.  As far as cardiac status, he denies chest pain, pressure or palpitations since his ablation.  He does have some exertional dyspnea but attributes this to more sedentary lifestyle.  He denies wheezing or coughing.  He is a never smoker.  No history of COPD or asthma.    Past Medical History:   Diagnosis Date   • Arrhythmia     \"flutter\", Dr. Mendoza cardiologist   • Arthritis     left thumb   • Breath shortness    • Cardiac arrhythmia    • Enlarged prostate with urinary obstruction    • GERD (gastroesophageal reflux disease)    • Hyperlipidemia    • Hypertension    • OA (osteoarthritis) of knee     bilateral    • Prostate enlargement    • Restless leg syndrome    • Tonsillitis    • Urinary bladder disorder 12/06/2017    savage in place   • Urinary retention        Social History   Substance Use Topics   • Smoking status: Never Smoker   • Smokeless tobacco: Never Used   • Alcohol use Yes      Comment: 0.5 per month       Family History   Problem Relation Age of Onset   • Heart Disease Mother 65        MI   • Cancer Father    • Cancer Brother 50        prostate cancer   • Stroke Brother    • Diabetes Neg Hx    • Hyperlipidemia Neg Hx    • Alcohol/Drug Neg Hx        Immunization History   Administered Date(s) Administered   • Pneumococcal Conjugate Vaccine (Prevnar/PCV-13) 11/12/2017       Current medications as of today   No current outpatient prescriptions on " "file.     No current facility-administered medications for this visit.        Allergies: Penicillins    Blood pressure 120/78, pulse 71, resp. rate 16, height 1.88 m (6' 2\"), weight 91.6 kg (202 lb), SpO2 92 %.      Review of Systems   Constitutional: Positive for malaise/fatigue. Negative for chills, diaphoresis, fever and weight loss.   HENT: Negative.    Eyes: Negative for pain and discharge.   Respiratory: Positive for shortness of breath. Negative for cough, hemoptysis, sputum production and wheezing.    Cardiovascular: Negative.    Gastrointestinal: Negative.    Musculoskeletal: Positive for joint pain. Negative for back pain, falls, myalgias and neck pain.   Skin: Negative.    Neurological: Negative.  Negative for weakness.   Endo/Heme/Allergies: Negative for environmental allergies. Does not bruise/bleed easily.   Psychiatric/Behavioral: Negative for depression, hallucinations, memory loss, substance abuse and suicidal ideas. The patient has insomnia. The patient is not nervous/anxious.        Physical Exam   Constitutional: He is oriented to person, place, and time and well-developed, well-nourished, and in no distress.   HENT:   Head: Normocephalic and atraumatic.   Eyes: Pupils are equal, round, and reactive to light.   Neck: Normal range of motion. Neck supple. No tracheal deviation present.   Cardiovascular: Normal rate, regular rhythm and normal heart sounds.    Pulmonary/Chest: Breath sounds normal. No respiratory distress. He has no wheezes. He has no rales.   Neurological: He is alert and oriented to person, place, and time.   Skin: Skin is warm and dry.   Psychiatric: Mood, memory, affect and judgment normal.   Vitals reviewed.      Diagnoses/Plan:    1. SHERRY (obstructive sleep apnea)  I reviewed with the patient pathophysiology of SHERRY as well as its association with cardiac arrhythmias.  We discussed dental appliance or CPAP therapy for treatment of mild SHERRY.  Given cardiac history CPAP therapy is " recommended as first-line.  He is amendable to a trial of auto CPAP.  Will order auto machine 5-15 cm H2O.  He understands to call our office for questions.  His equipment company will fit him for a mask.  He will follow-up in 3 months for compliance check.  - DME CPAP    2. SVT (supraventricular tachycardia) (HCC)  Symptoms are stable since his ablation, denies palpitations, chest pain or pressure.  Follows with cardiology routinely.    3. SOB (shortness of breath)  Patient denies smoking history, no history of COPD or asthma.  He denies other pulmonary complaints.  Shortness of breath is likely related to deconditioning.  Consider PFTs if necessary.        This dictation was created using voice recognition software. The accuracy of the dictation is limited to the abilities of the software. I expect there may be some errors of grammar and possibly content.

## 2018-12-21 NOTE — TELEPHONE ENCOUNTER
Roxana Crockett, [facility]  SHERLEY Gandhi Lisa from [facility] has Mr. Cavanaugh there today and she wanted to verify he is cleared for some dental procedures. She has faxed over a request to our office and would like it to be completed and faxed back when you have a moment. I am going to look for the fax and I will bring it to you once it arrives.      Request for antibiotic prophylaxis received as pt had SVT ablation on 11/28/18. No documented hx of prosthetic valve, natalie heart disease, or heart transplant. Faxed back advising to follow AHA guidelines.

## 2019-01-08 ENCOUNTER — OFFICE VISIT (OUTPATIENT)
Dept: MEDICAL GROUP | Facility: PHYSICIAN GROUP | Age: 74
End: 2019-01-08
Payer: MEDICARE

## 2019-01-08 VITALS
DIASTOLIC BLOOD PRESSURE: 80 MMHG | RESPIRATION RATE: 16 BRPM | TEMPERATURE: 97.5 F | SYSTOLIC BLOOD PRESSURE: 146 MMHG | OXYGEN SATURATION: 96 % | HEIGHT: 74 IN | WEIGHT: 207 LBS | HEART RATE: 68 BPM | BODY MASS INDEX: 26.56 KG/M2

## 2019-01-08 DIAGNOSIS — Z23 NEED FOR VACCINATION: ICD-10-CM

## 2019-01-08 DIAGNOSIS — R03.0 PREHYPERTENSION: ICD-10-CM

## 2019-01-08 DIAGNOSIS — E78.00 ELEVATED LDL CHOLESTEROL LEVEL: ICD-10-CM

## 2019-01-08 DIAGNOSIS — I48.3 TYPICAL ATRIAL FLUTTER (HCC): Chronic | ICD-10-CM

## 2019-01-08 DIAGNOSIS — N40.0 BENIGN PROSTATIC HYPERPLASIA WITHOUT LOWER URINARY TRACT SYMPTOMS: ICD-10-CM

## 2019-01-08 DIAGNOSIS — M25.532 LEFT WRIST PAIN: ICD-10-CM

## 2019-01-08 PROBLEM — Z78.9 NON-SMOKER: Status: RESOLVED | Noted: 2018-11-29 | Resolved: 2019-01-08

## 2019-01-08 PROBLEM — I47.10 SVT (SUPRAVENTRICULAR TACHYCARDIA) (HCC): Status: RESOLVED | Noted: 2018-11-28 | Resolved: 2019-01-08

## 2019-01-08 PROBLEM — I49.3 PVC (PREMATURE VENTRICULAR CONTRACTION): Status: RESOLVED | Noted: 2018-07-09 | Resolved: 2019-01-08

## 2019-01-08 PROCEDURE — 90732 PPSV23 VACC 2 YRS+ SUBQ/IM: CPT | Performed by: INTERNAL MEDICINE

## 2019-01-08 PROCEDURE — 99214 OFFICE O/P EST MOD 30 MIN: CPT | Mod: 25 | Performed by: INTERNAL MEDICINE

## 2019-01-08 PROCEDURE — G0009 ADMIN PNEUMOCOCCAL VACCINE: HCPCS | Performed by: INTERNAL MEDICINE

## 2019-01-08 ASSESSMENT — PATIENT HEALTH QUESTIONNAIRE - PHQ9: CLINICAL INTERPRETATION OF PHQ2 SCORE: 0

## 2019-01-08 NOTE — ASSESSMENT & PLAN NOTE
He reports left wrist pain, worse when turning. Pain is on the thumb. Worse at night. He is not taking anything, no swelling.

## 2019-01-08 NOTE — ASSESSMENT & PLAN NOTE
BP elevated. He tells me that BP used to be similar 2-3 years ago. He tells me that BP is variable. Today he reports some blurry vision, mild headache. He does not check BP at home. He refuses to take medications

## 2019-01-08 NOTE — ASSESSMENT & PLAN NOTE
PSA slight elevated again. Continue to monitor. He denies urinary symptoms. See previous note. He had hydronephrosis due to BPH,

## 2019-01-08 NOTE — PROGRESS NOTES
Subjective:   Boo Cavanaugh Jr. is a 73 y.o. male here today for lab work review, wrist pain, hypertension     Atrial flutter (CMS-HCC) (HCC)  He had recent ablation, he is doing better since ablation, he is not on medication. He refuses anticoagulation. I did advise to follow up with cardiology. He denies chest pain, shortness of breath, palpitation lightheadedness    Prehypertension  BP elevated. He tells me that BP used to be similar 2-3 years ago. He tells me that BP is variable. Today he reports some blurry vision, mild headache. He does not check BP at home. He refuses to take medications     Left wrist pain  He reports left wrist pain, worse when turning. Pain is on the thumb. Worse at night. He is not taking anything, no swelling.     Elevated LDL cholesterol level  He had refuses statin.  ASCVD score 26.8. he would like to manage with healthy lifestyle. Continue to monitor     Benign prostatic hyperplasia without lower urinary tract symptoms  PSA slight elevated again. Continue to monitor. He denies urinary symptoms. See previous note. He had hydronephrosis due to BPH,        Current medicines (including changes today)  No current outpatient prescriptions on file.     No current facility-administered medications for this visit.      He  has a past medical history of Arrhythmia; Arthritis; Breath shortness; Cardiac arrhythmia; Enlarged prostate with urinary obstruction; GERD (gastroesophageal reflux disease); Hyperlipidemia; Hypertension; OA (osteoarthritis) of knee; Prostate enlargement; Restless leg syndrome; Tonsillitis; Urinary bladder disorder (12/06/2017); and Urinary retention.    No current outpatient prescriptions on file.     No current facility-administered medications for this visit.        Allergies as of 01/08/2019 - Reviewed 01/08/2019   Allergen Reaction Noted   • Penicillins Rash 10/25/2017       Social History     Social History   • Marital status: Single     Spouse name: N/A   • Number of  "children: N/A   • Years of education: N/A     Occupational History   • Not on file.     Social History Main Topics   • Smoking status: Never Smoker   • Smokeless tobacco: Never Used   • Alcohol use Yes      Comment: 0.5 per month   • Drug use: No   • Sexual activity: No      Comment: 2 daughters      Other Topics Concern   • Not on file     Social History Narrative   • No narrative on file        Family History   Problem Relation Age of Onset   • Heart Disease Mother 65        MI   • Cancer Father    • Cancer Brother 50        prostate cancer   • Stroke Brother    • Diabetes Neg Hx    • Hyperlipidemia Neg Hx    • Alcohol/Drug Neg Hx        Past Surgical History:   Procedure Laterality Date   • TURP-VAPOR  12/7/2017    Procedure: TURP-VAPOR - GREEN LIGHT PHOTOSELECTIVE;  Surgeon: Cristofer Cr M.D.;  Location: SURGERY [facility];  Service: Urology   • GANGLION EXCISION Right 1966    wrist   • TONSILLECTOMY      as a child       ROS   All systems reviewed are negative except for HPI       Objective:     Blood pressure 146/80, pulse 68, temperature 36.4 °C (97.5 °F), temperature source Temporal, resp. rate 16, height 1.88 m (6' 2\"), weight 93.9 kg (207 lb), SpO2 96 %. Body mass index is 26.58 kg/m².   Physical Exam:  Constitutional: Alert, no distress.  Skin: Warm, dry, good turgor, no rashes in visible areas.  Eye: Equal, round and reactive, conjunctiva clear, lids normal.  ENMT: Lips without lesions, fair dentition, oropharynx clear.  Neck: Trachea midline, no masses, no thyromegaly. No cervical or supraclavicular lymphadenopathy  Respiratory: Unlabored respiratory effort, lungs clear to auscultation, no wheezes, no ronchi.  Cardiovascular: Normal S1, S2, no murmur, no edema.  Abdomen: Soft, non-tender, no masses, no hepatosplenomegaly.  Psych: Alert and oriented x3, normal affect and mood.        Assessment and Plan:   The following treatment plan was discussed    1. Typical atrial flutter (HCC)  He would " like to follow up with cardiology. Continue to monitor     2. Prehypertension  Continue to monitor. Refusing treatment. He would like to monitor     3. Elevated LDL cholesterol level  Continue to monitor . Refuses treatment     4. Benign prostatic hyperplasia without lower urinary tract symptoms  Continue to monitor     5. Left wrist pain  I believe his symptoms are related to carpal tunnel syndrome. I ddi advice supportive care. Brace at night. Tylenol prn. Follow up if worse    6. Need for vaccination  - Pneumococal Polysaccharide Vaccine 23-Valent =>3YO SQ/IM      Followup: Return in about 6 months (around 7/8/2019), or if symptoms worsen or fail to improve, for Short.

## 2019-01-08 NOTE — ASSESSMENT & PLAN NOTE
He had recent ablation, he is doing better since ablation, he is not on medication. He refuses anticoagulation. I did advise to follow up with cardiology. He denies chest pain, shortness of breath, palpitation lightheadedness

## 2019-01-08 NOTE — ASSESSMENT & PLAN NOTE
He had refuses statin.  ASCVD score 26.8. he would like to manage with healthy lifestyle. Continue to monitor

## 2019-01-15 ENCOUNTER — OFFICE VISIT (OUTPATIENT)
Dept: CARDIOLOGY | Facility: MEDICAL CENTER | Age: 74
End: 2019-01-15
Payer: MEDICARE

## 2019-01-15 VITALS
HEIGHT: 74 IN | WEIGHT: 202 LBS | HEART RATE: 83 BPM | BODY MASS INDEX: 25.93 KG/M2 | OXYGEN SATURATION: 94 % | DIASTOLIC BLOOD PRESSURE: 74 MMHG | SYSTOLIC BLOOD PRESSURE: 132 MMHG

## 2019-01-15 DIAGNOSIS — Z86.79 S/P ABLATION OF ATRIAL FLUTTER: ICD-10-CM

## 2019-01-15 DIAGNOSIS — Z98.890 S/P CATHETER ABLATION OF SLOW PATHWAY: ICD-10-CM

## 2019-01-15 DIAGNOSIS — I48.4 ATYPICAL ATRIAL FLUTTER (HCC): ICD-10-CM

## 2019-01-15 DIAGNOSIS — E78.00 ELEVATED LDL CHOLESTEROL LEVEL: ICD-10-CM

## 2019-01-15 DIAGNOSIS — G47.33 OSA (OBSTRUCTIVE SLEEP APNEA): ICD-10-CM

## 2019-01-15 DIAGNOSIS — Z86.79 S/P CATHETER ABLATION OF SLOW PATHWAY: ICD-10-CM

## 2019-01-15 DIAGNOSIS — Z98.890 S/P ABLATION OF ATRIAL FLUTTER: ICD-10-CM

## 2019-01-15 DIAGNOSIS — I49.3 PVC (PREMATURE VENTRICULAR CONTRACTION): ICD-10-CM

## 2019-01-15 PROBLEM — Q62.11 HYDRONEPHROSIS WITH URETEROPELVIC JUNCTION (UPJ) OBSTRUCTION: Status: RESOLVED | Noted: 2017-11-15 | Resolved: 2019-01-15

## 2019-01-15 LAB — EKG IMPRESSION: NORMAL

## 2019-01-15 PROCEDURE — 99214 OFFICE O/P EST MOD 30 MIN: CPT | Performed by: INTERNAL MEDICINE

## 2019-01-15 PROCEDURE — 93000 ELECTROCARDIOGRAM COMPLETE: CPT | Performed by: INTERNAL MEDICINE

## 2019-01-15 NOTE — PROGRESS NOTES
"Chief Complaint   Patient presents with   • Premature Ventricular Contractions (PVCs)     F/V DX:ATRIAL FLUTTER/PVC   • Atrial Flutter       Subjective:   Boo Cavanaugh Jr. is a 73 y.o. male who presents today with history of paroxysmal atrial flutter status post ablation no evidence of recurrence.  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  Denies any chest pain or shortness of breath.  Denies any palpitations.  Does have an elevated LDL.  Recently diagnosed with mild sleep apnea.  No difficulty from the procedure.    Past Medical History:   Diagnosis Date   • Arrhythmia     \"flutter\", Dr. Mendoza cardiologist   • Arthritis     left thumb   • Breath shortness    • Cardiac arrhythmia    • Enlarged prostate with urinary obstruction    • GERD (gastroesophageal reflux disease)    • Hyperlipidemia    • Hypertension    • OA (osteoarthritis) of knee     bilateral    • Prostate enlargement    • Restless leg syndrome    • Tonsillitis    • Urinary bladder disorder 12/06/2017    savage in place   • Urinary retention      Past Surgical History:   Procedure Laterality Date   • TURP-VAPOR  12/7/2017    Procedure: TURP-VAPOR - GREEN LIGHT PHOTOSELECTIVE;  Surgeon: Cristofer Cr M.D.;  Location: SURGERY [facility];  Service: Urology   • GANGLION EXCISION Right 1966    wrist   • TONSILLECTOMY      as a child     Family History   Problem Relation Age of Onset   • Heart Disease Mother 65        MI   • Cancer Father    • Cancer Brother 50        prostate cancer   • Stroke Brother    • Diabetes Neg Hx    • Hyperlipidemia Neg Hx    • Alcohol/Drug Neg Hx      Social History     Social History   • Marital status: Single     Spouse name: N/A   • Number of children: N/A   • Years of education: N/A     Occupational History   • Not on file.     Social History Main Topics   • Smoking status: Never Smoker   • Smokeless tobacco: Never Used   • Alcohol use Yes      Comment: 0.5 per month   • Drug use: No   • Sexual " "activity: No      Comment: 2 daughters      Other Topics Concern   • Not on file     Social History Narrative   • No narrative on file     Allergies   Allergen Reactions   • Penicillins Rash     Rash as a child.      No outpatient encounter prescriptions on file as of 1/15/2019.     No facility-administered encounter medications on file as of 1/15/2019.      ROS     Objective:   /74 (BP Location: Left arm, Patient Position: Sitting, BP Cuff Size: Adult)   Pulse 83   Ht 1.88 m (6' 2\")   Wt 91.6 kg (202 lb)   SpO2 94%   BMI 25.94 kg/m²     Physical Exam   Constitutional: He is oriented to person, place, and time. He appears well-developed and well-nourished.   HENT:   Head: Normocephalic and atraumatic.   Eyes: EOM are normal.   Neck: Normal range of motion. Neck supple.   Cardiovascular: Normal rate, regular rhythm and intact distal pulses.  Exam reveals no gallop and no friction rub.    No murmur heard.  Pulmonary/Chest: Effort normal and breath sounds normal.   Abdominal: Soft.   Musculoskeletal: Normal range of motion. He exhibits no edema.   Neurological: He is alert and oriented to person, place, and time.   Skin: Skin is warm and dry.   Psychiatric: He has a normal mood and affect. His behavior is normal. Judgment and thought content normal.       Assessment:     1. Atypical atrial flutter (HCC)  EKG   2. PVC (premature ventricular contraction)  EKG   3. S/P ablation of atrial flutter     4. Elevated LDL cholesterol level     5. SHERRY (obstructive sleep apnea)     6. S/P catheter ablation of slow pathway         Medical Decision Making:  Today's Assessment / Status / Plan:   1.  Atrial flutter status post ablation no recurrence.  2.  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  3.  Sleep apnea considering using a CPAP machine.  4.  Elevated LDL discussed indication for statin.  He wishes to think about this.  5.  Follow-up in 1 year.  "

## 2019-01-15 NOTE — LETTER
"     [facility]   [address], [address]  [place] [address]  Phone: [phone]  Fax: [phone]              Boo Cavanaugh Jr.  1945    Encounter Date: 1/15/2019    Enoch Mendoza M.D.          PROGRESS NOTE:  Chief Complaint   Patient presents with   • Premature Ventricular Contractions (PVCs)     F/V DX:ATRIAL FLUTTER/PVC   • Atrial Flutter       Subjective:   Boo Cavanaugh Jr. is a 73 y.o. male who presents today with history of paroxysmal atrial flutter status post ablation no evidence of recurrence.  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  Denies any chest pain or shortness of breath.  Denies any palpitations.  Does have an elevated LDL.  Recently diagnosed with mild sleep apnea.  No difficulty from the procedure.    Past Medical History:   Diagnosis Date   • Arrhythmia     \"flutter\", Dr. Mendoza cardiologist   • Arthritis     left thumb   • Breath shortness    • Cardiac arrhythmia    • Enlarged prostate with urinary obstruction    • GERD (gastroesophageal reflux disease)    • Hyperlipidemia    • Hypertension    • OA (osteoarthritis) of knee     bilateral    • Prostate enlargement    • Restless leg syndrome    • Tonsillitis    • Urinary bladder disorder 12/06/2017    savage in place   • Urinary retention      Past Surgical History:   Procedure Laterality Date   • TURP-VAPOR  12/7/2017    Procedure: TURP-VAPOR - GREEN LIGHT PHOTOSELECTIVE;  Surgeon: Cristofer Cr M.D.;  Location: SURGERY [facility];  Service: Urology   • GANGLION EXCISION Right 1966    wrist   • TONSILLECTOMY      as a child     Family History   Problem Relation Age of Onset   • Heart Disease Mother 65        MI   • Cancer Father    • Cancer Brother 50        prostate cancer   • Stroke Brother    • Diabetes Neg Hx    • Hyperlipidemia Neg Hx    • Alcohol/Drug Neg Hx      Social History     Social History   • Marital status: Single     Spouse name: N/A   • Number of " "children: N/A   • Years of education: N/A     Occupational History   • Not on file.     Social History Main Topics   • Smoking status: Never Smoker   • Smokeless tobacco: Never Used   • Alcohol use Yes      Comment: 0.5 per month   • Drug use: No   • Sexual activity: No      Comment: 2 daughters      Other Topics Concern   • Not on file     Social History Narrative   • No narrative on file     Allergies   Allergen Reactions   • Penicillins Rash     Rash as a child.      No outpatient encounter prescriptions on file as of 1/15/2019.     No facility-administered encounter medications on file as of 1/15/2019.      ROS     Objective:   /74 (BP Location: Left arm, Patient Position: Sitting, BP Cuff Size: Adult)   Pulse 83   Ht 1.88 m (6' 2\")   Wt 91.6 kg (202 lb)   SpO2 94%   BMI 25.94 kg/m²      Physical Exam   Constitutional: He is oriented to person, place, and time. He appears well-developed and well-nourished.   HENT:   Head: Normocephalic and atraumatic.   Eyes: EOM are normal.   Neck: Normal range of motion. Neck supple.   Cardiovascular: Normal rate, regular rhythm and intact distal pulses.  Exam reveals no gallop and no friction rub.    No murmur heard.  Pulmonary/Chest: Effort normal and breath sounds normal.   Abdominal: Soft.   Musculoskeletal: Normal range of motion. He exhibits no edema.   Neurological: He is alert and oriented to person, place, and time.   Skin: Skin is warm and dry.   Psychiatric: He has a normal mood and affect. His behavior is normal. Judgment and thought content normal.       Assessment:     1. Atypical atrial flutter (HCC)  EKG   2. PVC (premature ventricular contraction)  EKG   3. S/P ablation of atrial flutter     4. Elevated LDL cholesterol level     5. SHERRY (obstructive sleep apnea)     6. S/P catheter ablation of slow pathway         Medical Decision Making:  Today's Assessment / Status / Plan:   1.  Atrial flutter status post ablation no recurrence.  2.  AVNRT easily " inducible status post slow pathway ablation.  3.  Sleep apnea considering using a CPAP machine.  4.  Elevated LDL discussed indication for statin.  He wishes to think about this.  5.  Follow-up in 1 year.      Mindy Carnes M.D.  [address]  [place] [address]  VIA In Basket

## 2019-03-12 ENCOUNTER — OFFICE VISIT (OUTPATIENT)
Dept: MEDICAL GROUP | Facility: PHYSICIAN GROUP | Age: 74
End: 2019-03-12
Payer: MEDICARE

## 2019-03-12 VITALS
TEMPERATURE: 97.5 F | HEART RATE: 84 BPM | WEIGHT: 209 LBS | DIASTOLIC BLOOD PRESSURE: 90 MMHG | BODY MASS INDEX: 26.82 KG/M2 | OXYGEN SATURATION: 93 % | SYSTOLIC BLOOD PRESSURE: 130 MMHG | RESPIRATION RATE: 14 BRPM | HEIGHT: 74 IN

## 2019-03-12 DIAGNOSIS — M15.9 PRIMARY OSTEOARTHRITIS INVOLVING MULTIPLE JOINTS: ICD-10-CM

## 2019-03-12 DIAGNOSIS — Z12.11 SCREENING FOR COLORECTAL CANCER: ICD-10-CM

## 2019-03-12 DIAGNOSIS — M65.4 TENOSYNOVITIS, DE QUERVAIN: Primary | ICD-10-CM

## 2019-03-12 DIAGNOSIS — Z12.12 SCREENING FOR COLORECTAL CANCER: ICD-10-CM

## 2019-03-12 PROBLEM — M15.0 PRIMARY OSTEOARTHRITIS INVOLVING MULTIPLE JOINTS: Status: ACTIVE | Noted: 2019-03-12

## 2019-03-12 PROCEDURE — 99214 OFFICE O/P EST MOD 30 MIN: CPT | Performed by: FAMILY MEDICINE

## 2019-03-12 RX ORDER — MELOXICAM 7.5 MG/1
7.5 TABLET ORAL DAILY
Qty: 14 TAB | Refills: 0 | Status: SHIPPED | OUTPATIENT
Start: 2019-03-12 | End: 2019-03-26

## 2019-03-12 NOTE — ASSESSMENT & PLAN NOTE
This is a chronic condition.  Onset: 5 months  Location: left radial wrist  Duration: daily  Quality: catching sensation, shooting pain over radial wrist   Modifying factors: wrist brace helps  Associated symptoms: no numbness/tingling  History of fracture in the wrist around 1968.  At last appointment, suspected carpal tunnel. He has been wearing a brace at night which is helping.

## 2019-03-12 NOTE — ASSESSMENT & PLAN NOTE
This is a chronic condition. He reports both knees causing pain. He had it worked up a couple years ago and is aware the next step is a replacement. He also has some left hip pain and back pain.

## 2019-03-12 NOTE — PROGRESS NOTES
"Subjective:     CC:  Diagnoses of Tenosynovitis, de Quervain, Primary osteoarthritis involving multiple joints, and Screening for colorectal cancer were pertinent to this visit.    HISTORY OF THE PRESENT ILLNESS: Patient is a 73 y.o. male. This pleasant patient is here today to establish care and discuss wrist pain. His prior PCP was Mindy Carnes MD.    Tenosynovitis, de Quervain  This is a chronic condition.  Onset: 5 months  Location: left radial wrist  Duration: daily  Quality: catching sensation, shooting pain over radial wrist   Modifying factors: wrist brace helps  Associated symptoms: no numbness/tingling  History of fracture in the wrist around 1968.  At last appointment, suspected carpal tunnel. He has been wearing a brace at night which is helping.    Primary osteoarthritis involving multiple joints  This is a chronic condition. He reports both knees causing pain. He had it worked up a couple years ago and is aware the next step is a replacement. He also has some left hip pain and back pain.      Allergies: Penicillins    Current Outpatient Prescriptions Ordered in [facility]   Medication Sig Dispense Refill   • meloxicam (MOBIC) 7.5 MG Tab Take 1 Tab by mouth every day for 14 days. 14 Tab 0   • Misc. Devices Misc 1 Left-sided thumb spica splint 1 Device 0     No current Epic-ordered facility-administered medications on file.        Past Medical History:   Diagnosis Date   • Arrhythmia     \"flutter\", Dr. Mendoza cardiologist   • Arthritis     left thumb   • Breath shortness    • Cardiac arrhythmia    • Enlarged prostate with urinary obstruction    • GERD (gastroesophageal reflux disease)    • Hyperlipidemia    • Hypertension    • OA (osteoarthritis) of knee     bilateral    • Restless leg syndrome    • Tonsillitis    • Urinary bladder disorder 12/06/2017    savage in place       Past Surgical History:   Procedure Laterality Date   • TURP-VAPOR  12/7/2017    Procedure: TURP-VAPOR - GREEN LIGHT PHOTOSELECTIVE;  " "Surgeon: Cristofer Cr M.D.;  Location: SURGERY [facility];  Service: Urology   • GANGLION EXCISION Right 1966    wrist   • TONSILLECTOMY      as a child       Social History   Substance Use Topics   • Smoking status: Never Smoker   • Smokeless tobacco: Never Used   • Alcohol use Yes      Comment: 0.5 per month       Social History     Social History Narrative   • No narrative on file       Family History   Problem Relation Age of Onset   • Heart Disease Mother 65        MI   • Cancer Father    • Cancer Brother 50        prostate cancer   • Stroke Brother    • Diabetes Neg Hx    • Hyperlipidemia Neg Hx    • Alcohol/Drug Neg Hx        Health Maintenance: Completed    ROS:   Gen: no fevers/chills, no changes in weight, + feeling less sharp for the last year  Eyes: no changes in vision  ENT: + sore throat - recent URI  Pulm: + sob - occasional  CV: no chest pain  GI: no nausea/vomiting, + gassy  : no dysuria  MSk: + knee pain  Skin: no rash  Neuro: no numbness/tingling      Objective:     Exam: Blood pressure 130/90, pulse 84, temperature 36.4 °C (97.5 °F), resp. rate 14, height 1.88 m (6' 2\"), weight 94.8 kg (209 lb), SpO2 93 %. Body mass index is 26.83 kg/m².    General: Normal appearing. No distress.  HEENT: Normocephalic. Eyes conjunctiva clear lids without ptosis, pupils equal and reactive to light accommodation, ears normal shape and contour, canalclear on the left but with cerumen on the right, tympanic membrane benign on the left but I am unable to visualize TM on the right, oropharynx is without erythema, edema or exudates.   Neck: Supple without JVD. Thyroid is not enlarged.  Pulmonary: Clear to ausculation.  Normal effort. No rales, ronchi, or wheezing.  Cardiovascular: Regular rate and rhythm without murmur. Carotid and radial pulses are intact and equal bilaterally.  Abdomen: Soft, nontender, nondistended. Normal bowel sounds. Liver and spleen are not palpable  Neurologic: Grossly nonfocal  Lymph: " No cervical or supraclavicular lymph nodes are palpable  Skin: Warm and dry.  No obvious lesions.  Musculoskeletal: Normal gait. No extremity cyanosis, clubbing, or edema.  L Wrist/Hand: No deformity, swelling or bruising noted. Tenderness to palpation not present. No tenderness at snuffbox. Range of motion intact. Strength and sensation intact.  Good  strength. 2+ radial pulse. Finkelstein's test: positive.  Psych: Normal mood and affect. Alert and oriented x3. Judgment and insight is normal.    Assessment & Plan:   73 y.o. male with the following -    1. Tenosynovitis, de Quervain  This is a chronic condition.  He reports for the last 5 months he has had pain on the radial aspect of his left wrist that will be triggered by certain movements and cause shooting, terrible pain.  At his last appointment the provider suggested possible carpal tunnel syndrome and he has been using a friend's old wrist brace at night which does seem to help a little bit.  He has no symptoms of numbness or tingling in his fingers and carpal tunnel testing was negative.  However, Finkelstein's test was positive I suspect he has de Quervain's tenosynovitis.  Therefore, I am going to get him a thumb spica splint specifically for this condition that he will wear at night as well as give him 2 weeks of an anti-inflammatory to help with the pain.  - meloxicam (MOBIC) 7.5 MG Tab; Take 1 Tab by mouth every day for 14 days.  Dispense: 14 Tab; Refill: 0  - Misc. Devices Misc; 1 Left-sided thumb spica splint  Dispense: 1 Device; Refill: 0    2. Primary osteoarthritis involving multiple joints  This is a chronic condition.  He reports he has bilateral knee pain that was worked up a couple years ago and the ultimate decision was the only next step is knee replacement.  He was waiting until he got his prostate and heart condition under control and now is considering placement.  He is a little worried about knee replacement surgery and he wants to  consider it further.  He also describes getting some pain in his left hip and in his back.  This is fairly stable.    3. Screening for colorectal cancer  - REFERRAL TO GI FOR COLONOSCOPY    Return in about 2 months (around 5/12/2019) for Medicare Annual/wellness visit; wrist.    Please note that this dictation was created using voice recognition software. I have made every reasonable attempt to correct obvious errors, but I expect that there are errors of grammar and possibly content that I did not discover before finalizing the note.

## 2019-03-22 ENCOUNTER — SLEEP CENTER VISIT (OUTPATIENT)
Dept: SLEEP MEDICINE | Facility: MEDICAL CENTER | Age: 74
End: 2019-03-22
Payer: MEDICARE

## 2019-03-22 VITALS
HEIGHT: 74 IN | HEART RATE: 65 BPM | BODY MASS INDEX: 26.41 KG/M2 | WEIGHT: 205.8 LBS | OXYGEN SATURATION: 95 % | DIASTOLIC BLOOD PRESSURE: 86 MMHG | RESPIRATION RATE: 16 BRPM | SYSTOLIC BLOOD PRESSURE: 140 MMHG

## 2019-03-22 DIAGNOSIS — R53.83 OTHER FATIGUE: ICD-10-CM

## 2019-03-22 DIAGNOSIS — G47.33 OSA (OBSTRUCTIVE SLEEP APNEA): ICD-10-CM

## 2019-03-22 PROCEDURE — 99213 OFFICE O/P EST LOW 20 MIN: CPT | Performed by: NURSE PRACTITIONER

## 2019-03-22 ASSESSMENT — ENCOUNTER SYMPTOMS
CHILLS: 0
PALPITATIONS: 1
WEAKNESS: 0
WEIGHT LOSS: 0
RESPIRATORY NEGATIVE: 1
ORTHOPNEA: 0
BRUISES/BLEEDS EASILY: 0
EYE DISCHARGE: 0
CLAUDICATION: 0
DIAPHORESIS: 0
FEVER: 0
GASTROINTESTINAL NEGATIVE: 1
EYE PAIN: 0
PSYCHIATRIC NEGATIVE: 1
PND: 0

## 2019-03-22 NOTE — PROGRESS NOTES
"Chief Complaint   Patient presents with   • Apnea     Last Seen 12/21/18         HPI: This patient is a 73 y.o. male, who presents for follow-up of SHERRY with first compliance check.     Patient was initially referred for consultation by his cardiologist Dr. Mendoza.  The patient has history of atrial dysrhythmias including a flutter and SVT.  He underwent ablation in November.  Patient was seen in consultation with Dr. Reyna November 30, 2018.  Home sleep study confirms mild sleep apnea with an RDI of 8.5, supine RAJEEV 18.2, jason O2 sat of 85%.  He was initiated on auto CPAP after his visit in December . he is compliant with auto CPAP 5-15 cm H2O. Compliance download over the past 30 days indicates 100 % compliance, average use of 6 hours 19 minutes per night, AHI of 5.6. Patient reports having adjusted to therapy.  He does not find his machine or mask troublesome.  Overall they are comfortable.  Unfortunately he does not feel much different as far as energy level.  He does report perhaps a better quality sleep or a deeper sleep.  He denies a.m. Headache.  No other changes to health since his last visit.      Past Medical History:   Diagnosis Date   • Arrhythmia     \"flutter\", Dr. Mendoza cardiologist   • Arthritis     left thumb   • Breath shortness    • Cardiac arrhythmia    • Enlarged prostate with urinary obstruction    • GERD (gastroesophageal reflux disease)    • Hyperlipidemia    • Hypertension    • OA (osteoarthritis) of knee     bilateral    • Restless leg syndrome    • Tonsillitis    • Urinary bladder disorder 12/06/2017    savage in place       Social History   Substance Use Topics   • Smoking status: Never Smoker   • Smokeless tobacco: Never Used   • Alcohol use Yes      Comment: 0.5 per month       Family History   Problem Relation Age of Onset   • Heart Disease Mother 65        MI   • Cancer Father    • Cancer Brother 50        prostate cancer   • Stroke Brother    • Diabetes Neg Hx    • Hyperlipidemia Neg " Hx    • Alcohol/Drug Neg Hx        Immunization History   Administered Date(s) Administered   • Pneumococcal Conjugate Vaccine (Prevnar/PCV-13) 11/12/2017   • Pneumococcal polysaccharide vaccine (PPSV-23) 01/08/2019       Current medications as of today   Current Outpatient Prescriptions   Medication Sig Dispense Refill   • meloxicam (MOBIC) 7.5 MG Tab Take 1 Tab by mouth every day for 14 days. 14 Tab 0   • Misc. Devices Misc 1 Left-sided thumb spica splint 1 Device 0     No current facility-administered medications for this visit.        Allergies: Penicillins    There were no vitals taken for this visit.      Review of Systems   Constitutional: Positive for malaise/fatigue. Negative for chills, diaphoresis, fever and weight loss.   HENT: Negative.         Positive for sinus congestion   Eyes: Negative for pain and discharge.   Respiratory: Negative.    Cardiovascular: Positive for palpitations. Negative for chest pain, orthopnea, claudication, leg swelling and PND.   Gastrointestinal: Negative.    Neurological: Negative for weakness.   Endo/Heme/Allergies: Negative for environmental allergies. Does not bruise/bleed easily.   Psychiatric/Behavioral: Negative.        Physical Exam   Constitutional: He is oriented to person, place, and time and well-developed, well-nourished, and in no distress.   HENT:   Head: Normocephalic and atraumatic.   Eyes: Pupils are equal, round, and reactive to light.   Neck: Normal range of motion. Neck supple. No tracheal deviation present.   Cardiovascular: Normal rate, regular rhythm and normal heart sounds.    Pulmonary/Chest: Effort normal and breath sounds normal. No respiratory distress. He has no wheezes. He has no rales.   Neurological: He is alert and oriented to person, place, and time.   Skin: Skin is warm and dry.   Psychiatric: Mood, memory, affect and judgment normal.   Vitals reviewed.      Diagnoses/Plan:    1. SHERRY (obstructive sleep apnea)  Compliance download shows  improved AHI.  I will order overnight oximetry to verify nocturnal saturation.  If evidence of desaturations, consider titration study.  - Overnight Oximetry; Future    2. Other fatigue      Follow-up in 6-8 weeks to review compliance and overnight oximetry  - Overnight Oximetry; Future      This dictation was created using voice recognition software. The accuracy of the dictation is limited to the abilities of the software. I expect there may be some errors of grammar and possibly content.

## 2019-05-03 ENCOUNTER — APPOINTMENT (OUTPATIENT)
Dept: SLEEP MEDICINE | Facility: MEDICAL CENTER | Age: 74
End: 2019-05-03
Payer: MEDICARE

## 2019-05-10 DIAGNOSIS — R97.20 ELEVATED PSA: ICD-10-CM

## 2019-05-13 ENCOUNTER — HOSPITAL ENCOUNTER (OUTPATIENT)
Dept: LAB | Facility: MEDICAL CENTER | Age: 74
End: 2019-05-13
Attending: FAMILY MEDICINE
Payer: MEDICARE

## 2019-05-13 DIAGNOSIS — R97.20 ELEVATED PSA: ICD-10-CM

## 2019-05-13 PROCEDURE — 84153 ASSAY OF PSA TOTAL: CPT

## 2019-05-13 PROCEDURE — 84154 ASSAY OF PSA FREE: CPT

## 2019-05-13 PROCEDURE — 36415 COLL VENOUS BLD VENIPUNCTURE: CPT

## 2019-05-14 ENCOUNTER — HOME STUDY (OUTPATIENT)
Dept: SLEEP MEDICINE | Facility: MEDICAL CENTER | Age: 74
End: 2019-05-14
Attending: NURSE PRACTITIONER
Payer: MEDICARE

## 2019-05-14 DIAGNOSIS — R53.83 OTHER FATIGUE: ICD-10-CM

## 2019-05-14 DIAGNOSIS — G47.33 OSA (OBSTRUCTIVE SLEEP APNEA): ICD-10-CM

## 2019-05-14 PROCEDURE — 94762 N-INVAS EAR/PLS OXIMTRY CONT: CPT | Performed by: FAMILY MEDICINE

## 2019-05-15 ENCOUNTER — TELEPHONE (OUTPATIENT)
Dept: MEDICAL GROUP | Facility: PHYSICIAN GROUP | Age: 74
End: 2019-05-15

## 2019-05-15 LAB
PSA FREE MFR SERPL: 21 %
PSA FREE SERPL-MCNC: 0.7 NG/ML
PSA SERPL-MCNC: 3.3 NG/ML (ref 0–4)

## 2019-05-15 NOTE — PROCEDURES
Over Night Pulse Oximetry     Indication:To assess the efficacy of the current pressure.       Impression:   The study was done on CPAP 5-15 cm. The total recording time was 8 hrs 22 min. O2 Sat. jason was 89% and mean O2 sat was 90 % and baseline O2 at 92%. O2 sat was below 88% for 0 min of the flow evaluation time. Oxygen Desaturation (>=4%) Index was elevated at 1.8/hr.      Recommendation:  Unremarkable OPO. Continues CPAP at current pressure.

## 2019-05-16 ENCOUNTER — OFFICE VISIT (OUTPATIENT)
Dept: MEDICAL GROUP | Facility: PHYSICIAN GROUP | Age: 74
End: 2019-05-16
Payer: MEDICARE

## 2019-05-16 VITALS
HEART RATE: 81 BPM | OXYGEN SATURATION: 95 % | DIASTOLIC BLOOD PRESSURE: 62 MMHG | RESPIRATION RATE: 16 BRPM | WEIGHT: 203.04 LBS | HEIGHT: 73 IN | BODY MASS INDEX: 26.91 KG/M2 | TEMPERATURE: 98.4 F | SYSTOLIC BLOOD PRESSURE: 128 MMHG

## 2019-05-16 DIAGNOSIS — E78.00 ELEVATED LDL CHOLESTEROL LEVEL: ICD-10-CM

## 2019-05-16 DIAGNOSIS — R03.0 PREHYPERTENSION: ICD-10-CM

## 2019-05-16 DIAGNOSIS — I48.3 TYPICAL ATRIAL FLUTTER (HCC): Chronic | ICD-10-CM

## 2019-05-16 DIAGNOSIS — N40.0 BENIGN PROSTATIC HYPERPLASIA WITHOUT LOWER URINARY TRACT SYMPTOMS: ICD-10-CM

## 2019-05-16 DIAGNOSIS — M15.9 PRIMARY OSTEOARTHRITIS INVOLVING MULTIPLE JOINTS: ICD-10-CM

## 2019-05-16 DIAGNOSIS — M65.4 TENOSYNOVITIS, DE QUERVAIN: ICD-10-CM

## 2019-05-16 DIAGNOSIS — G47.33 OSA (OBSTRUCTIVE SLEEP APNEA): ICD-10-CM

## 2019-05-16 PROBLEM — R53.83 OTHER FATIGUE: Status: RESOLVED | Noted: 2019-03-22 | Resolved: 2019-05-16

## 2019-05-16 PROCEDURE — G0439 PPPS, SUBSEQ VISIT: HCPCS | Performed by: FAMILY MEDICINE

## 2019-05-16 ASSESSMENT — ACTIVITIES OF DAILY LIVING (ADL): BATHING_REQUIRES_ASSISTANCE: 0

## 2019-05-16 ASSESSMENT — PATIENT HEALTH QUESTIONNAIRE - PHQ9: CLINICAL INTERPRETATION OF PHQ2 SCORE: 0

## 2019-05-16 ASSESSMENT — ENCOUNTER SYMPTOMS: GENERAL WELL-BEING: FAIR

## 2019-05-16 NOTE — ASSESSMENT & PLAN NOTE
This is a chronic condition. He has been using a thumb spica splint at night, which controls his pain well. He doesn't get much issues during the day unless he hits his wrist.

## 2019-05-16 NOTE — ASSESSMENT & PLAN NOTE
This is a chronic condition.  He is on CPAP nightly and tolerating it well.  He does follow with sleep medicine regularly and has an upcoming appointment.

## 2019-05-16 NOTE — PROGRESS NOTES
Chief Complaint   Patient presents with   • Annual Wellness Visit         HPI:  Boo is a 74 y.o. here for Medicare Annual Wellness Visit    Patient Active Problem List    Diagnosis Date Noted   • Primary osteoarthritis involving multiple joints 03/12/2019   • S/P ablation of atrial flutter 01/15/2019   • S/P catheter ablation of slow pathway 01/15/2019   • Prehypertension 01/08/2019   • Tenosynovitis, de Quervain 01/08/2019   • SHERRY (obstructive sleep apnea) 11/29/2018   • Elevated LDL cholesterol level 06/07/2018   • Benign prostatic hyperplasia without lower urinary tract symptoms 11/15/2017   • Atrial flutter (HCC) 11/11/2017       No current outpatient prescriptions on file.     No current facility-administered medications for this visit.         Patient is taking medications as noted in medication list.  Current supplements as per medication list.     Allergies: Penicillins    Current social contact/activities: grand kids yard work family    Is patient current with immunizations? No, due for TDAP and SHINGRIX (Shingles). Patient is interested in receiving TDAP, SHINGRIX (Shingles) and NONE today.    He  reports that he has never smoked. He has never used smokeless tobacco. He reports that he drinks alcohol. He reports that he does not use drugs.  Counseling given: Not Answered        DPA/Advanced directive: Patient has Advanced Directive, but it is not on file. Instructed to bring in a copy to scan into their chart.    ROS:    Gait: Uses no assistive device  Ostomy: No   Other tubes: No   Amputations: No   Chronic oxygen use No  C-PAP  Last eye exam 2018   Wears hearing aids: No   : Denies any urinary leakage during the last 6 months      Screening:    Little interest or pleasure in doing things?  0 - not at all  Feeling down, depressed, or hopeless? 0 - not at all  Patient Health Questionnaire Score: 0    If depressive symptoms identified deferred to follow up visit unless specifically addressed in  assessment and plan.    Interpretation of PHQ-9 Total Score   Score Severity   1-4 No Depression   5-9 Mild Depression   10-14 Moderate Depression   15-19 Moderately Severe Depression   20-27 Severe Depression    Screening for Cognitive Impairment    Three Minute Recall (village, kitchen, baby)  2/3 Village Kitchen Baby  Margarito clock face with all 12 numbers and set the hands to show 10 past 10.  Yes Time 10:10     5/5  If cognitive concerns identified, deferred for follow up unless specifically addressed in assessment and plan.    Fall Risk Assessment    Has the patient had two or more falls in the last year or any fall with injury in the last year?  No  If fall risk identified, deferred for follow up unless specifically addressed in assessment and plan.    Safety Assessment    Throw rugs on floor.  Yes  Handrails on all stairs.  No  Good lighting in all hallways.  Yes  Difficulty hearing.  Yes  Patient counseled about all safety risks that were identified.    Functional Assessment ADLs    Are there any barriers preventing you from cooking for yourself or meeting nutritional needs?  No.    Are there any barriers preventing you from driving safely or obtaining transportation?  No.    Are there any barriers preventing you from using a telephone or calling for help?  No.    Are there any barriers preventing you from shopping?  No.    Are there any barriers preventing you from taking care of your own finances?  No.    Are there any barriers preventing you from managing your medications?  No.    Are there any barriers preventing you from showering, bathing or dressing yourself?  No.    Are you currently engaging in any exercise or physical activity?  No.     What is your perception of your health?  Fair.    Health Maintenance Summary                Annual Wellness Visit Overdue 1945     IMM DTaP/Tdap/Td Vaccine Overdue 4/17/1965     COLONOSCOPY Overdue 4/17/1996     IMM ZOSTER VACCINES Overdue 4/17/1996     IMM  "INFLUENZA Postponed 12/2/2019 Originally 9/1/2019. Patient Refused          Patient Care Team:  Jerica Coronado M.D. as PCP - General (Family Medicine)    Social History   Substance Use Topics   • Smoking status: Never Smoker   • Smokeless tobacco: Never Used   • Alcohol use Yes      Comment: 0.5 per month     Family History   Problem Relation Age of Onset   • Heart Disease Mother 65        MI   • Cancer Father    • Cancer Brother 50        prostate cancer   • Stroke Brother    • Diabetes Neg Hx    • Hyperlipidemia Neg Hx    • Alcohol/Drug Neg Hx      He  has a past medical history of Arrhythmia; Arthritis; Breath shortness; Cardiac arrhythmia; Enlarged prostate with urinary obstruction; GERD (gastroesophageal reflux disease); Hyperlipidemia; Hypertension; OA (osteoarthritis) of knee; Restless leg syndrome; Tonsillitis; and Urinary bladder disorder (12/06/2017).   Past Surgical History:   Procedure Laterality Date   • TURP-VAPOR  12/7/2017    Procedure: TURP-VAPOR - GREEN LIGHT PHOTOSELECTIVE;  Surgeon: Cristofer Cr M.D.;  Location: SURGERY [facility];  Service: Urology   • GANGLION EXCISION Right 1966    wrist   • TONSILLECTOMY      as a child     Exam:     /79 (BP Location: Right arm, Patient Position: Sitting, BP Cuff Size: Adult long)   Pulse 81   Temp 36.9 °C (98.4 °F) (Temporal)   Resp 16   Ht 1.854 m (6' 1\")   Wt 92.1 kg (203 lb 0.7 oz)   SpO2 95%  Body mass index is 26.79 kg/m².    Hearing good.    Dentition good  Alert, oriented in no acute distress.  Eye contact is good, speech goal directed, affect calm    Assessment and Plan. The following treatment and monitoring plan is recommended:     1. Typical atrial flutter (HCC)  This is a chronic condition, improved.  He had a history of atrial flutter and was on medication.  However, he underwent an ablation recently and he reports he has had no recurrences of atrial flutter since.  He is now off all medications and follows with " cardiology.    2. Benign prostatic hyperplasia without lower urinary tract symptoms  This is a chronic condition, stable.  He has had treatment with laser and reports the symptoms are well controlled.  He only gets up once each night to urinate.  He denies any hesitancy or dribbling.  He does follow with urology yearly.    3. Elevated LDL cholesterol level  This is a chronic condition, stable.  His last lipid panel in October, 2018 showed his LDL was 157 and his total cholesterol was 221.  His ASCVD risk score is 26.7% today.  Previously has declined a statin but today he reports that he is researching and thinking about starting a statin.  We also discussed fish oil and red yeast rice extract as options if you did not want to start a medication just yet.  He will let me know if he is ready to start a medication or if he starts a supplement.    4. SHERRY (obstructive sleep apnea)  This is a chronic condition, controlled.  He has a history of obstructive sleep apnea and is on CPAP.  He reports that he uses it nightly and is tolerating it well.  He does follow with sleep medicine regularly and has an upcoming appointment in the next week or so.    5. Prehypertension  This is a chronic condition, stable.  He has a history of pre-hypertension and is currently on no medications.  He denies any chest pain or shortness of breath.  His blood pressure was initially elevated at 143/79 today but at the end of the visit had come down to 128/62. He does plan to start checking his blood pressure at least weekly to see if he needs to start a medication.  We discussed the proper way to check a blood pressure and we will review his blood pressure log at his next appointment.    6. Primary osteoarthritis involving multiple joints  This is a chronic condition, stable.  He gets pain in bilateral knees and it will restrict his ability to exercise sometimes.  He has been told in the past that he will need both knees replaced but is not yet  ready for replacement.  He does get occasional left hip pain as well.    7. Tenosynovitis, de Quervain  This is a chronic condition, mild improvement.  He has been using the thumb spica splint I prescribed at night.  He reports that this works very well to control his symptoms at night because the tenosynovitis used to keep him up.  He does not wear the brace in a day but he does not get much symptoms during the day unless he hits his wrist against something.  I encouraged him to continue using the brace at night.      Services suggested: No services needed at this time  Health Care Screening recommendations as per orders if indicated.  Referrals offered: PT/OT/Nutrition counseling/Behavioral Health/Smoking cessation as per orders if indicated.    Discussion today about general wellness and lifestyle habits:    · Prevent falls and reduce trip hazards; Cautioned about securing or removing rugs.  · Have a working fire alarm and carbon monoxide detector;   · Engage in regular physical activity and social activities       Follow-up: Return in about 4 months (around 9/16/2019) for f/u BP log.

## 2019-05-16 NOTE — ASSESSMENT & PLAN NOTE
This is a chronic condition.  Current Meds: none  Home BP Log: none  Associated symptoms: no cp, no sob

## 2019-05-16 NOTE — ASSESSMENT & PLAN NOTE
This is a chronic condition.  His last lipid panel was in October, 2018.  His LDL is elevated at 157 and his total cholesterol was elevated 221.  His ASCVD risk score is 26.7%.  He is currently researching and thinking about starting a statin.  He will let me know if he is ready to start one.

## 2019-05-16 NOTE — ASSESSMENT & PLAN NOTE
This is a chronic condition. He has had an ablation and reports no recurrences since. He is now off all medication.

## 2019-05-16 NOTE — PATIENT INSTRUCTIONS
Red Yeast Rice Extract    For your blood pressure:  - check your blood pressure every day and put it in a log  - pick a different time each day so we can see how it varies throughout the day  - when you check your blood pressure: make sure you sit quietly for 5-10 minutes beforehand, keep both feet flat on the ground, and make sure you use an arm cuff at heart height

## 2019-05-16 NOTE — ASSESSMENT & PLAN NOTE
This is a chronic condition.  He gets pain in both knees which will restrict his ability to exercise regularly.  He was told in the past that he will need both knees replaced.  He does get occasional left hip pain as well.  He is not yet ready for a replacement.

## 2019-05-16 NOTE — ASSESSMENT & PLAN NOTE
This is a chronic condition. He has had a treatment with the laser and reports his symptoms are well controlled.  He reports only getting up once at night to urinate.  He denies any hesitancy or dribbling.  He follows with urology regularly.

## 2019-05-20 ENCOUNTER — OFFICE VISIT (OUTPATIENT)
Dept: URGENT CARE | Facility: CLINIC | Age: 74
End: 2019-05-20
Payer: MEDICARE

## 2019-05-20 VITALS
WEIGHT: 200 LBS | SYSTOLIC BLOOD PRESSURE: 152 MMHG | HEART RATE: 77 BPM | BODY MASS INDEX: 25.67 KG/M2 | HEIGHT: 74 IN | RESPIRATION RATE: 19 BRPM | DIASTOLIC BLOOD PRESSURE: 100 MMHG | OXYGEN SATURATION: 96 % | TEMPERATURE: 98.4 F

## 2019-05-20 DIAGNOSIS — R03.0 ELEVATED BLOOD PRESSURE READING: ICD-10-CM

## 2019-05-20 DIAGNOSIS — Z71.1 WORRIED WELL: ICD-10-CM

## 2019-05-20 PROCEDURE — 99214 OFFICE O/P EST MOD 30 MIN: CPT | Performed by: PHYSICIAN ASSISTANT

## 2019-05-22 ASSESSMENT — ENCOUNTER SYMPTOMS
DOUBLE VISION: 0
BLURRED VISION: 0
EYE PAIN: 0
HEADACHES: 0
DIZZINESS: 0
NECK PAIN: 0
EYE REDNESS: 0
EYE DISCHARGE: 0
ORTHOPNEA: 0
PND: 0
PHOTOPHOBIA: 0
CLAUDICATION: 0
HYPERTENSION: 1
PALPITATIONS: 0
SHORTNESS OF BREATH: 0

## 2019-05-22 NOTE — PROGRESS NOTES
"Subjective:      Boo Cavanaugh Jr. is a 74 y.o. male who presents with Hypertension (only change was taking Tdap shot, not feeling well, keeps getting higher every reading, he has new machine at home using to monitor by pcp)            Pt is a 73 y/o male who presents to  with elevated blood pressure readings per his monitor for the last few days. He expresses concern with a BP reading in the 190's/100 today- he brings his BP monitor today which is approx. 20 points above our reading today.   He reports \"feeling fine\"- denies HA, change to vision, CP, SOB.   He denies any new meds, or diet changes. He tries to limit his salt intake.         Hypertension   This is a new problem. The current episode started in the past 7 days. The problem has been waxing and waning since onset. The problem is controlled. Pertinent negatives include no blurred vision, chest pain, headaches, malaise/fatigue, neck pain, orthopnea, palpitations, peripheral edema, PND or shortness of breath. Past treatments include nothing.       Review of Systems   Constitutional: Negative for malaise/fatigue.   Eyes: Negative for blurred vision, double vision, photophobia, pain, discharge and redness.   Respiratory: Negative for shortness of breath.    Cardiovascular: Negative for chest pain, palpitations, orthopnea, claudication, leg swelling and PND.   Musculoskeletal: Negative for neck pain.   Neurological: Negative for dizziness and headaches.   All other systems reviewed and are negative.         Objective:     /100 (BP Location: Left arm, Patient Position: Sitting, BP Cuff Size: Adult)   Pulse 77   Temp 36.9 °C (98.4 °F) (Temporal)   Resp 19   Ht 1.867 m (6' 1.5\")   Wt 90.7 kg (200 lb)   SpO2 96%   BMI 26.03 kg/m²    PMH:  has a past medical history of Arrhythmia; Arthritis; Breath shortness; Cardiac arrhythmia; Enlarged prostate with urinary obstruction; GERD (gastroesophageal reflux disease); Hyperlipidemia; Hypertension; OA " (osteoarthritis) of knee; Restless leg syndrome; Tonsillitis; and Urinary bladder disorder (12/06/2017).  MEDS: No current outpatient prescriptions on file.  ALLERGIES:   Allergies   Allergen Reactions   • Penicillins Rash     Rash as a child.      SURGHX:   Past Surgical History:   Procedure Laterality Date   • TURP-VAPOR  12/7/2017    Procedure: TURP-VAPOR - GREEN LIGHT PHOTOSELECTIVE;  Surgeon: Cristofer Cr M.D.;  Location: SURGERY [facility];  Service: Urology   • GANGLION EXCISION Right 1966    wrist   • TONSILLECTOMY      as a child     SOCHX:  reports that he has never smoked. He has never used smokeless tobacco. He reports that he drinks alcohol. He reports that he does not use drugs.  FH: Family history was reviewed, no pertinent findings to report    Physical Exam   Constitutional: He is oriented to person, place, and time. He appears well-developed and well-nourished. No distress.   HENT:   Head: Normocephalic and atraumatic.   Eyes: Pupils are equal, round, and reactive to light. Conjunctivae and EOM are normal.   Neck: Normal range of motion. Neck supple. No tracheal deviation present.   Cardiovascular: Normal rate.    No murmur heard.  Pulmonary/Chest: Effort normal. No respiratory distress.   Musculoskeletal: He exhibits no edema.   Neurological: He is alert and oriented to person, place, and time. Coordination normal.   Skin: Skin is warm. No rash noted.   Psychiatric: He has a normal mood and affect. His behavior is normal. Judgment and thought content normal.   Vitals reviewed.              Assessment/Plan:     1. Elevated blood pressure reading  2. Worried well    Pt's BP is elevated in clinic- however his BP monitor is erroneous- he is to buy another machine- RTC in have MA visit with his PCP for check on his monitor.   Pt. Is very adamant that he does not want to start on meds and would like to continue with limited salt, decrease his sugar intake and try to monitor this at home.    Patient given precautionary s/sx that mandate immediate follow up and evaluation in the ED. Advised of risks of not doing so.    DDX, Supportive care, and indications for immediate follow-up discussed with patient.    Instructed to return to clinic or nearest emergency department if we are not available for any change in condition, further concerns, or worsening of symptoms.    The patient demonstrated a good understanding and agreed with the treatment plan.  Please note that this dictation was created using voice recognition software. I have made every reasonable attempt to correct obvious errors, but I expect that there are errors of grammar and possibly content that I did not discover before finalizing the note.

## 2019-05-24 ENCOUNTER — SLEEP CENTER VISIT (OUTPATIENT)
Dept: SLEEP MEDICINE | Facility: MEDICAL CENTER | Age: 74
End: 2019-05-24
Payer: MEDICARE

## 2019-05-24 VITALS
OXYGEN SATURATION: 95 % | SYSTOLIC BLOOD PRESSURE: 130 MMHG | HEART RATE: 74 BPM | HEIGHT: 74 IN | RESPIRATION RATE: 16 BRPM | WEIGHT: 202.2 LBS | BODY MASS INDEX: 25.95 KG/M2 | DIASTOLIC BLOOD PRESSURE: 78 MMHG

## 2019-05-24 DIAGNOSIS — Z98.890 S/P ABLATION OF ATRIAL FLUTTER: ICD-10-CM

## 2019-05-24 DIAGNOSIS — Z86.79 S/P ABLATION OF ATRIAL FLUTTER: ICD-10-CM

## 2019-05-24 DIAGNOSIS — G47.33 OSA (OBSTRUCTIVE SLEEP APNEA): ICD-10-CM

## 2019-05-24 DIAGNOSIS — I48.3 TYPICAL ATRIAL FLUTTER (HCC): Chronic | ICD-10-CM

## 2019-05-24 DIAGNOSIS — R03.0 ELEVATED BLOOD PRESSURE READING: ICD-10-CM

## 2019-05-24 PROCEDURE — 99214 OFFICE O/P EST MOD 30 MIN: CPT | Performed by: NURSE PRACTITIONER

## 2019-05-24 RX ORDER — TETANUS TOXOID, REDUCED DIPHTHERIA TOXOID AND ACELLULAR PERTUSSIS VACCINE, ADSORBED 5; 2.5; 8; 8; 2.5 [IU]/.5ML; [IU]/.5ML; UG/.5ML; UG/.5ML; UG/.5ML
SUSPENSION INTRAMUSCULAR
COMMUNITY
Start: 2019-05-17 | End: 2019-11-13

## 2019-05-24 ASSESSMENT — ENCOUNTER SYMPTOMS
CLAUDICATION: 0
RESPIRATORY NEGATIVE: 1
PALPITATIONS: 1
EYE PAIN: 0
NEUROLOGICAL NEGATIVE: 1
EYE DISCHARGE: 0
BRUISES/BLEEDS EASILY: 0
PSYCHIATRIC NEGATIVE: 1
GASTROINTESTINAL NEGATIVE: 1
CONSTITUTIONAL NEGATIVE: 1
PND: 0
ORTHOPNEA: 0
MUSCULOSKELETAL NEGATIVE: 1

## 2019-05-24 NOTE — PROGRESS NOTES
"Chief Complaint   Patient presents with   • Results     SS Re         HPI: This patient is a 74 y.o. male, who presents for follow-up of SHERRY with compliance check an overnight oximetry results.     Patient was initially referred for consultation by his cardiologist Dr. Mendoza. The patient has history of atrial dysrhythmias including a flutter and SVT. He underwent ablation in November.    Home sleep study confirms mild sleep apnea with an RDI of 8.5, supine RAJEEV 18.2, jason O2 sat of 85%.  He was initiated on auto CPAP after his visit in December . he is compliant with auto CPAP 5-15 cm H2O. Compliance download over the past 30 days indicates 86.7 % compliance, average use of 6 hours 34 minutes per night, AHI of 5. Patient reports benefiting from therapy.  Denies EDS or a.m. headache.  CPAP is not disruptive to his sleep and he has acclimated.  Overnight oximetry on CPAP shows adequate saturations with a basal SPO2 of 92%.    He reports elevated blood pressure readings over the past several weeks pressure at home.  He went to urgent care recently, his blood pressure cuff was compared and showed it was reading higher than normal although blood pressure has still been mildly elevated systolic at 150.  He has also noticed increased palpitations periodically throughout the day over the past several weeks.  Have encouraged him to follow-up with his cardiologist.      Past Medical History:   Diagnosis Date   • Arrhythmia     \"flutter\", Dr. Mendoza cardiologist   • Arthritis     left thumb   • Breath shortness    • Cardiac arrhythmia    • Enlarged prostate with urinary obstruction    • GERD (gastroesophageal reflux disease)    • Hyperlipidemia    • Hypertension    • OA (osteoarthritis) of knee     bilateral    • Restless leg syndrome    • Tonsillitis    • Urinary bladder disorder 12/06/2017    savage in [facility]       Social History   Substance Use Topics   • Smoking status: Never Smoker   • Smokeless tobacco: Never Used   • " "Alcohol use Yes      Comment: 0.5 per month       Family History   Problem Relation Age of Onset   • Heart Disease Mother 65        MI   • Cancer Father    • Cancer Brother 50        prostate cancer   • Stroke Brother    • Diabetes Neg Hx    • Hyperlipidemia Neg Hx    • Alcohol/Drug Neg Hx        Immunization History   Administered Date(s) Administered   • Pneumococcal Conjugate Vaccine (Prevnar/PCV-13) 11/12/2017   • Pneumococcal polysaccharide vaccine (PPSV-23) 01/08/2019   • Tdap Vaccine 05/17/2019       Current medications as of today   Current Outpatient Prescriptions   Medication Sig Dispense Refill   • BOOSTRIX 5-2.5-18.5 LF-MCG/0.5 Suspension        No current facility-administered medications for this visit.        Allergies: Penicillins    /78 (BP Location: Left arm, Patient Position: Sitting, BP Cuff Size: Adult)   Pulse 74   Resp 16   Ht 1.867 m (6' 1.5\")   Wt 91.7 kg (202 lb 3.2 oz)   SpO2 95%       Review of Systems   Constitutional: Negative.    HENT: Negative.    Eyes: Negative for pain and discharge.   Respiratory: Negative.    Cardiovascular: Positive for palpitations. Negative for chest pain, orthopnea, claudication, leg swelling and PND.   Gastrointestinal: Negative.    Musculoskeletal: Negative.    Skin: Negative.    Neurological: Negative.    Endo/Heme/Allergies: Negative for environmental allergies. Does not bruise/bleed easily.   Psychiatric/Behavioral: Negative.        Physical Exam   Constitutional: He is oriented to person, place, and time and well-developed, well-nourished, and in no distress.   HENT:   Head: Normocephalic and atraumatic.   Eyes: Pupils are equal, round, and reactive to light.   Neck: Normal range of motion. Neck supple. No tracheal deviation present.   Cardiovascular: Normal rate, regular rhythm and normal heart sounds.    Pulmonary/Chest: Effort normal and breath sounds normal.   Musculoskeletal: Normal range of motion.   Neurological: He is alert and " oriented to person, place, and time.   Skin: Skin is warm and dry.   Psychiatric: Mood, memory, affect and judgment normal.   Vitals reviewed.      Diagnoses/Plan:    1. SHERRY (obstructive sleep apnea)   Continue CPAP nightly, Clean mask & tubing weekly, Replace supplies as insurance will allow, RX for new supplies to DME  - DME Mask and Supplies    2. Typical atrial flutter (HCC)  Patient had increased palpitations over the past several weeks.  He is encouraged to follow-up with cardiology.  He denies any associated cardiac complaints with palpitations.    3. S/P ablation of atrial flutter      4. Elevated blood pressure reading  Monitor blood pressure at home.  He denies cardiac complaints.  He is encouraged to follow-up with PCP or cardiology to discuss.    He will follow-up at the sleep clinic annually, sooner if needed    This dictation was created using voice recognition software. The accuracy of the dictation is limited to the abilities of the software. I expect there may be some errors of grammar and possibly content.

## 2019-06-24 DIAGNOSIS — M65.4 TENOSYNOVITIS, DE QUERVAIN: ICD-10-CM

## 2019-08-08 NOTE — TELEPHONE ENCOUNTER
1. Name: same    Call Back Number: [phone]   Patient approves a detailed voicemail message: Y    Pt would like a referral to see Miguel Farris cardiologist. Please call pt with any questions   DISPLAY PLAN FREE TEXT DISPLAY PLAN FREE TEXT DISPLAY PLAN FREE TEXT DISPLAY PLAN FREE TEXT DISPLAY PLAN FREE TEXT DISPLAY PLAN FREE TEXT DISPLAY PLAN FREE TEXT DISPLAY PLAN FREE TEXT

## 2019-08-19 ENCOUNTER — OFFICE VISIT (OUTPATIENT)
Dept: MEDICAL GROUP | Facility: PHYSICIAN GROUP | Age: 74
End: 2019-08-19
Payer: MEDICARE

## 2019-08-19 VITALS
SYSTOLIC BLOOD PRESSURE: 142 MMHG | RESPIRATION RATE: 16 BRPM | TEMPERATURE: 97.3 F | DIASTOLIC BLOOD PRESSURE: 70 MMHG | BODY MASS INDEX: 26.21 KG/M2 | HEIGHT: 73 IN | OXYGEN SATURATION: 94 % | HEART RATE: 72 BPM | WEIGHT: 197.8 LBS

## 2019-08-19 DIAGNOSIS — R03.0 PREHYPERTENSION: Primary | ICD-10-CM

## 2019-08-19 DIAGNOSIS — Z00.00 ROUTINE HEALTH MAINTENANCE: ICD-10-CM

## 2019-08-19 DIAGNOSIS — E78.00 ELEVATED LDL CHOLESTEROL LEVEL: ICD-10-CM

## 2019-08-19 PROCEDURE — 99214 OFFICE O/P EST MOD 30 MIN: CPT | Performed by: FAMILY MEDICINE

## 2019-08-19 NOTE — ASSESSMENT & PLAN NOTE
This is a chronic condition.  His last lipid panel was in October, 2018 with his LDL elevated 157 his total cholesterol 221.  His ASCVD risk score today is 27.2%.

## 2019-08-19 NOTE — ASSESSMENT & PLAN NOTE
This is a chronic condition. He did have a home cuff but he got a SBP around 190 so he went to . In [facility] they told him his cuff was inaccurate, so he has returned the cuff. He isn't on medication currently. He wonders if eating carbs are contributing.

## 2019-08-19 NOTE — PROGRESS NOTES
"Subjective:     CC: f/u BP    HPI:   Boo presents today with hypertension.    Prehypertension  This is a chronic condition. He did have a home cuff but he got a SBP around 190 so he went to . In  they told him his cuff was inaccurate, so he has returned the cuff. He isn't on medication currently. He wonders if eating carbs are contributing.     Elevated LDL cholesterol level  This is a chronic condition.  His last lipid panel was in October, 2018 with his LDL elevated 157 his total cholesterol 221.  His ASCVD risk score today is 27.2%.      Past Medical History:   Diagnosis Date   • Arrhythmia     \"flutter\", Dr. Mendoza cardiologist   • Arthritis     left thumb   • Breath shortness    • Cardiac arrhythmia    • Enlarged prostate with urinary obstruction    • GERD (gastroesophageal reflux disease)    • Hyperlipidemia    • Hypertension    • OA (osteoarthritis) of knee     bilateral    • Restless leg syndrome    • Tonsillitis    • Urinary bladder disorder 12/06/2017    savage in place       Social History     Tobacco Use   • Smoking status: Never Smoker   • Smokeless tobacco: Never Used   Substance Use Topics   • Alcohol use: Not Currently     Comment: 1 a month    • Drug use: No       Current Outpatient Medications Ordered in Epic   Medication Sig Dispense Refill   • BOOSTRIX 5-2.5-18.5 LF-MCG/0.5 Suspension        No current Epic-ordered facility-administered medications on file.        Allergies:  Penicillins    Health Maintenance: Completed    ROS:  Gen: no fevers/chills  Pulm: no sob  CV: + chest pain - sometimes, vague    Objective:     Exam:  /70 (BP Location: Left arm, Patient Position: Sitting, BP Cuff Size: Adult)   Pulse 72   Temp 36.3 °C (97.3 °F) (Temporal)   Resp 16   Ht 1.854 m (6' 1\")   Wt 89.7 kg (197 lb 12.8 oz)   SpO2 94%   BMI 26.10 kg/m²  Body mass index is 26.1 kg/m².    Gen: Alert and oriented, No apparent distress.  Neck: Neck is supple without lymphadenopathy.  Lungs: Normal " effort, CTA bilaterally, no wheezes, rhonchi, or rales  CV: Regular rate and rhythm. No murmurs, rubs, or gallops.  Ext: No clubbing, cyanosis, edema.    Assessment & Plan:     74 y.o. male with the following -     1. Prehypertension  This is a chronic condition, stable.  He did have a home blood pressure cuff but once he got a systolic blood pressure in 190 and he went to urgent care.  In urgent care they told him his cuff was inaccurate so he returned the cuff and no longer has a home cuff.  He is not on medication currently and he wonders if eating carbs is contributing to his high blood pressure.  He wants to work on a healthy diet and lifestyle to further manage.  - CBC WITH DIFFERENTIAL; Future  - Comp Metabolic Panel; Future    2. Elevated LDL cholesterol level  This is a chronic condition, stable.  His last lipid panel October, 2018 showed a total cholesterol 221 and .  He stated at that time he would consider statin but he has not made a decision yet.  We spent a significant amount of time discussing how a statin works, what benefits of statin provides, and the risks/side effects.  He wants to work on healthy lifestyle and recheck his cholesterol before deciding about a statin.  - Lipid Profile; Future    3. Routine health maintenance  - CBC WITH DIFFERENTIAL; Future  - Comp Metabolic Panel; Future  - Lipid Profile; Future    Patient was seen for 25 minutes face to face of which, at least 50% of the time was spent counseling regarding meredith cholesterol and statin use.        Return in about 4 months (around 12/19/2019) for f/u lab results.    Please note that this dictation was created using voice recognition software. I have made every reasonable attempt to correct obvious errors, but I expect that there are errors of grammar and possibly content that I did not discover before finalizing the note.

## 2019-08-26 ENCOUNTER — PATIENT MESSAGE (OUTPATIENT)
Dept: CARDIOLOGY | Facility: MEDICAL CENTER | Age: 74
End: 2019-08-26

## 2019-08-28 ENCOUNTER — DOCUMENTATION (OUTPATIENT)
Dept: CARDIOLOGY | Facility: MEDICAL CENTER | Age: 74
End: 2019-08-28

## 2019-08-28 ENCOUNTER — NON-PROVIDER VISIT (OUTPATIENT)
Dept: CARDIOLOGY | Facility: MEDICAL CENTER | Age: 74
End: 2019-08-28
Payer: MEDICARE

## 2019-08-28 ENCOUNTER — TELEPHONE (OUTPATIENT)
Dept: CARDIOLOGY | Facility: MEDICAL CENTER | Age: 74
End: 2019-08-28

## 2019-08-28 ENCOUNTER — OFFICE VISIT (OUTPATIENT)
Dept: CARDIOLOGY | Facility: MEDICAL CENTER | Age: 74
End: 2019-08-28
Payer: MEDICARE

## 2019-08-28 VITALS
HEIGHT: 74 IN | DIASTOLIC BLOOD PRESSURE: 74 MMHG | BODY MASS INDEX: 24.51 KG/M2 | HEART RATE: 78 BPM | SYSTOLIC BLOOD PRESSURE: 144 MMHG | WEIGHT: 191 LBS | OXYGEN SATURATION: 96 %

## 2019-08-28 DIAGNOSIS — R00.2 PALPITATIONS: ICD-10-CM

## 2019-08-28 DIAGNOSIS — I48.4 ATYPICAL ATRIAL FLUTTER (HCC): ICD-10-CM

## 2019-08-28 DIAGNOSIS — I49.3 PVC'S (PREMATURE VENTRICULAR CONTRACTIONS): ICD-10-CM

## 2019-08-28 DIAGNOSIS — Z98.890 S/P CATHETER ABLATION OF SLOW PATHWAY: ICD-10-CM

## 2019-08-28 DIAGNOSIS — Z86.79 S/P ABLATION OF ATRIAL FLUTTER: ICD-10-CM

## 2019-08-28 DIAGNOSIS — Z86.79 S/P CATHETER ABLATION OF SLOW PATHWAY: ICD-10-CM

## 2019-08-28 DIAGNOSIS — Z98.890 S/P ABLATION OF ATRIAL FLUTTER: ICD-10-CM

## 2019-08-28 LAB — EKG IMPRESSION: NORMAL

## 2019-08-28 PROCEDURE — 93000 ELECTROCARDIOGRAM COMPLETE: CPT | Performed by: INTERNAL MEDICINE

## 2019-08-28 PROCEDURE — 99214 OFFICE O/P EST MOD 30 MIN: CPT | Performed by: NURSE PRACTITIONER

## 2019-08-28 ASSESSMENT — ENCOUNTER SYMPTOMS
SEIZURES: 0
WHEEZING: 0
PALPITATIONS: 1
SHORTNESS OF BREATH: 0
ABDOMINAL PAIN: 0
COUGH: 0
SPUTUM PRODUCTION: 0
SPEECH CHANGE: 0
VOMITING: 0
FLANK PAIN: 0
NAUSEA: 0
FOCAL WEAKNESS: 0
PSYCHIATRIC NEGATIVE: 1
FEVER: 0
CHILLS: 0
BLURRED VISION: 0
SORE THROAT: 0
HEARTBURN: 0
LOSS OF CONSCIOUSNESS: 0
WEIGHT LOSS: 0
ORTHOPNEA: 0
DIZZINESS: 0
CLAUDICATION: 0
HEADACHES: 0
DOUBLE VISION: 0
PND: 0
MUSCULOSKELETAL NEGATIVE: 1

## 2019-08-28 NOTE — PROGRESS NOTES
"Chief Complaint   Patient presents with   • Atrial Flutter     F/V DX:ATRIAL FLUTTER       Subjective:   Boo Cavanaugh Jr. is a 74 y.o. male who presents today for review of his palpitations.  He has a KARDIA and has felt that he is experiencing an irregular rhythm that feels different from his prior arrhythmias with his VPC's. He has felt no rapid rates, however. He notes some low heart rates but I am suspicious it is related to his VPC 's and pulse deficit.  KARDIA strips the ones he brought in today do not look like AF to me. 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  Was seen at [facility] and EKG demonstrated SR.  Records have been ordered.  Exercise tolerance is unchanged.  EKG Sinus rhythm /vrr 78 bpm.  Patient has undergone prior AFL and AVNRT with Dr Mendoza on 11/28/18:     Impressions/Plan:   1. Spontaneous typical counterclockwise right atrial flutter.  2. Successful catheter mediated ablation of right atrial flutter with bilateral isthmus block.  3.  Spontaneous AVNRT and inducible AVNRT.  4.  Slow pathway ablation.  3. Non inducible post ablation.     Zio patch will be ordered.   Past Medical History:   Diagnosis Date   • Arrhythmia     \"flutter\", Dr. Mendoza cardiologist   • Arthritis     left thumb   • Breath shortness    • Cardiac arrhythmia    • Enlarged prostate with urinary obstruction    • GERD (gastroesophageal reflux disease)    • Hyperlipidemia    • Hypertension    • OA (osteoarthritis) of knee     bilateral    • Restless leg syndrome    • Tonsillitis    • Urinary bladder disorder 12/06/2017    savage in place     Past Surgical History:   Procedure Laterality Date   • TURP-VAPOR  12/7/2017    Procedure: TURP-VAPOR - GREEN LIGHT PHOTOSELECTIVE;  Surgeon: Cristofer Cr M.D.;  Location: SURGERY [facility];  Service: Urology   • GANGLION EXCISION Right 1966    wrist   • TONSILLECTOMY      as a child     Family History   Problem Relation Age of Onset   • Heart Disease Mother 65        MI   • Cancer Father "    • Cancer Brother 50        prostate cancer   • Stroke Brother    • Diabetes Neg Hx    • Hyperlipidemia Neg Hx    • Alcohol/Drug Neg Hx      Social History     Socioeconomic History   • Marital status: Single     Spouse name: Not on file   • Number of children: Not on file   • Years of education: Not on file   • Highest education level: Not on file   Occupational History   • Not on file   Social Needs   • Financial resource strain: Not on file   • Food insecurity:     Worry: Not on file     Inability: Not on file   • Transportation needs:     Medical: Not on file     Non-medical: Not on file   Tobacco Use   • Smoking status: Never Smoker   • Smokeless tobacco: Never Used   Substance and Sexual Activity   • Alcohol use: Not Currently     Comment: 1 a month    • Drug use: No   • Sexual activity: Never     Comment: 2 daughters    Lifestyle   • Physical activity:     Days per week: Not on file     Minutes per session: Not on file   • Stress: Not on file   Relationships   • Social connections:     Talks on phone: Not on file     Gets together: Not on file     Attends [employer] service: Not on file     Active member of club or organization: Not on file     Attends meetings of clubs or organizations: Not on file     Relationship status: Not on file   • Intimate partner violence:     Fear of current or ex partner: Not on file     Emotionally abused: Not on file     Physically abused: Not on file     Forced sexual activity: Not on file   Other Topics Concern   • Not on file   Social History Narrative   • Not on file     Allergies   Allergen Reactions   • Penicillins Rash     Rash as a child.      Outpatient Encounter Medications as of 8/28/2019   Medication Sig Dispense Refill   • BOOSTRIX 5-2.5-18.5 LF-MCG/0.5 Suspension        No facility-administered encounter medications on file as of 8/28/2019.      Review of Systems   Constitutional: Negative for chills, fever, malaise/fatigue and weight loss.   HENT: Negative for  "congestion and sore throat.    Eyes: Negative for blurred vision and double vision.   Respiratory: Negative for cough, sputum production, shortness of breath and wheezing.    Cardiovascular: Positive for palpitations. Negative for chest pain, orthopnea, claudication, leg swelling and PND.   Gastrointestinal: Negative for abdominal pain, heartburn, nausea and vomiting.   Genitourinary: Negative for dysuria, flank pain and frequency.   Musculoskeletal: Negative.    Skin: Negative.    Neurological: Negative for dizziness, speech change, focal weakness, seizures, loss of consciousness and headaches.   Endo/Heme/Allergies: Negative.    Psychiatric/Behavioral: Negative.         Objective:   /74 (BP Location: Left arm, Patient Position: Sitting, BP Cuff Size: Adult)   Pulse 78   Ht 1.867 m (6' 1.5\")   Wt 86.6 kg (191 lb)   SpO2 96%   BMI 24.86 kg/m²     Physical Exam   Constitutional: He is oriented to person, place, and time. He appears well-developed and well-nourished.   HENT:   Head: Normocephalic and atraumatic.   Eyes: Pupils are equal, round, and reactive to light.   Neck: Normal range of motion. Neck supple.   Cardiovascular: Normal rate. A regularly irregular rhythm present.   Pulmonary/Chest: Effort normal and breath sounds normal.   Abdominal: Soft. Bowel sounds are normal.   Musculoskeletal: Normal range of motion.   Neurological: He is alert and oriented to person, place, and time.   Skin: Skin is warm and dry.   Psychiatric: He has a normal mood and affect.       Assessment:     1. Palpitations  THYROID PANEL WITH TSH    ZIO PATCH MONITOR   2. Atypical atrial flutter (HCC)  EKG    THYROID PANEL WITH TSH    ZIO PATCH MONITOR   3. PVC's (premature ventricular contractions)  ZIO PATCH MONITOR   4. S/P ablation of atrial flutter     5. S/P catheter ablation of slow pathway         Medical Decision Making:  Today's Assessment / Status / Plan:     1. Palpitations etiology unclear.  Zio ordered.  2. " Atypical AFL   Ablation completed 11/28/18.  3. PVC's   Chronic isolated in trigeminal pattern here today.  4. S/P afl ablation as noted 11/28/18  5. AVNRT with slow pathway ablation 11/28/19  See report.  RTC 5-6 weeks when Dr Mendoza here with EP ELIUDN    Collaborating MD Nicole

## 2019-08-28 NOTE — PROGRESS NOTES
Fax sent to [facility] for recent ER records from visit 1 week ago.   Fax:[phone]  Ph:[phone]    Confirmation sent to scan

## 2019-08-28 NOTE — TELEPHONE ENCOUNTER
NIXON Alex Pt has scheduled a f/v w/Jing today. I wanted to give you a heads up. He says he has not been feeling well. He says that his at home EKG machine says possible Afib. He says he also has shortness of breath adn weakness. He says that his BP has been erratic and his heart rate has been low for about a month now. He can be reached at: [phone].     Thank you so much,    Ayan

## 2019-08-29 ENCOUNTER — HOSPITAL ENCOUNTER (OUTPATIENT)
Dept: LAB | Facility: MEDICAL CENTER | Age: 74
End: 2019-08-29
Attending: NURSE PRACTITIONER
Payer: MEDICARE

## 2019-08-29 LAB
T4 FREE SERPL-MCNC: 0.94 NG/DL (ref 0.53–1.43)
T4 SERPL-MCNC: 8.6 UG/DL (ref 4–12)
TSH SERPL DL<=0.005 MIU/L-ACNC: 1.58 UIU/ML (ref 0.38–5.33)

## 2019-08-29 PROCEDURE — 84479 ASSAY OF THYROID (T3 OR T4): CPT

## 2019-08-29 PROCEDURE — 36415 COLL VENOUS BLD VENIPUNCTURE: CPT

## 2019-08-29 PROCEDURE — 84443 ASSAY THYROID STIM HORMONE: CPT

## 2019-08-29 PROCEDURE — 84439 ASSAY OF FREE THYROXINE: CPT

## 2019-08-30 ENCOUNTER — TELEPHONE (OUTPATIENT)
Dept: CARDIOLOGY | Facility: MEDICAL CENTER | Age: 74
End: 2019-08-30

## 2019-08-30 DIAGNOSIS — E78.00 ELEVATED LDL CHOLESTEROL LEVEL: ICD-10-CM

## 2019-08-30 NOTE — TELEPHONE ENCOUNTER
I reviewed the records from [place].  Labs are stable WBC up a bit to 10K should follow up with PCP for this and there was a 7 mm nodular lesion left mid lung that needs to be monitored.  Also, he needs to get his lipids rechecked fasting as were elevated a year ago and not repeated on recent labs in ER.

## 2019-08-30 NOTE — TELEPHONE ENCOUNTER
----- Left message for pt. To call.   Note forwarded to Dr. Carnes and partners.   LP lab order mailed to pt.       Message from CARLOS Li sent at 8/30/2019  8:31 AM PDT -----  Thyroid studies normal. He had labs at [facility] with recent visit there can we get results.  CARLOS Li 2 hours ago (10:53 AM)         I reviewed the records from [place].  Labs are stable WBC up a bit to 10K should follow up with PCP for this and there was a 7 mm nodular lesion left mid lung that needs to be monitored.  Also, he needs to get his lipids rechecked fasting as were elevated a year ago and not repeated on recent labs in ER.

## 2019-08-31 LAB — T3RU NFR SERPL: 35 % (ref 28–41)

## 2019-09-09 ENCOUNTER — HOSPITAL ENCOUNTER (OUTPATIENT)
Dept: LAB | Facility: MEDICAL CENTER | Age: 74
End: 2019-09-09
Attending: UROLOGY
Payer: MEDICARE

## 2019-09-09 ENCOUNTER — HOSPITAL ENCOUNTER (OUTPATIENT)
Dept: LAB | Facility: MEDICAL CENTER | Age: 74
End: 2019-09-09
Attending: NURSE PRACTITIONER
Payer: MEDICARE

## 2019-09-09 DIAGNOSIS — E78.00 ELEVATED LDL CHOLESTEROL LEVEL: ICD-10-CM

## 2019-09-09 LAB
CHOLEST SERPL-MCNC: 197 MG/DL (ref 100–199)
FASTING STATUS PATIENT QL REPORTED: NORMAL
HDLC SERPL-MCNC: 46 MG/DL
LDLC SERPL CALC-MCNC: 135 MG/DL
TRIGL SERPL-MCNC: 81 MG/DL (ref 0–149)

## 2019-09-09 PROCEDURE — 36415 COLL VENOUS BLD VENIPUNCTURE: CPT

## 2019-09-09 PROCEDURE — 80061 LIPID PANEL: CPT

## 2019-09-10 ENCOUNTER — TELEPHONE (OUTPATIENT)
Dept: CARDIOLOGY | Facility: MEDICAL CENTER | Age: 74
End: 2019-09-10

## 2019-09-10 NOTE — TELEPHONE ENCOUNTER
Result Notes for Lipid Profile     Notes recorded by Abi Morton R.N. on 9/10/2019 at 1:54 PM PDT  Lm to call for results. See nurses note from here.  ------    Notes recorded by Enoch Mendoza M.D. on 9/10/2019 at 11:06 AM PDT  High LDL may want to go on statin  ------    Notes recorded by Cris Shewrood L.P.N. on 9/9/2019 at 3:37 PM PDT  [facility] 10-4-19 with SANTI

## 2019-09-16 ENCOUNTER — TELEPHONE (OUTPATIENT)
Dept: CARDIOLOGY | Facility: MEDICAL CENTER | Age: 74
End: 2019-09-16

## 2019-09-16 PROCEDURE — 0296T PR EXT ECG > 48HR TO 21 DAY RCRD W/CONECT INTL RCRD: CPT | Performed by: INTERNAL MEDICINE

## 2019-09-16 PROCEDURE — 0298T PR EXT ECG > 48HR TO 21 DAY REVIEW AND INTERPRETATN: CPT | Performed by: INTERNAL MEDICINE

## 2019-09-16 NOTE — TELEPHONE ENCOUNTER
Called pt. To advise. He will discuss details at 10-4-19 [facility] as well as starting a statin med.        Result Notes for ZIO PATCH MONITOR     Notes recorded by Enoch Mendoza M.D. on 9/16/2019 at 10:47 AM PDT  Tell him symptoms with pvc's otherwise nl

## 2019-10-04 ENCOUNTER — OFFICE VISIT (OUTPATIENT)
Dept: CARDIOLOGY | Facility: MEDICAL CENTER | Age: 74
End: 2019-10-04
Payer: MEDICARE

## 2019-10-04 VITALS — SYSTOLIC BLOOD PRESSURE: 122 MMHG | OXYGEN SATURATION: 97 % | DIASTOLIC BLOOD PRESSURE: 89 MMHG | HEART RATE: 71 BPM

## 2019-10-04 DIAGNOSIS — Z98.890 S/P CATHETER ABLATION OF SLOW PATHWAY: ICD-10-CM

## 2019-10-04 DIAGNOSIS — I49.3 PVC'S (PREMATURE VENTRICULAR CONTRACTIONS): ICD-10-CM

## 2019-10-04 DIAGNOSIS — I47.19 AVNRT (AV NODAL RE-ENTRY TACHYCARDIA): ICD-10-CM

## 2019-10-04 DIAGNOSIS — Z86.79 S/P CATHETER ABLATION OF SLOW PATHWAY: ICD-10-CM

## 2019-10-04 DIAGNOSIS — E78.5 HYPERLIPIDEMIA, UNSPECIFIED HYPERLIPIDEMIA TYPE: ICD-10-CM

## 2019-10-04 DIAGNOSIS — I48.3 TYPICAL ATRIAL FLUTTER (HCC): ICD-10-CM

## 2019-10-04 LAB — EKG IMPRESSION: NORMAL

## 2019-10-04 PROCEDURE — 99214 OFFICE O/P EST MOD 30 MIN: CPT | Performed by: NURSE PRACTITIONER

## 2019-10-04 PROCEDURE — 93000 ELECTROCARDIOGRAM COMPLETE: CPT | Performed by: INTERNAL MEDICINE

## 2019-10-04 ASSESSMENT — ENCOUNTER SYMPTOMS
LOSS OF CONSCIOUSNESS: 0
HEADACHES: 0
ABDOMINAL PAIN: 0
SPEECH CHANGE: 0
CHILLS: 0
PALPITATIONS: 0
WEIGHT LOSS: 0
DIARRHEA: 0
DOUBLE VISION: 0
SPUTUM PRODUCTION: 0
FEVER: 0
BLURRED VISION: 0
FOCAL WEAKNESS: 0
SENSORY CHANGE: 0
BLOOD IN STOOL: 0
NAUSEA: 0
SHORTNESS OF BREATH: 0
HEARTBURN: 0
VOMITING: 0
HEMOPTYSIS: 0
DIZZINESS: 0
PND: 0
TINGLING: 0
WHEEZING: 0
TREMORS: 0
ORTHOPNEA: 0
STRIDOR: 0
COUGH: 0
SORE THROAT: 0

## 2019-10-05 NOTE — PROGRESS NOTES
"Cardiology/Electrophysiology Follow-up Note      Subjective:   Chief Complaint:   Chief Complaint   Patient presents with   • Atrial Fibrillation     PP w/ EA       Boo Cavanaugh Jr. is a 74 y.o. male who presents today for follow-up and review of palpitations.    He is followed by Dr. Mendoza.  Past medical history also significant for ventricular premature contractions, Prior atrial flutter and AVNRT ablations with Dr. Mendoza, 11/28/2018.  He is on no medications at this time.    Last seen by Bridget YIP with increased palpitations and low heart rates, suspected possibly secondary to PVCs. A ZIO Patch was obtained, which showed sinus rhythm predominantly as well as 3% PVCs for which she was symptomatic to.    Today in follow up he tells me that since the last couple days of wearing his ZIO Patch in the time since then he has felt really good he has not noticed much in the way of palpitations and has reportedly been feeling much improved.  His lipid panel came back with a slightly elevated LDL level for which it was mentioned that he may want to consider taking a statin previously, patient reports that he is still unsure if this in regards to what his overall cardiac risk is and how a statin would help this..  He currently denies chest pain, dizziness, palpitations, pre syncope or syncope, dyspnea, PND, orthopnea, or lower extremity edema.        Past Medical History:   Diagnosis Date   • Arrhythmia     \"flutter\", Dr. Mendoza cardiologist   • Arthritis     left thumb   • Breath shortness    • Cardiac arrhythmia    • Enlarged prostate with urinary obstruction    • GERD (gastroesophageal reflux disease)    • Hyperlipidemia    • Hypertension    • OA (osteoarthritis) of knee     bilateral    • Restless leg syndrome    • Tonsillitis    • Urinary bladder disorder 12/06/2017    savage in place     Past Surgical History:   Procedure Laterality Date   • TURP-VAPOR  12/7/2017    Procedure: TURP-VAPOR - GREEN LIGHT " PHOTOSELECTIVE;  Surgeon: Cristofer Cr M.D.;  Location: SURGERY [facility];  Service: Urology   • GANGLION EXCISION Right 1966    wrist   • TONSILLECTOMY      as a child     Family History   Problem Relation Age of Onset   • Heart Disease Mother 65        MI   • Cancer Father    • Cancer Brother 50        prostate cancer   • Stroke Brother    • Diabetes Neg Hx    • Hyperlipidemia Neg Hx    • Alcohol/Drug Neg Hx      Social History     Socioeconomic History   • Marital status: Single     Spouse name: Not on file   • Number of children: Not on file   • Years of education: Not on file   • Highest education level: Not on file   Occupational History   • Not on file   Social Needs   • Financial resource strain: Not on file   • Food insecurity:     Worry: Not on file     Inability: Not on file   • Transportation needs:     Medical: Not on file     Non-medical: Not on file   Tobacco Use   • Smoking status: Never Smoker   • Smokeless tobacco: Never Used   Substance and Sexual Activity   • Alcohol use: Not Currently     Comment: 1 a month    • Drug use: No   • Sexual activity: Never     Comment: 2 daughters    Lifestyle   • Physical activity:     Days per week: Not on file     Minutes per session: Not on file   • Stress: Not on file   Relationships   • Social connections:     Talks on phone: Not on file     Gets together: Not on file     Attends Nondenominational service: Not on file     Active member of club or organization: Not on file     Attends meetings of clubs or organizations: Not on file     Relationship status: Not on file   • Intimate partner violence:     Fear of current or ex partner: Not on file     Emotionally abused: Not on file     Physically abused: Not on file     Forced sexual activity: Not on file   Other Topics Concern   • Not on file   Social History Narrative   • Not on file     Allergies   Allergen Reactions   • Penicillins Rash     Rash as a child.        Current Outpatient Medications   Medication  Sig Dispense Refill   • BOOSTRIX 5-2.5-18.5 LF-MCG/0.5 Suspension        No current facility-administered medications for this visit.        Review of Systems   Constitutional: Negative for chills, fever, malaise/fatigue and weight loss.   HENT: Negative for congestion, nosebleeds, sore throat and tinnitus.    Eyes: Negative for blurred vision and double vision.   Respiratory: Negative for cough, hemoptysis, sputum production, shortness of breath, wheezing and stridor.    Cardiovascular: Negative for chest pain, palpitations, orthopnea, leg swelling and PND.   Gastrointestinal: Negative for abdominal pain, blood in stool, diarrhea, heartburn, nausea and vomiting.   Skin: Negative for rash.   Neurological: Negative for dizziness, tingling, tremors, sensory change, speech change, focal weakness, loss of consciousness and headaches.     All others systems reviewed and negative.     Objective:     /89   Pulse 71   SpO2 97%  There is no height or weight on file to calculate BMI.    Physical Exam   Constitutional: He is well-developed, well-nourished, and in no distress.   HENT:   Head: Normocephalic and atraumatic.   Eyes: Pupils are equal, round, and reactive to light. Conjunctivae and EOM are normal.   Neck: Normal range of motion. Neck supple. No JVD present. No tracheal deviation present.   Cardiovascular: Normal rate, regular rhythm, normal heart sounds and intact distal pulses.  Occasional extrasystoles are present. Exam reveals no gallop and no friction rub.   No murmur heard.  Pulses:       Radial pulses are 2+ on the right side, and 2+ on the left side.        Dorsalis pedis pulses are 2+ on the right side, and 2+ on the left side.        Posterior tibial pulses are 2+ on the right side, and 2+ on the left side.   Pulmonary/Chest: Effort normal and breath sounds normal. No respiratory distress. He has no wheezes. He has no rales. He exhibits no tenderness.   Abdominal: Soft. Bowel sounds are normal.    Musculoskeletal: Normal range of motion. He exhibits no edema.   Neurological: He is alert. Gait normal.   Skin: Skin is warm and dry.   Psychiatric: Mood, memory, affect and judgment normal.         Cardiac Imaging and Procedures Review:    EKG (10/4/19) reviewed by myself:   Sinus rhythm, rate 63.     Echo (11/28/18):   Left Ventricle  Normal LV function    Right Ventricle  Normal right ventricular size.    Right Atrium  Normal right atrial size.    Left Atrium  Normal left atrial size.    LA Appendage  NO CLOT    IA Septum  Normal interatrial septum.    IV Septum  Normal Interventricular Septum.    Mitral Valve  Structurally normal mitral valve without significant stenosis or   regurgitation.    Aortic Valve  Structurally normal aortic valve without significant stenosis or   regurgitation.    Tricuspid Valve  Structurally normal tricuspid valve without significant stenosis or   regurgitation.    Pulmonic Valve  Structurally normal pulmonic valve without significant stenosis or   regurgitation.    Pericardium  Normal pericardium without effusion.    Aorta  Normal aortic root for body surface area.    EPS/ABLATION (11/28/18):  Impressions/Plan:   1. Spontaneous typical counterclockwise right atrial flutter.  2. Successful catheter mediated ablation of right atrial flutter with bilateral isthmus block.  3.  Spontaneous AVNRT and inducible AVNRT.  4.  Slow pathway ablation.  3. Non inducible post ablation.    Labs (personally reviewed and notable for):   Lab Results   Component Value Date/Time    SODIUM 141 11/27/2018 11:50 AM    POTASSIUM 4.1 11/27/2018 11:50 AM    CHLORIDE 108 11/27/2018 11:50 AM    CO2 22 11/27/2018 11:50 AM    GLUCOSE 90 11/27/2018 11:50 AM    BUN 21 11/27/2018 11:50 AM    CREATININE 1.10 11/27/2018 11:50 AM      Lab Results   Component Value Date/Time    WBC 6.9 11/27/2018 11:50 AM    RBC 5.54 11/27/2018 11:50 AM    HEMOGLOBIN 18.0 11/27/2018 11:50 AM    HEMATOCRIT 51.7 11/27/2018 11:50 AM     MCV 93.3 11/27/2018 11:50 AM    MCH 32.5 11/27/2018 11:50 AM    MCHC 34.8 11/27/2018 11:50 AM    MPV 9.9 11/27/2018 11:50 AM    NEUTSPOLYS 65.40 11/27/2018 11:50 AM    LYMPHOCYTES 22.70 11/27/2018 11:50 AM    MONOCYTES 10.00 11/27/2018 11:50 AM    EOSINOPHILS 1.20 11/27/2018 11:50 AM    BASOPHILS 0.60 11/27/2018 11:50 AM      PT/INR:   Lab Results   Component Value Date/Time    PROTHROMBTM 13.1 11/27/2018 11:50 AM    INR 0.98 11/27/2018 11:50 AM   ]      Assessment:     1. PVC's (premature ventricular contractions)  CT-CARDIAC SCORING   2. Hyperlipidemia, unspecified hyperlipidemia type  CT-CARDIAC SCORING   3. Typical atrial flutter (HCC)  EKG   4. AVNRT (AV maggie re-entry tachycardia) (HCC)     5. S/P catheter ablation of slow pathway         Medical Decision Making:  Today's Assessment / Status / Plan:   1.  PVCs:   -Recent ZIO patch with 3% PVCs symptoms on ZIO Patch surrounding PVC episodes.  Interestingly he was not symptomatic to his bigeminal PVCs, and he reports less symptoms when they are more frequent and more symptoms when they are more intermittent.  -Have discussed options with him today to include trial of suppression with medication such as low-dose beta-blocker, however since he is feeling much improved with minimal palpitations at this time he would like to hold off for now.  -Discussed if worsening palpitations or fatigue or symptoms should trial medication for suppression.    2.  Hyperlipidemia:  -Recent lipid profile with LDL of 135 HDL 46 and triglycerides of 81.  His LDL has come down significantly through dietary changes over the past 2 years, however still remains slightly elevated.  It has been suggested that he could do a trial of statin in the past, however he is not certain if he would like to go on medication but he is interested in evaluating his risk and whether or not statin therapy may be of benefit for him.  -I have ordered a coronary calcium CT scoring for him to further assess  this risk and indications for statins.  Will call him with results of cardiac CT and further recommendations.     3.  Typical flutter/AVNRT:  -No recurrence of arrhythmia on recent ZIO Patch.  Not on antiarrhythmics or suppressive medication.      Plan reviewed in detail with the patient and he verbalizes understanding and is in agreement.   RTC 4 months for reivew, sooner if clinical condition changes  Collaborating MD/ADD: NICHO Childs.

## 2019-10-16 ENCOUNTER — HOSPITAL ENCOUNTER (OUTPATIENT)
Dept: RADIOLOGY | Facility: MEDICAL CENTER | Age: 74
End: 2019-10-16
Attending: NURSE PRACTITIONER

## 2019-10-16 DIAGNOSIS — E78.5 HYPERLIPIDEMIA, UNSPECIFIED HYPERLIPIDEMIA TYPE: ICD-10-CM

## 2019-10-16 DIAGNOSIS — I49.3 PVC'S (PREMATURE VENTRICULAR CONTRACTIONS): ICD-10-CM

## 2019-10-16 PROCEDURE — 4410556 CT-CARDIAC SCORING

## 2019-10-23 ENCOUNTER — TELEPHONE (OUTPATIENT)
Dept: CARDIOLOGY | Facility: MEDICAL CENTER | Age: 74
End: 2019-10-23

## 2019-10-24 ENCOUNTER — TELEPHONE (OUTPATIENT)
Dept: CARDIOLOGY | Facility: MEDICAL CENTER | Age: 74
End: 2019-10-24

## 2019-10-24 NOTE — TELEPHONE ENCOUNTER
Called patient to review cardiac CT scoring.    Discussed with patient that based of of results would recommend that he be placed on a Statin- would start Lipitor 40 mg PO QHS and low dose ASA 81 mg PO daily.  I have also discussed with him that based off of guidelines that stress testing could be considered.     He reports that he is not ready to make decision and that he would like to couple of days to think about it all.  Will ouch base with him early next week.

## 2019-10-29 ENCOUNTER — TELEPHONE (OUTPATIENT)
Dept: CARDIOLOGY | Facility: MEDICAL CENTER | Age: 74
End: 2019-10-29

## 2019-10-29 NOTE — TELEPHONE ENCOUNTER
Called patient to see if he has considered Statin/ASA/ stress testing and follow up.  Message left asking to call back.

## 2019-11-02 ENCOUNTER — HOSPITAL ENCOUNTER (OUTPATIENT)
Dept: LAB | Facility: MEDICAL CENTER | Age: 74
End: 2019-11-02
Attending: FAMILY MEDICINE
Payer: MEDICARE

## 2019-11-02 DIAGNOSIS — E78.00 ELEVATED LDL CHOLESTEROL LEVEL: ICD-10-CM

## 2019-11-02 DIAGNOSIS — R03.0 PREHYPERTENSION: ICD-10-CM

## 2019-11-02 DIAGNOSIS — Z00.00 ROUTINE HEALTH MAINTENANCE: ICD-10-CM

## 2019-11-02 LAB
ALBUMIN SERPL BCP-MCNC: 4 G/DL (ref 3.2–4.9)
ALBUMIN/GLOB SERPL: 1.4 G/DL
ALP SERPL-CCNC: 73 U/L (ref 30–99)
ALT SERPL-CCNC: 14 U/L (ref 2–50)
ANION GAP SERPL CALC-SCNC: 10 MMOL/L (ref 0–11.9)
AST SERPL-CCNC: 19 U/L (ref 12–45)
BASOPHILS # BLD AUTO: 0.5 % (ref 0–1.8)
BASOPHILS # BLD: 0.03 K/UL (ref 0–0.12)
BILIRUB SERPL-MCNC: 0.9 MG/DL (ref 0.1–1.5)
BUN SERPL-MCNC: 22 MG/DL (ref 8–22)
CALCIUM SERPL-MCNC: 9 MG/DL (ref 8.5–10.5)
CHLORIDE SERPL-SCNC: 105 MMOL/L (ref 96–112)
CHOLEST SERPL-MCNC: 196 MG/DL (ref 100–199)
CO2 SERPL-SCNC: 26 MMOL/L (ref 20–33)
CREAT SERPL-MCNC: 1.27 MG/DL (ref 0.5–1.4)
EOSINOPHIL # BLD AUTO: 0.26 K/UL (ref 0–0.51)
EOSINOPHIL NFR BLD: 3.9 % (ref 0–6.9)
ERYTHROCYTE [DISTWIDTH] IN BLOOD BY AUTOMATED COUNT: 44.9 FL (ref 35.9–50)
FASTING STATUS PATIENT QL REPORTED: NORMAL
GLOBULIN SER CALC-MCNC: 2.8 G/DL (ref 1.9–3.5)
GLUCOSE SERPL-MCNC: 71 MG/DL (ref 65–99)
HCT VFR BLD AUTO: 48.7 % (ref 42–52)
HDLC SERPL-MCNC: 43 MG/DL
HGB BLD-MCNC: 16.6 G/DL (ref 14–18)
IMM GRANULOCYTES # BLD AUTO: 0.01 K/UL (ref 0–0.11)
IMM GRANULOCYTES NFR BLD AUTO: 0.2 % (ref 0–0.9)
LDLC SERPL CALC-MCNC: 138 MG/DL
LYMPHOCYTES # BLD AUTO: 2.13 K/UL (ref 1–4.8)
LYMPHOCYTES NFR BLD: 32.3 % (ref 22–41)
MCH RBC QN AUTO: 33.1 PG (ref 27–33)
MCHC RBC AUTO-ENTMCNC: 34.1 G/DL (ref 33.7–35.3)
MCV RBC AUTO: 97.2 FL (ref 81.4–97.8)
MONOCYTES # BLD AUTO: 0.81 K/UL (ref 0–0.85)
MONOCYTES NFR BLD AUTO: 12.3 % (ref 0–13.4)
NEUTROPHILS # BLD AUTO: 3.36 K/UL (ref 1.82–7.42)
NEUTROPHILS NFR BLD: 50.8 % (ref 44–72)
NRBC # BLD AUTO: 0 K/UL
NRBC BLD-RTO: 0 /100 WBC
PLATELET # BLD AUTO: 220 K/UL (ref 164–446)
PMV BLD AUTO: 10.5 FL (ref 9–12.9)
POTASSIUM SERPL-SCNC: 3.9 MMOL/L (ref 3.6–5.5)
PROT SERPL-MCNC: 6.8 G/DL (ref 6–8.2)
RBC # BLD AUTO: 5.01 M/UL (ref 4.7–6.1)
SODIUM SERPL-SCNC: 141 MMOL/L (ref 135–145)
TRIGL SERPL-MCNC: 74 MG/DL (ref 0–149)
WBC # BLD AUTO: 6.6 K/UL (ref 4.8–10.8)

## 2019-11-02 PROCEDURE — 85025 COMPLETE CBC W/AUTO DIFF WBC: CPT

## 2019-11-02 PROCEDURE — 80053 COMPREHEN METABOLIC PANEL: CPT

## 2019-11-02 PROCEDURE — 36415 COLL VENOUS BLD VENIPUNCTURE: CPT

## 2019-11-02 PROCEDURE — 80061 LIPID PANEL: CPT

## 2019-11-13 ENCOUNTER — OFFICE VISIT (OUTPATIENT)
Dept: MEDICAL GROUP | Facility: PHYSICIAN GROUP | Age: 74
End: 2019-11-13
Payer: MEDICARE

## 2019-11-13 VITALS
DIASTOLIC BLOOD PRESSURE: 86 MMHG | HEIGHT: 73 IN | TEMPERATURE: 97.5 F | BODY MASS INDEX: 24.94 KG/M2 | RESPIRATION RATE: 16 BRPM | HEART RATE: 62 BPM | OXYGEN SATURATION: 97 % | SYSTOLIC BLOOD PRESSURE: 134 MMHG | WEIGHT: 188.2 LBS

## 2019-11-13 DIAGNOSIS — L98.9 SKIN LESION: ICD-10-CM

## 2019-11-13 PROCEDURE — 99213 OFFICE O/P EST LOW 20 MIN: CPT | Performed by: FAMILY MEDICINE

## 2019-11-13 NOTE — PROGRESS NOTES
"Subjective:     CC: skin lesion    HPI:   Boo presents today with     Skin lesion  This is a new condition.  Onset: >1 month  Location: dorsum left hand  Duration: constant  Quality: started as open wound, painful bump now  Precipitating trauma: scraped the skin (assumes)  Associated symptoms: no itchy, no drainage  Home treatments: nothing        Past Medical History:   Diagnosis Date   • Arrhythmia     \"flutter\", Dr. Mendoza cardiologist   • Arthritis     left thumb   • Breath shortness    • Cardiac arrhythmia    • Enlarged prostate with urinary obstruction    • GERD (gastroesophageal reflux disease)    • Hyperlipidemia    • Hypertension    • OA (osteoarthritis) of knee     bilateral    • Restless leg syndrome    • Tonsillitis    • Urinary bladder disorder 12/06/2017    savage in place       Social History     Tobacco Use   • Smoking status: Never Smoker   • Smokeless tobacco: Never Used   Substance Use Topics   • Alcohol use: Not Currently     Comment: 1 a month    • Drug use: No       No current Epic-ordered outpatient medications on file.     No current Epic-ordered facility-administered medications on file.        Allergies:  Penicillins    Health Maintenance: Completed    ROS:  Gen: no fevers, + chills  Pulm: no sob  CV: no chest pain      Objective:     Exam:  /86 (BP Location: Left arm, Patient Position: Sitting, BP Cuff Size: Adult)   Pulse 62 Comment: Pulse irregular  Temp 36.4 °C (97.5 °F) (Temporal)   Resp 16   Ht 1.854 m (6' 1\")   Wt 85.4 kg (188 lb 3.2 oz)   SpO2 97%   BMI 24.83 kg/m²  Body mass index is 24.83 kg/m².    Gen: Alert and oriented, No apparent distress.  Neck: Neck is supple without lymphadenopathy.  Lungs: Normal effort, CTA bilaterally, no wheezes, rhonchi, or rales  CV: Regular rate and regularly irregular rhythm, sounds like trigeminy. No murmurs, rubs, or gallops.  Ext: No clubbing, cyanosis, edema.  Skin: Back of left hand - spot of shiny purple skin that feels " rough.    Assessment & Plan:     74 y.o. male with the following -     1. Skin lesion  This is a new condition.  Over the last month he has had a lesion on the dorsum of his left hand.  He thinks it started as an open wound by scraping his skin but he just assumes that.  It is now just a painful bump.  There is no associated itching or drainage.  He has not been doing them at home to treat it.  On exam there is a raised lesion with shiny purple skin that almost looks like scarring.  I wonder if the lesion keeps getting reopened which causes more scarring and delay is healing.  -I encouraged him to use Polysporin and a bandage to keep it covered  -If it does not get better in another week or so I strongly encouraged him to come back for skin biopsy just to ensure it is not basal cell carcinoma    Return if symptoms worsen or fail to improve.    Please note that this dictation was created using voice recognition software. I have made every reasonable attempt to correct obvious errors, but I expect that there are errors of grammar and possibly content that I did not discover before finalizing the note.

## 2019-11-13 NOTE — ASSESSMENT & PLAN NOTE
This is a new condition.  Onset: >1 month  Location: dorsum left hand  Duration: constant  Quality: started as open wound, painful bump now  Precipitating trauma: scraped the skin (assumes)  Associated symptoms: no itchy, no drainage  Home treatments: nothing

## 2019-11-25 ENCOUNTER — OFFICE VISIT (OUTPATIENT)
Dept: MEDICAL GROUP | Facility: PHYSICIAN GROUP | Age: 74
End: 2019-11-25
Payer: MEDICARE

## 2019-11-25 ENCOUNTER — HOSPITAL ENCOUNTER (OUTPATIENT)
Dept: LAB | Facility: MEDICAL CENTER | Age: 74
End: 2019-11-25
Attending: FAMILY MEDICINE
Payer: MEDICARE

## 2019-11-25 VITALS
OXYGEN SATURATION: 96 % | HEART RATE: 58 BPM | BODY MASS INDEX: 24.81 KG/M2 | WEIGHT: 187.2 LBS | DIASTOLIC BLOOD PRESSURE: 80 MMHG | SYSTOLIC BLOOD PRESSURE: 140 MMHG | RESPIRATION RATE: 16 BRPM | HEIGHT: 73 IN | TEMPERATURE: 97.1 F

## 2019-11-25 DIAGNOSIS — D48.9 NEOPLASM OF UNCERTAIN BEHAVIOR: ICD-10-CM

## 2019-11-25 LAB — PATHOLOGY CONSULT NOTE: NORMAL

## 2019-11-25 PROCEDURE — 11102 TANGNTL BX SKIN SINGLE LES: CPT | Performed by: FAMILY MEDICINE

## 2019-11-25 PROCEDURE — 88305 TISSUE EXAM BY PATHOLOGIST: CPT

## 2019-11-25 PROCEDURE — 99000 SPECIMEN HANDLING OFFICE-LAB: CPT | Performed by: FAMILY MEDICINE

## 2019-11-25 NOTE — PROGRESS NOTES
"Subjective:   Boo Cavanaugh Jr. is a 74 y.o. male here today for a skin biopsy.    Neoplasm of uncertain behavior  This is a new problem to me. He has seen Dr. Coronado recently for this issue. Boo reports that approximately 5-6 weeks ago, he developed a lesion on the back of his left hand. The area is tender to touch and has grown larger. He is unsure if he may have cut his hand, but it is not healing like a cut usually does for him. He denies any history of skin cancer.    Dr. Coronado had recommended he have the area biopsied if it does not fully resolve.    Current medicines (including changes today)  No current outpatient medications on file.     No current facility-administered medications for this visit.      He  has a past medical history of Arrhythmia, Arthritis, Breath shortness, Cardiac arrhythmia, Enlarged prostate with urinary obstruction, GERD (gastroesophageal reflux disease), Hyperlipidemia, Hypertension, OA (osteoarthritis) of knee, Restless leg syndrome, Tonsillitis, and Urinary bladder disorder (12/06/2017).    ROS   No fever, no chills, no chest pain, no shortness of breath, no abdominal pain.     Objective:     Physical Exam:  /80 (BP Location: Right arm, Patient Position: Sitting, BP Cuff Size: Adult)   Pulse (!) 58   Temp 36.2 °C (97.1 °F) (Temporal)   Resp 16   Ht 1.854 m (6' 1\")   Wt 84.9 kg (187 lb 3.2 oz)   SpO2 96%  Body mass index is 24.7 kg/m².   Constitutional: Alert, no distress, well-groomed.  Skin: Warm, dry, good turgor. Dorsal aspect of left hand he has a skin lesion that is about 7 mm x 1 cm that is elevated and firm.  Eye: Equal, round and reactive, conjunctiva clear, lids normal.  ENMT: Lips without lesions, good dentition, moist mucous membranes.  Neck: Trachea midline, no masses, no thyromegaly.  Respiratory: Unlabored respiratory effort, no cough.  Abdomen: Soft, no gross masses.  MSK: Normal gait, moves all extremities.  Neuro: Grossly non-focal. No cranial nerve " deficit. Strength and sensation intact.   Psych: Alert and oriented x3, normal affect and mood.    Procedures:  SHAVE BIOPSY PROCEDURE NOTE:  Discussed with the patient the risks, benefits and alternatives to excision of the lesion. Informed consent was obtained. The area was alcohol swabbed and 1 cc of 1% lidocaine with epinephrine was administered. The area was then prepped and draped in the usual sterile fashion. A shave biopsy was used to excise the lesion. The excision was approximately 1.2 cm by 1 cm. The specimen was placed in a pathology jar and sent to the lab. Hemostasis was obtained with gentle pressure, heat-based and chemical-based cautery. Antibiotic ointment and bandage was applied. The patient was given wound care instructions and follow-up precautions.    Assessment and Plan:     1. Neoplasm of uncertain behavior  Patient presents with a persistent skin lesion on the dorsal aspect of his left hand that is suspicious for skin cancer. Shave biopsy performed today and sent to lab. Wound care instructions and return precautions given.  - Consent for Amb Surgery/Procedure    Followup: Return if symptoms worsen or fail to improve.         Obed TAN (Nirmalibe), am scribing for, and in the presence of, Charmaine Kerns MD    Electronically signed by: Obed Harris (Mariano), 11/25/2019    Charmaine TAN MD personally performed the services described in this documentation, as scribed by Obed Harris in my presence, and it is both accurate and complete.

## 2019-11-27 ENCOUNTER — TELEPHONE (OUTPATIENT)
Dept: MEDICAL GROUP | Facility: PHYSICIAN GROUP | Age: 74
End: 2019-11-27

## 2019-11-27 DIAGNOSIS — C44.629 SQUAMOUS CELL CARCINOMA OF LEFT HAND: ICD-10-CM

## 2019-11-27 NOTE — TELEPHONE ENCOUNTER
I spoke with patient regarding his biopsy results.  They do show SCC and there is cancer at the deep margin.  Will refer urgently to dermatology.  -Charmaine Kerns M.D.

## 2019-12-18 ENCOUNTER — OFFICE VISIT (OUTPATIENT)
Dept: CARDIOLOGY | Facility: MEDICAL CENTER | Age: 74
End: 2019-12-18
Payer: MEDICARE

## 2019-12-18 VITALS
DIASTOLIC BLOOD PRESSURE: 80 MMHG | HEIGHT: 73 IN | HEART RATE: 60 BPM | WEIGHT: 180 LBS | OXYGEN SATURATION: 98 % | SYSTOLIC BLOOD PRESSURE: 138 MMHG | BODY MASS INDEX: 23.86 KG/M2

## 2019-12-18 DIAGNOSIS — E78.5 HYPERLIPIDEMIA, UNSPECIFIED HYPERLIPIDEMIA TYPE: ICD-10-CM

## 2019-12-18 DIAGNOSIS — I48.3 TYPICAL ATRIAL FLUTTER (HCC): ICD-10-CM

## 2019-12-18 DIAGNOSIS — Z98.890 H/O CARDIAC RADIOFREQUENCY ABLATION: ICD-10-CM

## 2019-12-18 DIAGNOSIS — I49.3 PVC (PREMATURE VENTRICULAR CONTRACTION): ICD-10-CM

## 2019-12-18 DIAGNOSIS — R93.1 ABNORMAL SCREENING CARDIAC CT: Primary | ICD-10-CM

## 2019-12-18 PROCEDURE — 93000 ELECTROCARDIOGRAM COMPLETE: CPT | Performed by: INTERNAL MEDICINE

## 2019-12-18 PROCEDURE — 99214 OFFICE O/P EST MOD 30 MIN: CPT | Performed by: NURSE PRACTITIONER

## 2019-12-18 RX ORDER — ROSUVASTATIN CALCIUM 20 MG/1
20 TABLET, COATED ORAL DAILY
COMMUNITY
End: 2022-05-10 | Stop reason: SDUPTHER

## 2019-12-18 ASSESSMENT — ENCOUNTER SYMPTOMS
VOMITING: 0
DOUBLE VISION: 0
SHORTNESS OF BREATH: 0
ABDOMINAL PAIN: 0
DIARRHEA: 0
NAUSEA: 0
HEMOPTYSIS: 0
SPEECH CHANGE: 0
SPUTUM PRODUCTION: 0
TREMORS: 0
BLOOD IN STOOL: 0
CHILLS: 0
DIZZINESS: 0
TINGLING: 0
LOSS OF CONSCIOUSNESS: 0
BLURRED VISION: 0
SORE THROAT: 0
HEADACHES: 0
PND: 0
WEIGHT LOSS: 0
ORTHOPNEA: 0
WHEEZING: 0
SENSORY CHANGE: 0
STRIDOR: 0
FOCAL WEAKNESS: 0
COUGH: 0
PALPITATIONS: 0
HEARTBURN: 0
FEVER: 0

## 2019-12-18 NOTE — PROGRESS NOTES
"Cardiology/Electrophysiology Follow-up Note      Subjective:   Chief Complaint:   Chief Complaint   Patient presents with   • Premature Ventricular Contractions (PVCs)     FV       Boo Cavanaugh Jr. is a 74 y.o. male who presents today for follow-up and review of his cardiac CT score and hyperlipidemia.    He is followed by Dr. Mendoza.  Past medical history also significant for ventricular premature contractions, Prior atrial flutter and AVNRT ablations with Dr. Mendoza, 11/28/2018.  He is on no medications at this time.    Previous ZIO Patch 9/16/19 showed sinus rhythm predominantly as well as 3% PVCs for which he was symptomatic to.    Today in follow up he tells me that he has overall been physically feeling well.  He has not had significant palpitations presently.  He does report that since his cardiac CT results he has been more worried abut his overall health and what the future may hold.  He reported that he has started Crestor Q MWF as recommended and that he has done well with that; no myalgias or other concerns for side effects.  He currently denies chest pain, dizziness, palpitations, pre syncope or syncope, dyspnea, PND, orthopnea, or lower extremity edema.        Past Medical History:   Diagnosis Date   • Arrhythmia     \"flutter\", Dr. Mendoza cardiologist   • Arthritis     left thumb   • Breath shortness    • Cardiac arrhythmia    • Enlarged prostate with urinary obstruction    • GERD (gastroesophageal reflux disease)    • Hyperlipidemia    • Hypertension    • OA (osteoarthritis) of knee     bilateral    • Restless leg syndrome    • Tonsillitis    • Urinary bladder disorder 12/06/2017    savage in place     Past Surgical History:   Procedure Laterality Date   • TURP-VAPOR  12/7/2017    Procedure: TURP-VAPOR - GREEN LIGHT PHOTOSELECTIVE;  Surgeon: Cristofer Cr M.D.;  Location: SURGERY [facility];  Service: Urology   • GANGLION EXCISION Right 1966    wrist   • TONSILLECTOMY      as a child     Family " History   Problem Relation Age of Onset   • Heart Disease Mother 65        MI   • Cancer Father    • Cancer Brother 50        prostate cancer   • Stroke Brother    • Diabetes Neg Hx    • Hyperlipidemia Neg Hx    • Alcohol/Drug Neg Hx      Social History     Socioeconomic History   • Marital status: Single     Spouse name: Not on file   • Number of children: Not on file   • Years of education: Not on file   • Highest education level: Not on file   Occupational History   • Not on file   Social Needs   • Financial resource strain: Not on file   • Food insecurity:     Worry: Not on file     Inability: Not on file   • Transportation needs:     Medical: Not on file     Non-medical: Not on file   Tobacco Use   • Smoking status: Never Smoker   • Smokeless tobacco: Never Used   Substance and Sexual Activity   • Alcohol use: Not Currently     Comment: 1 a month    • Drug use: No   • Sexual activity: Never     Comment: 2 daughters    Lifestyle   • Physical activity:     Days per week: Not on file     Minutes per session: Not on file   • Stress: Not on file   Relationships   • Social connections:     Talks on phone: Not on file     Gets together: Not on file     Attends Orthodoxy service: Not on file     Active member of club or organization: Not on file     Attends meetings of clubs or organizations: Not on file     Relationship status: Not on file   • Intimate partner violence:     Fear of current or ex partner: Not on file     Emotionally abused: Not on file     Physically abused: Not on file     Forced sexual activity: Not on file   Other Topics Concern   • Not on file   Social History Narrative   • Not on file     Allergies   Allergen Reactions   • Penicillins Rash     Rash as a child.        Current Outpatient Medications   Medication Sig Dispense Refill   • rosuvastatin (CRESTOR) 20 MG Tab Take 20 mg by mouth every evening. M, W, Fr       No current facility-administered medications for this visit.        Review of  "Systems   Constitutional: Negative for chills, fever, malaise/fatigue and weight loss.   HENT: Negative for congestion, nosebleeds, sore throat and tinnitus.    Eyes: Negative for blurred vision and double vision.   Respiratory: Negative for cough, hemoptysis, sputum production, shortness of breath, wheezing and stridor.    Cardiovascular: Negative for chest pain, palpitations, orthopnea, leg swelling and PND.   Gastrointestinal: Negative for abdominal pain, blood in stool, diarrhea, heartburn, nausea and vomiting.   Skin: Negative for rash.   Neurological: Negative for dizziness, tingling, tremors, sensory change, speech change, focal weakness, loss of consciousness and headaches.     All others systems reviewed and negative.     Objective:     /80 (BP Location: Left arm, Patient Position: Sitting, BP Cuff Size: Adult)   Pulse 60   Ht 1.854 m (6' 1\")   Wt 81.6 kg (180 lb)   SpO2 98%  Body mass index is 23.75 kg/m².    Physical Exam   Constitutional: He is well-developed, well-nourished, and in no distress.   HENT:   Head: Normocephalic and atraumatic.   Eyes: Pupils are equal, round, and reactive to light. Conjunctivae and EOM are normal.   Neck: Normal range of motion. Neck supple. No JVD present. No tracheal deviation present.   Cardiovascular: Normal rate, regular rhythm, normal heart sounds and intact distal pulses. Exam reveals no gallop and no friction rub.   No murmur heard.  Pulses:       Radial pulses are 2+ on the right side and 2+ on the left side.        Dorsalis pedis pulses are 2+ on the right side and 2+ on the left side.        Posterior tibial pulses are 2+ on the right side and 2+ on the left side.   Pulmonary/Chest: Effort normal and breath sounds normal. No respiratory distress. He has no wheezes. He has no rales. He exhibits no tenderness.   Abdominal: Soft. Bowel sounds are normal.   Musculoskeletal: Normal range of motion.         General: No edema.   Neurological: He is alert. Gait " normal.   Skin: Skin is warm and dry.   Psychiatric: Mood, memory, affect and judgment normal.         Cardiac Imaging and Procedures Review:    EKG (12/18/19) reviewed by myself:   Sinus rhythm, rate 60.  Please note print out ready Atrial fibrillation, however my review is sinus rhythm.     Echo (11/28/18):   Left Ventricle  Normal LV function    Right Ventricle  Normal right ventricular size.    Right Atrium  Normal right atrial size.    Left Atrium  Normal left atrial size.    LA Appendage  NO CLOT    IA Septum  Normal interatrial septum.    IV Septum  Normal Interventricular Septum.    Mitral Valve  Structurally normal mitral valve without significant stenosis or   regurgitation.    Aortic Valve  Structurally normal aortic valve without significant stenosis or   regurgitation.    Tricuspid Valve  Structurally normal tricuspid valve without significant stenosis or   regurgitation.    Pulmonic Valve  Structurally normal pulmonic valve without significant stenosis or   regurgitation.    Pericardium  Normal pericardium without effusion.    Aorta  Normal aortic root for body surface area.    EPS/ABLATION (11/28/18):  Impressions/Plan:   1. Spontaneous typical counterclockwise right atrial flutter.  2. Successful catheter mediated ablation of right atrial flutter with bilateral isthmus block.  3.  Spontaneous AVNRT and inducible AVNRT.  4.  Slow pathway ablation.  3. Non inducible post ablation.    Labs (personally reviewed and notable for):   Lab Results   Component Value Date/Time    SODIUM 141 11/02/2019 07:04 AM    POTASSIUM 3.9 11/02/2019 07:04 AM    CHLORIDE 105 11/02/2019 07:04 AM    CO2 26 11/02/2019 07:04 AM    GLUCOSE 71 11/02/2019 07:04 AM    BUN 22 11/02/2019 07:04 AM    CREATININE 1.27 11/02/2019 07:04 AM      Lab Results   Component Value Date/Time    WBC 6.6 11/02/2019 07:04 AM    RBC 5.01 11/02/2019 07:04 AM    HEMOGLOBIN 16.6 11/02/2019 07:04 AM    HEMATOCRIT 48.7 11/02/2019 07:04 AM    MCV 97.2  11/02/2019 07:04 AM    MCH 33.1 (H) 11/02/2019 07:04 AM    MCHC 34.1 11/02/2019 07:04 AM    MPV 10.5 11/02/2019 07:04 AM    NEUTSPOLYS 50.80 11/02/2019 07:04 AM    LYMPHOCYTES 32.30 11/02/2019 07:04 AM    MONOCYTES 12.30 11/02/2019 07:04 AM    EOSINOPHILS 3.90 11/02/2019 07:04 AM    BASOPHILS 0.50 11/02/2019 07:04 AM      PT/INR:   Lab Results   Component Value Date/Time    PROTHROMBTM 13.1 11/27/2018 11:50 AM    INR 0.98 11/27/2018 11:50 AM       Assessment:     1. Abnormal screening cardiac CT  REFERRAL TO [facility] (HIP) Services Requested: Registered Dietitian for Medical Nutrition Therapy; Reason for Visit: Medical Condition Requiring Nutrition Counseling, Management of Chronic Condition   2. PVC (premature ventricular contraction)  EKG   3. Hyperlipidemia, unspecified hyperlipidemia type  REFERRAL TO [facility] (HIP) Services Requested: Registered Dietitian for Medical Nutrition Therapy; Reason for Visit: Medical Condition Requiring Nutrition Counseling, Management of Chronic Condition   4. Typical atrial flutter (HCC)  REFERRAL TO [facility] (HIP) Services Requested: Registered Dietitian for Medical Nutrition Therapy; Reason for Visit: Medical Condition Requiring Nutrition Counseling, Management of Chronic Condition   5. H/O cardiac radiofrequency ablation         Medical Decision Making:  Today's Assessment / Status / Plan:   1. Abnormal Coronary calcium CT:  - Results previously discussed with patient and reviewed again today (score of 1629).  He has started on crestor 20 mg every MWF and has done well with medication so far.  - He additionally has decided that he would like to proceed with stress test, treadmill previously scheduled for Jan.  I have reviewed stress testing with him and questions I have answered his questions at this time.      2. PVCs:   -Zio Patch 09/2019 with 3% PVCs symptoms on ZIO Patch surrounding PVC episodes.     - He currently reports improvement of symptoms.  He will continue to monitor.      3.  Hyperlipidemia:  - Most lipid profile with LDL of 135 HDL 46 and triglycerides of 81.   - Coronary Calcium CT results as above.  Now on Crestor and doing well.  - He has a follow up fasting LP/CMP ordered.   - He has many questions about heart healthy diet and associated wt loss with trying to change his diet.  I have referred to [facility] for dietician.     4.  Typical flutter/AVNRT:  - No recurrence of arrhythmia on zio this fall.  Not on antiarrhythmics or suppressive medication.    Plan reviewed in detail with the patient and he verbalizes understanding and is in agreement.   RTC 6 months for reivew, sooner if clinical condition changes  Collaborating MD/ADD: NICHO Childs.

## 2020-01-01 LAB — EKG IMPRESSION: NORMAL

## 2020-01-07 ENCOUNTER — OFFICE VISIT (OUTPATIENT)
Dept: MEDICAL GROUP | Facility: PHYSICIAN GROUP | Age: 75
End: 2020-01-07
Payer: MEDICARE

## 2020-01-07 VITALS
HEIGHT: 73 IN | SYSTOLIC BLOOD PRESSURE: 126 MMHG | RESPIRATION RATE: 16 BRPM | WEIGHT: 188.6 LBS | TEMPERATURE: 97.5 F | DIASTOLIC BLOOD PRESSURE: 62 MMHG | OXYGEN SATURATION: 98 % | HEART RATE: 62 BPM | BODY MASS INDEX: 25 KG/M2

## 2020-01-07 DIAGNOSIS — C44.90 SKIN CANCER: Primary | ICD-10-CM

## 2020-01-07 DIAGNOSIS — M70.62 GREATER TROCHANTERIC BURSITIS OF LEFT HIP: ICD-10-CM

## 2020-01-07 PROBLEM — M25.552 PAIN OF LEFT HIP JOINT: Status: ACTIVE | Noted: 2020-01-07

## 2020-01-07 PROCEDURE — 99214 OFFICE O/P EST MOD 30 MIN: CPT | Performed by: FAMILY MEDICINE

## 2020-01-07 ASSESSMENT — PATIENT HEALTH QUESTIONNAIRE - PHQ9: CLINICAL INTERPRETATION OF PHQ2 SCORE: 0

## 2020-01-07 NOTE — ASSESSMENT & PLAN NOTE
"This is a chronic condition. He reports pain in his left hip that has been present for \"a long time\". Sometimes it seems to be related to when his \"back goes out\". He tries to exercise and do stretches. The pain is located laterally. He assumes he has arthritis and wonders if he should see an orthopedist.   "

## 2020-01-07 NOTE — PROGRESS NOTES
"Subjective:     CC: f/u biopsy    HPI:   Boo presents today with     Skin cancer  This is an acute condition. He recently had a biopsy that showed SCC and he has seen dermatology. They completely removed the carcinoma and he has follow up in a couple of weeks. He reports no need for chemo or radiation at this time.     Pain of left hip joint  This is a chronic condition. He reports pain in his left hip that has been present for \"a long time\". Sometimes it seems to be related to when his \"back goes out\". He tries to exercise and do stretches. The pain is located laterally. He assumes he has arthritis and wonders if he should see an orthopedist.         Past Medical History:   Diagnosis Date   • Arrhythmia     \"flutter\", Dr. Mendoza cardiologist   • Arthritis     left thumb   • Breath shortness    • Cardiac arrhythmia    • Enlarged prostate with urinary obstruction    • GERD (gastroesophageal reflux disease)    • Hyperlipidemia    • Hypertension    • OA (osteoarthritis) of knee     bilateral    • Restless leg syndrome    • Tonsillitis    • Urinary bladder disorder 12/06/2017    savage in place       Social History     Tobacco Use   • Smoking status: Never Smoker   • Smokeless tobacco: Never Used   Substance Use Topics   • Alcohol use: Not Currently     Comment: 1 a month    • Drug use: No       Current Outpatient Medications Ordered in Epic   Medication Sig Dispense Refill   • rosuvastatin (CRESTOR) 20 MG Tab Take 20 mg by mouth every evening. M, W, Fr       No current Epic-ordered facility-administered medications on file.        Allergies:  Penicillins    Health Maintenance: Completed    ROS:  Gen: no fevers/chills  Pulm: + sob - intermittent  CV: no chest pain    Objective:     Exam:  /62 (BP Location: Left arm, Patient Position: Sitting, BP Cuff Size: Adult)   Pulse 62   Temp 36.4 °C (97.5 °F) (Temporal)   Resp 16   Ht 1.854 m (6' 1\")   Wt 85.5 kg (188 lb 9.6 oz)   SpO2 98%   BMI 24.88 kg/m²  Body mass " "index is 24.88 kg/m².    Gen: Alert and oriented, No apparent distress.  Neck: Neck is supple without lymphadenopathy.  Lungs: Normal effort, CTA bilaterally, no wheezes, rhonchi, or rales  CV: Regular rate and rhythm. No murmurs, rubs, or gallops.  Ext: No clubbing, cyanosis, edema.  Back:  Full ROM, 5/5 LE strength, sensation intact bilaterally in LE, no TTP over spinous processes, paraspinals nontender, SI joint nontender, straight leg raise negative, John test negative  L Hip:  Full ROM, full strength, TTP over greater trochanter, FADIR test negative    Assessment & Plan:     74 y.o. male with the following -     1. Skin cancer  This is an acute condition.  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  He is already established with dermatology and had a completely removed.  He reports no further treatment is required but he does have close follow-up scheduled.    2. Greater trochanteric bursitis of left hip  This is a chronic condition, new to me.  He reports for \"a long time\" he has had lateral left hip pain that comes and goes.  He states is not very severe and usually goes away after a day when is been triggered.  He is been tried to do exercises and stretches but is not helping.  Exam indicates likely greater trochanteric bursitis on the left side.  -Handout with exercises and stretches provided  -If no improvement, will provide a corticosteroid injection      Return if symptoms worsen or fail to improve.    Please note that this dictation was created using voice recognition software. I have made every reasonable attempt to correct obvious errors, but I expect that there are errors of grammar and possibly content that I did not discover before finalizing the note.      "

## 2020-01-07 NOTE — ASSESSMENT & PLAN NOTE
"This is a chronic condition, new to me. He reports pain in his left hip that has been present for \"a long time\". Sometimes it seems to be related to when his \"back goes out\". He tries to exercise and do stretches. The pain is located laterally.  He states the pain is not very severe and usually goes away after a day when it is triggered.  He assumes he has arthritis and wonders if he should see an orthopedist.     "

## 2020-01-07 NOTE — ASSESSMENT & PLAN NOTE
This is an acute condition. He recently had a biopsy that showed SCC and he has seen dermatology. They completely removed the carcinoma and he has follow up in a couple of weeks. He reports no need for chemo or radiation at this time.

## 2020-01-08 ENCOUNTER — HOSPITAL ENCOUNTER (OUTPATIENT)
Dept: LAB | Facility: MEDICAL CENTER | Age: 75
End: 2020-01-08
Attending: INTERNAL MEDICINE
Payer: MEDICARE

## 2020-01-08 LAB
ALBUMIN SERPL BCP-MCNC: 4.1 G/DL (ref 3.2–4.9)
ALP SERPL-CCNC: 73 U/L (ref 30–99)
ALT SERPL-CCNC: 37 U/L (ref 2–50)
AST SERPL-CCNC: 27 U/L (ref 12–45)
BILIRUB CONJ SERPL-MCNC: 0.2 MG/DL (ref 0.1–0.5)
BILIRUB INDIRECT SERPL-MCNC: 0.7 MG/DL (ref 0–1)
BILIRUB SERPL-MCNC: 0.9 MG/DL (ref 0.1–1.5)
CHOLEST SERPL-MCNC: 132 MG/DL (ref 100–199)
FASTING STATUS PATIENT QL REPORTED: NORMAL
HDLC SERPL-MCNC: 42 MG/DL
LDLC SERPL CALC-MCNC: 75 MG/DL
PROT SERPL-MCNC: 6.4 G/DL (ref 6–8.2)
TRIGL SERPL-MCNC: 74 MG/DL (ref 0–149)

## 2020-01-08 PROCEDURE — 80076 HEPATIC FUNCTION PANEL: CPT

## 2020-01-08 PROCEDURE — 80061 LIPID PANEL: CPT

## 2020-01-08 PROCEDURE — 36415 COLL VENOUS BLD VENIPUNCTURE: CPT

## 2020-01-21 ENCOUNTER — HOSPITAL ENCOUNTER (OUTPATIENT)
Dept: HOSPITAL 8 - CFH | Age: 75
Discharge: HOME | End: 2020-01-21
Attending: INTERNAL MEDICINE
Payer: MEDICARE

## 2020-01-21 DIAGNOSIS — I25.10: Primary | ICD-10-CM

## 2020-01-21 PROCEDURE — A9502 TC99M TETROFOSMIN: HCPCS

## 2020-01-21 PROCEDURE — 93017 CV STRESS TEST TRACING ONLY: CPT

## 2020-01-21 PROCEDURE — 78452 HT MUSCLE IMAGE SPECT MULT: CPT

## 2020-01-31 ENCOUNTER — HOSPITAL ENCOUNTER (OUTPATIENT)
Dept: LAB | Facility: MEDICAL CENTER | Age: 75
End: 2020-01-31
Attending: PHYSICIAN ASSISTANT
Payer: MEDICARE

## 2020-01-31 PROCEDURE — 87086 URINE CULTURE/COLONY COUNT: CPT

## 2020-02-02 LAB
BACTERIA UR CULT: NORMAL
SIGNIFICANT IND 70042: NORMAL
SITE SITE: NORMAL
SOURCE SOURCE: NORMAL

## 2020-02-07 ENCOUNTER — HOSPITAL ENCOUNTER (OUTPATIENT)
Dept: LAB | Facility: MEDICAL CENTER | Age: 75
End: 2020-02-07
Attending: PHYSICIAN ASSISTANT
Payer: MEDICARE

## 2020-02-07 LAB
ANION GAP SERPL CALC-SCNC: 10 MMOL/L (ref 0–11.9)
BUN SERPL-MCNC: 25 MG/DL (ref 8–22)
CALCIUM SERPL-MCNC: 9.4 MG/DL (ref 8.5–10.5)
CHLORIDE SERPL-SCNC: 104 MMOL/L (ref 96–112)
CO2 SERPL-SCNC: 26 MMOL/L (ref 20–33)
CREAT SERPL-MCNC: 1.08 MG/DL (ref 0.5–1.4)
GLUCOSE SERPL-MCNC: 83 MG/DL (ref 65–99)
POTASSIUM SERPL-SCNC: 4 MMOL/L (ref 3.6–5.5)
SODIUM SERPL-SCNC: 140 MMOL/L (ref 135–145)

## 2020-02-07 PROCEDURE — 80048 BASIC METABOLIC PNL TOTAL CA: CPT

## 2020-02-07 PROCEDURE — 36415 COLL VENOUS BLD VENIPUNCTURE: CPT

## 2020-02-11 ENCOUNTER — HOSPITAL ENCOUNTER (OUTPATIENT)
Dept: RADIOLOGY | Facility: MEDICAL CENTER | Age: 75
End: 2020-02-11
Attending: PHYSICIAN ASSISTANT
Payer: MEDICARE

## 2020-02-11 DIAGNOSIS — R31.0 GROSS HEMATURIA: ICD-10-CM

## 2020-02-11 PROCEDURE — 74178 CT ABD&PLV WO CNTR FLWD CNTR: CPT

## 2020-02-11 PROCEDURE — 700117 HCHG RX CONTRAST REV CODE 255: Performed by: PHYSICIAN ASSISTANT

## 2020-02-11 RX ADMIN — IOHEXOL 100 ML: 350 INJECTION, SOLUTION INTRAVENOUS at 08:00

## 2020-02-12 ENCOUNTER — HOSPITAL ENCOUNTER (OUTPATIENT)
Dept: RADIOLOGY | Facility: MEDICAL CENTER | Age: 75
End: 2020-02-12
Attending: PHYSICIAN ASSISTANT
Payer: MEDICARE

## 2020-02-12 DIAGNOSIS — N28.1 CYST OF KIDNEY, ACQUIRED: ICD-10-CM

## 2020-02-12 PROCEDURE — 76775 US EXAM ABDO BACK WALL LIM: CPT

## 2020-02-13 ENCOUNTER — OFFICE VISIT (OUTPATIENT)
Dept: MEDICAL GROUP | Facility: PHYSICIAN GROUP | Age: 75
End: 2020-02-13
Payer: MEDICARE

## 2020-02-13 VITALS
TEMPERATURE: 98.5 F | OXYGEN SATURATION: 94 % | HEART RATE: 74 BPM | RESPIRATION RATE: 16 BRPM | SYSTOLIC BLOOD PRESSURE: 116 MMHG | BODY MASS INDEX: 24.81 KG/M2 | DIASTOLIC BLOOD PRESSURE: 68 MMHG | WEIGHT: 187.2 LBS | HEIGHT: 73 IN

## 2020-02-13 DIAGNOSIS — R07.89 CHEST PRESSURE: Primary | ICD-10-CM

## 2020-02-13 DIAGNOSIS — R06.02 SHORTNESS OF BREATH: ICD-10-CM

## 2020-02-13 PROCEDURE — 99214 OFFICE O/P EST MOD 30 MIN: CPT | Performed by: FAMILY MEDICINE

## 2020-02-13 NOTE — LETTER
[facility]  Jerica Coronado M.D.  [address]  [place] [address]  Fax: [phone]   Authorization for Release/Disclosure of   Protected Health Information   Name: BOO CAVANAUGH JR. : 1945 SSN: [id number]   Address: [address] [place] [address]  [place] [address] Phone:    [phone] (home) [phone] (work)   I authorize the entity listed below to release/disclose the PHI below to:   [facility]/Jerica Coronado M.D. and Jerica Coronado M.D.   Provider or Entity Name:  [facility]   Address   City, [facility], [facility]   Phone:      Fax:     Reason for request: continuity of care   Information to be released:    [  ] LAST COLONOSCOPY,  including any PATH REPORT and follow-up  [  ] LAST FIT/COLOGUARD RESULT [  ] LAST DEXA  [  ] LAST MAMMOGRAM  [  ] LAST PAP  [  ] LAST LABS [  ] RETINA EXAM REPORT  [  ] IMMUNIZATION RECORDS  [X] Release all info      [  ] Check here and initial the line next to each item to release ALL health information INCLUDING  _____ Care and treatment for drug and / or alcohol abuse  _____ HIV testing, infection status, or AIDS  _____ Genetic Testing    DATES OF SERVICE OR TIME PERIOD TO BE DISCLOSED: _____________  I understand and acknowledge that:  * This Authorization may be revoked at any time by you in writing, except if your health information has already been used or disclosed.  * Your health information that will be used or disclosed as a result of you signing this authorization could be re-disclosed by the recipient. If this occurs, your re-disclosed health information may no longer be protected by State or Federal laws.  * You may refuse to sign this Authorization. Your refusal will not affect your ability to obtain treatment.  * This Authorization becomes effective upon signing and will  on (date) __________.      If no date is indicated, this Authorization will  one (1) year from the signature date.    Name: Boo Cavanaugh Jr.    Signature:   Date:        2/13/2020       PLEASE FAX REQUESTED RECORDS BACK TO: [phone]

## 2020-02-14 NOTE — ASSESSMENT & PLAN NOTE
This is a new problem.    Onset: 2-3 days  Duration: usually less than 1 hour, this morning 1-1.5 hours  Timing: intermittent, with irregular heart beats for last couple of years  Quality: pressure  Associated symptoms: +sob - intermittent for last 2-3 days; +R shoulder/arm pain, +soreness in upper chest  Aggravating factors: walking triggered it today  Alleviating factors: laying down, deep breaths  Radiation: none

## 2020-02-14 NOTE — PROGRESS NOTES
"Subjective:     CC: chest pressure    HPI:   Boo presents today with     Chest pressure  This is a new problem.    Onset: 2-3 days  Duration: usually less than 1 hour, this morning 1-1.5 hours  Timing: intermittent, with irregular heart beats for last couple of years  Quality: pressure  Associated symptoms: +sob - intermittent for last 2-3 days; +R shoulder/arm pain, +soreness in upper chest  Aggravating factors: walking triggered it today  Alleviating factors: laying down, deep breaths  Radiation: none        Past Medical History:   Diagnosis Date   • Arrhythmia     \"flutter\", Dr. Mendoza cardiologist   • Arthritis     left thumb   • Breath shortness    • Cardiac arrhythmia    • Enlarged prostate with urinary obstruction    • GERD (gastroesophageal reflux disease)    • Hyperlipidemia    • Hypertension    • OA (osteoarthritis) of knee     bilateral    • Restless leg syndrome    • Tonsillitis    • Urinary bladder disorder 12/06/2017    savage in place       Social History     Tobacco Use   • Smoking status: Never Smoker   • Smokeless tobacco: Never Used   Substance Use Topics   • Alcohol use: Not Currently   • Drug use: Never       Current Outpatient Medications Ordered in Epic   Medication Sig Dispense Refill   • rosuvastatin (CRESTOR) 20 MG Tab Take 20 mg by mouth every evening. M, W, Fr       No current Epic-ordered facility-administered medications on file.        Allergies:  Penicillins    Health Maintenance: Completed    ROS:  Gen: no fevers/chills, no diaphoresis  GI: + nausea - very mild this morning, no vomiting    Objective:     Exam:  /68 (BP Location: Left arm, Patient Position: Sitting, BP Cuff Size: Adult)   Pulse 74   Temp 36.9 °C (98.5 °F) (Temporal)   Resp 16   Ht 1.854 m (6' 1\")   Wt 84.9 kg (187 lb 3.2 oz)   SpO2 94%   BMI 24.70 kg/m²  Body mass index is 24.7 kg/m².    Gen: Alert and oriented, No apparent distress.  Neck: Neck is supple without lymphadenopathy.  Lungs: Normal effort, CTA " bilaterally, no wheezes, rhonchi, or rales  CV: Regular rate and rhythm. No murmurs, rubs, or gallops.  Ext: No clubbing, cyanosis, edema.  R Shoulder: Full ROM, full strength, empty can test negative, drop arm test negative, Hawkin's negative      EKG Interpretation   Ordered and interpreted by Jerica Coronado MD  Rhythm: normal sinus   Rate: normal   Axis: normal   Ectopy: multiple PVCs  Conduction: normal   ST Segments: no acute change   T Waves: no acute change   Q Waves: none   Clinical Impression: no acute changes and normal EKG except for PVCs.      Assessment & Plan:     74 y.o. male with the following -     1. Chest pressure  2. Shortness of breath  This is a new condition.  He states for the last 2-3 days he is been getting intermittent symptoms of shortness of breath.  He specify that is not trouble breathing as it is not as severe as that just mild shortness of breath.  He also has been getting associated chest pressure with that shortness of breath.  However, he states the chest pressure has been going on for couple of years and he gets that sensation when he has his multiple PVCs.  Usually his symptoms last less than an hour but this morning it lasted upwards of 1.5 hours.  He has also noticed soreness of the right shoulder for the last week and is concerned it may be related to the chest pressure shortness of breath.  Walking today was what triggered the shortness of breath and laying down and providing deep breaths will help.  The pain does not seem to radiate anywhere when he has it.  EKG in the office did not show any ACS signs and just multiple PVCs.  He has an EKG johnnie and the EKG he took during his symptoms of shortness of breath showed lots of PVCs which could be the cause for his pain.  Ideally I want to send him for a stress test as the story is very concerning but he reports that his cardiologist at [facility] just gave him a stress test a month ago he just does not know the results.   Therefore, I am going to request records from his cardiologist.  We also did discuss when he should go to the ER and strict ER precautions were provided.  - EKG - Clinic Performed      Return in about 3 months (around 5/13/2020) for Med check.    Please note that this dictation was created using voice recognition software. I have made every reasonable attempt to correct obvious errors, but I expect that there are errors of grammar and possibly content that I did not discover before finalizing the note.

## 2020-02-21 ENCOUNTER — TELEPHONE (OUTPATIENT)
Dept: CARDIOLOGY | Facility: MEDICAL CENTER | Age: 75
End: 2020-02-21

## 2020-02-21 NOTE — TELEPHONE ENCOUNTER
I spoke with him.  Please see notes from Dr. Coronado.  He describes the same symptoms for me and they have not worsened since she saw him.  The right arm aches and he is wondering if this is due to the Crestor.  It varies with sleep position and is affected by massage.  He continues to have irregular heart rates since the ablation in 11/2018.  Dr. Coronado did an EKG at the visit and that is documented..  He describes his SOB as doing more intense mouth breathing.  I have reassured him; advised ER if he feels like there is a change; and to see Dr. Mendoza as planned.

## 2020-02-21 NOTE — TELEPHONE ENCOUNTER
NAGA Lynch Pt has an appt w/DS on Tuesday. He says that he has been having more irregular heart beats, shortness of breath, and R. Shoulder pain. He would like a call back at: [phone].    Thank you so much,    Ayan

## 2020-02-25 ENCOUNTER — OFFICE VISIT (OUTPATIENT)
Dept: CARDIOLOGY | Facility: MEDICAL CENTER | Age: 75
End: 2020-02-25
Payer: MEDICARE

## 2020-02-25 VITALS
OXYGEN SATURATION: 96 % | DIASTOLIC BLOOD PRESSURE: 82 MMHG | HEIGHT: 73 IN | BODY MASS INDEX: 24.65 KG/M2 | WEIGHT: 186 LBS | SYSTOLIC BLOOD PRESSURE: 130 MMHG | HEART RATE: 81 BPM

## 2020-02-25 DIAGNOSIS — Z86.79 S/P ABLATION OF ATRIAL FLUTTER: ICD-10-CM

## 2020-02-25 DIAGNOSIS — Z98.890 S/P CATHETER ABLATION OF SLOW PATHWAY: ICD-10-CM

## 2020-02-25 DIAGNOSIS — G47.33 OSA (OBSTRUCTIVE SLEEP APNEA): ICD-10-CM

## 2020-02-25 DIAGNOSIS — R07.89 CHEST PRESSURE: ICD-10-CM

## 2020-02-25 DIAGNOSIS — E78.2 MIXED HYPERLIPIDEMIA: ICD-10-CM

## 2020-02-25 DIAGNOSIS — I48.4 ATYPICAL ATRIAL FLUTTER (HCC): Primary | ICD-10-CM

## 2020-02-25 DIAGNOSIS — Z98.890 S/P ABLATION OF ATRIAL FLUTTER: ICD-10-CM

## 2020-02-25 DIAGNOSIS — Z86.79 S/P CATHETER ABLATION OF SLOW PATHWAY: ICD-10-CM

## 2020-02-25 LAB — EKG IMPRESSION: NORMAL

## 2020-02-25 PROCEDURE — 93000 ELECTROCARDIOGRAM COMPLETE: CPT | Performed by: INTERNAL MEDICINE

## 2020-02-25 PROCEDURE — 99214 OFFICE O/P EST MOD 30 MIN: CPT | Performed by: INTERNAL MEDICINE

## 2020-02-25 NOTE — PROGRESS NOTES
"Chief Complaint   Patient presents with   • Atrial Flutter     F/V DX:ATRIAL FLUTTER       Subjective:   Boo Cavanaugh Jr. is a 74 y.o. male who presents today for of atrial flutter and flutter ablation with slow pathway ablation.  Hyperlipidemia with elevated CT score.  Currently on Crestor 20 mg a day.  Some right shoulder pain sounds musculoskeletal.  Negative stress thallium recently.  Seeing Dr. Miguel Farris at [facility].  Continue PVCs with some fatigue.  Atypical chest pain.    Past Medical History:   Diagnosis Date   • Arrhythmia     \"flutter\", Dr. Mendoza cardiologist   • Arthritis     left thumb   • Breath shortness    • Cardiac arrhythmia    • Enlarged prostate with urinary obstruction    • GERD (gastroesophageal reflux disease)    • Hyperlipidemia    • Hypertension    • OA (osteoarthritis) of knee     bilateral    • Restless leg syndrome    • Tonsillitis    • Urinary bladder disorder 12/06/2017    savage in place     Past Surgical History:   Procedure Laterality Date   • TURP-VAPOR  12/7/2017    Procedure: TURP-VAPOR - GREEN LIGHT PHOTOSELECTIVE;  Surgeon: Cristofer Cr M.D.;  Location: SURGERY [facility];  Service: Urology   • GANGLION EXCISION Right 1966    wrist   • TONSILLECTOMY      as a child     Family History   Problem Relation Age of Onset   • Heart Disease Mother 65        MI   • Cancer Father    • Cancer Brother 50        prostate cancer   • Stroke Brother    • Diabetes Neg Hx    • Hyperlipidemia Neg Hx    • Alcohol/Drug Neg Hx      Social History     Socioeconomic History   • Marital status: Single     Spouse name: Not on file   • Number of children: Not on file   • Years of education: Not on file   • Highest education level: Not on file   Occupational History   • Not on file   Social Needs   • Financial resource strain: Not on file   • Food insecurity     Worry: Not on file     Inability: Not on file   • Transportation needs     Medical: Not on file     Non-medical: Not on file " "  Tobacco Use   • Smoking status: Never Smoker   • Smokeless tobacco: Never Used   Substance and Sexual Activity   • Alcohol use: Not Currently   • Drug use: Never   • Sexual activity: Never     Comment: 2 daughters    Lifestyle   • Physical activity     Days per week: Not on file     Minutes per session: Not on file   • Stress: Not on file   Relationships   • Social connections     Talks on phone: Not on file     Gets together: Not on file     Attends [employer] service: Not on file     Active member of club or organization: Not on file     Attends meetings of clubs or organizations: Not on file     Relationship status: Not on file   • Intimate partner violence     Fear of current or ex partner: Not on file     Emotionally abused: Not on file     Physically abused: Not on file     Forced sexual activity: Not on file   Other Topics Concern   • Not on file   Social History Narrative   • Not on file     Allergies   Allergen Reactions   • Penicillins Rash     Rash as a child.      Outpatient Encounter Medications as of 2/25/2020   Medication Sig Dispense Refill   • rosuvastatin (CRESTOR) 20 MG Tab Take 20 mg by mouth every evening. M, W, Fr       No facility-administered encounter medications on file as of 2/25/2020.      ROS     Objective:   /82 (BP Location: Left arm, Patient Position: Sitting, BP Cuff Size: Adult)   Pulse 81   Ht 1.854 m (6' 1\")   Wt 84.4 kg (186 lb)   SpO2 96%   BMI 24.54 kg/m²     Physical Exam   Constitutional: He is oriented to person, place, and time. He appears well-developed and well-nourished.   HENT:   Head: Normocephalic and atraumatic.   Eyes: EOM are normal.   Neck: Normal range of motion. Neck supple.   Cardiovascular: Normal rate, regular rhythm and intact distal pulses. Frequent extrasystoles are present. Exam reveals no gallop and no friction rub.   No murmur heard.  Pulmonary/Chest: Effort normal and breath sounds normal.   Abdominal: Soft.   Musculoskeletal: Normal range " of motion.         General: No edema.   Neurological: He is alert and oriented to person, place, and time.   Skin: Skin is warm and dry.   Psychiatric: He has a normal mood and affect. His behavior is normal. Judgment and thought content normal.       Assessment:     1. Atypical atrial flutter (HCC)  EKG    Holter Monitor Study   2. Chest pressure     3. S/P catheter ablation of slow pathway     4. S/P ablation of atrial flutter     5. Mixed hyperlipidemia     6. SHERRY (obstructive sleep apnea)         Medical Decision Making:  Today's Assessment / Status / Plan:   1.  Outflow track PVCs.  Check Holter for quantity and symptoms.  2.  Atypical chest pain negative thallium.  Reassurance.  3.  Hyperlipidemia continue statins but possibly cut the Crestor down to 10 mg a day.  This is primary prevention.  He did try aspirin but had hematuria so he is off.  Shoulder pain possibly related to statins.  4.  Atrial flutter.  No recurrence.  5.  Follow-up in 3 months.

## 2020-02-25 NOTE — LETTER
"     [facility]   [address], [address]  [place] [address]  Phone: [phone]  Fax: [phone]              Boo Cavanaugh Jr.  1945    Encounter Date: 2/25/2020    Enoch Mendoza M.D.          PROGRESS NOTE:  Chief Complaint   Patient presents with   • Atrial Flutter     F/V DX:ATRIAL FLUTTER       Subjective:   Boo Cavanaugh Jr. is a 74 y.o. male who presents today for of atrial flutter and flutter ablation with slow pathway ablation.  Hyperlipidemia with elevated CT score.  Currently on Crestor 20 mg a day.  Some right shoulder pain sounds musculoskeletal.  Negative stress thallium recently.  Seeing Dr. Miguel Farris at [place].  Continue PVCs with some fatigue.  Atypical chest pain.    Past Medical History:   Diagnosis Date   • Arrhythmia     \"flutter\", Dr. Mendoza cardiologist   • Arthritis     left thumb   • Breath shortness    • Cardiac arrhythmia    • Enlarged prostate with urinary obstruction    • GERD (gastroesophageal reflux disease)    • Hyperlipidemia    • Hypertension    • OA (osteoarthritis) of knee     bilateral    • Restless leg syndrome    • Tonsillitis    • Urinary bladder disorder 12/06/2017    savage in place     Past Surgical History:   Procedure Laterality Date   • TURP-VAPOR  12/7/2017    Procedure: TURP-VAPOR - GREEN LIGHT PHOTOSELECTIVE;  Surgeon: Cristofer Cr M.D.;  Location: SURGERY [facility];  Service: Urology   • GANGLION EXCISION Right 1966    wrist   • TONSILLECTOMY      as a child     Family History   Problem Relation Age of Onset   • Heart Disease Mother 65        MI   • Cancer Father    • Cancer Brother 50        prostate cancer   • Stroke Brother    • Diabetes Neg Hx    • Hyperlipidemia Neg Hx    • Alcohol/Drug Neg Hx      Social History     Socioeconomic History   • Marital status: Single     Spouse name: Not on file   • Number of children: Not on file   • Years of education: Not on file   • Highest education level: " "Not on file   Occupational History   • Not on file   Social Needs   • Financial resource strain: Not on file   • Food insecurity     Worry: Not on file     Inability: Not on file   • Transportation needs     Medical: Not on file     Non-medical: Not on file   Tobacco Use   • Smoking status: Never Smoker   • Smokeless tobacco: Never Used   Substance and Sexual Activity   • Alcohol use: Not Currently   • Drug use: Never   • Sexual activity: Never     Comment: 2 daughters    Lifestyle   • Physical activity     Days per week: Not on file     Minutes per session: Not on file   • Stress: Not on file   Relationships   • Social connections     Talks on phone: Not on file     Gets together: Not on file     Attends Religion service: Not on file     Active member of club or organization: Not on file     Attends meetings of clubs or organizations: Not on file     Relationship status: Not on file   • Intimate partner violence     Fear of current or ex partner: Not on file     Emotionally abused: Not on file     Physically abused: Not on file     Forced sexual activity: Not on file   Other Topics Concern   • Not on file   Social History Narrative   • Not on file     Allergies   Allergen Reactions   • Penicillins Rash     Rash as a child.      Outpatient Encounter Medications as of 2/25/2020   Medication Sig Dispense Refill   • rosuvastatin (CRESTOR) 20 MG Tab Take 20 mg by mouth every evening. M, W, Fr       No facility-administered encounter medications on file as of 2/25/2020.      ROS     Objective:   /82 (BP Location: Left arm, Patient Position: Sitting, BP Cuff Size: Adult)   Pulse 81   Ht 1.854 m (6' 1\")   Wt 84.4 kg (186 lb)   SpO2 96%   BMI 24.54 kg/m²      Physical Exam   Constitutional: He is oriented to person, place, and time. He appears well-developed and well-nourished.   HENT:   Head: Normocephalic and atraumatic.   Eyes: EOM are normal.   Neck: Normal range of motion. Neck supple.   Cardiovascular: " Normal rate, regular rhythm and intact distal pulses. Frequent extrasystoles are present. Exam reveals no gallop and no friction rub.   No murmur heard.  Pulmonary/Chest: Effort normal and breath sounds normal.   Abdominal: Soft.   Musculoskeletal: Normal range of motion.         General: No edema.   Neurological: He is alert and oriented to person, place, and time.   Skin: Skin is warm and dry.   Psychiatric: He has a normal mood and affect. His behavior is normal. Judgment and thought content normal.       Assessment:     1. Atypical atrial flutter (HCC)  EKG    Holter Monitor Study   2. Chest pressure     3. S/P catheter ablation of slow pathway     4. S/P ablation of atrial flutter     5. Mixed hyperlipidemia     6. SHERRY (obstructive sleep apnea)         Medical Decision Making:  Today's Assessment / Status / Plan:   1.  Outflow track PVCs.  Check Holter for quantity and symptoms.  2.  Atypical chest pain negative thallium.  Reassurance.  3.  Hyperlipidemia continue statins but possibly cut the Crestor down to 10 mg a day.  This is primary prevention.  He did try aspirin but had hematuria so he is off.  4.  Atrial flutter.  No recurrence.  5.  Follow-up in 3 months.      Miguel Farris D.O.  [address]  [address] [address]  VIA Facsimile: [phone]

## 2020-03-18 ENCOUNTER — HOSPITAL ENCOUNTER (OUTPATIENT)
Dept: LAB | Facility: MEDICAL CENTER | Age: 75
End: 2020-03-18
Attending: INTERNAL MEDICINE
Payer: MEDICARE

## 2020-03-18 LAB
CHOLEST SERPL-MCNC: 140 MG/DL (ref 100–199)
FASTING STATUS PATIENT QL REPORTED: NORMAL
HDLC SERPL-MCNC: 53 MG/DL
LDLC SERPL CALC-MCNC: 73 MG/DL
TRIGL SERPL-MCNC: 71 MG/DL (ref 0–149)

## 2020-03-18 PROCEDURE — 36415 COLL VENOUS BLD VENIPUNCTURE: CPT | Mod: GA

## 2020-03-18 PROCEDURE — 80061 LIPID PANEL: CPT | Mod: GA

## 2020-03-27 ENCOUNTER — HOSPITAL ENCOUNTER (OUTPATIENT)
Dept: LAB | Facility: MEDICAL CENTER | Age: 75
End: 2020-03-27
Attending: UROLOGY
Payer: MEDICARE

## 2020-03-27 LAB — PSA SERPL-MCNC: 3.46 NG/ML (ref 0–4)

## 2020-03-27 PROCEDURE — 84153 ASSAY OF PSA TOTAL: CPT

## 2020-03-27 PROCEDURE — 36415 COLL VENOUS BLD VENIPUNCTURE: CPT

## 2020-05-05 ENCOUNTER — TELEMEDICINE (OUTPATIENT)
Dept: MEDICAL GROUP | Facility: PHYSICIAN GROUP | Age: 75
End: 2020-05-05
Payer: MEDICARE

## 2020-05-05 VITALS — BODY MASS INDEX: 24.65 KG/M2 | HEIGHT: 73 IN | WEIGHT: 186 LBS | OXYGEN SATURATION: 95 % | TEMPERATURE: 98.2 F

## 2020-05-05 DIAGNOSIS — R41.3 MEMORY CHANGE: ICD-10-CM

## 2020-05-05 PROCEDURE — 99214 OFFICE O/P EST MOD 30 MIN: CPT | Mod: 95,CR | Performed by: FAMILY MEDICINE

## 2020-05-05 RX ORDER — TRIAMCINOLONE ACETONIDE 1 MG/G
CREAM TOPICAL
COMMUNITY
End: 2021-04-20

## 2020-05-05 RX ORDER — ASPIRIN 81 MG/1
81 TABLET ORAL DAILY
COMMUNITY
Start: 2020-04-29

## 2020-05-05 ASSESSMENT — FIBROSIS 4 INDEX: FIB4 SCORE: 1.49

## 2020-05-05 NOTE — PROGRESS NOTES
Telemedicine Visit: Established Patient     This encounter was conducted via Zoom .   Verbal consent was obtained. Patient's identity was verified.    Subjective:   CC: memory concerns  Boo Cavanaugh Jr. is a 75 y.o. male presenting for evaluation and management of:    Memory change  This is a new condition. He states for a while he is having trouble remembering some items but then it would come to him shortly.     He notes ~6 days ago he had significant difficulty with focusing and completing a task. He laid down for a while, relaxed and reports he started feeling better soon. He has not had a repeat of this episode.     He notes a couple months ago he had lost sense of taste and smell but that is improved but not resolved. At the same time, he doesn't think he had a concurrent URI. However, he has noted a lot of rhinorrhea and itching in his nose. He is not taking any medication for allergies.     He states for almost a year he has felt foggy and doesn't feel like his thinking as clear as he used to be. He also feels there is some pressure in his skull constantly. There are good and bad days.       ROS   Denies any recent fevers or chills. No nausea or vomiting. No chest pains. + shortness of breath - occasional    Allergies   Allergen Reactions   • Penicillins Rash     Rash as a child.        Current medicines (including changes today)  Current Outpatient Medications   Medication Sig Dispense Refill   • aspirin (ASPIR-LOW) 81 MG EC tablet Take  by mouth every day.     • triamcinolone acetonide (KENALOG) 0.1 % Cream triamcinolone acetonide 0.1 % topical cream     • rosuvastatin (CRESTOR) 20 MG Tab Take 20 mg by mouth every evening. Mon-Fri       No current facility-administered medications for this visit.        Patient Active Problem List    Diagnosis Date Noted   • Memory change 05/05/2020   • Hyperlipidemia    • Chest pressure 02/13/2020   • Pain of left hip joint 01/07/2020   • Skin cancer 11/13/2019   •  "Primary osteoarthritis involving multiple joints 03/12/2019   • S/P ablation of atrial flutter 01/15/2019   • S/P catheter ablation of slow pathway 01/15/2019   • Prehypertension 01/08/2019   • Tenosynovitis, de Quervain 01/08/2019   • SHERRY (obstructive sleep apnea) 11/29/2018   • Elevated LDL cholesterol level 06/07/2018   • Benign prostatic hyperplasia without lower urinary tract symptoms 11/15/2017   • Atrial flutter (HCC) 11/11/2017       Family History   Problem Relation Age of Onset   • Heart Disease Mother 65        MI   • Cancer Father    • Cancer Brother 50        prostate cancer   • Stroke Brother    • Diabetes Neg Hx    • Hyperlipidemia Neg Hx    • Alcohol/Drug Neg Hx        He  has a past medical history of Arrhythmia, Arthritis, Breath shortness, Cardiac arrhythmia, Enlarged prostate with urinary obstruction, GERD (gastroesophageal reflux disease), Hyperlipidemia, Hypertension, OA (osteoarthritis) of knee, Restless leg syndrome, Tonsillitis, and Urinary bladder disorder (12/06/2017).  He  has a past surgical history that includes tonsillectomy; ganglion excision (Right, 1966); and turp-vapor (12/7/2017).       Objective:   Vitals obtained by patient:  Pulse ox: 95%, Temp: 98.2, Height: 6' 1\" and Weight: 186 lbs    Physical Exam:  Constitutional: Alert, no distress, well-groomed.  Skin: No rashes in visible areas.  Eye: Round. Conjunctiva clear, lids normal. No icterus.   ENMT: Lips pink without lesions, good dentition, moist mucous membranes. Phonation normal.  Neck: No masses, no thyromegaly. Moves freely without pain.  Respiratory: Unlabored respiratory effort, no cough or audible wheeze  Psych: Alert and oriented x3, normal affect and mood.       Assessment and Plan:   The following treatment plan was discussed:     1. Memory change  This is a new condition.  He states for almost about a year he almost constantly feels foggy and feels like his thinking is not as clear as it used to be.  He also notes " a pressure in his skull that is present constantly but there are good and bad days.  He will have trouble remembering some items but then it will come back to him fairly shortly.  He then notes about 6 days ago he had significant difficulty with focusing and completing a task.  He states this is very unlike him and he actually went to go lay down and rest for a while and shortly started to feel better.  He has not had a repeat of that significant of impairment.  He does note a couple months ago he lost his sense of taste and smell.  He did not have a concurrent URI but he did have some rhinorrhea and itching of his nose.  There may been some allergies contributing to his loss of smell but he states that that has not really improved and the allergy symptoms are resolved.  He is very concerned about his neurologic state.  I did do a very brief memory screen and his three-minute word recall was 2/3 and his clock was appropriate.  Since is taking a while to get into neurology will get some basic lab work to screen for organic causes of memory issues.  We also did briefly discuss depression he states that he has no depression so that may not be contributing to his memory changes either.  - REFERRAL TO NEUROLOGY  - CBC WITH DIFFERENTIAL; Future  - VITAMIN B12; Future  - TSH WITH REFLEX TO FT4; Future    Follow-up: Return if symptoms worsen or fail to improve.

## 2020-05-05 NOTE — ASSESSMENT & PLAN NOTE
This is a new condition. He states for a while he is having trouble remembering some items but then it would come to him shortly.     He notes ~6 days ago he had significant difficulty with focusing and completing a task. He laid down for a while, relaxed and reports he started feeling better soon. He has not had a repeat of this episode.     He notes a couple months ago he had lost sense of taste and smell but that is improved but not resolved. At the same time, he doesn't think he had a concurrent URI. However, he has noted a lot of rhinorrhea and itching in his nose. He is not taking any medication for allergies.     He states for almost a year he has felt foggy and doesn't feel like his thinking as clear as he used to be. He also feels there is some pressure in his skull constantly. There are good and bad days.

## 2020-05-06 ENCOUNTER — HOSPITAL ENCOUNTER (OUTPATIENT)
Dept: LAB | Facility: MEDICAL CENTER | Age: 75
End: 2020-05-06
Attending: FAMILY MEDICINE
Payer: MEDICARE

## 2020-05-06 DIAGNOSIS — R41.3 MEMORY CHANGE: ICD-10-CM

## 2020-05-06 LAB
BASOPHILS # BLD AUTO: 0.6 % (ref 0–1.8)
BASOPHILS # BLD: 0.04 K/UL (ref 0–0.12)
EOSINOPHIL # BLD AUTO: 0.16 K/UL (ref 0–0.51)
EOSINOPHIL NFR BLD: 2.6 % (ref 0–6.9)
ERYTHROCYTE [DISTWIDTH] IN BLOOD BY AUTOMATED COUNT: 46.3 FL (ref 35.9–50)
HCT VFR BLD AUTO: 47.7 % (ref 42–52)
HGB BLD-MCNC: 16.1 G/DL (ref 14–18)
IMM GRANULOCYTES # BLD AUTO: 0.01 K/UL (ref 0–0.11)
IMM GRANULOCYTES NFR BLD AUTO: 0.2 % (ref 0–0.9)
LYMPHOCYTES # BLD AUTO: 1.42 K/UL (ref 1–4.8)
LYMPHOCYTES NFR BLD: 23 % (ref 22–41)
MCH RBC QN AUTO: 33.4 PG (ref 27–33)
MCHC RBC AUTO-ENTMCNC: 33.8 G/DL (ref 33.7–35.3)
MCV RBC AUTO: 99 FL (ref 81.4–97.8)
MONOCYTES # BLD AUTO: 0.69 K/UL (ref 0–0.85)
MONOCYTES NFR BLD AUTO: 11.2 % (ref 0–13.4)
NEUTROPHILS # BLD AUTO: 3.86 K/UL (ref 1.82–7.42)
NEUTROPHILS NFR BLD: 62.4 % (ref 44–72)
NRBC # BLD AUTO: 0 K/UL
NRBC BLD-RTO: 0 /100 WBC
PLATELET # BLD AUTO: 197 K/UL (ref 164–446)
PMV BLD AUTO: 11 FL (ref 9–12.9)
RBC # BLD AUTO: 4.82 M/UL (ref 4.7–6.1)
TSH SERPL DL<=0.005 MIU/L-ACNC: 3.28 UIU/ML (ref 0.38–5.33)
VIT B12 SERPL-MCNC: 314 PG/ML (ref 211–911)
WBC # BLD AUTO: 6.2 K/UL (ref 4.8–10.8)

## 2020-05-06 PROCEDURE — 84443 ASSAY THYROID STIM HORMONE: CPT

## 2020-05-06 PROCEDURE — 85025 COMPLETE CBC W/AUTO DIFF WBC: CPT

## 2020-05-06 PROCEDURE — 36415 COLL VENOUS BLD VENIPUNCTURE: CPT

## 2020-05-06 PROCEDURE — 82607 VITAMIN B-12: CPT

## 2020-05-11 ENCOUNTER — HOSPITAL ENCOUNTER (OUTPATIENT)
Dept: LAB | Facility: MEDICAL CENTER | Age: 75
End: 2020-05-11
Attending: FAMILY MEDICINE
Payer: MEDICARE

## 2020-05-11 DIAGNOSIS — D75.89 MACROCYTOSIS: ICD-10-CM

## 2020-05-11 LAB
FOLATE SERPL-MCNC: 9.8 NG/ML
HGB RETIC QN AUTO: 35.9 PG/CELL (ref 29–35)
IMM RETICS NFR: 3.1 % (ref 9.3–17.4)
RETICS # AUTO: 0.05 M/UL (ref 0.04–0.06)
RETICS/RBC NFR: 0.9 % (ref 0.8–2.1)

## 2020-05-11 PROCEDURE — 82746 ASSAY OF FOLIC ACID SERUM: CPT

## 2020-05-11 PROCEDURE — 36415 COLL VENOUS BLD VENIPUNCTURE: CPT

## 2020-05-11 PROCEDURE — 85046 RETICYTE/HGB CONCENTRATE: CPT

## 2020-05-26 ENCOUNTER — TELEMEDICINE (OUTPATIENT)
Dept: CARDIOLOGY | Facility: MEDICAL CENTER | Age: 75
End: 2020-05-26
Payer: MEDICARE

## 2020-05-26 ENCOUNTER — TELEPHONE (OUTPATIENT)
Dept: CARDIOLOGY | Facility: MEDICAL CENTER | Age: 75
End: 2020-05-26

## 2020-05-26 ENCOUNTER — OFFICE VISIT (OUTPATIENT)
Dept: NEUROLOGY | Facility: MEDICAL CENTER | Age: 75
End: 2020-05-26
Payer: MEDICARE

## 2020-05-26 VITALS
DIASTOLIC BLOOD PRESSURE: 82 MMHG | SYSTOLIC BLOOD PRESSURE: 130 MMHG | BODY MASS INDEX: 24.25 KG/M2 | WEIGHT: 183 LBS | HEIGHT: 73 IN

## 2020-05-26 VITALS
HEIGHT: 74 IN | BODY MASS INDEX: 23.61 KG/M2 | DIASTOLIC BLOOD PRESSURE: 74 MMHG | OXYGEN SATURATION: 96 % | WEIGHT: 184 LBS | HEART RATE: 56 BPM | SYSTOLIC BLOOD PRESSURE: 130 MMHG | TEMPERATURE: 98.3 F

## 2020-05-26 DIAGNOSIS — Z86.79 S/P CATHETER ABLATION OF SLOW PATHWAY: ICD-10-CM

## 2020-05-26 DIAGNOSIS — E78.00 ELEVATED LDL CHOLESTEROL LEVEL: ICD-10-CM

## 2020-05-26 DIAGNOSIS — Z86.79 S/P ABLATION OF ATRIAL FLUTTER: ICD-10-CM

## 2020-05-26 DIAGNOSIS — Z98.890 S/P ABLATION OF ATRIAL FLUTTER: ICD-10-CM

## 2020-05-26 DIAGNOSIS — I49.3 PVC (PREMATURE VENTRICULAR CONTRACTION): ICD-10-CM

## 2020-05-26 DIAGNOSIS — G47.33 OSA (OBSTRUCTIVE SLEEP APNEA): ICD-10-CM

## 2020-05-26 DIAGNOSIS — E78.2 MIXED HYPERLIPIDEMIA: ICD-10-CM

## 2020-05-26 DIAGNOSIS — R41.89 COGNITIVE IMPAIRMENT: Primary | ICD-10-CM

## 2020-05-26 DIAGNOSIS — Z98.890 S/P CATHETER ABLATION OF SLOW PATHWAY: ICD-10-CM

## 2020-05-26 PROCEDURE — 99214 OFFICE O/P EST MOD 30 MIN: CPT | Mod: 95,CR | Performed by: INTERNAL MEDICINE

## 2020-05-26 PROCEDURE — 99205 OFFICE O/P NEW HI 60 MIN: CPT | Performed by: PSYCHIATRY & NEUROLOGY

## 2020-05-26 RX ORDER — METOPROLOL SUCCINATE 25 MG/1
25 TABLET, EXTENDED RELEASE ORAL DAILY
Qty: 90 TAB | Refills: 3 | Status: SHIPPED
Start: 2020-05-26 | End: 2021-04-20

## 2020-05-26 ASSESSMENT — ENCOUNTER SYMPTOMS
INSOMNIA: 0
NERVOUS/ANXIOUS: 0
MEMORY LOSS: 1
FOCAL WEAKNESS: 0
FALLS: 0
TREMORS: 0

## 2020-05-26 ASSESSMENT — FIBROSIS 4 INDEX
FIB4 SCORE: 1.67
FIB4 SCORE: 1.67

## 2020-05-26 NOTE — PROGRESS NOTES
Subjective:      Boo Cavanaugh Jr. is a 75 y.o. male who presents for consultation, from the office of Dr. Jerica Coronado MD, with a history of an indolent memory loss dating back a couple of years.  Unfortunately, he presents alone, a third-party observer not available.    ANALIA Haddad is a pleasant 75-year-old right-handed gentleman who had begun to notice some issues with mental processing dating back a couple of years but which seemed to have come to ahead just about 1 month ago.  At the time he had just finished a video conference with some fellow staff members, and then afterward  found himself incapable of putting together an outline for some of the issues that have been raised by the individuals in the conference.  He did eventually complete the task but it took him much longer to do so.  He actually had to sleep on it and with a clear mind the next morning, seemed to do well.  Still, he did not feel it was performed at his usual level.    Over time prior to this, he found that he was having more difficulty putting his thoughts together in a cogent fashion.  None of this has been incapacitating but it is getting frustrating.  His mental processing speed is slowed, even the acquisition and learning of new bits of information is slower.  This is often happening in areas that she should be quite at ease with.  There is a mild impairment of short-term memory with delayed recall, he does require cueing but for the most part gets there.  Still, it can be embarrassing.  He does not repeat himself, he has some occasional word finding issues but these are minimal, he can use that very same word in conversation into the future appropriately.    He requires more structure overall, he can become easily distracted, and thus, multitasking takes him longer to complete though he can do so.  His mood has been euthymic, there have been no reports of changes in his personality or behavior, he does not feel he is more  irritable.  With all of the above, he seems to be the only one who is aware.    He is using electronics appropriately, drives safely, keeps his medication regimen up to speed.  He drives safely, is not getting lost.  He has not found himself doing things like leaving the house unlocked, the car unlocked, water running, the stove on, etc.  He does not hallucinate, has not had suspiciousness overwhelm him.  He denies REM behavioral disorder symptoms, he sleeps well in general.    Speech and voice volume are normal, he denies tremor in the hands, loss of dexterity, loss of smell or taste, he does not drool.  Though he walks more slowly, he is to certainly not feel he is much more unsteady, he has not been tripping or falling with any kind of regularity.  He denies changes with bowel or bladder control.    A directed work-up has been started, earlier this year, B12, folate, TSH values were all checked and found to be normal.  Imaging has not been done.    He has a history of SHERRY on CPAP at home, hypertension, dyslipidemia, atrial flutter, no history of diagnosed neurodegenerative disease, MS, stroke, seizure, migraine, autoimmune disease, psychiatric disease, liver or kidney disease, diabetes, hypertension, PVD, CAD, CVA, or significant pulmonary disease other than the above.  There is no surgical history of note from my standpoint.    No one in the family including both his parents, 2 brothers and his 2 daughters have suffered from dementia or been diagnosed with a neurodegenerative disease.  His father had cancer.  Siblings and  daughters are alive and well.    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    He is on baby aspirin, Toprol-XL, Crestor.    Review of Systems   Musculoskeletal: Negative for falls.   Neurological: Negative for tremors and focal weakness.   Psychiatric/Behavioral: Positive for memory loss. The patient is not nervous/anxious and  "does not have insomnia.    All other systems reviewed and are negative.       Objective:     /74 (BP Location: Left arm, Patient Position: Sitting, BP Cuff Size: Adult)   Pulse (!) 56   Temp 36.8 °C (98.3 °F) (Temporal)   Ht 1.88 m (6' 2\")   Wt 83.5 kg (184 lb)   SpO2 96%   BMI 23.62 kg/m²      Physical Exam    He appears in no acute distress.  Clean and appropriately dressed, he maintains good eye contact throughout the interview.  He is very pleasant in spirit and demeanor, mood is euthymic, affect is mood appropriate.  His vital signs are stable.  There is no malar rash or sialorrhea.  The neck is supple, range of motion is full.  Carotid pulses are present without asymmetry.  Cardiac evaluation reveals a regular rhythm.    He is fully oriented, there is a little slowing with mental processing but he completes tasks and answers questions appropriately.  Still, MOCA is low at 24/30.  The biggest issues have to do with delayed recall, though with cueing he correctly recounts all 5 words.  With executive and visuospatial, the mistakes that were made were simply because he did not listen to the full instructions that were provided.  Fund of knowledge was intact.  There is no aphasia, agnosia, apraxia, or inattention.    PERRLA/EOMI, visual fields are full to finger counting on confrontation bilaterally, funduscopic exam is benign, facial movements are symmetric, there is no hypophonia or dysarthria.  Sensory exam is intact to temperature and light touch bilaterally.  The tongue and uvula are midline.  Shoulder shrug and head rotation are intact and symmetric.    Musculoskeletal exam reveals normal tone bilaterally, there is no tremor, asterixis, or drift.  There is no bradykinesia.  Strength is 5/5 in all 4 extremities.  Reflexes are brisk and present throughout, there are no asymmetries and none are dropped.  Both toes are downgoing.    He stands slowly, stride length is diminished, arm swing is " symmetric.  There is no appendicular dystaxia.  Heel, toe, and tandem walking can all be done in normal fashion.  Repetitive movements and fine motor control are also intact and symmetric with normal amplitude and frequencies throughout.    Sensory exam is intact to vibration and temperature, Romberg is absent.     Assessment/Plan:     1. 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  Still, work-up has yet to be done though blood work looking for typical medical causes of cognitive impairment has been done and completed.  Imaging of the brain can be done for thoroughness.  I do not think an EEG study is indicated in this circumstance.    We talked about conditions that have a symptomatic presentation with cognitive impairment versus primary degenerative neurologic disorders, how they differ, and how the diagnosis can be established.  The latter pretty much is established once the former collection of diseases have been ruled out and we are thus left with observation and symptomatic relief when indicated.  Observation at this time is appropriate, he certainly does not warrant symptomatic relief.    MRI scan of the brain will be ordered, we will call him with the results if necessary, otherwise we talk in several months over specifics and then an annual follow-up probably can be pursued.    - MR-BRAIN-W/O; Future    Time: 60-minute spent face-to-face for exam, review, discussion, and education, of this over 50% of the time spent counseling and coordinating care.

## 2020-05-26 NOTE — TELEPHONE ENCOUNTER
Did you want to quantitate PVC's on or off Beta blockers?    From pt email:    Boo Cavanaugh Jr., David E, M.D. 5 hours ago (8:20 AM)          I just read a bit about beta blockers and was concerned about this - Beta blockers cause your heart to beat more slowly and with less force, which lowers blood pressure.  This seems contrary to my experience of not getting enough air and resting hear rate going low when I am sleeping and apneas. My blood pressure readings over the past year vary widely from 120/80 to 140-150/90. I am naturally very adverse to medications unless absolutely necessary. Would it be better to wait for results of hear monitor before starting the beta blockers?

## 2020-05-26 NOTE — PROGRESS NOTES
Telemedicine Visit: Established Patient     This encounter was conducted via Zoom .   Verbal consent was obtained. Patient's identity was verified.    Subjective:   CC: Symptomatic PVCs  Boo Cavanaugh Jr. is a 75 y.o. 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  Some increased fatigue and low energy related to PVCs previously 8% PVCs.  Currently not on medications.  Some senior moments seen neurology.  Has not noticed any atrial arrhythmia.  Has a Kardia with PVCs with symptoms.         Allergies   Allergen Reactions   • Penicillins Rash     Rash as a child.        Current medicines (including changes today)  Current Outpatient Medications   Medication Sig Dispense Refill   • metoprolol SR (TOPROL XL) 25 MG TABLET SR 24 HR Take 1 Tab by mouth every day. 90 Tab 3   • aspirin (ASPIR-LOW) 81 MG EC tablet Take  by mouth every day.     • triamcinolone acetonide (KENALOG) 0.1 % Cream triamcinolone acetonide 0.1 % topical cream     • rosuvastatin (CRESTOR) 20 MG Tab Take 20 mg by mouth every evening. Mon-Fri       No current facility-administered medications for this visit.        Patient Active Problem List    Diagnosis Date Noted   • Memory change 05/05/2020   • Hyperlipidemia    • Chest pressure 02/13/2020   • Pain of left hip joint 01/07/2020   • Skin cancer 11/13/2019   • Primary osteoarthritis involving multiple joints 03/12/2019   • S/P ablation of atrial flutter 01/15/2019   • S/P catheter ablation of slow pathway 01/15/2019   • Prehypertension 01/08/2019   • Tenosynovitis, de Quervain 01/08/2019   • SHERRY (obstructive sleep apnea) 11/29/2018   • Elevated LDL cholesterol level 06/07/2018   • Benign prostatic hyperplasia without lower urinary tract symptoms 11/15/2017   • Atrial flutter (HCC) 11/11/2017       Family History   Problem Relation Age of Onset   • Heart Disease Mother 65        MI   • Cancer Father    • Cancer  "Brother 50        prostate cancer   • Stroke Brother    • Diabetes Neg Hx    • Hyperlipidemia Neg Hx    • Alcohol/Drug Neg Hx        He  has a past medical history of Arrhythmia, Arthritis, Breath shortness, Cardiac arrhythmia, Enlarged prostate with urinary obstruction, GERD (gastroesophageal reflux disease), Hyperlipidemia, Hypertension, OA (osteoarthritis) of knee, Restless leg syndrome, Tonsillitis, and Urinary bladder disorder (12/06/2017).  He  has a past surgical history that includes tonsillectomy; ganglion excision (Right, 1966); and turp-vapor (12/7/2017).       Objective:   /82 Comment: unable to take BP. BP from last reading in [facility]  Ht 1.854 m (6' 1\")   Wt 83 kg (183 lb)   BMI 24.14 kg/m²     Kardia tracings reviewed with PVCs sinus rhythm      Assessment and Plan:   The following treatment plan was discussed:     1. PVC (premature ventricular contraction)  - Holter Monitor Study    2. Elevated LDL cholesterol level    3. Mixed hyperlipidemia    4. SHERRY (obstructive sleep apnea)    5. S/P ablation of atrial flutter    6. S/P catheter ablation of slow pathway    Other orders  - metoprolol SR (TOPROL XL) 25 MG TABLET SR 24 HR; Take 1 Tab by mouth every day.  Dispense: 90 Tab; Refill: 3  1.  PVCs possibly symptomatic.  Start Toprol 25 mg a day.  Holter monitor to quantitate.  2.  Some change in cognitive function seeing neurology.  3.  Atrial arrhythmias no recurrence.  4.  Hyperlipidemia on statins.      Follow-up: Return in about 1 month (around 6/26/2020).         "

## 2020-05-26 NOTE — LETTER
[facility]   [address], [address]  [place] [address]  Phone: [phone]  Fax: [phone]              Boo Cavanaugh Jr.  1945    Encounter Date: 5/26/2020    Enoch Mendoza M.D.          PROGRESS NOTE:  Telemedicine Visit: Established Patient     This encounter was conducted via Zoom .   Verbal consent was obtained. Patient's identity was verified.    Subjective:   CC: Symptomatic PVCs  Boo Cavanaugh Jr. is a 75 y.o. 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  Some increased fatigue and low energy related to PVCs previously 8% PVCs.  Currently not on medications.  Some senior moments seen neurology.  Has not noticed any atrial arrhythmia.  Has a Kardia with PVCs start symptoms.         Allergies   Allergen Reactions   • Penicillins Rash     Rash as a child.        Current medicines (including changes today)  Current Outpatient Medications   Medication Sig Dispense Refill   • metoprolol SR (TOPROL XL) 25 MG TABLET SR 24 HR Take 1 Tab by mouth every day. 90 Tab 3   • aspirin (ASPIR-LOW) 81 MG EC tablet Take  by mouth every day.     • triamcinolone acetonide (KENALOG) 0.1 % Cream triamcinolone acetonide 0.1 % topical cream     • rosuvastatin (CRESTOR) 20 MG Tab Take 20 mg by mouth every evening. Mon-Fri       No current facility-administered medications for this visit.        Patient Active Problem List    Diagnosis Date Noted   • Memory change 05/05/2020   • Hyperlipidemia    • Chest pressure 02/13/2020   • Pain of left hip joint 01/07/2020   • Skin cancer 11/13/2019   • Primary osteoarthritis involving multiple joints 03/12/2019   • S/P ablation of atrial flutter 01/15/2019   • S/P catheter ablation of slow pathway 01/15/2019   • Prehypertension 01/08/2019   • Tenosynovitis, de Quervain 01/08/2019   • SHERRY (obstructive sleep apnea) 11/29/2018   • Elevated LDL  "cholesterol level 06/07/2018   • Benign prostatic hyperplasia without lower urinary tract symptoms 11/15/2017   • Atrial flutter (HCC) 11/11/2017       Family History   Problem Relation Age of Onset   • Heart Disease Mother 65        MI   • Cancer Father    • Cancer Brother 50        prostate cancer   • Stroke Brother    • Diabetes Neg Hx    • Hyperlipidemia Neg Hx    • Alcohol/Drug Neg Hx        He  has a past medical history of Arrhythmia, Arthritis, Breath shortness, Cardiac arrhythmia, Enlarged prostate with urinary obstruction, GERD (gastroesophageal reflux disease), Hyperlipidemia, Hypertension, OA (osteoarthritis) of knee, Restless leg syndrome, Tonsillitis, and Urinary bladder disorder (12/06/2017).  He  has a past surgical history that includes tonsillectomy; ganglion excision (Right, 1966); and turp-vapor (12/7/2017).       Objective:   /82 Comment: unable to take BP. BP from last reading in [facility]  Ht 1.854 m (6' 1\")   Wt 83 kg (183 lb)   BMI 24.14 kg/m²     Kardia tracings reviewed with PVCs sinus rhythm      Assessment and Plan:   The following treatment plan was discussed:     1. PVC (premature ventricular contraction)  - Holter Monitor Study    2. Elevated LDL cholesterol level    3. Mixed hyperlipidemia    4. SHERRY (obstructive sleep apnea)    5. S/P ablation of atrial flutter    6. S/P catheter ablation of slow pathway    Other orders  - metoprolol SR (TOPROL XL) 25 MG TABLET SR 24 HR; Take 1 Tab by mouth every day.  Dispense: 90 Tab; Refill: 3  1.  PVCs possibly symptomatic.  Start Toprol 25 mg a day.  Holter monitor to quantitate.  2.  Some change in cognitive function see neurology.  3.  Atrial arrhythmias no recurrence.  4.  Hyperlipidemia on statins.      Follow-up: Return in about 1 month (around 6/26/2020).             Jerica Coronado M.D.  [address]  [place] [address]  VIA In Basket              "

## 2020-05-27 ENCOUNTER — TELEPHONE (OUTPATIENT)
Dept: CARDIOLOGY | Facility: MEDICAL CENTER | Age: 75
End: 2020-05-27

## 2020-05-27 NOTE — TELEPHONE ENCOUNTER
"DS    Pt has appt today @ 11am, it is scheduled for a zio & pt thought it could be mailed to him. I spoke with Amira & she said it was scheduled incorrectly, the order is for the 24hr holter & he will need to come in for that. Pt doesn't want that \"extra exposure\" coming in & wants to know if he can get a different monitor that can be mailed, [phone].  "

## 2020-06-01 ENCOUNTER — APPOINTMENT (OUTPATIENT)
Dept: CARDIOLOGY | Facility: MEDICAL CENTER | Age: 75
End: 2020-06-01
Payer: MEDICARE

## 2020-06-01 ENCOUNTER — HOSPITAL ENCOUNTER (OUTPATIENT)
Dept: RADIOLOGY | Facility: MEDICAL CENTER | Age: 75
End: 2020-06-01
Attending: PSYCHIATRY & NEUROLOGY
Payer: MEDICARE

## 2020-06-01 DIAGNOSIS — R41.89 COGNITIVE IMPAIRMENT: ICD-10-CM

## 2020-06-01 PROCEDURE — 70551 MRI BRAIN STEM W/O DYE: CPT

## 2020-06-03 ENCOUNTER — TELEPHONE (OUTPATIENT)
Dept: CARDIOLOGY | Facility: MEDICAL CENTER | Age: 75
End: 2020-06-03

## 2020-06-03 DIAGNOSIS — I48.3 TYPICAL ATRIAL FLUTTER (HCC): Chronic | ICD-10-CM

## 2020-06-03 LAB — EKG IMPRESSION: NORMAL

## 2020-06-04 NOTE — TELEPHONE ENCOUNTER
Notes recorded by Abi Morton R.N. on 6/3/2020 at 5:34 PM PDT   Pt notified. Pt reluctant to start med as the 24 hour period was not a bad day. (he is concerned about his lower nightime heart rates. Discussed this in depth) Encouraged him to fill med and have on hand. If reluctant to start daily, take next time he has a bad day and see if helps. If decided to use prn, let us know. Will discuss sleep apnea with pulm at appt next week. (he is concerned lack of nighttime o2 causing PVC's)  ------     Notes recorded by Enoch Mendoza M.D. on 6/3/2020 at 4:58 PM PDT   Only 2 % pvcs ,not much, looks good   ------     Notes recorded by Abi Morton R.N. on 6/3/2020 at 11:50 AM PDT   Fu  6/26

## 2020-06-08 ENCOUNTER — PATIENT MESSAGE (OUTPATIENT)
Dept: NEUROLOGY | Facility: MEDICAL CENTER | Age: 75
End: 2020-06-08

## 2020-06-08 NOTE — PATIENT COMMUNICATION
Spoke with patient he verbally understood results , he was very happy and will call for a follow up when needed .

## 2020-06-08 NOTE — TELEPHONE ENCOUNTER
----- Message from Marlon Parks Med [facility]'t sent at 6/8/2020 11:35 AM PDT -----  Regarding: FW: Test Result Question  Contact: [phone]  Good morning Dr Preciado ,    Patient had an MRI done on 06/01/2020. Patient is requesting a review of the results ?  Should an appointment be scheduled or can you discuss results with him ? Thanks.   ----- Message -----  From: Boo Cavanaugh Jr.  Sent: 6/8/2020   7:20 AM PDT  To: Neurology Mas  Subject: Test Result Question                             Not sure how to interpret MRI results from over a week ago. What is plan? Will someone be calling to explain?

## 2020-06-08 NOTE — TELEPHONE ENCOUNTER
Regarding: Test Result Question  ----- Message from Gerry Preciado M.D. sent at 6/8/2020  4:03 PM PDT -----       ----- Message from Boo Cavanaugh Jr. to Gerry Preciado M.D. sent at 6/8/2020  7:20 AM -----   Not sure how to interpret MRI results from over a week ago. What is plan? Will someone be calling to explain?

## 2020-06-08 NOTE — PATIENT COMMUNICATION
Tell Boo that the MRI scan of his brain is actually okay.  The changes they are seeing are age-related (atrophy), nothing to be concerned about.  The hardening of the arteries (atherosclerosis) can also be seen in patients his age to have a history of hypertension and elevated cholesterol such as he.  None of these findings will explain the memory problems he is having.  I am pleased with this.  He and I can talk about this when I follow-up in the office.  There is no reason to push his appointment up, none of this is critical or emergent.

## 2020-06-10 ENCOUNTER — SLEEP CENTER VISIT (OUTPATIENT)
Dept: SLEEP MEDICINE | Facility: MEDICAL CENTER | Age: 75
End: 2020-06-10
Payer: MEDICARE

## 2020-06-10 VITALS
BODY MASS INDEX: 24.26 KG/M2 | OXYGEN SATURATION: 95 % | DIASTOLIC BLOOD PRESSURE: 56 MMHG | HEART RATE: 72 BPM | SYSTOLIC BLOOD PRESSURE: 126 MMHG | HEIGHT: 74 IN | WEIGHT: 189 LBS

## 2020-06-10 DIAGNOSIS — I48.92 ATRIAL FLUTTER, UNSPECIFIED TYPE (HCC): Chronic | ICD-10-CM

## 2020-06-10 DIAGNOSIS — G47.33 OSA (OBSTRUCTIVE SLEEP APNEA): ICD-10-CM

## 2020-06-10 DIAGNOSIS — I49.3 PVC (PREMATURE VENTRICULAR CONTRACTION): ICD-10-CM

## 2020-06-10 PROCEDURE — 99213 OFFICE O/P EST LOW 20 MIN: CPT | Performed by: NURSE PRACTITIONER

## 2020-06-10 ASSESSMENT — FIBROSIS 4 INDEX: FIB4 SCORE: 1.67

## 2020-06-10 NOTE — PROGRESS NOTES
Chief Complaint   Patient presents with   • Follow-Up     SHERRY- Last seen 05/24/2019       HPI:      Mr. Mao Montse Is a 74 y/o male patient who is in today for annual SHERRY f/u. PMH includes atrial dysrhythmias including a flutter and SVT. He underwent ablation in November, 2018.     Home sleep study confirms mild sleep apnea with an RDI of 8.5, supine RAJEEV 18.2, jason O2 sat of 85%.    Compliance report from 5/11/20-6/9/20 was downloaded and reviewed with the patient which showed ACPAP at 5-15 cmH2O, 100% compliance, 6 hrs 56 min use, AHI of 2.1. He is tolerating the pressure and mask (nasal) well. He goes to bed 10 pm and wakes up at 6 am.  He has noted to be sleeping longer and feels rested.  He does wake up once or twice to use the restroom but has no trouble going back to sleep he denies morning headaches however he has had forehead and temple pressure for the last year.  He noted increasing PVCs in August 2019 and routinely follows up with Dr. Mendoza and Dr. Farris for both cardiologist.  He feels when the PVCs increase that he has trouble taking a deep breath and also notes some chest tightness.  Dr. Mendoza ordered a Holter monitor which he wore last week and has a follow-up appointment with Dr. Mendoza in a couple weeks to further review.  He denies shortness of breath with exertion or at rest.  He is able to work in his yard and doing a few holes for bushes without any trouble.  He is not as active anymore due to his knee pain.      ROS:  Constitutional: Denies fevers, chills, sweats, fatigue, weight loss  Eyes: Denies glasses, vision loss, pain, drainage, double vision  Ears/Nose/Mouth/Throat: Denies rhinitis, nasal congestion, ear ache, difficulty hearing, sore throat, persistent hoarseness, decayed teeth/toothache  Cardiovascular: Denies chest pain, tightness, palpitations, swelling in feet/legs, fainting, difficulty breathing when laying down  Respiratory: Denies shortness of breath, cough, sputum,  "wheezing, painful breathing, coughing up blood  GI: Denies heartburn, difficulty swallowing, nausea, vomiting, abdominal pain, diarrhea, constiptation  : Denies frequent urination, painful urination  Integumentary: Denies rashes, lumps or color changes  Neurological: Denies frequent headaches, dizziness, weakness  Sleep: Denies daytime sleepiness, loud snoring, stops breathing during sleep, waking up gasping for air, insomnia, morning headaches      Past Medical History:   Diagnosis Date   • Arrhythmia     \"flutter\", Dr. Mendoza cardiologist   • Arthritis     left thumb   • Breath shortness    • Cardiac arrhythmia    • Enlarged prostate with urinary obstruction    • GERD (gastroesophageal reflux disease)    • Hyperlipidemia    • Hypertension    • OA (osteoarthritis) of knee     bilateral    • Restless leg syndrome    • Tonsillitis    • Urinary bladder disorder 12/06/2017    savage in place       Past Surgical History:   Procedure Laterality Date   • TURP-VAPOR  12/7/2017    Procedure: TURP-VAPOR - GREEN LIGHT PHOTOSELECTIVE;  Surgeon: Cristofer Cr M.D.;  Location: SURGERY [facility];  Service: Urology   • GANGLION EXCISION Right 1966    wrist   • TONSILLECTOMY      as a child       Family History   Problem Relation Age of Onset   • Heart Disease Mother 65        MI   • Cancer Father    • Cancer Brother 50        prostate cancer   • Stroke Brother    • Diabetes Neg Hx    • Hyperlipidemia Neg Hx    • Alcohol/Drug Neg Hx        Social History     Socioeconomic History   • Marital status: Single     Spouse name: Not on file   • Number of children: Not on file   • Years of education: Not on file   • Highest education level: Not on file   Occupational History   • Not on file   Social Needs   • Financial resource strain: Not on file   • Food insecurity     Worry: Not on file     Inability: Not on file   • Transportation needs     Medical: Not on file     Non-medical: Not on file   Tobacco Use   • Smoking status: " Never Smoker   • Smokeless tobacco: Never Used   Substance and Sexual Activity   • Alcohol use: Not Currently   • Drug use: Never   • Sexual activity: Never     Comment: 2 daughters    Lifestyle   • Physical activity     Days per week: Not on file     Minutes per session: Not on file   • Stress: Not on file   Relationships   • Social connections     Talks on phone: Not on file     Gets together: Not on file     Attends Baptism service: Not on file     Active member of club or organization: Not on file     Attends meetings of clubs or organizations: Not on file     Relationship status: Not on file   • Intimate partner violence     Fear of current or ex partner: Not on file     Emotionally abused: Not on file     Physically abused: Not on file     Forced sexual activity: Not on file   Other Topics Concern   • Not on file   Social History Narrative   • Not on file       Allergies as of 06/10/2020 - Reviewed 06/10/2020   Allergen Reaction Noted   • Penicillins Rash 10/25/2017        Vitals:  Vitals:    06/10/20 0816   BP: 126/56   Pulse: 72   SpO2: 95%         Current medications as of today   Current Outpatient Medications   Medication Sig Dispense Refill   • aspirin (ASPIR-LOW) 81 MG EC tablet Take  by mouth every day.     • triamcinolone acetonide (KENALOG) 0.1 % Cream triamcinolone acetonide 0.1 % topical cream     • rosuvastatin (CRESTOR) 20 MG Tab Take 20 mg by mouth every evening. Mon-Fri     • metoprolol SR (TOPROL XL) 25 MG TABLET SR 24 HR Take 1 Tab by mouth every day. 90 Tab 3     No current facility-administered medications for this visit.          Physical Exam:   Appearance: Well developed, well nourished, no acute distress  Eyes: PERRL, EOM intact, sclera white, conjunctiva moist  Ears: no lesions or deformities  Hearing: grossly intact  Nose: no lesions or deformities  Respiratory effort: no intercostal retractions or use of accessory muscles  Lung auscultation: no rales, rhonchi or wheezes  bilaterally.   Heart auscultation: no murmur rub or gallop, regular.   Extremities: no cyanosis or edema  Abdomen: soft   Gait and Station: normal  Digits and nails: no clubbing, cyanosis, petechiae or nodes.  Cranial nerves: grossly intact  Skin: no rashes, lesions or ulcers noted  Orientation: Oriented to time, person and place  Mood and affect: mood and affect appropriate, normal interaction with examiner  Judgement: Intact    Assessment:  1. SHERRY (obstructive sleep apnea)  DME Mask and Supplies    Overnight Oximetry   2. PVC (premature ventricular contraction)     3. Atrial flutter, unspecified type (HCC)     4. BMI 24.0-24.9, adult           Plan  The pathophysiology of sleep anea and the increased risk of cardiovascular morbidity from untreated sleep apnea is discussed in detail with the patient.     1. Compliance report from 5/11/20-6/9/20 was downloaded and reviewed with the patient which showed ACPAP at 5-15 cmH2O, 100% compliance, 6 hrs 56 min use, AHI of 2.1. Continue ACPAP. Patient is compliant and benefiting from ACAPAP therapy for management of SHERRY.   2. DME order for mask and supplies was provided today. Continue to clean mask and supplies weekly.   3. Continue to stay active.   4. Order OPO.   5.  Continue to follow-up with Dr. Mendoza and Dr. Farris every 6 months.  Encouraged the patient to further review shortness of breath during PVCs with Dr. Mendoza at his next follow-up appointment which is in a few weeks.  6. BP today is 126/56, HR 72, 95% SpO2. BP was rechecked and was 122/60.   7. F/u in 1 year, sooner if needed.     NICHO Guerra.      This dictation was created using voice recognition software. The accuracy of the dictation is limited to the abilities of the software. I expect there may be some errors of grammar and possibly content.

## 2020-06-16 ENCOUNTER — HOME STUDY (OUTPATIENT)
Dept: SLEEP MEDICINE | Facility: MEDICAL CENTER | Age: 75
End: 2020-06-16
Attending: NURSE PRACTITIONER
Payer: MEDICARE

## 2020-06-16 DIAGNOSIS — G47.33 OSA (OBSTRUCTIVE SLEEP APNEA): ICD-10-CM

## 2020-06-16 PROCEDURE — 94762 N-INVAS EAR/PLS OXIMTRY CONT: CPT | Performed by: FAMILY MEDICINE

## 2020-06-18 ENCOUNTER — TELEPHONE (OUTPATIENT)
Dept: SLEEP MEDICINE | Facility: MEDICAL CENTER | Age: 75
End: 2020-06-18

## 2020-06-18 NOTE — TELEPHONE ENCOUNTER
Called pt regarding result OPO. Per Dr. Renae Recommendation:  Overall it is an unremarkable oximetry. Continue CPAP at the current pressure. Clinical correlation recommended.     When calling the results the pt wanted to know the heart rate while completing this test and informed that his Avg. Pulse rate (bpm) was 54.4 Low Pulse Rate (bpm) was 44. I spoke to Dr. Renae regarding this and he informed that the pt pulse is low and recommends the pt to spoke to PCP or cardiologist so he can complete a Holter monitor. Pt understood and will speak to his providers regarding this.

## 2020-06-26 ENCOUNTER — TELEMEDICINE (OUTPATIENT)
Dept: CARDIOLOGY | Facility: MEDICAL CENTER | Age: 75
End: 2020-06-26
Payer: MEDICARE

## 2020-06-26 VITALS
DIASTOLIC BLOOD PRESSURE: 71 MMHG | BODY MASS INDEX: 24.39 KG/M2 | WEIGHT: 190 LBS | SYSTOLIC BLOOD PRESSURE: 117 MMHG | HEART RATE: 62 BPM

## 2020-06-26 DIAGNOSIS — Z98.890 S/P ABLATION OF ATRIAL FLUTTER: ICD-10-CM

## 2020-06-26 DIAGNOSIS — Z86.79 S/P CATHETER ABLATION OF SLOW PATHWAY: ICD-10-CM

## 2020-06-26 DIAGNOSIS — I48.92 ATRIAL FLUTTER, UNSPECIFIED TYPE (HCC): Chronic | ICD-10-CM

## 2020-06-26 DIAGNOSIS — I49.3 PVC (PREMATURE VENTRICULAR CONTRACTION): ICD-10-CM

## 2020-06-26 DIAGNOSIS — Z86.79 S/P ABLATION OF ATRIAL FLUTTER: ICD-10-CM

## 2020-06-26 DIAGNOSIS — E78.2 MIXED HYPERLIPIDEMIA: ICD-10-CM

## 2020-06-26 DIAGNOSIS — E78.00 ELEVATED LDL CHOLESTEROL LEVEL: ICD-10-CM

## 2020-06-26 DIAGNOSIS — Z98.890 S/P CATHETER ABLATION OF SLOW PATHWAY: ICD-10-CM

## 2020-06-26 PROCEDURE — 99213 OFFICE O/P EST LOW 20 MIN: CPT | Mod: 95,CR | Performed by: INTERNAL MEDICINE

## 2020-06-26 ASSESSMENT — FIBROSIS 4 INDEX: FIB4 SCORE: 1.67

## 2020-06-26 NOTE — PROGRESS NOTES
Telemedicine Visit: Established Patient     This encounter was conducted via Zoom .   Verbal consent was obtained. Patient's identity was verified.    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 is a 75 y.o. male presenting for evaluation and management of:    PVCs.  Memory loss.  Has seen neurology.  MRI unremarkable.  Feels better.  Did not start his beta-blockers.  2% PVCs on monitor.      Denies any recent fevers or chills. No nausea or vomiting. No chest pains or shortness of breath.     Allergies   Allergen Reactions   • Penicillins Rash     Rash as a child.        Current medicines (including changes today)  Current Outpatient Medications   Medication Sig Dispense Refill   • aspirin (ASPIR-LOW) 81 MG EC tablet Take  by mouth every day.     • triamcinolone acetonide (KENALOG) 0.1 % Cream triamcinolone acetonide 0.1 % topical cream     • rosuvastatin (CRESTOR) 20 MG Tab Take 20 mg by mouth every evening. Mon-Fri     • metoprolol SR (TOPROL XL) 25 MG TABLET SR 24 HR Take 1 Tab by mouth every day. (Patient not taking: Reported on 6/26/2020) 90 Tab 3     No current facility-administered medications for this visit.        Patient Active Problem List    Diagnosis Date Noted   • Hyperlipidemia    • Pain of left hip joint 01/07/2020   • Skin cancer 11/13/2019   • Primary osteoarthritis involving multiple joints 03/12/2019   • S/P ablation of atrial flutter 01/15/2019   • S/P catheter ablation of slow pathway 01/15/2019   • Prehypertension 01/08/2019   • Tenosynovitis, de Quervain 01/08/2019   • SHERRY (obstructive sleep apnea) 11/29/2018   • PVC (premature ventricular contraction) 07/09/2018   • Elevated LDL cholesterol level 06/07/2018   • Benign prostatic hyperplasia without lower urinary tract symptoms 11/15/2017   • Atrial flutter (HCC) 11/11/2017       Family History   Problem Relation Age of Onset   • Heart Disease Mother 65        MI   • Cancer Father    • Cancer  Brother 50        prostate cancer   • Stroke Brother    • Diabetes Neg Hx    • Hyperlipidemia Neg Hx    • Alcohol/Drug Neg Hx        He  has a past medical history of Arrhythmia, Arthritis, Breath shortness, Cardiac arrhythmia, Enlarged prostate with urinary obstruction, GERD (gastroesophageal reflux disease), Hyperlipidemia, Hypertension, OA (osteoarthritis) of knee, Restless leg syndrome, Tonsillitis, and Urinary bladder disorder (12/06/2017).  He  has a past surgical history that includes tonsillectomy; ganglion excision (Right, 1966); and turp-vapor (12/7/2017).       Objective:   /71 (BP Location: Left arm, Patient Position: Sitting, BP Cuff Size: Adult) Comment: PT [place]  Pulse 62   Wt 86.2 kg (190 lb)   BMI 24.39 kg/m²           Assessment and Plan:   The following treatment plan was discussed:     1. Atrial flutter, unspecified type (HCC)    2. S/P ablation of atrial flutter    3. S/P catheter ablation of slow pathway    4. PVC (premature ventricular contraction)    5. Elevated LDL cholesterol level    6. Mixed hyperlipidemia    1.  Atrial flutter no recurrence.  2.  Symptomatic PVCs.  Use as needed beta-blockers.  3.  Hyperlipidemia continue statins.  4.  Kardia recordings.  5.  Follow-up in 3 months.

## 2020-09-01 ENCOUNTER — HOSPITAL ENCOUNTER (OUTPATIENT)
Dept: RADIOLOGY | Facility: MEDICAL CENTER | Age: 75
End: 2020-09-01
Attending: UROLOGY
Payer: MEDICARE

## 2020-09-01 DIAGNOSIS — N28.1 ACQUIRED CYST OF KIDNEY: ICD-10-CM

## 2020-09-01 PROCEDURE — 76775 US EXAM ABDO BACK WALL LIM: CPT

## 2020-10-05 ENCOUNTER — HOSPITAL ENCOUNTER (OUTPATIENT)
Dept: LAB | Facility: MEDICAL CENTER | Age: 75
End: 2020-10-05
Attending: INTERNAL MEDICINE
Payer: MEDICARE

## 2020-10-05 LAB
ALBUMIN SERPL BCP-MCNC: 4.2 G/DL (ref 3.2–4.9)
ALP SERPL-CCNC: 94 U/L (ref 30–99)
ALT SERPL-CCNC: 34 U/L (ref 2–50)
AST SERPL-CCNC: 22 U/L (ref 12–45)
BILIRUB CONJ SERPL-MCNC: <0.2 MG/DL (ref 0.1–0.5)
BILIRUB INDIRECT SERPL-MCNC: NORMAL MG/DL (ref 0–1)
BILIRUB SERPL-MCNC: 0.8 MG/DL (ref 0.1–1.5)
CHOLEST SERPL-MCNC: 124 MG/DL (ref 100–199)
FASTING STATUS PATIENT QL REPORTED: NORMAL
HDLC SERPL-MCNC: 50 MG/DL
LDLC SERPL CALC-MCNC: 61 MG/DL
PROT SERPL-MCNC: 6.7 G/DL (ref 6–8.2)
TRIGL SERPL-MCNC: 63 MG/DL (ref 0–149)

## 2020-10-05 PROCEDURE — 80061 LIPID PANEL: CPT

## 2020-10-05 PROCEDURE — 36415 COLL VENOUS BLD VENIPUNCTURE: CPT

## 2020-10-05 PROCEDURE — 80076 HEPATIC FUNCTION PANEL: CPT

## 2020-10-20 ENCOUNTER — OFFICE VISIT (OUTPATIENT)
Dept: MEDICAL GROUP | Facility: PHYSICIAN GROUP | Age: 75
End: 2020-10-20
Payer: MEDICARE

## 2020-10-20 ENCOUNTER — HOSPITAL ENCOUNTER (OUTPATIENT)
Dept: LAB | Facility: MEDICAL CENTER | Age: 75
End: 2020-10-20
Attending: FAMILY MEDICINE
Payer: MEDICARE

## 2020-10-20 ENCOUNTER — NURSE TRIAGE (OUTPATIENT)
Dept: HEALTH INFORMATION MANAGEMENT | Facility: OTHER | Age: 75
End: 2020-10-20

## 2020-10-20 VITALS
RESPIRATION RATE: 12 BRPM | TEMPERATURE: 97.6 F | HEIGHT: 74 IN | DIASTOLIC BLOOD PRESSURE: 60 MMHG | WEIGHT: 185.2 LBS | SYSTOLIC BLOOD PRESSURE: 134 MMHG | OXYGEN SATURATION: 95 % | BODY MASS INDEX: 23.77 KG/M2 | HEART RATE: 64 BPM

## 2020-10-20 DIAGNOSIS — R79.9 ELEVATED BUN: ICD-10-CM

## 2020-10-20 DIAGNOSIS — R21 RASH: Primary | ICD-10-CM

## 2020-10-20 DIAGNOSIS — D72.829 LEUKOCYTOSIS, UNSPECIFIED TYPE: ICD-10-CM

## 2020-10-20 DIAGNOSIS — Z23 NEED FOR VACCINATION: ICD-10-CM

## 2020-10-20 LAB
ALBUMIN SERPL BCP-MCNC: 4.1 G/DL (ref 3.2–4.9)
BUN SERPL-MCNC: 23 MG/DL (ref 8–22)
CALCIUM SERPL-MCNC: 9.2 MG/DL (ref 8.5–10.5)
CHLORIDE SERPL-SCNC: 102 MMOL/L (ref 96–112)
CO2 SERPL-SCNC: 28 MMOL/L (ref 20–33)
CREAT SERPL-MCNC: 1.21 MG/DL (ref 0.5–1.4)
ERYTHROCYTE [DISTWIDTH] IN BLOOD BY AUTOMATED COUNT: 44.7 FL (ref 35.9–50)
GLUCOSE SERPL-MCNC: 97 MG/DL (ref 65–99)
HCT VFR BLD AUTO: 49.2 % (ref 42–52)
HGB BLD-MCNC: 16.7 G/DL (ref 14–18)
MCH RBC QN AUTO: 33 PG (ref 27–33)
MCHC RBC AUTO-ENTMCNC: 33.9 G/DL (ref 33.7–35.3)
MCV RBC AUTO: 97.2 FL (ref 81.4–97.8)
PHOSPHATE SERPL-MCNC: 2.6 MG/DL (ref 2.5–4.5)
PLATELET # BLD AUTO: 164 K/UL (ref 164–446)
PMV BLD AUTO: 11.6 FL (ref 9–12.9)
POTASSIUM SERPL-SCNC: 4.2 MMOL/L (ref 3.6–5.5)
RBC # BLD AUTO: 5.06 M/UL (ref 4.7–6.1)
SODIUM SERPL-SCNC: 137 MMOL/L (ref 135–145)
WBC # BLD AUTO: 7.4 K/UL (ref 4.8–10.8)

## 2020-10-20 PROCEDURE — 80069 RENAL FUNCTION PANEL: CPT

## 2020-10-20 PROCEDURE — 85027 COMPLETE CBC AUTOMATED: CPT

## 2020-10-20 PROCEDURE — 36415 COLL VENOUS BLD VENIPUNCTURE: CPT

## 2020-10-20 PROCEDURE — 99214 OFFICE O/P EST MOD 30 MIN: CPT | Mod: 25 | Performed by: FAMILY MEDICINE

## 2020-10-20 PROCEDURE — 90662 IIV NO PRSV INCREASED AG IM: CPT | Performed by: FAMILY MEDICINE

## 2020-10-20 PROCEDURE — G0008 ADMIN INFLUENZA VIRUS VAC: HCPCS | Performed by: FAMILY MEDICINE

## 2020-10-20 RX ORDER — TRIAMCINOLONE ACETONIDE 0.25 MG/G
CREAM TOPICAL
Qty: 15 G | Refills: 0 | Status: SHIPPED | OUTPATIENT
Start: 2020-10-20 | End: 2023-01-16

## 2020-10-20 RX ORDER — METOPROLOL SUCCINATE 50 MG/1
50 TABLET, EXTENDED RELEASE ORAL DAILY
COMMUNITY
Start: 2020-10-14 | End: 2021-05-06

## 2020-10-20 ASSESSMENT — FIBROSIS 4 INDEX: FIB4 SCORE: 1.42

## 2020-10-20 NOTE — PROGRESS NOTES
"Subjective:     CC: rash, discuss labs    HPI:   Boo presents today with     Rash  This is a new condition.  Onset: 5 days ago (after cath procedure and having metoprolol increased to 50, went back to 25)  Location: lower chest, moved into abdomen  Duration: constant  Quality: a little bumpy,  Mildly red  Associated symptoms: no itching  Home treatments: none        Past Medical History:   Diagnosis Date   • Arrhythmia     \"flutter\", Dr. Mendoza cardiologist   • Arthritis     left thumb   • Breath shortness    • Cardiac arrhythmia    • Enlarged prostate with urinary obstruction    • GERD (gastroesophageal reflux disease)    • Hyperlipidemia    • Hypertension    • OA (osteoarthritis) of knee     bilateral    • Restless leg syndrome    • Tonsillitis    • Urinary bladder disorder 12/06/2017    savage in place       Social History     Tobacco Use   • Smoking status: Never Smoker   • Smokeless tobacco: Never Used   Substance Use Topics   • Alcohol use: Not Currently   • Drug use: Never       Current Outpatient Medications Ordered in Epic   Medication Sig Dispense Refill   • triamcinolone acetonide (KENALOG) 0.025 % Cream Apply thin layer to affected area(s) twice daily for 7-14 days. 15 g 0   • metoprolol SR (TOPROL XL) 25 MG TABLET SR 24 HR Take 1 Tab by mouth every day. 90 Tab 3   • aspirin (ASPIR-LOW) 81 MG EC tablet Take  by mouth every day.     • triamcinolone acetonide (KENALOG) 0.1 % Cream triamcinolone acetonide 0.1 % topical cream     • rosuvastatin (CRESTOR) 20 MG Tab Take 20 mg by mouth every day.     • metoprolol SR (TOPROL XL) 50 MG TABLET SR 24 HR        No current Epic-ordered facility-administered medications on file.        Allergies:  Penicillins    Health Maintenance: Completed    ROS:  Gen: no fevers/chills  Pulm: + sob - \"always\"  CV: no chest pain    Objective:     Exam:  /60 (BP Location: Right arm, Patient Position: Sitting, BP Cuff Size: Adult)   Pulse 64   Temp 36.4 °C (97.6 °F) (Temporal) " "  Resp 12   Ht 1.88 m (6' 2\")   Wt 84 kg (185 lb 3.2 oz)   SpO2 95%   BMI 23.78 kg/m²  Body mass index is 23.78 kg/m².    Gen: Alert and oriented, No apparent distress.  Neck: Neck is supple without lymphadenopathy.  Lungs: Normal effort, CTA bilaterally, no wheezes, rhonchi, or rales  CV: Regular rate and rhythm. No murmurs, rubs, or gallops.  Ext: No clubbing, cyanosis, edema.  Skin: Fine, faint reticular erythema of chest without any papules or plaques.    Assessment & Plan:     75 y.o. male with the following -     1. Rash  This is a new condition.  He states on 10/6/2020 he started metoprolol 25 mg daily.  He then had a catheterization on 10/14/2020.  At that point he was told to double his dose of metoprolol and the next day he developed this fine, reticular rash on his chest.  There is no associated itching but he did drop his dose back down to 25 mg daily.  There is a very faint rash on exam today.  He did contact his cardiologist who wanted him to discuss the rash with me.  I am highly suspicious the rash is secondary to the metoprolol since that such a new medication and it occurred after a dose increase.  However, we will use a very low dose steroid cream to see if it goes away and if it does not, he knows to stop after 7 days and to contact his cardiologist regarding the metoprolol.    2. Elevated BUN  This is a new condition.  He had labs prior to the catheterization showing elevated BUN.  We will recheck lab to see if this persists and check the rest of his kidney function as well.  - Renal Function Panel; Future    3. Leukocytosis, unspecified type  This is a new condition.  Labs prior to the catheterization showed a very mildly elevated white blood cell count.  We will repeat this to see if it persist.  - CBC WITHOUT DIFFERENTIAL; Future    4. Need for vaccination  - INFLUENZA VACCINE, HIGH DOSE (65+ ONLY)    Return in about 6 months (around 4/20/2021) for Medicare Wellness.    Please note that " this dictation was created using voice recognition software. I have made every reasonable attempt to correct obvious errors, but I expect that there are errors of grammar and possibly content that I did not discover before finalizing the note.

## 2020-10-20 NOTE — TELEPHONE ENCOUNTER
1. Caller Name: Boo Cavanaugh Jr.                 Call Back Number: [phone]  [facility] PCP or Specialty Provider: Yes         2.  In the last two weeks, has the patient had any new or worsening symptoms (not explained by alternative diagnosis)? No.  Loose stool but has had for years.      3.  Does patient have any comoribidities? Chronic problems w/his heart, sleep apnea.      4.  Has the patient traveled in the last 14 days OR had any known contact with someone who is suspected or confirmed to have COVID-19?  Travel to [place], no covid contact.      5. POTENTIAL PUI (LOW): Cleared for appt.    Note routed to [facility] Provider: FYI only.

## 2020-10-20 NOTE — ASSESSMENT & PLAN NOTE
This is a new condition.  Onset: 5 days ago (after cath procedure and having metoprolol increased to 50, went back to 25)  Location: lower chest, moved into abdomen  Duration: constant  Quality: a little bumpy,  Mildly red  Associated symptoms: no itching  Home treatments: none

## 2021-01-16 DIAGNOSIS — Z23 NEED FOR VACCINATION: ICD-10-CM

## 2021-01-28 ENCOUNTER — IMMUNIZATION (OUTPATIENT)
Dept: FAMILY PLANNING/WOMEN'S HEALTH CLINIC | Facility: IMMUNIZATION CENTER | Age: 76
End: 2021-01-28
Attending: INTERNAL MEDICINE
Payer: MEDICARE

## 2021-01-28 DIAGNOSIS — Z23 ENCOUNTER FOR VACCINATION: Primary | ICD-10-CM

## 2021-01-28 DIAGNOSIS — Z23 NEED FOR VACCINATION: ICD-10-CM

## 2021-01-28 PROCEDURE — 0011A MODERNA SARS-COV-2 VACCINE: CPT

## 2021-01-28 PROCEDURE — 91301 MODERNA SARS-COV-2 VACCINE: CPT

## 2021-02-25 ENCOUNTER — IMMUNIZATION (OUTPATIENT)
Dept: FAMILY PLANNING/WOMEN'S HEALTH CLINIC | Facility: IMMUNIZATION CENTER | Age: 76
End: 2021-02-25
Attending: INTERNAL MEDICINE
Payer: MEDICARE

## 2021-02-25 DIAGNOSIS — Z23 ENCOUNTER FOR VACCINATION: Primary | ICD-10-CM

## 2021-02-25 PROCEDURE — 91301 MODERNA SARS-COV-2 VACCINE: CPT

## 2021-02-25 PROCEDURE — 0012A MODERNA SARS-COV-2 VACCINE: CPT

## 2021-03-31 ENCOUNTER — HOSPITAL ENCOUNTER (OUTPATIENT)
Dept: LAB | Facility: MEDICAL CENTER | Age: 76
End: 2021-03-31
Attending: FAMILY MEDICINE
Payer: MEDICARE

## 2021-03-31 DIAGNOSIS — Z12.5 ENCOUNTER FOR SCREENING FOR MALIGNANT NEOPLASM OF PROSTATE: ICD-10-CM

## 2021-03-31 DIAGNOSIS — Z12.11 COLON CANCER SCREENING: ICD-10-CM

## 2021-03-31 DIAGNOSIS — R94.4 DECREASED GFR: ICD-10-CM

## 2021-03-31 DIAGNOSIS — G47.33 OSA (OBSTRUCTIVE SLEEP APNEA): ICD-10-CM

## 2021-03-31 LAB
ALBUMIN SERPL BCP-MCNC: 4.2 G/DL (ref 3.2–4.9)
BUN SERPL-MCNC: 27 MG/DL (ref 8–22)
CALCIUM SERPL-MCNC: 9.5 MG/DL (ref 8.5–10.5)
CHLORIDE SERPL-SCNC: 106 MMOL/L (ref 96–112)
CO2 SERPL-SCNC: 26 MMOL/L (ref 20–33)
CREAT SERPL-MCNC: 1.17 MG/DL (ref 0.5–1.4)
ERYTHROCYTE [DISTWIDTH] IN BLOOD BY AUTOMATED COUNT: 44.7 FL (ref 35.9–50)
GLUCOSE SERPL-MCNC: 93 MG/DL (ref 65–99)
HCT VFR BLD AUTO: 50.7 % (ref 42–52)
HGB BLD-MCNC: 16.7 G/DL (ref 14–18)
MCH RBC QN AUTO: 32 PG (ref 27–33)
MCHC RBC AUTO-ENTMCNC: 32.9 G/DL (ref 33.7–35.3)
MCV RBC AUTO: 97.1 FL (ref 81.4–97.8)
PHOSPHATE SERPL-MCNC: 2.6 MG/DL (ref 2.5–4.5)
PLATELET # BLD AUTO: 172 K/UL (ref 164–446)
PMV BLD AUTO: 11.6 FL (ref 9–12.9)
POTASSIUM SERPL-SCNC: 4.8 MMOL/L (ref 3.6–5.5)
PSA SERPL-MCNC: 3.14 NG/ML (ref 0–4)
RBC # BLD AUTO: 5.22 M/UL (ref 4.7–6.1)
SODIUM SERPL-SCNC: 141 MMOL/L (ref 135–145)
WBC # BLD AUTO: 6.7 K/UL (ref 4.8–10.8)

## 2021-03-31 PROCEDURE — 84153 ASSAY OF PSA TOTAL: CPT | Mod: GA

## 2021-03-31 PROCEDURE — 80069 RENAL FUNCTION PANEL: CPT

## 2021-03-31 PROCEDURE — 85027 COMPLETE CBC AUTOMATED: CPT

## 2021-03-31 PROCEDURE — 36415 COLL VENOUS BLD VENIPUNCTURE: CPT

## 2021-04-20 ENCOUNTER — OFFICE VISIT (OUTPATIENT)
Dept: MEDICAL GROUP | Facility: PHYSICIAN GROUP | Age: 76
End: 2021-04-20
Payer: MEDICARE

## 2021-04-20 VITALS
OXYGEN SATURATION: 94 % | HEIGHT: 74 IN | WEIGHT: 193 LBS | HEART RATE: 62 BPM | TEMPERATURE: 98.1 F | BODY MASS INDEX: 24.77 KG/M2 | SYSTOLIC BLOOD PRESSURE: 112 MMHG | DIASTOLIC BLOOD PRESSURE: 70 MMHG | RESPIRATION RATE: 18 BRPM

## 2021-04-20 DIAGNOSIS — E78.2 MIXED HYPERLIPIDEMIA: ICD-10-CM

## 2021-04-20 DIAGNOSIS — I48.92 ATRIAL FLUTTER, UNSPECIFIED TYPE (HCC): Chronic | ICD-10-CM

## 2021-04-20 DIAGNOSIS — Z98.890 S/P ABLATION OF ATRIAL FLUTTER: ICD-10-CM

## 2021-04-20 DIAGNOSIS — Z86.79 S/P CATHETER ABLATION OF SLOW PATHWAY: ICD-10-CM

## 2021-04-20 DIAGNOSIS — G47.33 OSA (OBSTRUCTIVE SLEEP APNEA): ICD-10-CM

## 2021-04-20 DIAGNOSIS — Z86.79 S/P ABLATION OF ATRIAL FLUTTER: ICD-10-CM

## 2021-04-20 DIAGNOSIS — Z11.59 NEED FOR HEPATITIS C SCREENING TEST: ICD-10-CM

## 2021-04-20 DIAGNOSIS — Z00.00 ENCOUNTER FOR MEDICARE ANNUAL WELLNESS EXAM: ICD-10-CM

## 2021-04-20 DIAGNOSIS — R03.0 PREHYPERTENSION: ICD-10-CM

## 2021-04-20 DIAGNOSIS — R31.0 GROSS HEMATURIA: ICD-10-CM

## 2021-04-20 DIAGNOSIS — N40.0 BENIGN PROSTATIC HYPERPLASIA WITHOUT LOWER URINARY TRACT SYMPTOMS: ICD-10-CM

## 2021-04-20 DIAGNOSIS — Z98.890 S/P CATHETER ABLATION OF SLOW PATHWAY: ICD-10-CM

## 2021-04-20 PROBLEM — E78.00 ELEVATED LDL CHOLESTEROL LEVEL: Status: RESOLVED | Noted: 2018-06-07 | Resolved: 2021-04-20

## 2021-04-20 PROBLEM — Z85.828 HISTORY OF SKIN CANCER: Status: ACTIVE | Noted: 2019-11-13

## 2021-04-20 PROBLEM — R21 RASH: Status: RESOLVED | Noted: 2020-10-20 | Resolved: 2021-04-20

## 2021-04-20 PROCEDURE — G0439 PPPS, SUBSEQ VISIT: HCPCS | Performed by: FAMILY MEDICINE

## 2021-04-20 RX ORDER — FINASTERIDE 5 MG/1
TABLET, FILM COATED ORAL
COMMUNITY
End: 2023-12-12

## 2021-04-20 ASSESSMENT — ACTIVITIES OF DAILY LIVING (ADL): BATHING_REQUIRES_ASSISTANCE: 0

## 2021-04-20 ASSESSMENT — ENCOUNTER SYMPTOMS: GENERAL WELL-BEING: FAIR

## 2021-04-20 ASSESSMENT — PATIENT HEALTH QUESTIONNAIRE - PHQ9: CLINICAL INTERPRETATION OF PHQ2 SCORE: 0

## 2021-04-20 ASSESSMENT — VISUAL ACUITY
OS_CC: 20/30
OD_CC: 20/30

## 2021-04-20 ASSESSMENT — FIBROSIS 4 INDEX: FIB4 SCORE: 1.65

## 2021-04-20 NOTE — PROGRESS NOTES
Chief Complaint   Patient presents with   • Annual Wellness Visit       HPI:  Boo is a 76 y.o. here for Medicare Annual Wellness Visit      Patient Active Problem List    Diagnosis Date Noted   • Hyperlipidemia    • Pain of left hip joint 01/07/2020   • History of skin cancer 11/13/2019   • Primary osteoarthritis involving multiple joints 03/12/2019   • S/P ablation of atrial flutter 01/15/2019   • S/P catheter ablation of slow pathway 01/15/2019   • Prehypertension 01/08/2019   • Tenosynovitis, de Quervain 01/08/2019   • SHERRY (obstructive sleep apnea) 11/29/2018   • PVC (premature ventricular contraction) 07/09/2018   • Benign prostatic hyperplasia without lower urinary tract symptoms 11/15/2017   • Atrial flutter (HCC) 11/11/2017       Current Outpatient Medications   Medication Sig Dispense Refill   • finasteride (PROSCAR) 5 MG Tab Take 1 tablet every day     • metoprolol SR (TOPROL XL) 50 MG TABLET SR 24 HR Take 50 mg by mouth every day.     • triamcinolone acetonide (KENALOG) 0.025 % Cream Apply thin layer to affected area(s) twice daily for 7-14 days. 15 g 0   • aspirin (ASPIR-LOW) 81 MG EC tablet Take  by mouth every day.     • rosuvastatin (CRESTOR) 20 MG Tab Take 20 mg by mouth every day.       No current facility-administered medications for this visit.        Patient is taking medications as noted in medication list.  Current supplements as per medication list.     Allergies: Penicillins    Current social contact/activities: family gatherings     Is patient current with immunizations? No, due for SHINGRIX (Shingles). Patient is interested in receiving NONE today. shingles is not covered by medicare.    He  reports that he has never smoked. He has never used smokeless tobacco. He reports previous alcohol use. He reports that he does not use drugs.  Counseling given: Yes      DPA/Advanced directive: Patient has Advanced Directive, but it is not on file. Instructed to bring in a copy to scan into their  chart.    ROS:    Gait: Uses no assistive device   Ostomy: No   Other tubes: No   Amputations: No   Chronic oxygen use No   Last eye exam A couple years ago   Wears hearing aids: No   : Denies any urinary leakage during the last 6 months    Screening:    Depression Screening    Little interest or pleasure in doing things?  0 - not at all  Feeling down, depressed, or hopeless? 0 - not at all  Patient Health Questionnaire Score: 0    If depressive symptoms identified deferred to follow up visit unless specifically addressed in assessment and plan.    Interpretation of PHQ-9 Total Score   Score Severity   1-4 No Depression   5-9 Mild Depression   10-14 Moderate Depression   15-19 Moderately Severe Depression   20-27 Severe Depression    Screening for Cognitive Impairment    Three Minute Recall (captain, garden, picture)  3/3    Margarito clock face with all 12 numbers and set the hands to show 5 past 8.  Yes    If cognitive concerns identified, deferred for follow up unless specifically addressed in assessment and plan.    Fall Risk Assessment    Has the patient had two or more falls in the last year or any fall with injury in the last year?  No  If fall risk identified, deferred for follow up unless specifically addressed in assessment and plan.    Safety Assessment    Throw rugs on floor.  Yes  Handrails on all stairs.  Yes  Good lighting in all hallways.  Yes  Difficulty hearing.  Yes  Patient counseled about all safety risks that were identified.    Functional Assessment ADLs    Are there any barriers preventing you from cooking for yourself or meeting nutritional needs?  No.    Are there any barriers preventing you from driving safely or obtaining transportation?  No.    Are there any barriers preventing you from using a telephone or calling for help?  No.    Are there any barriers preventing you from shopping?  No.    Are there any barriers preventing you from taking care of your own finances?  No.    Are there any  "barriers preventing you from managing your medications?  No.    Are there any barriers preventing you from showering, bathing or dressing yourself?  No.    Are you currently engaging in any exercise or physical activity?  Yes.  \"very little\"  What is your perception of your health?  Fair.    Health Maintenance Summary                HEPATITIS C SCREENING Overdue 1945     IMM ZOSTER VACCINES Postponed 9/20/2021 Originally 4/17/1996. System: vaccine not available, other system reasons    Annual Wellness Visit Next Due 4/21/2022      Done 4/20/2021 Visit Dx: Encounter for Medicare annual wellness exam    COLONOSCOPY Next Due 5/6/2029      Done 5/6/2019 REFERRAL TO GI FOR COLONOSCOPY     Patient has more history with this topic...    IMM DTaP/Tdap/Td Vaccine Next Due 5/17/2029      Done 5/17/2019 Imm Admin: Tdap Vaccine          Patient Care Team:  Jerica Coronado M.D. as PCP - General (Family Medicine)  Miguel Farris D.O. as Consulting Physician (Cardiology)    Social History     Tobacco Use   • Smoking status: Never Smoker   • Smokeless tobacco: Never Used   Substance Use Topics   • Alcohol use: Not Currently   • Drug use: Never     Family History   Problem Relation Age of Onset   • Heart Disease Mother 65        MI   • Cancer Father    • Cancer Brother 50        prostate cancer   • Stroke Brother    • Diabetes Neg Hx    • Hyperlipidemia Neg Hx    • Alcohol/Drug Neg Hx      He  has a past medical history of Arrhythmia, Arthritis, Breath shortness, Cardiac arrhythmia, Enlarged prostate with urinary obstruction, GERD (gastroesophageal reflux disease), Hyperlipidemia, Hypertension, OA (osteoarthritis) of knee, Restless leg syndrome, Tonsillitis, and Urinary bladder disorder (12/06/2017).   Past Surgical History:   Procedure Laterality Date   • TURP-VAPOR  12/7/2017    Procedure: TURP-VAPOR - GREEN LIGHT PHOTOSELECTIVE;  Surgeon: Cristofer Cr M.D.;  Location: SURGERY [facility];  Service: Urology   • " "GANGLION EXCISION Right 1966    wrist   • TONSILLECTOMY      as a child     Exam:     /70 (BP Location: Right arm, Patient Position: Sitting, BP Cuff Size: Adult)   Pulse 62   Temp 36.7 °C (98.1 °F) (Temporal)   Resp 18   Ht 1.88 m (6' 2\")   Wt 87.5 kg (193 lb)   SpO2 94%  Body mass index is 24.78 kg/m².    Hearing fair.    Dentition good  Alert, oriented in no acute distress.  Eye contact is good, speech goal directed, affect calm    Assessment and Plan. The following treatment and monitoring plan is recommended:      1. Encounter for Medicare annual wellness exam  2. Need for hepatitis C screening test  Boo is a pleasant 76-year-old man here today for his Medicare wellness visit.  He has no acute concerns.  He is due for hepatitis C screening per USPSTF guidelines which has been ordered.  He is also due for the Shingrix but as that is not covered under part B, I have encouraged him to go to the pharmacy to receive this vaccine.  - HCV Scrn ( [id number] 1xLife); Future    3. Atrial flutter, unspecified type (HCC)  4. S/P ablation of atrial flutter  5. S/P catheter ablation of slow pathway  This is a chronic condition, stable.  He has a history of atrial flutter and has a history of ablation.  He follows with cardiology regularly who is managing his condition.  He still intermittently gets episodes of a flutter and will be symptomatic with shortness of breath and lethargy.  He is on metoprolol which is largely controlling his atrial flutter.  -Continue metoprolol SR 50 mg daily  -Continue to follow with cardiology    6. Prehypertension  This is a chronic condition, stable.  Has a history of prehypertension.  He does report rare episodes of systolic up to 160 but only occur 2 or 3 times in the last year.  Generally he reports his systolic blood pressure averages 120s-130s.  He is on metoprolol which is helping manage this is somewhat.  We will continue to monitor his blood pressures with 8 visit.    7. " Mixed hyperlipidemia  This is a chronic condition, controlled.  He is on rosuvastatin for primary prevention and tolerating it well.  His last lipid panel in October, 2020 was all within normal limits and his LDL was under 70.  -Continue rosuvastatin 20 mg daily    8. Benign prostatic hyperplasia without lower urinary tract symptoms  9. Gross hematuria  These are chronic conditions, stable.  He does have a history of BPH status post laser therapy.  That is controlling the urinary symptoms very well in addition to finasteride.  He also notes 3 or 4 episodes of gross hematuria since January, 2021.  He follows with urology who is managing both of these conditions.  He reports today that urology has worked up the hematuria and is found no malignant causes and I suspect it is actually from the enlarged prostate.  -Continue finasteride 5 mg daily  -Continue to follow with urology    10. SHERRY (obstructive sleep apnea)  This is a chronic condition, stable.  He has a history of SHERRY and is on CPAP.  He reports he uses a CPAP nightly and he does follow with sleep medicine regularly.  -Continue CPAP nightly  -Continue to follow with sleep medicine    Services suggested: No services needed at this time  Health Care Screening recommendations as per orders if indicated.  Referrals offered: PT/OT/Nutrition counseling/Behavioral Health/Smoking cessation as per orders if indicated.    Discussion today about general wellness and lifestyle habits:    · Prevent falls and reduce trip hazards; Cautioned about securing or removing rugs.  · Have a working fire alarm and carbon monoxide detector;   · Engage in regular physical activity and social activities       Follow-up: Return in about 6 months (around 10/20/2021) for Med check.

## 2021-05-05 DIAGNOSIS — I49.3 PVC (PREMATURE VENTRICULAR CONTRACTION): ICD-10-CM

## 2021-05-06 RX ORDER — METOPROLOL SUCCINATE 25 MG/1
TABLET, EXTENDED RELEASE ORAL
Qty: 90 TABLET | Refills: 0 | Status: SHIPPED | OUTPATIENT
Start: 2021-05-06 | End: 2021-07-09

## 2021-06-04 ENCOUNTER — OFFICE VISIT (OUTPATIENT)
Dept: CARDIOLOGY | Facility: MEDICAL CENTER | Age: 76
End: 2021-06-04
Payer: MEDICARE

## 2021-06-04 VITALS
WEIGHT: 193 LBS | OXYGEN SATURATION: 96 % | HEIGHT: 74 IN | SYSTOLIC BLOOD PRESSURE: 112 MMHG | BODY MASS INDEX: 24.77 KG/M2 | HEART RATE: 58 BPM | DIASTOLIC BLOOD PRESSURE: 60 MMHG

## 2021-06-04 DIAGNOSIS — Z98.890 S/P CATHETER ABLATION OF SLOW PATHWAY: ICD-10-CM

## 2021-06-04 DIAGNOSIS — I49.3 PVC (PREMATURE VENTRICULAR CONTRACTION): ICD-10-CM

## 2021-06-04 DIAGNOSIS — Z98.890 S/P ABLATION OF ATRIAL FLUTTER: ICD-10-CM

## 2021-06-04 DIAGNOSIS — E78.2 MIXED HYPERLIPIDEMIA: ICD-10-CM

## 2021-06-04 DIAGNOSIS — Z86.79 S/P ABLATION OF ATRIAL FLUTTER: ICD-10-CM

## 2021-06-04 DIAGNOSIS — I48.4 ATYPICAL ATRIAL FLUTTER (HCC): ICD-10-CM

## 2021-06-04 DIAGNOSIS — Z86.79 S/P CATHETER ABLATION OF SLOW PATHWAY: ICD-10-CM

## 2021-06-04 LAB — EKG IMPRESSION: NORMAL

## 2021-06-04 PROCEDURE — 93000 ELECTROCARDIOGRAM COMPLETE: CPT | Performed by: INTERNAL MEDICINE

## 2021-06-04 PROCEDURE — 99214 OFFICE O/P EST MOD 30 MIN: CPT | Performed by: INTERNAL MEDICINE

## 2021-06-04 ASSESSMENT — FIBROSIS 4 INDEX: FIB4 SCORE: 1.65

## 2021-06-04 NOTE — PROGRESS NOTES
"Chief Complaint   Patient presents with   • Atrial Flutter     F/V Dx: Atypical atrial flutter (HCC)   • Premature Ventricular Contractions (PVCs)       Subjective:   Boo Cavanaugh Jr. is a 76 y.o. male who presents today with history of atrial flutter status post ablation.  Also slow pathway ablation.  PVCs outflow tract. On low-dose beta-blockers. On statins.  Also sees Dr. Farris.  Minor CAD.  On aspirin.  No chest pain or shortness of breath occasional fatigue.  Occasional PVCs.    Past Medical History:   Diagnosis Date   • Arrhythmia     \"flutter\", Dr. Mendoza cardiologist   • Arthritis     left thumb   • Breath shortness    • Cardiac arrhythmia    • Enlarged prostate with urinary obstruction    • GERD (gastroesophageal reflux disease)    • Hyperlipidemia    • Hypertension    • OA (osteoarthritis) of knee     bilateral    • Restless leg syndrome    • Tonsillitis    • Urinary bladder disorder 12/06/2017    savage in place     Past Surgical History:   Procedure Laterality Date   • TURP-VAPOR  12/7/2017    Procedure: TURP-VAPOR - GREEN LIGHT PHOTOSELECTIVE;  Surgeon: Cristofer Cr M.D.;  Location: SURGERY [facility];  Service: Urology   • GANGLION EXCISION Right 1966    wrist   • TONSILLECTOMY      as a child     Family History   Problem Relation Age of Onset   • Heart Disease Mother 65        MI   • Cancer Father    • Cancer Brother 50        prostate cancer   • Stroke Brother    • Diabetes Neg Hx    • Hyperlipidemia Neg Hx    • Alcohol/Drug Neg Hx      Social History     Socioeconomic History   • Marital status: Single     Spouse name: Not on file   • Number of children: Not on file   • Years of education: Not on file   • Highest education level: Not on file   Occupational History   • Not on file   Tobacco Use   • Smoking status: Never Smoker   • Smokeless tobacco: Never Used   Vaping Use   • Vaping Use: Never used   Substance and Sexual Activity   • Alcohol use: Not Currently   • Drug use: Never   • " "Sexual activity: Never     Comment: 2 daughters    Other Topics Concern   • Not on file   Social History Narrative   • Not on file     Social Determinants of Health     Financial Resource Strain:    • Difficulty of Paying Living Expenses:    Food Insecurity:    • Worried About Running Out of Food in the Last Year:    • Ran Out of Food in the Last Year:    Transportation Needs:    • Lack of Transportation (Medical):    • Lack of Transportation (Non-Medical):    Physical Activity:    • Days of Exercise per Week:    • Minutes of Exercise per Session:    Stress:    • Feeling of Stress :    Social Connections:    • Frequency of Communication with Friends and Family:    • Frequency of Social Gatherings with Friends and Family:    • Attends Zoroastrianism Services:    • Active Member of Clubs or Organizations:    • Attends Club or Organization Meetings:    • Marital Status:    Intimate Partner Violence:    • Fear of Current or Ex-Partner:    • Emotionally Abused:    • Physically Abused:    • Sexually Abused:      Allergies   Allergen Reactions   • Penicillins Rash     Rash as a child.      Outpatient Encounter Medications as of 6/4/2021   Medication Sig Dispense Refill   • metoprolol SR (TOPROL XL) 25 MG TABLET SR 24 HR TAKE ONE TABLET BY MOUTH EVERY DAY 90 tablet 0   • finasteride (PROSCAR) 5 MG Tab Take 1 tablet every day      • triamcinolone acetonide (KENALOG) 0.025 % Cream Apply thin layer to affected area(s) twice daily for 7-14 days. 15 g 0   • aspirin (ASPIR-LOW) 81 MG EC tablet Take  by mouth every day.     • rosuvastatin (CRESTOR) 20 MG Tab Take 20 mg by mouth every day.       No facility-administered encounter medications on file as of 6/4/2021.     ROS     Objective:   /60 (BP Location: Left arm, Patient Position: Sitting, BP Cuff Size: Adult)   Pulse (!) 58   Ht 1.88 m (6' 2\")   Wt 87.5 kg (193 lb)   SpO2 96%   BMI 24.78 kg/m²     Physical Exam   Constitutional: He is oriented to person, place, and time. " He appears well-developed.   HENT:   Head: Normocephalic and atraumatic.   Cardiovascular: Normal rate and regular rhythm. Exam reveals no gallop and no friction rub.   No murmur heard.  Pulmonary/Chest: Effort normal and breath sounds normal.   Abdominal: Soft.   Musculoskeletal:         General: Normal range of motion.      Cervical back: Normal range of motion and neck supple.   Neurological: He is alert and oriented to person, place, and time.   Skin: Skin is warm and dry.   Psychiatric: His behavior is normal. Judgment and thought content normal.       Assessment:     1. Atypical atrial flutter (HCC)  EKG   2. S/P ablation of atrial flutter     3. Mixed hyperlipidemia     4. S/P catheter ablation of slow pathway     5. PVC (premature ventricular contraction)         Medical Decision Making:  Today's Assessment / Status / Plan:   1.  Atrial flutter status post ablation no recurrence.  2.  PVCs continue beta-blockers.  3.  Minor fatigue may try tapering beta-blockers down to 12.5 mg daily.  4.  Hypercholesterolemia continue statins.  5.  Follow-up in 6 months.

## 2021-06-07 ENCOUNTER — OFFICE VISIT (OUTPATIENT)
Dept: SLEEP MEDICINE | Facility: MEDICAL CENTER | Age: 76
End: 2021-06-07
Payer: MEDICARE

## 2021-06-07 VITALS
WEIGHT: 192 LBS | HEIGHT: 74 IN | DIASTOLIC BLOOD PRESSURE: 60 MMHG | HEART RATE: 63 BPM | OXYGEN SATURATION: 96 % | SYSTOLIC BLOOD PRESSURE: 124 MMHG | BODY MASS INDEX: 24.64 KG/M2

## 2021-06-07 DIAGNOSIS — Z98.890 S/P ABLATION OF ATRIAL FLUTTER: ICD-10-CM

## 2021-06-07 DIAGNOSIS — G47.33 OSA (OBSTRUCTIVE SLEEP APNEA): ICD-10-CM

## 2021-06-07 DIAGNOSIS — R06.02 SHORT OF BREATH ON EXERTION: ICD-10-CM

## 2021-06-07 DIAGNOSIS — I49.3 PVC (PREMATURE VENTRICULAR CONTRACTION): ICD-10-CM

## 2021-06-07 DIAGNOSIS — Z86.79 S/P ABLATION OF ATRIAL FLUTTER: ICD-10-CM

## 2021-06-07 PROCEDURE — 99213 OFFICE O/P EST LOW 20 MIN: CPT | Performed by: NURSE PRACTITIONER

## 2021-06-07 ASSESSMENT — FIBROSIS 4 INDEX: FIB4 SCORE: 1.65

## 2021-06-07 NOTE — PROGRESS NOTES
Chief Complaint   Patient presents with   • Follow-Up     SHERRY- Last seen06/10/2020   • Results     OPO- 06/16/2020       HPI:      Mr. Mao Fung is a 77 y/o male patient who is in today for annual SHERRY f/u and OPO results. PMH includes BPH, PVC, SHERRY, hypertension, status post cardiac ablation in 11/2018 for atrial flutter, never smoker, tonsillectomy, shortness of breath, RLS, GERD.     Compliance report from 5/5/21-6/3/21 was downloaded and reviewed with the patient which showed autoCPAP 5-15 cmH2O, 100% compliance, 6 hrs 52 min use, AHI of 2.9.  He notes that he has had some increased AHI's around March April in the single digits and one episode up to 16 events per hour. Compliance report from 3/1/21-4/3/21 showed autoCPAP 5-15 cmH2O, 100% compliance, 7 hrs 12 min use, AHI of 3.6. Compliance report from 4/3/21-5/4/21 showed autoCPAP 5-15 cmH2O, 100% compliance, 7 hrs 6 min use, AHI of 4.0. He is tolerating the pressure and mask well, but [facility] increased the Aflex setting from 2-3 to decrease the increased pressure he was feeling which caused him to wake up. The RAMP setting was also increased from 4 to 5.  He goes to bed 10 pm and wakes up at 6 am.  He has noted to be sleeping longer and feels rested since using the CPAP.  He does wake up once or twice to use the restroom but has no trouble going back to sleep. He denies morning headache or snoring.  He continues to follow-up with cardiology Dr. Mendoza at [facility] and Dr. Farris at [facility]. He reports shortness of breath with exertion and wonders if it could be related to his lungs. He states he had an echocardiogram done at [facility] a few months ago. He denies SOB at rest.  He is not as active anymore due to his knee pain.  Patient states he does not consume alcohol.    Sleep Study History:   OPO on autoCPAP 5-15 cmH2O and RA from 6/16/20 indicated O2 Sat. jason was 86% and mean O2 sat was 90 % and baseline O2 at 92%. O2 sat was below 88% for 1.1 min of the flow  "evaluation time. Oxygen Desaturation (>=3%) Index was 1.9/hr. Overall it is an unremarkable oximetry.    Home sleep study from 12/13/18 indicated mild sleep apnea with an RDI of 8.5, supine RAJEEV 18.2, jason O2 sat of 85%, avg O2 was 91 % and baseline O2 at 95 %. O2 sat was below 89% for 9 min of the flow evaluation time. Avg HR 60 BPM.    ROS:    Constitutional: Denies fevers, Denies weight changes  Eyes: Denies changes in vision, no eye pain  Ears/Nose/Throat/Mouth: Denies nasal congestion or sore throat   Cardiovascular: Denies chest pain or palpitations   Respiratory: Denies shortness of breath (only with exertion), Denies cough  Gastrointestinal/Hepatic: Denies abdominal pain, nausea, vomiting,   Skin/Breast: Denies rash,   Neurological: Denies headache, confusion,   Psychiatric: denies mood disorder   Sleep: denies snoring       Past Medical History:   Diagnosis Date   • Arrhythmia     \"flutter\", Dr. Mendoza cardiologist   • Arthritis     left thumb   • Breath shortness    • Cardiac arrhythmia    • Enlarged prostate with urinary obstruction    • GERD (gastroesophageal reflux disease)    • Hyperlipidemia    • Hypertension    • OA (osteoarthritis) of knee     bilateral    • Restless leg syndrome    • Tonsillitis    • Urinary bladder disorder 12/06/2017    savage in place       Past Surgical History:   Procedure Laterality Date   • TURP-VAPOR  12/7/2017    Procedure: TURP-VAPOR - GREEN LIGHT PHOTOSELECTIVE;  Surgeon: Cristofer Cr M.D.;  Location: SURGERY [facility];  Service: Urology   • GANGLION EXCISION Right 1966    wrist   • TONSILLECTOMY      as a child       Family History   Problem Relation Age of Onset   • Heart Disease Mother 65        MI   • Cancer Father    • Cancer Brother 50        prostate cancer   • Stroke Brother    • Diabetes Neg Hx    • Hyperlipidemia Neg Hx    • Alcohol/Drug Neg Hx        Social History     Socioeconomic History   • Marital status: Single     Spouse name: Not on file   • " Number of children: Not on file   • Years of education: Not on file   • Highest education level: Not on file   Occupational History   • Not on file   Tobacco Use   • Smoking status: Never Smoker   • Smokeless tobacco: Never Used   Vaping Use   • Vaping Use: Never used   Substance and Sexual Activity   • Alcohol use: Not Currently   • Drug use: Never   • Sexual activity: Never     Comment: 2 daughters    Other Topics Concern   • Not on file   Social History Narrative   • Not on file     Social Determinants of Health     Financial Resource Strain:    • Difficulty of Paying Living Expenses:    Food Insecurity:    • Worried About Running Out of Food in the Last Year:    • Ran Out of Food in the Last Year:    Transportation Needs:    • Lack of Transportation (Medical):    • Lack of Transportation (Non-Medical):    Physical Activity:    • Days of Exercise per Week:    • Minutes of Exercise per Session:    Stress:    • Feeling of Stress :    Social Connections:    • Frequency of Communication with Friends and Family:    • Frequency of Social Gatherings with Friends and Family:    • Attends Sikh Services:    • Active Member of Clubs or Organizations:    • Attends Club or Organization Meetings:    • Marital Status:    Intimate Partner Violence:    • Fear of Current or Ex-Partner:    • Emotionally Abused:    • Physically Abused:    • Sexually Abused:        Allergies as of 06/07/2021 - Reviewed 06/07/2021   Allergen Reaction Noted   • Penicillins Rash 10/25/2017        Vitals:  Vitals:    06/07/21 1011   BP: 124/60   Pulse: 63   SpO2: 96%       Current medications as of today   Current Outpatient Medications   Medication Sig Dispense Refill   • metoprolol SR (TOPROL XL) 25 MG TABLET SR 24 HR TAKE ONE TABLET BY MOUTH EVERY DAY 90 tablet 0   • finasteride (PROSCAR) 5 MG Tab Take 1 tablet every day      • triamcinolone acetonide (KENALOG) 0.025 % Cream Apply thin layer to affected area(s) twice daily for 7-14 days. 15 g 0    • aspirin (ASPIR-LOW) 81 MG EC tablet Take  by mouth every day.     • rosuvastatin (CRESTOR) 20 MG Tab Take 20 mg by mouth every day.       No current facility-administered medications for this visit.         Physical Exam: Limited by COVID-19 precautions.  Appearance: Well developed, well nourished, no acute distress  Eyes: PERRL, EOM intact, sclera white, conjunctiva moist  Ears: no lesions or deformities  Hearing: grossly intact  Nose: no lesions or deformities  Respiratory effort: no intercostal retractions or use of accessory muscles  Extremities: no cyanosis or edema  Abdomen: soft   Gait and Station: normal  Digits and nails: no clubbing, cyanosis, petechiae or nodes.  Cranial nerves: grossly intact  Skin: no visible rashes, lesions or ulcers noted  Orientation: Oriented to time, person and place  Mood and affect: mood and affect appropriate, normal interaction with examiner  Judgement: Intact    Assessment:  1. SHERRY (obstructive sleep apnea)  DME Mask and Supplies   2. S/P ablation of atrial flutter     3. PVC (premature ventricular contraction)     4. BMI 24.0-24.9, adult     5. Short of breath on exertion           Plan  Discussed the cardiovascular and neuropsychiatric risks of untreated SHERRY; including but not limited to: HTN, DM, MI, ASCVD, CVA, CHF, traffic accidents.     1. OPO on autoCPAP 5-15 cmH2O and RA from 6/16/20 indicated O2 Sat. jason was 86% and mean O2 sat was 90 % and baseline O2 at 92%. O2 sat was below 88% for 1.1 min of the flow evaluation time. Oxygen Desaturation (>=3%) Index was 1.9/hr. Overall it is an unremarkable oximetry.    Compliance report from 5/5/21-6/3/21 was downloaded and reviewed with the patient which showed autoCPAP 5-15 cmH2O, 100% compliance, 6 hrs 52 min use, AHI of 2.9. Compliance report from 3/1/21-4/3/21 showed autoCPAP 5-15 cmH2O, 100% compliance, 7 hrs 12 min use, AHI of 3.6. Compliance report from 4/3/21-5/4/21 showed autoCPAP 5-15 cmH2O, 100% compliance, 7  hrs 6 min use, AHI of 4.0. Continue autoCPAP. Patient is compliant and benefiting from autoCPAP therapy for management of SHERRY. Advised patient to continue to use the ACPAP every night for more than four hours for optimal health benefit.    *Advised patient to avoid the supine position and alcohol 4 hrs prior to bed as this can contribute to increased apneas, and to change the mask on a monthly basis to avoid leaks which also can contribute to an increased elevation in apnea index.    *DME order ([facility]) for mask (MOC) and supplies was provided today. Continue to clean mask and supplies weekly with soap and water, and change supplies per insurance guidelines.     2-3. Continue to f/u with cardiology Dr. Mendoza at [facility] and Dr. Farris at [facility]. Request Echo from [facility] from 2021.   4. Continue to stay active to avoid deconditioning which could be contributing to SOB with exertion.   5.  Advised patient to further review shortness of breath with exertion with cardiology as well as PCP and obtain pulmonary function testing through PCP if needed.  Pending those results patient could obtain a referral to the pulmonary clinic through his PCP.  May also want to consider cardiopulmonary stress test.  6.  Sleep hygiene discussed. Recommend keeping a set sleep/wake schedule. Logging enough hours of sleep. Limiting/Avoiding naps. No caffeine after noon and no heavy meals in the evening.   7. Follow up with the appropriate healthcare practitioners for all other medical problems.  8. F/u in 1 year for SHERRY, sooner if needed.       NICHO Guerra.      This dictation was created using voice recognition software. The accuracy of the dictation is limited to the abilities of the software. I expect there may be some errors of grammar and possibly content.

## 2021-07-09 DIAGNOSIS — I49.3 PVC (PREMATURE VENTRICULAR CONTRACTION): ICD-10-CM

## 2021-07-09 RX ORDER — METOPROLOL SUCCINATE 25 MG/1
TABLET, EXTENDED RELEASE ORAL
Qty: 90 TABLET | Refills: 3 | Status: SHIPPED | OUTPATIENT
Start: 2021-07-09 | End: 2022-10-26 | Stop reason: SDUPTHER

## 2021-11-02 ENCOUNTER — OFFICE VISIT (OUTPATIENT)
Dept: MEDICAL GROUP | Facility: PHYSICIAN GROUP | Age: 76
End: 2021-11-02
Payer: MEDICARE

## 2021-11-02 VITALS
DIASTOLIC BLOOD PRESSURE: 70 MMHG | WEIGHT: 198.2 LBS | RESPIRATION RATE: 16 BRPM | BODY MASS INDEX: 25.44 KG/M2 | SYSTOLIC BLOOD PRESSURE: 128 MMHG | HEIGHT: 74 IN | OXYGEN SATURATION: 96 % | HEART RATE: 74 BPM | TEMPERATURE: 98.9 F

## 2021-11-02 DIAGNOSIS — I48.92 ATRIAL FLUTTER, UNSPECIFIED TYPE (HCC): Chronic | ICD-10-CM

## 2021-11-02 DIAGNOSIS — M15.9 PRIMARY OSTEOARTHRITIS INVOLVING MULTIPLE JOINTS: ICD-10-CM

## 2021-11-02 PROCEDURE — 99214 OFFICE O/P EST MOD 30 MIN: CPT | Performed by: FAMILY MEDICINE

## 2021-11-02 RX ORDER — CICLOPIROX 80 MG/ML
1 SOLUTION TOPICAL
COMMUNITY
Start: 2021-10-18

## 2021-11-02 ASSESSMENT — ENCOUNTER SYMPTOMS
CHILLS: 0
SHORTNESS OF BREATH: 1
FEVER: 0

## 2021-11-02 ASSESSMENT — FIBROSIS 4 INDEX: FIB4 SCORE: 1.65

## 2021-11-02 NOTE — PROGRESS NOTES
Subjective:     CC: ***    HPI:   Boo presents today with ***    Problem   Atrial Flutter (Hcc)    S/p ablation      [id number]         Current Outpatient Medications Ordered in Epic   Medication Sig Dispense Refill   • metoprolol SR (TOPROL XL) 25 MG TABLET SR 24 HR TAKE ONE TABLET BY MOUTH EVERY DAY 90 tablet 3   • finasteride (PROSCAR) 5 MG Tab Take 1 tablet every day      • triamcinolone acetonide (KENALOG) 0.025 % Cream Apply thin layer to affected area(s) twice daily for 7-14 days. 15 g 0   • aspirin (ASPIR-LOW) 81 MG EC tablet Take  by mouth every day.     • rosuvastatin (CRESTOR) 20 MG Tab Take 20 mg by mouth every day.       No current [facility]-ordered facility-administered medications on file.       Health Maintenance: {COMPLETED:530038}    ROS:  ROS    Objective:     Exam:  There were no vitals taken for this visit. There is no height or weight on file to calculate BMI.    Physical Exam    A chaperone was offered to the patient during today's exam. {CHAPERONE:86867}    Labs: ***    Assessment & Plan:     76 y.o. male with the following -     Problem List Items Addressed This Visit     Atrial flutter (HCC) (Chronic)     [id number]               I spent a total of *** minutes with record review, exam, communication with the patient, communication with other providers, and documentation of this encounter.      No follow-ups on file.    Please note that this dictation was created using voice recognition software. I have made every reasonable attempt to correct obvious errors, but I expect that there are errors of grammar and possibly content that I did not discover before finalizing the note.

## 2021-11-02 NOTE — ASSESSMENT & PLAN NOTE
This is a chronic condition, stable.  He has undergone an ablation but still gets symptomatic.  He states for about 2 months over the summer he had very minimal episodes of a flutter and felt overall tons better.  However, last few weeks to be more episodes of start to feel poorly again.  He is getting see his EP cardiologist in December.  He will continue to take medications as directed.  -Metoprolol SR 25 mg daily  -Aspirin daily

## 2021-11-02 NOTE — ASSESSMENT & PLAN NOTE
This is a chronic condition, progressing.  He has had arthritis in both knees for 5 or more years.  However, over the last 3 years has been progressively getting worse and he has been getting continued pain in his knees.  We did discuss potential options today and after discussion we decided to go ahead and get some x-rays of each knee in case he does want me to give corticosteroid injections.

## 2021-11-02 NOTE — PROGRESS NOTES
"Subjective:     CC: knee pain    HPI:   Boo presents today with     Problem   Primary Osteoarthritis Involving Multiple Joints    Reports bilateral knees are getting worse over the last 3 yrs,  Discussed different options  Ordering xrays bilateral knees     Atrial Flutter (Hcc)    S/p ablation    Good over the summer, 2m without any sx  States overall feels better, last 3 wks comes and go a bit more, trying to identify triggers  Sept with cardio  Ca score 60% obstruction  Sees EP in Dec.            Current Outpatient Medications Ordered in Epic   Medication Sig Dispense Refill   • ciclopirox (PENLAC) 8 % solution      • metoprolol SR (TOPROL XL) 25 MG TABLET SR 24 HR TAKE ONE TABLET BY MOUTH EVERY DAY 90 tablet 3   • finasteride (PROSCAR) 5 MG Tab Take 1 tablet every day      • triamcinolone acetonide (KENALOG) 0.025 % Cream Apply thin layer to affected area(s) twice daily for 7-14 days. 15 g 0   • aspirin (ASPIR-LOW) 81 MG EC tablet Take  by mouth every day.     • rosuvastatin (CRESTOR) 20 MG Tab Take 20 mg by mouth every day.       No current [facility]-ordered facility-administered medications on file.       Health Maintenance: Completed    ROS:  Review of Systems   Constitutional: Negative for chills and fever.   Respiratory: Positive for shortness of breath.    Cardiovascular: Negative for chest pain.       Objective:     Exam:  /70 (BP Location: Left arm, Patient Position: Sitting, BP Cuff Size: Adult)   Pulse 74   Temp 37.2 °C (98.9 °F) (Temporal)   Resp 16   Ht 1.88 m (6' 2\")   Wt 89.9 kg (198 lb 3.2 oz)   SpO2 96%   BMI 25.45 kg/m²  Body mass index is 25.45 kg/m².    Physical Exam  Constitutional:       Appearance: Normal appearance.   Cardiovascular:      Rate and Rhythm: Normal rate and regular rhythm.      Heart sounds: Normal heart sounds.   Pulmonary:      Effort: Pulmonary effort is normal.      Breath sounds: Normal breath sounds.   Musculoskeletal:      Cervical back: Normal range of motion " and neck supple.   Neurological:      Mental Status: He is alert.       Assessment & Plan:     76 y.o. male with the following -     Problem List Items Addressed This Visit     Atrial flutter (HCC) (Chronic)     This is a chronic condition, stable.  He has undergone an ablation but still gets symptomatic.  He states for about 2 months over the summer he had very minimal episodes of a flutter and felt overall tons better.  However, last few weeks to be more episodes of start to feel poorly again.  He is getting see his EP cardiologist in December.  He will continue to take medications as directed.  -Metoprolol SR 25 mg daily  -Aspirin daily         Primary osteoarthritis involving multiple joints     This is a chronic condition, progressing.  He has had arthritis in both knees for 5 or more years.  However, over the last 3 years has been progressively getting worse and he has been getting continued pain in his knees.  We did discuss potential options today and after discussion we decided to go ahead and get some x-rays of each knee in case he does want me to give corticosteroid injections.         Relevant Orders    DX-KNEE 3 VIEWS LEFT    DX-KNEE 3 VIEWS RIGHT        Return in about 6 months (around 5/2/2022) for Medicare Wellness.    Please note that this dictation was created using voice recognition software. I have made every reasonable attempt to correct obvious errors, but I expect that there are errors of grammar and possibly content that I did not discover before finalizing the note.

## 2021-11-08 ENCOUNTER — HOSPITAL ENCOUNTER (OUTPATIENT)
Dept: RADIOLOGY | Facility: MEDICAL CENTER | Age: 76
End: 2021-11-08
Attending: FAMILY MEDICINE
Payer: MEDICARE

## 2021-11-08 DIAGNOSIS — M15.9 PRIMARY OSTEOARTHRITIS INVOLVING MULTIPLE JOINTS: ICD-10-CM

## 2021-11-08 PROCEDURE — 73562 X-RAY EXAM OF KNEE 3: CPT | Mod: LT

## 2021-11-08 PROCEDURE — 73562 X-RAY EXAM OF KNEE 3: CPT | Mod: RT

## 2021-11-15 ENCOUNTER — HOSPITAL ENCOUNTER (OUTPATIENT)
Dept: LAB | Facility: MEDICAL CENTER | Age: 76
End: 2021-11-15
Attending: FAMILY MEDICINE
Payer: MEDICARE

## 2021-11-15 DIAGNOSIS — Z11.59 NEED FOR HEPATITIS C SCREENING TEST: ICD-10-CM

## 2021-11-15 PROCEDURE — G0472 HEP C SCREEN HIGH RISK/OTHER: HCPCS | Mod: GY

## 2021-11-15 PROCEDURE — 36415 COLL VENOUS BLD VENIPUNCTURE: CPT | Mod: GY

## 2021-11-16 LAB — HCV AB SER QL: NORMAL

## 2021-12-10 ENCOUNTER — OFFICE VISIT (OUTPATIENT)
Dept: CARDIOLOGY | Facility: MEDICAL CENTER | Age: 76
End: 2021-12-10
Payer: MEDICARE

## 2021-12-10 VITALS
HEART RATE: 65 BPM | WEIGHT: 195 LBS | BODY MASS INDEX: 25.03 KG/M2 | SYSTOLIC BLOOD PRESSURE: 126 MMHG | OXYGEN SATURATION: 98 % | RESPIRATION RATE: 18 BRPM | HEIGHT: 74 IN | DIASTOLIC BLOOD PRESSURE: 62 MMHG

## 2021-12-10 DIAGNOSIS — G47.33 OSA (OBSTRUCTIVE SLEEP APNEA): ICD-10-CM

## 2021-12-10 DIAGNOSIS — Z86.79 S/P CATHETER ABLATION OF SLOW PATHWAY: ICD-10-CM

## 2021-12-10 DIAGNOSIS — I49.3 PVC (PREMATURE VENTRICULAR CONTRACTION): ICD-10-CM

## 2021-12-10 DIAGNOSIS — Z86.79 S/P ABLATION OF ATRIAL FLUTTER: ICD-10-CM

## 2021-12-10 DIAGNOSIS — I48.92 ATRIAL FLUTTER, UNSPECIFIED TYPE (HCC): ICD-10-CM

## 2021-12-10 DIAGNOSIS — Z98.890 S/P CATHETER ABLATION OF SLOW PATHWAY: ICD-10-CM

## 2021-12-10 DIAGNOSIS — Z98.890 S/P ABLATION OF ATRIAL FLUTTER: ICD-10-CM

## 2021-12-10 LAB — EKG IMPRESSION: NORMAL

## 2021-12-10 PROCEDURE — 93000 ELECTROCARDIOGRAM COMPLETE: CPT | Performed by: INTERNAL MEDICINE

## 2021-12-10 PROCEDURE — 99214 OFFICE O/P EST MOD 30 MIN: CPT | Performed by: INTERNAL MEDICINE

## 2021-12-10 ASSESSMENT — FIBROSIS 4 INDEX: FIB4 SCORE: 1.65

## 2021-12-10 NOTE — PROGRESS NOTES
"Chief Complaint   Patient presents with   • Atrial Flutter     Fv Dx: Atypical atrial flutter        Subjective     Boo Cavanaugh Jr. is a 76 y.o. male who presents today status post flutter ablation and slow pathway ablation. No recurrence of tachycardia. Some PVCs outflow tract. On low-dose beta-blockers and on statins. Arthritis in the knee. No chest pain or shortness of breath.    Past Medical History:   Diagnosis Date   • Arrhythmia     \"flutter\", Dr. Mendoza cardiologist   • Arthritis     left thumb   • Breath shortness    • Cardiac arrhythmia    • Enlarged prostate with urinary obstruction    • GERD (gastroesophageal reflux disease)    • Hyperlipidemia    • Hypertension    • OA (osteoarthritis) of knee     bilateral    • Restless leg syndrome    • Tonsillitis    • Urinary bladder disorder 12/06/2017    savage in place     Past Surgical History:   Procedure Laterality Date   • TURP-VAPOR  12/7/2017    Procedure: TURP-VAPOR - GREEN LIGHT PHOTOSELECTIVE;  Surgeon: Cristofer Cr M.D.;  Location: SURGERY [facility];  Service: Urology   • GANGLION EXCISION Right 1966    wrist   • TONSILLECTOMY      as a child     Family History   Problem Relation Age of Onset   • Heart Disease Mother 65        MI   • Cancer Father    • Cancer Brother 50        prostate cancer   • Stroke Brother    • Diabetes Neg Hx    • Hyperlipidemia Neg Hx    • Alcohol/Drug Neg Hx      Social History     Socioeconomic History   • Marital status: Single     Spouse name: Not on file   • Number of children: Not on file   • Years of education: Not on file   • Highest education level: Not on file   Occupational History   • Not on file   Tobacco Use   • Smoking status: Never Smoker   • Smokeless tobacco: Never Used   Vaping Use   • Vaping Use: Never used   Substance and Sexual Activity   • Alcohol use: Not Currently   • Drug use: Never   • Sexual activity: Never     Comment: 2 daughters    Other Topics Concern   • Not on file   Social History " Narrative   • Not on file     Social Determinants of Health     Financial Resource Strain:    • Difficulty of Paying Living Expenses: Not on file   Food Insecurity:    • Worried About Running Out of Food in the Last Year: Not on file   • Ran Out of Food in the Last Year: Not on file   Transportation Needs:    • Lack of Transportation (Medical): Not on file   • Lack of Transportation (Non-Medical): Not on file   Physical Activity:    • Days of Exercise per Week: Not on file   • Minutes of Exercise per Session: Not on file   Stress:    • Feeling of Stress : Not on file   Social Connections:    • Frequency of Communication with Friends and Family: Not on file   • Frequency of Social Gatherings with Friends and Family: Not on file   • Attends [employer] Services: Not on file   • Active Member of Clubs or Organizations: Not on file   • Attends Club or Organization Meetings: Not on file   • Marital Status: Not on file   Intimate Partner Violence:    • Fear of Current or Ex-Partner: Not on file   • Emotionally Abused: Not on file   • Physically Abused: Not on file   • Sexually Abused: Not on file   Housing Stability:    • Unable to Pay for Housing in the Last Year: Not on file   • Number of Places Lived in the Last Year: Not on file   • Unstable Housing in the Last Year: Not on file     Allergies   Allergen Reactions   • Penicillins Rash     Rash as a child.      Outpatient Encounter Medications as of 12/10/2021   Medication Sig Dispense Refill   • ciclopirox (PENLAC) 8 % solution      • metoprolol SR (TOPROL XL) 25 MG TABLET SR 24 HR TAKE ONE TABLET BY MOUTH EVERY DAY (Patient taking differently: Patient taking 12.5 MG daily) 90 tablet 3   • finasteride (PROSCAR) 5 MG Tab Take 1 tablet every day      • triamcinolone acetonide (KENALOG) 0.025 % Cream Apply thin layer to affected area(s) twice daily for 7-14 days. 15 g 0   • aspirin (ASPIR-LOW) 81 MG EC tablet Take  by mouth every day.     • rosuvastatin (CRESTOR) 20 MG Tab  "Take 20 mg by mouth every day.       No facility-administered encounter medications on file as of 12/10/2021.     ROS           Objective     /62 (BP Location: Left arm, Patient Position: Sitting, BP Cuff Size: Adult)   Pulse 65   Resp 18   Ht 1.88 m (6' 2\")   Wt 88.5 kg (195 lb)   SpO2 98%   BMI 25.04 kg/m²     Physical Exam  Constitutional:       Appearance: He is well-developed.   HENT:      Head: Normocephalic and atraumatic.   Cardiovascular:      Rate and Rhythm: Normal rate and regular rhythm. Occasional extrasystoles are present.     Heart sounds: No murmur heard.  No friction rub. No gallop.    Pulmonary:      Effort: Pulmonary effort is normal.      Breath sounds: Normal breath sounds.   Abdominal:      Palpations: Abdomen is soft.   Musculoskeletal:         General: Normal range of motion.      Cervical back: Normal range of motion and neck supple.   Skin:     General: Skin is warm and dry.   Neurological:      Mental Status: He is alert and oriented to person, place, and time.   Psychiatric:         Behavior: Behavior normal.         Thought Content: Thought content normal.         Judgment: Judgment normal.                Assessment & Plan     1. Atrial flutter, unspecified type (HCC)  EKG   2. S/P catheter ablation of slow pathway     3. S/P ablation of atrial flutter     4. SHERRY (obstructive sleep apnea)     5. PVC (premature ventricular contraction)         Medical Decision Making: Today's Assessment/Status/Plan:   1. PVCs continue low-dose beta-blockers.  2. Status post flutter ablation and slow path ablation, no recurrence.  3. Obstructive sleep apnea treated.  4. Hyperlipidemia on statins.  5. Follow-up in 6 months.                   "

## 2022-01-07 ENCOUNTER — OFFICE VISIT (OUTPATIENT)
Dept: MEDICAL GROUP | Facility: PHYSICIAN GROUP | Age: 77
End: 2022-01-07
Payer: MEDICARE

## 2022-01-07 VITALS
DIASTOLIC BLOOD PRESSURE: 74 MMHG | SYSTOLIC BLOOD PRESSURE: 128 MMHG | OXYGEN SATURATION: 97 % | HEART RATE: 64 BPM | RESPIRATION RATE: 16 BRPM | TEMPERATURE: 97.3 F | HEIGHT: 74 IN | WEIGHT: 193.2 LBS | BODY MASS INDEX: 24.79 KG/M2

## 2022-01-07 DIAGNOSIS — M54.50 ACUTE BILATERAL LOW BACK PAIN WITHOUT SCIATICA: ICD-10-CM

## 2022-01-07 PROBLEM — M54.9 BACK PAIN: Status: ACTIVE | Noted: 2022-01-07

## 2022-01-07 PROCEDURE — 99213 OFFICE O/P EST LOW 20 MIN: CPT | Performed by: FAMILY MEDICINE

## 2022-01-07 RX ORDER — TIZANIDINE 4 MG/1
4 TABLET ORAL EVERY 6 HOURS PRN
Qty: 30 TABLET | Refills: 1 | Status: SHIPPED
Start: 2022-01-07 | End: 2022-12-05

## 2022-01-07 ASSESSMENT — PATIENT HEALTH QUESTIONNAIRE - PHQ9: CLINICAL INTERPRETATION OF PHQ2 SCORE: 0

## 2022-01-07 ASSESSMENT — ENCOUNTER SYMPTOMS
SHORTNESS OF BREATH: 0
FEVER: 0
CHILLS: 0

## 2022-01-07 ASSESSMENT — FIBROSIS 4 INDEX: FIB4 SCORE: 1.65

## 2022-01-07 NOTE — PROGRESS NOTES
"Subjective:     CC: back pain    HPI:   Boo presents today with    Problem   Back Pain    Onset: 3 weeks  Location: lumbar  Quality: muscle spasms, shooting pain, achy  Duration: constant  Trauma: fell stepping over a 28 inch item  Associated symptoms: no sciatica, +right hamstring tendon tender  Red flag symptoms: no progressive weakness, no saddle anesthesia, no urinary retention, + age >50 years, no osteoporosis, no trauma, no infection, no immunocompromise, no IV drug use, no night pain, no oral steroid use, no fever, no h/o malignancy, no progressive pain, no unintentional weight loss  Home treatment: ibuprofen prn         Health Maintenance: Completed    ROS:  Review of Systems   Constitutional: Negative for chills and fever.   Respiratory: Negative for shortness of breath.    Cardiovascular: Negative for chest pain.       Objective:     Exam:  /74 (BP Location: Right arm, Patient Position: Sitting, BP Cuff Size: Adult)   Pulse 64   Temp 36.3 °C (97.3 °F) (Temporal)   Resp 16   Ht 1.88 m (6' 2\")   Wt 87.6 kg (193 lb 3.2 oz)   SpO2 97%   BMI 24.81 kg/m²  Body mass index is 24.81 kg/m².    Physical Exam  Constitutional:       Appearance: Normal appearance.   Cardiovascular:      Rate and Rhythm: Normal rate and regular rhythm.      Heart sounds: Normal heart sounds.   Pulmonary:      Effort: Pulmonary effort is normal.      Breath sounds: Normal breath sounds.   Musculoskeletal:      Cervical back: Normal range of motion and neck supple.      Comments: Back: Full ROM, 5/5 LE strength, sensation intact bilaterally in LE, no TTP over spinous processes, paraspinals slightly tender bilaterally in lumbar region, SI joint non-tender bilaterally, straight leg raise negative bilaterally, John test negative bilaterally     Neurological:      Mental Status: He is alert.         Assessment & Plan:     76 y.o. male with the following -     Problem List Items Addressed This Visit     Back pain     Acute. 3 " weeks of low back pain. No red flags and no sciatica. Back exam benign today. He reports occasional muscle spasms.  -tizanidine 4 mg every 6 hours as needed for muscle spasms  -ibuprofen as needed for other pain  -prescription/handout for home exercises provided  -if no improvement with home exercises, will send for formal physical therapy         Relevant Medications    tizanidine (ZANAFLEX) 4 MG Tab          Return if symptoms worsen or fail to improve.    Please note that this dictation was created using voice recognition software. I have made every reasonable attempt to correct obvious errors, but I expect that there are errors of grammar and possibly content that I did not discover before finalizing the note.

## 2022-01-07 NOTE — ASSESSMENT & PLAN NOTE
Acute. 3 weeks of low back pain. No red flags and no sciatica. Back exam benign today. He reports occasional muscle spasms.  -tizanidine 4 mg every 6 hours as needed for muscle spasms  -ibuprofen as needed for other pain  -prescription/handout for home exercises provided  -if no improvement with home exercises, will send for formal physical therapy

## 2022-02-17 ENCOUNTER — OFFICE VISIT (OUTPATIENT)
Dept: MEDICAL GROUP | Facility: PHYSICIAN GROUP | Age: 77
End: 2022-02-17
Payer: MEDICARE

## 2022-02-17 VITALS
RESPIRATION RATE: 16 BRPM | SYSTOLIC BLOOD PRESSURE: 122 MMHG | TEMPERATURE: 98.9 F | HEIGHT: 74 IN | DIASTOLIC BLOOD PRESSURE: 68 MMHG | WEIGHT: 198 LBS | BODY MASS INDEX: 25.41 KG/M2 | HEART RATE: 70 BPM | OXYGEN SATURATION: 97 %

## 2022-02-17 DIAGNOSIS — M26.629 TMJ SYNDROME: ICD-10-CM

## 2022-02-17 DIAGNOSIS — M25.512 ACUTE PAIN OF LEFT SHOULDER: ICD-10-CM

## 2022-02-17 DIAGNOSIS — H93.8X2 CRACKLING SOUND IN LEFT EAR: ICD-10-CM

## 2022-02-17 DIAGNOSIS — R06.02 SHORTNESS OF BREATH: ICD-10-CM

## 2022-02-17 DIAGNOSIS — Z12.5 ENCOUNTER FOR SCREENING FOR MALIGNANT NEOPLASM OF PROSTATE: ICD-10-CM

## 2022-02-17 PROBLEM — Z86.79 HISTORY OF ATRIAL FLUTTER: Status: ACTIVE | Noted: 2017-11-11

## 2022-02-17 PROBLEM — M25.519 SHOULDER PAIN: Status: ACTIVE | Noted: 2022-02-17

## 2022-02-17 PROCEDURE — 99214 OFFICE O/P EST MOD 30 MIN: CPT | Performed by: FAMILY MEDICINE

## 2022-02-17 ASSESSMENT — ENCOUNTER SYMPTOMS
CHILLS: 0
FEVER: 0

## 2022-02-17 ASSESSMENT — FIBROSIS 4 INDEX: FIB4 SCORE: 1.65

## 2022-02-17 NOTE — PATIENT INSTRUCTIONS
For the ear  - use flonase 1 spray per day per nostril  - see a dentist for the jaw pain    For the shoulder  - we will get an x-ray to see what's going on    For the lungs  - we will do lung function tests  - if those are normal, then we will do cardiopulmonary function testing

## 2022-02-17 NOTE — ASSESSMENT & PLAN NOTE
Acute.  While discussing the crackling of left ear he also mentioned that he has been getting pain and popping of the left jaw.  I recommended he see a dentist for this as I suspect TMJ.

## 2022-02-17 NOTE — ASSESSMENT & PLAN NOTE
Chronic, uncontrolled.  He states for couple of years he has had intermittent shortness of breath when he exerts himself.  In the last 2 months it is gotten worse and requires less and less exertion to feel like his breathing is more labored.  No associated chest pain, wheezing, or palpitations.  It will get better with rest.  No other associate symptoms to be concerning for COVID-19.  -Check PFTs  -If PFTs normal, check cardiopulmonary function testing

## 2022-02-17 NOTE — PROGRESS NOTES
"Subjective:     CC: SOB, shoulder pain, ear crackling    HPI:   Boo presents today with    Problem   Shortness of Breath    Onset: couple of years, worse in last 2 months  Quality: shortness of breath, feels more labored  Duration: constant, sometimes noted more and sometimes noted less  Associated symptoms: no wheezing, no palpitations    Can occur just with walking around when it didn't before.  Hasn't been exercising as much as he used to. Used to do more cardio but stopped 4 years ago.    Will improve with rest. Unknown if the SOB would be worse if going uphill compared to walking on flat surface.      Shoulder Pain    Onset: 2 month  Location: L shoulder, superior shoulder  Duration: intermittent  Quality: cannot describe  Modifying factors: triggered - moving arm posteriorly, better - rest  Precipitating trauma: none  Associated symptoms: + popping, +numbness - sometimes in middle of night quickly resolves  Home treatments: nothing     Crackling Sound in Left Ear    3 weeks  L ear crackling  L jaw has been painful and popping. Did have some trouble chewing but better now.  Last saw dentist 4 months ago  Very slight pain in the R ear, occasional. More like discomfort.      TMJ Syndrome   History of Atrial Flutter    S/p ablation    Good over the summer, 2m without any sx  States overall feels better, last 3 wks comes and go a bit more, trying to identify triggers  Sept with cardio  Ca score 60% obstruction  Sees EP in Dec.            Health Maintenance: Completed    ROS:  Review of Systems   Constitutional: Negative for chills and fever.   Cardiovascular: Negative for chest pain.       Objective:     Exam:  /68 (BP Location: Left arm, Patient Position: Sitting, BP Cuff Size: Adult)   Pulse 70   Temp 37.2 °C (98.9 °F) (Temporal)   Resp 16   Ht 1.88 m (6' 2\")   Wt 89.8 kg (198 lb)   SpO2 97%   BMI 25.42 kg/m²  Body mass index is 25.42 kg/m².    Physical Exam  Constitutional:       Appearance: Normal " appearance.   HENT:      Right Ear: Tympanic membrane, ear canal and external ear normal.      Left Ear: Tympanic membrane, ear canal and external ear normal.   Cardiovascular:      Rate and Rhythm: Normal rate and regular rhythm.      Heart sounds: Normal heart sounds.   Pulmonary:      Effort: Pulmonary effort is normal.      Breath sounds: Normal breath sounds.   Musculoskeletal:      Cervical back: Normal range of motion and neck supple.      Comments: L Shoulder: Full ROM, full strength, empty can test negative, drop arm test negative, Hawkin's positive   Neurological:      Mental Status: He is alert.       Assessment & Plan:     76 y.o. male with the following -     Problem List Items Addressed This Visit     Shortness of breath     Chronic, uncontrolled.  He states for couple of years he has had intermittent shortness of breath when he exerts himself.  In the last 2 months it is gotten worse and requires less and less exertion to feel like his breathing is more labored.  No associated chest pain, wheezing, or palpitations.  It will get better with rest.  No other associate symptoms to be concerning for COVID-19.  -Check PFTs  -If PFTs normal, check cardiopulmonary function testing         Relevant Orders    PULMONARY FUNCTION TESTS -Test requested: Complete Pulmonary Function Test; Include MIPS/MEPS? Yes    CBC WITHOUT DIFFERENTIAL    Shoulder pain     Acute.  He has a left shoulder pain that is intermittent and triggered by movements especially when trying to move the arm posteriorly.  Shoulder exam is completely benign except for positive Bear.  -Check x-ray for arthritis  -X-ray benign, send to physical therapy         Relevant Orders    DX-SHOULDER 2+ LEFT    Crackling sound in left ear     Acute.  For 3 weeks he has had a crackling sound in his left ear.  Sometimes it is very slight pain.  Ear exam is benign so I suspect eustachian tube dysfunction.  I recommended he use Flonase to see if that  helps.         TMJ syndrome     Acute.  While discussing the crackling of left ear he also mentioned that he has been getting pain and popping of the left jaw.  I recommended he see a dentist for this as I suspect TMJ.           Other Visit Diagnoses     Encounter for screening for malignant neoplasm of prostate        Relevant Orders    PROSTATE SPECIFIC AG SCREENING          I spent a total of 35 minutes with record review, exam, communication with the patient, and documentation of this encounter.      Return if symptoms worsen or fail to improve.    Please note that this dictation was created using voice recognition software. I have made every reasonable attempt to correct obvious errors, but I expect that there are errors of grammar and possibly content that I did not discover before finalizing the note.

## 2022-02-17 NOTE — ASSESSMENT & PLAN NOTE
Acute.  For 3 weeks he has had a crackling sound in his left ear.  Sometimes it is very slight pain.  Ear exam is benign so I suspect eustachian tube dysfunction.  I recommended he use Flonase to see if that helps.

## 2022-02-17 NOTE — ASSESSMENT & PLAN NOTE
Acute.  He has a left shoulder pain that is intermittent and triggered by movements especially when trying to move the arm posteriorly.  Shoulder exam is completely benign except for positive Bear.  -Check x-ray for arthritis  -X-ray benign, send to physical therapy

## 2022-03-02 ENCOUNTER — HOSPITAL ENCOUNTER (OUTPATIENT)
Dept: RADIOLOGY | Facility: MEDICAL CENTER | Age: 77
End: 2022-03-02
Attending: FAMILY MEDICINE
Payer: MEDICARE

## 2022-03-02 DIAGNOSIS — M25.512 ACUTE PAIN OF LEFT SHOULDER: ICD-10-CM

## 2022-03-02 PROCEDURE — 73030 X-RAY EXAM OF SHOULDER: CPT | Mod: LT

## 2022-03-09 ENCOUNTER — HOSPITAL ENCOUNTER (OUTPATIENT)
Dept: RADIOLOGY | Facility: MEDICAL CENTER | Age: 77
End: 2022-03-09
Attending: FAMILY MEDICINE
Payer: MEDICARE

## 2022-03-09 DIAGNOSIS — R91.1 LUNG NODULE: ICD-10-CM

## 2022-03-09 PROCEDURE — 71250 CT THORAX DX C-: CPT | Mod: ME

## 2022-03-16 ENCOUNTER — HOSPITAL ENCOUNTER (OUTPATIENT)
Dept: PULMONOLOGY | Facility: MEDICAL CENTER | Age: 77
End: 2022-03-16
Attending: FAMILY MEDICINE
Payer: MEDICARE

## 2022-03-16 DIAGNOSIS — R06.02 SHORTNESS OF BREATH: ICD-10-CM

## 2022-03-16 PROCEDURE — 94729 DIFFUSING CAPACITY: CPT | Mod: 26 | Performed by: STUDENT IN AN ORGANIZED HEALTH CARE EDUCATION/TRAINING PROGRAM

## 2022-03-16 PROCEDURE — 94060 EVALUATION OF WHEEZING: CPT

## 2022-03-16 PROCEDURE — 94726 PLETHYSMOGRAPHY LUNG VOLUMES: CPT

## 2022-03-16 PROCEDURE — 94729 DIFFUSING CAPACITY: CPT

## 2022-03-16 PROCEDURE — 94726 PLETHYSMOGRAPHY LUNG VOLUMES: CPT | Mod: 26 | Performed by: STUDENT IN AN ORGANIZED HEALTH CARE EDUCATION/TRAINING PROGRAM

## 2022-03-16 PROCEDURE — 94060 EVALUATION OF WHEEZING: CPT | Mod: 26 | Performed by: STUDENT IN AN ORGANIZED HEALTH CARE EDUCATION/TRAINING PROGRAM

## 2022-03-16 RX ADMIN — Medication 2.5 MG: at 09:38

## 2022-03-16 ASSESSMENT — PULMONARY FUNCTION TESTS
FEV1_PERCENT_PREDICTED: 4
FEV1/FVC_PERCENT_PREDICTED: 102
FEV1/FVC_PERCENT_PREDICTED: 5
FVC_LLN: 1
FVC_LLN: 3.88
FEV1/FVC: 97
FEV1/FVC_PERCENT_CHANGE: 103
FEV1/FVC_PERCENT_PREDICTED: 74
FEV1_PERCENT_CHANGE: 104
FEV1/FVC: 54
FEV1_PREDICTED: 3.44
FEV1: 2.58
FEV1/FVC: 2.87
FEV1_PERCENT_CHANGE: 109
FEV1/FVC: 74.99
FVC_PERCENT_PREDICTED: 102
FEV1/FVC_PERCENT_PREDICTED: 100
FVC: 4
FEV1/FVC_PERCENT_CHANGE: 105
FEV1_PERCENT_PREDICTED: 104
FEV1_LLN: 2.87
FEV1/FVC_PERCENT_PREDICTED: 76.99
FEV1/FVC_PERCENT_LLN: 62.31
FVC_PERCENT_PREDICTED: 87
FEV1/FVC_PERCENT_LLN: 2
FVC_PREDICTED: 4.65
FEV1/FVC_PREDICTED: 74.62
FEV1_LLN: 4
FEV1: 3.88
FVC: 4.78

## 2022-03-23 NOTE — PROCEDURES
DATE OF SERVICE:  03/16/2022     PULMONARY FUNCTION TEST INTERPRETATION       INTERPRETATION:  There is no significant obstruction or restriction.  No   significant bronchodilator response.  Normal diffusion capacity.        ______________________________  Yadi Partida MD    NSS/SUB    DD:  03/22/2022 20:09  DT:  03/22/2022 21:01    Job#:  [id number]

## 2022-03-28 ENCOUNTER — PHYSICAL THERAPY (OUTPATIENT)
Dept: PHYSICAL THERAPY | Facility: REHABILITATION | Age: 77
End: 2022-03-28
Attending: FAMILY MEDICINE
Payer: MEDICARE

## 2022-03-28 DIAGNOSIS — M25.512 ACUTE PAIN OF LEFT SHOULDER: ICD-10-CM

## 2022-03-28 PROCEDURE — 97110 THERAPEUTIC EXERCISES: CPT

## 2022-03-28 PROCEDURE — 97162 PT EVAL MOD COMPLEX 30 MIN: CPT

## 2022-03-28 SDOH — ECONOMIC STABILITY: GENERAL: QUALITY OF LIFE: GOOD

## 2022-03-28 ASSESSMENT — ENCOUNTER SYMPTOMS
EXACERBATED BY: LIFTING
PAIN SCALE: 0
PAIN SCALE AT LOWEST: 0
QUALITY: ACHING
ALLEVIATING FACTORS: ACTIVITY MODIFICATION
EXACERBATED BY: OVERHEAD ACTIVITY
QUALITY: SHARP
PAIN SCALE AT HIGHEST: 4

## 2022-03-28 NOTE — OP THERAPY EVALUATION
"  Outpatient Physical Therapy  INITIAL EVALUATION    [facility] Physical Therapy [place] [id number] [facility], [address]  [place] [address]  Phone:  [phone]    Date of Evaluation: 2022    Patient: Boo Cavanaugh Jr.  YOB: 1945  MRN: [id number]     Referring Provider: Jerica Coronado M.D.  [address]  [place] [address]   Referring Diagnosis Acute pain of left shoulder [M25.512]     Time Calculation  Start time: 345  Stop time: 0430 Time Calculation (min): 45 minutes         Chief Complaint: Shoulder Problem    Visit Diagnoses     ICD-10-CM   1. Acute pain of left shoulder  M25.512       Date of onset of impairment: 2022    Subjective:   History of Present Illness:     Date of onset:  2022    Mechanism of injury:  The patient is a 76 year old male who reports (L) shoulder pain after walking the dog and landed to the (L) side. Pain to the top of the (L)shoulder while leaning over it or reaching back behind his low back area.has pain while sleeping on his (L) side.     Quality of life:  Good  Headaches:  no headaches  Ear problems: none  Sleep disturbance:  Not disrupted  Pain:     Current pain ratin    At best pain ratin    At worst pain ratin    Quality:  Aching and sharp    Relieving factors:  Activity modification    Aggravating factors:  Overhead activity and lifting    Pain Comments::  Reaching behind his low back; overhead activity; lifting objects.  Hand dominance:  Right  Treatments:     Current treatment:  Activity modification  Patient Goals:     Patient goals for therapy:  Decreased pain, increased motion and increased strength    Other patient goals:  To learn what is causing the disfunction; to strengthen his (L) shoudler       Past Medical History:   Diagnosis Date   • Arrhythmia     \"flutter\", Dr. Mendoza cardiologist   • Arthritis     left thumb   • Breath shortness    • Cardiac arrhythmia    • Enlarged prostate with urinary obstruction    • " GERD (gastroesophageal reflux disease)    • Hyperlipidemia    • Hypertension    • OA (osteoarthritis) of knee     bilateral    • Restless leg syndrome    • Tonsillitis    • Urinary bladder disorder 12/06/2017    savage in place     Past Surgical History:   Procedure Laterality Date   • TURP-VAPOR  12/7/2017    Procedure: TURP-VAPOR - GREEN LIGHT PHOTOSELECTIVE;  Surgeon: Cristofer Cr M.D.;  Location: SURGERY [facility];  Service: Urology   • GANGLION EXCISION Right 1966    wrist   • TONSILLECTOMY      as a child     Social History     Tobacco Use   • Smoking status: Never Smoker   • Smokeless tobacco: Never Used   Substance Use Topics   • Alcohol use: Not Currently     Family and Occupational History     Socioeconomic History   • Marital status: Single     Spouse name: Not on file   • Number of children: Not on file   • Years of education: Not on file   • Highest education level: Not on file   Occupational History   • Not on file       Objective     Static Posture   General Observations  Scoliosis.     Lumbar Spine   Lumbar spine (Left): Convex curve.   Lumbar spine (Right): Convex curve.      Pelvis   Pelvis (Left): Depressed.   Pelvis (Right): Elevated.     Observations   Central spine     Positive for forward head/neck, rounded shoulders and thoracic scoliosis.  Spine (left)      Negative for lateral convex curve.    Postural Observations  Seated posture: fair  Standing posture: poor  Correction of posture: makes symptoms worse    Additional Postural Observation Details  Convexity curve (L) in thoracic and then (R) in lower thoracic spine     Cervical Screen    Cervical range of motion within normal limits with the following exceptions: Forward head position; shoulders rounded slightly    Tenderness     Left Shoulder   No tenderness in the AC joint.     Active Range of Motion     Cervical Spine   Flexion: within functional limits  Extension: decreased  Retraction: decreased  Left lateral flexion:  decreased  Right lateral flexion: within functional limits  Left rotation: decreased  Right rotation: within functional limits  Left Shoulder   Flexion: 145 degrees with pain  Extension: 45 degrees   Abduction: 110 degrees   External rotation BTH: C6   Internal rotation BTB: L4     Right Shoulder   Flexion: 170 degrees   Extension: 55 degrees   Abduction: 165 degrees   External rotation BTH: C7   Internal rotation BTB: T7     Scapular Mobility   Left Shoulder   Scapular mobility: fair  Scapular Mobility with Shoulder to 90° FF   Scapular elevation: minimal  Upward rotation: inadequate    Right Shoulder   Scapular mobility: good    Joint Play   Left Shoulder     AC joint: hypomobile   Scapular joint: hypomobile    Strength:      Left Shoulder   Planes of Motion   Flexion: 5   Extension: 5   Abduction: 5   External rotation BTH: 4+   Internal rotation BTB: 4+     Right Shoulder   Planes of Motion   Flexion: 5   Extension: 5   Abduction: 5   External rotation BTH: 5   Internal rotation BTB: 5     Tests     Left Shoulder   Positive Hawkin's.   Negative belly press, crossover, drop arm, empty can and lift-off.         Therapeutic Exercises (CPT 43468):     1. Shoulder depression & retraction    2. Supine (L) sh IR/ER, 1 x10 reps    3. PNF D1 flexion     4. Protraction     5. Shoulder retractions     6. Corner stretch, 3 x30 sec    Therapeutic Treatments and Modalities:     1. Manual Therapy (CPT 31912), (L) shoulder, (L) GH jt mobs anterior/posterior grade 3    Time-based treatments/modalities:    Physical Therapy Timed Treatment Charges  Therapeutic exercise minutes (CPT 63906): 10 minutes      Assessment, Response and Plan:   Impairments: activity intolerance, impaired physical strength, lacks appropriate home exercise program, limited mobility and weight-bearing intolerance    Assessment details:  The patient is a 76 year old male who reports (L) shoulder pain with reaching out to the side or behind his lower back. He  also has difficulty getting situated in bed at night and has pain lying on his (L) side. The patient would benefit from skilled physical therapy to address possible (L) shoulder impingement; OA to the GH or AC joint working on PROM/AROM, manual therapy, strength and conditioning, functional training and activity tolerance.  Barriers to therapy:  None  Prognosis: good    Goals:   Short Term Goals:   1) Indep with HEP  2) 25-50% less pain lying over his (L) shoulder at night   3) Increased (L) shoulder flexion by 5-10 deg with less pain   Short term goal time span:  2-4 weeks      Long Term Goals:    1) Progression/regression with HEP   2) Awakens by less than 1-2 x at night   3) Able to reach up above his head into cabinets   4) Increased reaching BTB in IR to T5-6 or higher  5) Increase mobility in (L) AC joint by 1-2 grades with less pain by 1-2 levels    Long term goal time span:  4-6 weeks    Plan:   Therapy options:  Physical therapy treatment to continue  Planned therapy interventions:  Neuromuscular Re-education (CPT 67416), Self Care ADL Training (CPT 46640), Therapeutic Activities (CPT 02333) and Therapeutic Exercise (CPT 88739)  Frequency:  2x week  Duration in weeks:  6  Duration in visits:  12  Plan details:  Functional Training, Self care: 49497 (1)   Neuromuscular Re-education: 22761 (1)   Therapeutic Activities: 07099 (1)   Therapeutic Exercise: 08627 (1)        Functional Assessment Used        Referring provider co-signature:  I have reviewed this plan of care and my co-signature certifies the need for services.    Certification Period: 03/28/2022 to  05/09/22    Physician Signature: ________________________________ Date: ______________

## 2022-04-01 ENCOUNTER — HOSPITAL ENCOUNTER (OUTPATIENT)
Dept: LAB | Facility: MEDICAL CENTER | Age: 77
End: 2022-04-01
Attending: UROLOGY
Payer: MEDICARE

## 2022-04-01 ENCOUNTER — OFFICE VISIT (OUTPATIENT)
Dept: MEDICAL GROUP | Facility: PHYSICIAN GROUP | Age: 77
End: 2022-04-01

## 2022-04-01 ENCOUNTER — HOSPITAL ENCOUNTER (OUTPATIENT)
Dept: LAB | Facility: MEDICAL CENTER | Age: 77
End: 2022-04-01
Attending: INTERNAL MEDICINE
Payer: MEDICARE

## 2022-04-01 ENCOUNTER — HOSPITAL ENCOUNTER (OUTPATIENT)
Dept: LAB | Facility: MEDICAL CENTER | Age: 77
End: 2022-04-01
Attending: FAMILY MEDICINE
Payer: MEDICARE

## 2022-04-01 ENCOUNTER — PHYSICAL THERAPY (OUTPATIENT)
Dept: PHYSICAL THERAPY | Facility: REHABILITATION | Age: 77
End: 2022-04-01
Attending: FAMILY MEDICINE
Payer: MEDICARE

## 2022-04-01 VITALS
SYSTOLIC BLOOD PRESSURE: 124 MMHG | DIASTOLIC BLOOD PRESSURE: 58 MMHG | WEIGHT: 198.6 LBS | HEART RATE: 64 BPM | HEIGHT: 74 IN | OXYGEN SATURATION: 96 % | TEMPERATURE: 97.3 F | RESPIRATION RATE: 16 BRPM | BODY MASS INDEX: 25.49 KG/M2

## 2022-04-01 DIAGNOSIS — R06.02 SHORTNESS OF BREATH: ICD-10-CM

## 2022-04-01 DIAGNOSIS — R21 RASH: ICD-10-CM

## 2022-04-01 DIAGNOSIS — M25.512 ACUTE PAIN OF LEFT SHOULDER: ICD-10-CM

## 2022-04-01 DIAGNOSIS — L84 CALLUS OF FOOT: ICD-10-CM

## 2022-04-01 DIAGNOSIS — Z13.79 GENETIC SCREENING: ICD-10-CM

## 2022-04-01 DIAGNOSIS — Z12.5 ENCOUNTER FOR SCREENING FOR MALIGNANT NEOPLASM OF PROSTATE: ICD-10-CM

## 2022-04-01 PROBLEM — M21.969 FOOT DEFORMITY: Status: ACTIVE | Noted: 2022-04-01

## 2022-04-01 LAB
ERYTHROCYTE [DISTWIDTH] IN BLOOD BY AUTOMATED COUNT: 44.3 FL (ref 35.9–50)
HCT VFR BLD AUTO: 48.4 % (ref 42–52)
HGB BLD-MCNC: 16.4 G/DL (ref 14–18)
MCH RBC QN AUTO: 32.7 PG (ref 27–33)
MCHC RBC AUTO-ENTMCNC: 33.9 G/DL (ref 33.7–35.3)
MCV RBC AUTO: 96.4 FL (ref 81.4–97.8)
PLATELET # BLD AUTO: 166 K/UL (ref 164–446)
PMV BLD AUTO: 11 FL (ref 9–12.9)
PSA SERPL-MCNC: 1.29 NG/ML (ref 0–4)
PSA SERPL-MCNC: 1.29 NG/ML (ref 0–4)
RBC # BLD AUTO: 5.02 M/UL (ref 4.7–6.1)
WBC # BLD AUTO: 6.4 K/UL (ref 4.8–10.8)

## 2022-04-01 PROCEDURE — 97140 MANUAL THERAPY 1/> REGIONS: CPT

## 2022-04-01 PROCEDURE — 84153 ASSAY OF PSA TOTAL: CPT

## 2022-04-01 PROCEDURE — 36415 COLL VENOUS BLD VENIPUNCTURE: CPT | Mod: GA

## 2022-04-01 PROCEDURE — 97110 THERAPEUTIC EXERCISES: CPT

## 2022-04-01 PROCEDURE — 99213 OFFICE O/P EST LOW 20 MIN: CPT | Mod: 25 | Performed by: FAMILY MEDICINE

## 2022-04-01 PROCEDURE — 85027 COMPLETE CBC AUTOMATED: CPT

## 2022-04-01 PROCEDURE — 11055 PARING/CUTG B9 HYPRKER LES 1: CPT | Performed by: FAMILY MEDICINE

## 2022-04-01 PROCEDURE — 84153 ASSAY OF PSA TOTAL: CPT | Mod: GA

## 2022-04-01 ASSESSMENT — ENCOUNTER SYMPTOMS
CHILLS: 0
FEVER: 0
NAUSEA: 0
VOMITING: 0

## 2022-04-01 ASSESSMENT — FIBROSIS 4 INDEX: FIB4 SCORE: 1.65

## 2022-04-01 NOTE — PROGRESS NOTES
"Subjective:     CC: rash, bump    HPI:   Boo presents today with    Problem   Rash    1.5-2 months  Right dorsal hand  Fluid-filled blisters, then get crusty  Itching  No pain, burning  Tried triamcinolone cream, then switched to neosporin  Using lotion     Callus of Foot    2 months of pain, only noticed bump a couple days ago  Bump on lateral, left 5th digit  Painful when rubs against shoes       Shortness of Breath    Onset: couple of years, worse in last 2 months  Quality: shortness of breath, feels more labored  Duration: constant, sometimes noted more and sometimes noted less  Associated symptoms: no wheezing, no palpitations    Can occur just with walking around when it didn't before.  Hasn't been exercising as much as he used to. Used to do more cardio but stopped 4 years ago.    Will improve with rest. Unknown if the SOB would be worse if going uphill compared to walking on flat surface.          Health Maintenance: Completed    ROS:  Review of Systems   Constitutional: Negative for chills and fever.   Gastrointestinal: Negative for nausea and vomiting.       Objective:     Exam:  /58 (BP Location: Right arm, Patient Position: Sitting, BP Cuff Size: Adult)   Pulse 64   Temp 36.3 °C (97.3 °F) (Temporal)   Resp 16   Ht 1.88 m (6' 2\")   Wt 90.1 kg (198 lb 9.6 oz)   SpO2 96%   BMI 25.50 kg/m²  Body mass index is 25.5 kg/m².    Physical Exam  Constitutional:       Appearance: Normal appearance.   Cardiovascular:      Rate and Rhythm: Normal rate and regular rhythm.      Heart sounds: Normal heart sounds.   Pulmonary:      Effort: Pulmonary effort is normal.      Breath sounds: Normal breath sounds.   Musculoskeletal:      Cervical back: Normal range of motion and neck supple.        Feet:    Neurological:      Mental Status: He is alert.           Assessment & Plan:     76 y.o. male with the following -     Problem List Items Addressed This Visit     Shortness of breath     Chronic, uncontrolled.  " He states for couple of years he has had intermittent shortness of breath when he exerts himself.  In the last few months it is gotten worse and requires less and less exertion to feel like his breathing is more labored.  No associated chest pain, wheezing, or palpitations.  It will get better with rest.  No other associate symptoms to be concerning for COVID-19.  PFTs were recently checked and were largely normal.  -Pulmonology referral         Relevant Orders    Referral to Pulmonary and Sleep Medicine    Rash     Chronic, slightly improved.  For 1-1/2 to 2 months he has had a rash of the dorsal right hand.  Started as blisters that then became scabbed and crusty.  Do not suspect shingles as there was no associated pain but there was some itching.  He has an appoint with his dermatologist next week and I recommended he discuss this further with them.         Callus of foot     Chronic.  For 2 months has been getting pain on the lateral left fifth toe and recently noticed there is a bump there.  On exam he does have a callus which I think is the reason for the pain.  Therefore, we did shave down the callus today.  Using a #11 scalpel, I gently shaved off layers of the callus until it was flat and flush with the rest of his toe.  There was no bleeding or other complications and he tolerated it well.           Other Visit Diagnoses     Genetic screening        Relevant Orders    Referral to Genetic Research Studies          Referral for genetic research was offered. Patient accepted.    I spent a total of 24 minutes with record review, exam, communication with the patient, and documentation of this encounter.    Return if symptoms worsen or fail to improve.    Please note that this dictation was created using voice recognition software. I have made every reasonable attempt to correct obvious errors, but I expect that there are errors of grammar and possibly content that I did not discover before finalizing the  note.

## 2022-04-01 NOTE — ASSESSMENT & PLAN NOTE
Chronic, uncontrolled.  He states for couple of years he has had intermittent shortness of breath when he exerts himself.  In the last few months it is gotten worse and requires less and less exertion to feel like his breathing is more labored.  No associated chest pain, wheezing, or palpitations.  It will get better with rest.  No other associate symptoms to be concerning for COVID-19.  PFTs were recently checked and were largely normal.  -Pulmonology referral

## 2022-04-01 NOTE — ASSESSMENT & PLAN NOTE
Chronic.  For 2 months has been getting pain on the lateral left fifth toe and recently noticed there is a bump there.  On exam he does have a callus which I think is the reason for the pain.  Therefore, we did shave down the callus today.  Using a #11 scalpel, I gently shaved off layers of the callus until it was flat and flush with the rest of his toe.  There was no bleeding or other complications and he tolerated it well.

## 2022-04-01 NOTE — ASSESSMENT & PLAN NOTE
Chronic, slightly improved.  For 1-1/2 to 2 months he has had a rash of the dorsal right hand.  Started as blisters that then became scabbed and crusty.  Do not suspect shingles as there was no associated pain but there was some itching.  He has an appoint with his dermatologist next week and I recommended he discuss this further with them.

## 2022-04-01 NOTE — OP THERAPY DAILY TREATMENT
Outpatient Physical Therapy  DAILY TREATMENT     [facility] Outpatient Physical Therapy [address] [facility], [address]  [place] [address]  Phone:  [phone]    Date: 04/01/2022    Patient: Boo Cavanaugh Jr.  YOB: 1945  MRN: [id number]     Time Calculation    Start time: 1015  Stop time: 1100 Time Calculation (min): 45 minutes         Chief Complaint: Shoulder Problem    Visit #: 2    SUBJECTIVE:  The patient reports having less soreness stiffness to his (L) shoulder but pain is still present when he moves certain directions with overhead activity especially.    OBJECTIVE:  Current objective measures:       Increase AROM in (L) shoulder; decrease tightness     Therapeutic Exercises (CPT 33546):     1. Shoulder depression & retraction    2. Supine (L) sh IR/ER, 1 x10 reps    3. PNF D1 flexion     4. Protraction     5. Shoulder retractions , 1 x10 reps (purple tb)    6. Corner stretch, 3 x30 sec    Therapeutic Treatments and Modalities:     1. Manual Therapy (CPT 11923), (L) shoulder, (L) GH jt mobs anterior/posterior grade 3, (L) AC joint AP's grade 2-3     Time-based treatments/modalities:    Physical Therapy Timed Treatment Charges  Manual therapy minutes (CPT 57983): 15 minutes  Therapeutic exercise minutes (CPT 86318): 30 minutes  ASSESSMENT:   Response to treatment:   (L) GH jt mobs anterior/posterior grade 3; (L) AC joint AP's grade 2-3 patient tolerated well; Self care instruction with AROM exercises using dowel for increasing mobility in flexion and abduction       PLAN/RECOMMENDATIONS:   Plan for treatment: therapy treatment to continue next visit.  Planned interventions for next visit: continue with current treatment.

## 2022-04-04 ENCOUNTER — PHYSICAL THERAPY (OUTPATIENT)
Dept: PHYSICAL THERAPY | Facility: REHABILITATION | Age: 77
End: 2022-04-04
Attending: FAMILY MEDICINE
Payer: MEDICARE

## 2022-04-04 DIAGNOSIS — M25.512 ACUTE PAIN OF LEFT SHOULDER: ICD-10-CM

## 2022-04-04 PROCEDURE — 97140 MANUAL THERAPY 1/> REGIONS: CPT

## 2022-04-04 PROCEDURE — 97110 THERAPEUTIC EXERCISES: CPT

## 2022-04-04 NOTE — OP THERAPY DAILY TREATMENT
Outpatient Physical Therapy  DAILY TREATMENT     [facility] Outpatient Physical Therapy [place] [address] [address] [facility], [address]  [place] [address]  Phone:  [phone]    Date: 04/04/2022    Patient: Boo Cavanaugh Jr.  YOB: 1945  MRN: [id number]     Time Calculation    Start time: 0345  Stop time: 0430 Time Calculation (min): 45 minutes         Chief Complaint: Shoulder Problem    Visit #: 3    SUBJECTIVE:  The patient reports having soreness to the (L) shoulder for shingles; feels his movement reaching behind the back in IR is better.    OBJECTIVE:  Current objective measures:       Improve scapular retraction strength; pectoral stretch; anterior deltoid stretch     Therapeutic Exercises (CPT 99004):     1. Shoulder depression & retraction    2. Supine (L) sh IR/ER, 1 x10 reps @3#    3. PNF D1 flexion     4. Protraction     5. Shoulder retractions , 1 x10 reps (purple tb)    6. Corner stretch, 3 x30 sec    7. Prone rows    8. Prone abductions    9. Pectoral flies , 2 x10 reo    Therapeutic Treatments and Modalities:     1. Manual Therapy (CPT 89404), (L) shoulder, (L) GH jt mobs anterior/posterior grade 3, (L) AC joint AP's grade 2-3     Time-based treatments/modalities:       ASSESSMENT:   Response to treatment:   AAROM with pulleys in flexion, abduction ER/IR; difficulty with (L) sh BTB reach to L4-5. Patient limited in (L) shoulder ER to ~40 deg from 90/90 (L) shoulder horizontal.       PLAN/RECOMMENDATIONS:   Plan for treatment: therapy treatment to continue next visit.  Planned interventions for next visit: continue with current treatment.

## 2022-04-07 ENCOUNTER — APPOINTMENT (OUTPATIENT)
Dept: PHYSICAL THERAPY | Facility: REHABILITATION | Age: 77
End: 2022-04-07
Attending: FAMILY MEDICINE
Payer: MEDICARE

## 2022-04-08 ENCOUNTER — PHYSICAL THERAPY (OUTPATIENT)
Dept: PHYSICAL THERAPY | Facility: REHABILITATION | Age: 77
End: 2022-04-08
Attending: FAMILY MEDICINE
Payer: MEDICARE

## 2022-04-08 DIAGNOSIS — M25.512 ACUTE PAIN OF LEFT SHOULDER: ICD-10-CM

## 2022-04-08 PROCEDURE — 97112 NEUROMUSCULAR REEDUCATION: CPT

## 2022-04-08 PROCEDURE — 97110 THERAPEUTIC EXERCISES: CPT

## 2022-04-08 NOTE — OP THERAPY DAILY TREATMENT
Outpatient Physical Therapy  DAILY TREATMENT     [facility] Outpatient Physical Therapy [place] [address] [facility], [address]  [place] [address]  Phone:  [phone]    Date: 04/08/2022    Patient: Boo Cavanaugh Jr.  YOB: 1945  MRN: [id number]     Time Calculation    Start time: 1015  Stop time: 1100 Time Calculation (min): 45 minutes         Chief Complaint: Shoulder Problem    Visit #: 4    SUBJECTIVE:  The patient reports having more mid thoracic pain into his low back a day ago after working out. He felt like he had strained a muscle but he reports that this has been happening often.    OBJECTIVE:  Current objective measures:       Decrease tightness around (R) scapular region     Therapeutic Exercises (CPT 55596):     1. Shoulder depression & retraction    2. Supine (L) sh IR/ER, 1 x10 reps @3#    3. PNF D1 flexion     4. Shoulder protraction , 2 x12 reps @ 8#    5. Shoulder retractions , 1 x10 reps (purple tb)    6. Corner stretch, 3 x30 sec    7. Prone rows    8. Prone abductions    9. Pectoral flies , 2 x10 reo    Therapeutic Treatments and Modalities:     1. Manual Therapy (CPT 53927), (L) shoulder, (L) GH jt mobs anterior/posterior grade 3, (L) AC joint AP's grade 2-3 , STM to the mid to lower traps, lats and rhomboids    Time-based treatments/modalities:    Physical Therapy Timed Treatment Charges  Manual therapy minutes (CPT 83773): 5 minutes  Neuromusc re-ed, balance, coor, post minutes (CPT 83274): 10 minutes  Therapeutic exercise minutes (CPT 81763): 30 minutes       ASSESSMENT:   Response to treatment:   STM to the thoracic paraspinals; increased tightness to the (R) inferior medial scapular region. Education on form roller use and purpose of device; patient tried using soft foam roller to release tightness along scapular ridge. Patient had some difficulty using on floor but was able experience use of roller and pressure relief in lats and rhomboid muscles.       PLAN/RECOMMENDATIONS:   Plan for  treatment: therapy treatment to continue next visit.  Planned interventions for next visit: continue with current treatment.

## 2022-04-15 ENCOUNTER — APPOINTMENT (OUTPATIENT)
Dept: PHYSICAL THERAPY | Facility: REHABILITATION | Age: 77
End: 2022-04-15
Attending: FAMILY MEDICINE
Payer: MEDICARE

## 2022-04-22 ENCOUNTER — PHYSICAL THERAPY (OUTPATIENT)
Dept: PHYSICAL THERAPY | Facility: REHABILITATION | Age: 77
End: 2022-04-22
Attending: FAMILY MEDICINE
Payer: MEDICARE

## 2022-04-22 ENCOUNTER — OFFICE VISIT (OUTPATIENT)
Dept: MEDICAL GROUP | Facility: PHYSICIAN GROUP | Age: 77
End: 2022-04-22

## 2022-04-22 VITALS
HEART RATE: 58 BPM | OXYGEN SATURATION: 97 % | DIASTOLIC BLOOD PRESSURE: 64 MMHG | WEIGHT: 195 LBS | BODY MASS INDEX: 25.03 KG/M2 | HEIGHT: 74 IN | SYSTOLIC BLOOD PRESSURE: 132 MMHG | TEMPERATURE: 97.3 F

## 2022-04-22 DIAGNOSIS — R22.32 MASS OF LEFT AXILLA: ICD-10-CM

## 2022-04-22 DIAGNOSIS — M25.512 ACUTE PAIN OF LEFT SHOULDER: ICD-10-CM

## 2022-04-22 PROCEDURE — 97110 THERAPEUTIC EXERCISES: CPT

## 2022-04-22 PROCEDURE — 97140 MANUAL THERAPY 1/> REGIONS: CPT

## 2022-04-22 PROCEDURE — 99213 OFFICE O/P EST LOW 20 MIN: CPT | Performed by: FAMILY MEDICINE

## 2022-04-22 RX ORDER — BETAMETHASONE DIPROPIONATE 0.05 %
OINTMENT (GRAM) TOPICAL
COMMUNITY
Start: 2022-04-07 | End: 2023-06-12

## 2022-04-22 ASSESSMENT — FIBROSIS 4 INDEX: FIB4 SCORE: 1.73

## 2022-04-22 ASSESSMENT — ENCOUNTER SYMPTOMS
FEVER: 0
CHILLS: 0

## 2022-04-22 NOTE — PROGRESS NOTES
"Subjective:     CC: lump    HPI:   Boo presents today with    Lump  2 weeks  Under/lateral left shoulder  No pain  No changes    Health Maintenance: Completed    ROS:  Review of Systems   Constitutional: Negative for chills and fever.   Cardiovascular: Negative for chest pain.       Objective:     Exam:  /64 (BP Location: Right arm, Patient Position: Sitting, BP Cuff Size: Adult)   Pulse (!) 58   Temp 36.3 °C (97.3 °F) (Temporal)   Ht 1.88 m (6' 2\")   Wt 88.5 kg (195 lb)   SpO2 97%   BMI 25.04 kg/m²  Body mass index is 25.04 kg/m².    Physical Exam  Constitutional:       Appearance: Normal appearance.   Cardiovascular:      Rate and Rhythm: Normal rate and regular rhythm.      Heart sounds: Normal heart sounds.   Pulmonary:      Effort: Pulmonary effort is normal.      Breath sounds: Normal breath sounds.   Musculoskeletal:        Arms:       Cervical back: Normal range of motion and neck supple.   Neurological:      Mental Status: He is alert.       Assessment & Plan:     77 y.o. male with the following -     1. Mass of left axilla  Acute, new diagnosis. Just noticed a lump of th posterolateral left axilla a couple of weeks ago. On exam he has a fairly large, soft mass that I suspect is a lipoma. We will get an ultrasound to evaluate it further.  - US-EXTREMITY NON VASCULAR UNILATERAL LEFT; Future    Referral for genetic research was offered. Patient already has a referral.    Return if symptoms worsen or fail to improve.    Please note that this dictation was created using voice recognition software. I have made every reasonable attempt to correct obvious errors, but I expect that there are errors of grammar and possibly content that I did not discover before finalizing the note.      "

## 2022-04-22 NOTE — OP THERAPY DAILY TREATMENT
Outpatient Physical Therapy  DAILY TREATMENT     [facility] Outpatient Physical Therapy [place] [address] [facility], [address]  [place] [address]  Phone:  [phone]    Date: 04/22/2022    Patient: Boo Cavanaugh Jr.  YOB: 1945  MRN: [id number]     Time Calculation    Start time: 1015  Stop time: 1100 Time Calculation (min): 45 minutes         Chief Complaint: Shoulder Problem and Shoulder Injury    Visit #: 5    SUBJECTIVE:  The patient reports having more mobility and movement from doing activity at home.    OBJECTIVE:  Current objective measures:       Decrease tightness to pectorals; increase strength to (L) sh RTC complex; increase (L) sh IR BTB    Therapeutic Exercises (CPT 95661):     1. Shoulder shrugs    2. Supine (L) sh IR/ER, 1 x10 reps @3#    3. PNF D1 flexion , 2 x10 reps (black tb)    4. Shoulder protraction , 2 x12 reps @ 8#    5. Shoulder retractions , 1 x10 reps (purple tb)    6. Corner stretch, 3 x30 sec    7. Prone rows    8. Prone abductions    9. Pectoral flies , 2 x10 (black tb)    10. Serratus anterior shoulder lift offs    11. 90/90 (L) Shoulder ER , 2 x10 reps (green tb)    12. Sleeper's stretch, 3 x30 sec    13. Foam roller, 3-4' , snow angels; shoulder retractions    Therapeutic Treatments and Modalities:     1. Manual Therapy (CPT 24642), (L) shoulder, (L) GH jt mobs anterior/posterior grade 3, (L) AC joint AP's grade 2-3 , STM to the mid to lower traps, lats and rhomboids    Time-based treatments/modalities:    Physical Therapy Timed Treatment Charges  Manual therapy minutes (CPT 71246): 15 minutes  Therapeutic exercise minutes (CPT 49325): 30 minutes    ASSESSMENT:   Response to treatment:   (L) sh GH joint mobs AP's grade 2-3; lateral glides and PAMS improved wit less pain following intervention; patient reports less stiffness and greater (L) shoulder ER/IR AROM. Educated patient on sleepers stretch and towel stretches to improve (L) sh mobility in IR. Patient tolerated well.  Education on use of foam roller for improved mobility in supine for increased ER/IR at (L) shoulder.        PLAN/RECOMMENDATIONS:   Plan for treatment: therapy treatment to continue next visit.  Planned interventions for next visit: continue with current treatment.

## 2022-04-28 ENCOUNTER — APPOINTMENT (OUTPATIENT)
Dept: PHYSICAL THERAPY | Facility: REHABILITATION | Age: 77
End: 2022-04-28
Attending: FAMILY MEDICINE
Payer: MEDICARE

## 2022-05-04 ENCOUNTER — APPOINTMENT (OUTPATIENT)
Dept: PHYSICAL THERAPY | Facility: REHABILITATION | Age: 77
End: 2022-05-04
Attending: FAMILY MEDICINE
Payer: MEDICARE

## 2022-05-05 SDOH — HEALTH STABILITY: PHYSICAL HEALTH: ON AVERAGE, HOW MANY MINUTES DO YOU ENGAGE IN EXERCISE AT THIS LEVEL?: 0 MIN

## 2022-05-05 SDOH — ECONOMIC STABILITY: INCOME INSECURITY: IN THE LAST 12 MONTHS, WAS THERE A TIME WHEN YOU WERE NOT ABLE TO PAY THE MORTGAGE OR RENT ON TIME?: NO

## 2022-05-05 SDOH — ECONOMIC STABILITY: INCOME INSECURITY: HOW HARD IS IT FOR YOU TO PAY FOR THE VERY BASICS LIKE FOOD, HOUSING, MEDICAL CARE, AND HEATING?: NOT HARD AT ALL

## 2022-05-05 SDOH — ECONOMIC STABILITY: FOOD INSECURITY: WITHIN THE PAST 12 MONTHS, YOU WORRIED THAT YOUR FOOD WOULD RUN OUT BEFORE YOU GOT MONEY TO BUY MORE.: NEVER TRUE

## 2022-05-05 SDOH — ECONOMIC STABILITY: TRANSPORTATION INSECURITY
IN THE PAST 12 MONTHS, HAS THE LACK OF TRANSPORTATION KEPT YOU FROM MEDICAL APPOINTMENTS OR FROM GETTING MEDICATIONS?: NO

## 2022-05-05 SDOH — ECONOMIC STABILITY: FOOD INSECURITY: WITHIN THE PAST 12 MONTHS, THE FOOD YOU BOUGHT JUST DIDN'T LAST AND YOU DIDN'T HAVE MONEY TO GET MORE.: NEVER TRUE

## 2022-05-05 SDOH — ECONOMIC STABILITY: HOUSING INSECURITY
IN THE LAST 12 MONTHS, WAS THERE A TIME WHEN YOU DID NOT HAVE A STEADY PLACE TO SLEEP OR SLEPT IN A SHELTER (INCLUDING NOW)?: NO

## 2022-05-05 SDOH — ECONOMIC STABILITY: HOUSING INSECURITY: IN THE LAST 12 MONTHS, HOW MANY PLACES HAVE YOU LIVED?: 1

## 2022-05-05 SDOH — HEALTH STABILITY: PHYSICAL HEALTH: ON AVERAGE, HOW MANY DAYS PER WEEK DO YOU ENGAGE IN MODERATE TO STRENUOUS EXERCISE (LIKE A BRISK WALK)?: 0 DAYS

## 2022-05-05 SDOH — HEALTH STABILITY: MENTAL HEALTH
STRESS IS WHEN SOMEONE FEELS TENSE, NERVOUS, ANXIOUS, OR CAN'T SLEEP AT NIGHT BECAUSE THEIR MIND IS TROUBLED. HOW STRESSED ARE YOU?: NOT AT ALL

## 2022-05-05 SDOH — ECONOMIC STABILITY: TRANSPORTATION INSECURITY
IN THE PAST 12 MONTHS, HAS LACK OF TRANSPORTATION KEPT YOU FROM MEETINGS, WORK, OR FROM GETTING THINGS NEEDED FOR DAILY LIVING?: NO

## 2022-05-05 SDOH — ECONOMIC STABILITY: TRANSPORTATION INSECURITY
IN THE PAST 12 MONTHS, HAS LACK OF RELIABLE TRANSPORTATION KEPT YOU FROM MEDICAL APPOINTMENTS, MEETINGS, WORK OR FROM GETTING THINGS NEEDED FOR DAILY LIVING?: NO

## 2022-05-05 ASSESSMENT — SOCIAL DETERMINANTS OF HEALTH (SDOH)
HOW OFTEN DO YOU HAVE SIX OR MORE DRINKS ON ONE OCCASION: NEVER
HOW HARD IS IT FOR YOU TO PAY FOR THE VERY BASICS LIKE FOOD, HOUSING, MEDICAL CARE, AND HEATING?: NOT HARD AT ALL
HOW OFTEN DO YOU GET TOGETHER WITH FRIENDS OR RELATIVES?: MORE THAN THREE TIMES A WEEK
HOW OFTEN DO YOU HAVE A DRINK CONTAINING ALCOHOL: MONTHLY OR LESS
HOW OFTEN DO YOU GET TOGETHER WITH FRIENDS OR RELATIVES?: MORE THAN THREE TIMES A WEEK
HOW OFTEN DO YOU ATTEND CHURCH OR RELIGIOUS SERVICES?: 1 TO 4 TIMES PER YEAR
IN A TYPICAL WEEK, HOW MANY TIMES DO YOU TALK ON THE PHONE WITH FAMILY, FRIENDS, OR NEIGHBORS?: MORE THAN THREE TIMES A WEEK
DO YOU BELONG TO ANY CLUBS OR ORGANIZATIONS SUCH AS CHURCH GROUPS UNIONS, FRATERNAL OR ATHLETIC GROUPS, OR SCHOOL GROUPS?: NO
HOW OFTEN DO YOU ATTENT MEETINGS OF THE CLUB OR ORGANIZATION YOU BELONG TO?: NEVER
WITHIN THE PAST 12 MONTHS, YOU WORRIED THAT YOUR FOOD WOULD RUN OUT BEFORE YOU GOT THE MONEY TO BUY MORE: NEVER TRUE
HOW OFTEN DO YOU ATTEND CHURCH OR RELIGIOUS SERVICES?: 1 TO 4 TIMES PER YEAR
HOW MANY DRINKS CONTAINING ALCOHOL DO YOU HAVE ON A TYPICAL DAY WHEN YOU ARE DRINKING: 1 OR 2
DO YOU BELONG TO ANY CLUBS OR ORGANIZATIONS SUCH AS CHURCH GROUPS UNIONS, FRATERNAL OR ATHLETIC GROUPS, OR SCHOOL GROUPS?: NO
IN A TYPICAL WEEK, HOW MANY TIMES DO YOU TALK ON THE PHONE WITH FAMILY, FRIENDS, OR NEIGHBORS?: MORE THAN THREE TIMES A WEEK
HOW OFTEN DO YOU ATTENT MEETINGS OF THE CLUB OR ORGANIZATION YOU BELONG TO?: NEVER

## 2022-05-05 ASSESSMENT — LIFESTYLE VARIABLES
HOW MANY STANDARD DRINKS CONTAINING ALCOHOL DO YOU HAVE ON A TYPICAL DAY: 1 OR 2
HOW OFTEN DO YOU HAVE A DRINK CONTAINING ALCOHOL: MONTHLY OR LESS
HOW OFTEN DO YOU HAVE SIX OR MORE DRINKS ON ONE OCCASION: NEVER

## 2022-05-06 ENCOUNTER — APPOINTMENT (OUTPATIENT)
Dept: PHYSICAL THERAPY | Facility: REHABILITATION | Age: 77
End: 2022-05-06
Attending: FAMILY MEDICINE
Payer: MEDICARE

## 2022-05-06 ENCOUNTER — HOSPITAL ENCOUNTER (OUTPATIENT)
Dept: RADIOLOGY | Facility: MEDICAL CENTER | Age: 77
End: 2022-05-06
Attending: FAMILY MEDICINE
Payer: MEDICARE

## 2022-05-06 ENCOUNTER — OFFICE VISIT (OUTPATIENT)
Dept: MEDICAL GROUP | Facility: PHYSICIAN GROUP | Age: 77
End: 2022-05-06
Payer: MEDICARE

## 2022-05-06 VITALS
SYSTOLIC BLOOD PRESSURE: 132 MMHG | HEART RATE: 78 BPM | DIASTOLIC BLOOD PRESSURE: 70 MMHG | BODY MASS INDEX: 25.51 KG/M2 | RESPIRATION RATE: 16 BRPM | OXYGEN SATURATION: 95 % | WEIGHT: 198.8 LBS | HEIGHT: 74 IN | TEMPERATURE: 98.8 F

## 2022-05-06 DIAGNOSIS — M15.9 PRIMARY OSTEOARTHRITIS INVOLVING MULTIPLE JOINTS: ICD-10-CM

## 2022-05-06 DIAGNOSIS — M54.50 ACUTE BILATERAL LOW BACK PAIN WITHOUT SCIATICA: ICD-10-CM

## 2022-05-06 DIAGNOSIS — G47.33 OSA (OBSTRUCTIVE SLEEP APNEA): ICD-10-CM

## 2022-05-06 DIAGNOSIS — R03.0 PREHYPERTENSION: ICD-10-CM

## 2022-05-06 DIAGNOSIS — M26.629 TMJ SYNDROME: ICD-10-CM

## 2022-05-06 DIAGNOSIS — R22.32 MASS OF LEFT AXILLA: ICD-10-CM

## 2022-05-06 DIAGNOSIS — E78.2 MIXED HYPERLIPIDEMIA: ICD-10-CM

## 2022-05-06 DIAGNOSIS — N40.0 BENIGN PROSTATIC HYPERPLASIA WITHOUT LOWER URINARY TRACT SYMPTOMS: ICD-10-CM

## 2022-05-06 DIAGNOSIS — Z00.00 ENCOUNTER FOR MEDICARE ANNUAL WELLNESS EXAM: Primary | ICD-10-CM

## 2022-05-06 DIAGNOSIS — M25.512 ACUTE PAIN OF LEFT SHOULDER: ICD-10-CM

## 2022-05-06 DIAGNOSIS — R06.02 SHORTNESS OF BREATH: ICD-10-CM

## 2022-05-06 PROBLEM — R21 RASH: Status: RESOLVED | Noted: 2022-04-01 | Resolved: 2022-05-06

## 2022-05-06 PROBLEM — M65.4 RADIAL STYLOID TENOSYNOVITIS: Status: RESOLVED | Noted: 2019-01-08 | Resolved: 2022-05-06

## 2022-05-06 PROCEDURE — 76882 US LMTD JT/FCL EVL NVASC XTR: CPT | Mod: LT

## 2022-05-06 PROCEDURE — G0439 PPPS, SUBSEQ VISIT: HCPCS | Performed by: FAMILY MEDICINE

## 2022-05-06 ASSESSMENT — ACTIVITIES OF DAILY LIVING (ADL): BATHING_REQUIRES_ASSISTANCE: 0

## 2022-05-06 ASSESSMENT — FIBROSIS 4 INDEX: FIB4 SCORE: 1.73

## 2022-05-06 ASSESSMENT — ENCOUNTER SYMPTOMS: GENERAL WELL-BEING: FAIR

## 2022-05-06 ASSESSMENT — PATIENT HEALTH QUESTIONNAIRE - PHQ9: CLINICAL INTERPRETATION OF PHQ2 SCORE: 0

## 2022-05-06 NOTE — PROGRESS NOTES
Chief Complaint   Patient presents with   • Annual Wellness Visit         HPI:  Boo is a 77 y.o. here for Medicare Annual Wellness Visit      Patient Active Problem List    Diagnosis Date Noted   • Callus of foot 04/01/2022   • Shortness of breath 02/17/2022   • Shoulder pain 02/17/2022   • Crackling sound in left ear 02/17/2022   • TMJ syndrome 02/17/2022   • Back pain 01/07/2022   • Hyperlipidemia    • Pain of left hip joint 01/07/2020   • History of skin cancer 11/13/2019   • Primary osteoarthritis involving multiple joints 03/12/2019   • S/P ablation of atrial flutter 01/15/2019   • S/P catheter ablation of slow pathway 01/15/2019   • Prehypertension 01/08/2019   • SHERRY (obstructive sleep apnea) 11/29/2018   • PVC (premature ventricular contraction) 07/09/2018   • Benign prostatic hyperplasia without lower urinary tract symptoms 11/15/2017   • History of atrial flutter 11/11/2017       Current Outpatient Medications   Medication Sig Dispense Refill   • betamethasone dipropionate (DIPROLENE) 0.05 % Ointment      • tizanidine (ZANAFLEX) 4 MG Tab Take 1 Tablet by mouth every 6 hours as needed. 30 Tablet 1   • ciclopirox (PENLAC) 8 % solution      • metoprolol SR (TOPROL XL) 25 MG TABLET SR 24 HR TAKE ONE TABLET BY MOUTH EVERY DAY (Patient taking differently: Patient taking 12.5 MG daily) 90 tablet 3   • finasteride (PROSCAR) 5 MG Tab Take 1 tablet every day      • triamcinolone acetonide (KENALOG) 0.025 % Cream Apply thin layer to affected area(s) twice daily for 7-14 days. 15 g 0   • aspirin 81 MG EC tablet Take  by mouth every day.     • rosuvastatin (CRESTOR) 20 MG Tab Take 20 mg by mouth every day.       No current facility-administered medications for this visit.        Patient is taking medications as noted in medication list.  Current supplements as per medication list.     Allergies: Penicillins    Current social contact/activities: nigelds     Is patient current with immunizations? Yes.    He  reports  that he has never smoked. He has never used smokeless tobacco. He reports previous alcohol use. He reports that he does not use drugs.  Counseling given: Yes    DPA/Advanced directive: Patient has Advanced Directive, but it is not on file. Instructed to bring in a copy to scan into their chart.    ROS:    Gait: Uses no assistive device   Ostomy: No   Other tubes: No   Amputations: No   Chronic oxygen use No, CPAP  Last eye exam 5/2021  Wears hearing aids: No   : Denies any urinary leakage during the last 6 months      Screening:      Depression Screening  Little interest or pleasure in doing things?  0 - not at all  Feeling down, depressed, or hopeless? 0 - not at all  Patient Health Questionnaire Score: 0    If depressive symptoms identified deferred to follow up visit unless specifically addressed in assessment and plan.    Interpretation of PHQ-9 Total Score   Score Severity   1-4 No Depression   5-9 Mild Depression   10-14 Moderate Depression   15-19 Moderately Severe Depression   20-27 Severe Depression    Screening for Cognitive Impairment  Three Minute Recall (daughter, heaven, mountain)  3/3    Margarito clock face with all 12 numbers and set the hands to show 10 past 11.  Yes    If cognitive concerns identified, deferred for follow up unless specifically addressed in assessment and plan.    Fall Risk Assessment  Has the patient had two or more falls in the last year or any fall with injury in the last year?  No  If fall risk identified, deferred for follow up unless specifically addressed in assessment and plan.    Safety Assessment  Throw rugs on floor.  No  Handrails on all stairs.  No - no stairs  Good lighting in all hallways.  Yes  Difficulty hearing.  Yes  Patient counseled about all safety risks that were identified.    Functional Assessment ADLs  Are there any barriers preventing you from cooking for yourself or meeting nutritional needs?  No.    Are there any barriers preventing you from driving  safely or obtaining transportation?  No.    Are there any barriers preventing you from using a telephone or calling for help?  No.    Are there any barriers preventing you from shopping?  No.    Are there any barriers preventing you from taking care of your own finances?  No.    Are there any barriers preventing you from managing your medications?  No.    Are there any barriers preventing you from showering, bathing or dressing yourself?  No.    Are you currently engaging in any exercise or physical activity?  No.     What is your perception of your health?  Fair.    Health Maintenance Summary          Postponed - IMM INFLUENZA (Season Ended) Postponed until 2022    10/20/2020  Imm Admin: Influenza Vaccine Adult HD          IMM ZOSTER VACCINES (2 of 2) Next due on 2022  Imm Admin: Zoster Vaccine Recombinant (RZV) (SHINGRIX)          Annual Wellness Visit (Every 366 Days) Next due on 2022  Visit Dx: Encounter for Medicare annual wellness exam    2021  Visit Dx: Encounter for Medicare annual wellness exam          COLORECTAL CANCER SCREENING (COLONOSCOPY - Every 10 Years) Tentatively due on 2019  REFERRAL TO GI FOR COLONOSCOPY    2019  REFERRAL TO GI FOR COLONOSCOPY    10/28/2017  OCCULT BLOOD FECES IMMUNOASSAY          IMM DTaP/Tdap/Td Vaccine (2 - Td or Tdap) Next due on 2019  Imm Admin: Tdap Vaccine          IMM PNEUMOCOCCAL VACCINE: 65+ Years (Series Information) Completed    2019  Imm Admin: Pneumococcal polysaccharide vaccine (PPSV-23)    2017  Imm Admin: Pneumococcal Conjugate Vaccine (Prevnar/PCV-13)          HEPATITIS C SCREENING  Completed    11/15/2021  HCV Scrn ( [id number] [facility])          COVID-19 Vaccine (Series Information) Completed    2021  Imm Admin: Moderna SARS-CoV-2 Vaccine    2021  Imm Admin: Moderna SARS-CoV-2 Vaccine    2021  Imm Admin: Moderna SARS-CoV-2 Vaccine           "IMM HEP B VACCINE (Series Information) Aged Out    No completion history exists for this topic.          IMM MENINGOCOCCAL VACCINE (MCV4) (Series Information) Aged Out    No completion history exists for this topic.                Patient Care Team:  Jerica Coronado M.D. as PCP - General (Family Medicine)  Miguel Farris D.O. as Consulting Physician (Cardiovascular Disease (Cardiology))  Dion Coronel, PT (Physical Therapy)  Dion Coronel, PT as Physical Therapist (Physical Therapy)    Social History     Tobacco Use   • Smoking status: Never Smoker   • Smokeless tobacco: Never Used   Vaping Use   • Vaping Use: Never used   Substance Use Topics   • Alcohol use: Not Currently   • Drug use: Never     Family History   Problem Relation Age of Onset   • Heart Disease Mother 65        MI   • Cancer Father    • Cancer Brother 50        prostate cancer   • Stroke Brother    • Diabetes Neg Hx    • Hyperlipidemia Neg Hx    • Alcohol/Drug Neg Hx    • Ovarian Cancer Neg Hx    • Tubal Cancer Neg Hx    • Peritoneal Cancer Neg Hx    • Colorectal Cancer Neg Hx    • Breast Cancer Neg Hx      He  has a past medical history of Arrhythmia, Arthritis, Breath shortness, Cardiac arrhythmia, Enlarged prostate with urinary obstruction, GERD (gastroesophageal reflux disease), Hyperlipidemia, Hypertension, OA (osteoarthritis) of knee, Restless leg syndrome, Tonsillitis, and Urinary bladder disorder (12/06/2017).   Past Surgical History:   Procedure Laterality Date   • TURP-VAPOR  12/7/2017    Procedure: TURP-VAPOR - GREEN LIGHT PHOTOSELECTIVE;  Surgeon: Cristofer Cr M.D.;  Location: SURGERY [facility];  Service: Urology   • GANGLION EXCISION Right 1966    wrist   • TONSILLECTOMY      as a child     Exam:     /70 (BP Location: Right arm, Patient Position: Sitting, BP Cuff Size: Adult)   Pulse 78   Temp 37.1 °C (98.8 °F) (Temporal)   Resp 16   Ht 1.88 m (6' 2\")   Wt 90.2 kg (198 lb 12.8 oz)   SpO2 95%  Body mass " index is 25.52 kg/m².    Hearing good.    Dentition good  Alert, oriented in no acute distress.  Eye contact is good, speech goal directed, affect calm    Assessment and Plan. The following treatment and monitoring plan is recommended:      1. Encounter for Medicare annual wellness exam  Boo is a pleasant 77-year-old man here today for his Medicare wellness visit.  Only concern is his knee arthritis which is discussed below.  He is already in contact with a [facility].    2. Acute bilateral low back pain without sciatica  Acute, recurrent.  He reports he keeps repeatedly straining his back he would like to work with physical therapy to find exercises to prevent this from happening.  Referral has been ordered.  - Referral to Physical Therapy    3. Benign prostatic hyperplasia without lower urinary tract symptoms  Chronic, controlled.  He reports with the laser surgery and finasteride it is controlling his symptoms well.  He will continue with the current regimen and continue to follow with urology as directed.    4. Mixed hyperlipidemia  Chronic, controlled.  He is on a statin for primary prevention and tolerating it well. The  last cholesterol panel in October, 2020 was all within normal limits so we will continue the current dosing.  - Rosuvastatin 20 mg daily    5. SHERRY (obstructive sleep apnea)  Chronic, stable.  He will continue to use the CPAP nightly and he will follow-up with sleep medicine as directed.    6. Prehypertension  Chronic, stable.  Blood pressure remains under 140/90 in office today but it is in the prehypertensive range so we will continue to monitor with each visit.    7. Shortness of breath  Chronic, slightly improved.  He reports his shortness of breath is a little bit better but I encouraged him to still follow-up with the pulmonology referral be placed at his last visit.    8. Acute pain of left shoulder  Acute, improving.  He reports physical therapy is improving the shoulder  pains he will continue to follow with them as directed.    9. TMJ syndrome  Acute.  At his last appointment I recommended he see a dentist and he reports that he is going to see his dentist next week to have this evaluated further.    10. Primary osteoarthritis involving multiple joints  Chronic, stable.  X-rays in November, 2021 did show bilateral knee arthritis with advanced arthritic change of the left knee.  He already is a patient of [facility] and I recommended he make an appointment to discuss this knee arthritis with them further.      Services suggested: No services needed at this time  Health Care Screening recommendations as per orders if indicated.  Referrals offered: PT/OT/Nutrition counseling/Behavioral Health/Smoking cessation as per orders if indicated.    Discussion today about general wellness and lifestyle habits:    · Prevent falls and reduce trip hazards; Cautioned about securing or removing rugs.  · Have a working fire alarm and carbon monoxide detector;   · Engage in regular physical activity and social activities       Follow-up: Return in about 6 months (around 11/6/2022) for Med check.

## 2022-05-09 DIAGNOSIS — E78.2 MIXED HYPERLIPIDEMIA: ICD-10-CM

## 2022-05-09 RX ORDER — ROSUVASTATIN CALCIUM 20 MG/1
20 TABLET, COATED ORAL DAILY
Qty: 90 TABLET | Refills: 2 | Status: CANCELLED | OUTPATIENT
Start: 2022-05-09

## 2022-05-10 ENCOUNTER — APPOINTMENT (OUTPATIENT)
Dept: PHYSICAL THERAPY | Facility: REHABILITATION | Age: 77
End: 2022-05-10
Attending: FAMILY MEDICINE
Payer: MEDICARE

## 2022-05-10 DIAGNOSIS — E78.2 MIXED HYPERLIPIDEMIA: ICD-10-CM

## 2022-05-10 NOTE — TELEPHONE ENCOUNTER
He's already scheduled.   Results reported to patient--grossly benign, CT shows no acute traumatic pathology, XR shows ?acromial fracture  Pt. reports feeling better after meds  pt. agrees to f/u with primary care outpt., referred to neuro for additional f/u as well as ortho   pt. understands to return to ED if symptoms worsen; will d/c  pt. understands to avoid contact sports until cleared by neurologist

## 2022-05-13 ENCOUNTER — APPOINTMENT (OUTPATIENT)
Dept: PHYSICAL THERAPY | Facility: REHABILITATION | Age: 77
End: 2022-05-13
Attending: FAMILY MEDICINE
Payer: MEDICARE

## 2022-05-13 RX ORDER — ROSUVASTATIN CALCIUM 20 MG/1
20 TABLET, COATED ORAL DAILY
Qty: 90 TABLET | Refills: 3 | Status: SHIPPED | OUTPATIENT
Start: 2022-05-13 | End: 2023-08-14 | Stop reason: SDUPTHER

## 2022-05-21 NOTE — LETTER
[facility]   [address]  [place] [address]  Phone: [phone]  Fax: [phone]              Boo Cavanaugh Jr.  1945    Encounter Date: 6/26/2020    Enoch Mendoza M.D.          PROGRESS NOTE:  No notes on file      Jerica Coronado M.D.  [address]  [facility] [address]  [facility] [address]  VIA In Basket               Discharge teaching and instructions for diagnosis/procedure of COVID-19 completed with patient using teachback method. AVS reviewed. Printed prescriptions given to patient. Patient voiced understanding regarding prescriptions, follow up appointments, and care of self at home. Discharged in a wheelchair to  home with support per wife in stable condition.

## 2022-06-03 ENCOUNTER — APPOINTMENT (OUTPATIENT)
Dept: PHYSICAL THERAPY | Facility: REHABILITATION | Age: 77
End: 2022-06-03
Payer: MEDICARE

## 2022-06-07 ENCOUNTER — OFFICE VISIT (OUTPATIENT)
Dept: SLEEP MEDICINE | Facility: MEDICAL CENTER | Age: 77
End: 2022-06-07
Payer: MEDICARE

## 2022-06-07 VITALS
HEART RATE: 86 BPM | DIASTOLIC BLOOD PRESSURE: 68 MMHG | TEMPERATURE: 98.6 F | RESPIRATION RATE: 18 BRPM | SYSTOLIC BLOOD PRESSURE: 122 MMHG | OXYGEN SATURATION: 97 %

## 2022-06-07 DIAGNOSIS — G47.33 OSA (OBSTRUCTIVE SLEEP APNEA): ICD-10-CM

## 2022-06-07 PROCEDURE — 99213 OFFICE O/P EST LOW 20 MIN: CPT | Performed by: NURSE PRACTITIONER

## 2022-06-07 NOTE — PROGRESS NOTES
"Chief Complaint   Patient presents with   • Apnea     SHERRY obstructive sleep apnea       HPI:  Boo Cavanaugh Jr. is a 77 y.o. year old male here today for follow-up on SHERRY.  Last seen 6/7/2021 by THEODORA Villegas. PMH includes BPH, PVC, SHERRY, hypertension, status post cardiac ablation in 11/2018 for atrial flutter, never smoker, tonsillectomy, shortness of breath, RLS, GERD.     Patient is currently using nasal pillows and denies any difficulty with mask fit or pressures.  He denies any morning headaches or excessive daytime sleepiness but does have PVCs or palpitations occasionally.  On average she is sleeping 6 to 8 hours nightly.  He denies any difficulty falling asleep but does have some difficulty staying asleep.  If he cannot sleep he will awaken and start reading a book until he falls back asleep.  He also gets up several times nightly to use the bathroom.    Compliance was reviewed with patient and does show 100% use with an average time of 7 hours and 14 minutes and a resultant AHI of 4.0.  Current settings is auto CPAP 5 to 15 cm/H2O.  Compliance shows a median pressure of 6.0 and a peak average pressure of 10.3 cm with no evidence of excessive leakage.    Sleep Study History:   OPO on autoCPAP 5-15 cmH2O and RA from 6/16/20 indicated O2 Sat. jason was 86% and mean O2 sat was 90 % and baseline O2 at 92%. O2 sat was below 88% for 1.1 min of the flow evaluation time. Oxygen Desaturation (>=3%) Index was 1.9/hr. Overall it is an unremarkable oximetry.     Home sleep study from 12/13/18 indicated mild sleep apnea with an RDI of 8.5, supine RAJEEV 18.2, jason O2 sat of 85%, avg O2 was 91 % and baseline O2 at 95 %. O2 sat was below 89% for 9 min of the flow evaluation time. Avg HR 60 BPM.    ROS: As per HPI and otherwise negative if not stated.    Past Medical History:   Diagnosis Date   • Arrhythmia     \"flutter\", Dr. Mendoza cardiologist   • Arthritis     left thumb   • Breath shortness    • Cardiac arrhythmia  "   • Enlarged prostate with urinary obstruction    • GERD (gastroesophageal reflux disease)    • Hyperlipidemia    • Hypertension    • OA (osteoarthritis) of knee     bilateral    • Restless leg syndrome    • Tonsillitis    • Urinary bladder disorder 12/06/2017    savage in place       Past Surgical History:   Procedure Laterality Date   • TURP-VAPOR  12/7/2017    Procedure: TURP-VAPOR - GREEN LIGHT PHOTOSELECTIVE;  Surgeon: Cristofer Cr M.D.;  Location: SURGERY [facility];  Service: Urology   • GANGLION EXCISION Right 1966    wrist   • TONSILLECTOMY      as a child       Family History   Problem Relation Age of Onset   • Heart Disease Mother 65        MI   • Cancer Father    • Cancer Brother 50        prostate cancer   • Stroke Brother    • Diabetes Neg Hx    • Hyperlipidemia Neg Hx    • Alcohol/Drug Neg Hx    • Ovarian Cancer Neg Hx    • Tubal Cancer Neg Hx    • Peritoneal Cancer Neg Hx    • Colorectal Cancer Neg Hx    • Breast Cancer Neg Hx        Allergies as of 06/07/2022 - Reviewed 06/07/2022   Allergen Reaction Noted   • Penicillins Rash 10/25/2017        Vitals:  /68 (BP Location: Right arm, Patient Position: Sitting, BP Cuff Size: Adult)   Pulse 86   Temp 37 °C (98.6 °F) (Temporal)   Resp 18   SpO2 97%     Current medications as of today   Current Outpatient Medications   Medication Sig Dispense Refill   • rosuvastatin (CRESTOR) 20 MG Tab Take 1 Tablet by mouth every day. 90 Tablet 3   • betamethasone dipropionate (DIPROLENE) 0.05 % Ointment      • tizanidine (ZANAFLEX) 4 MG Tab Take 1 Tablet by mouth every 6 hours as needed. 30 Tablet 1   • ciclopirox (PENLAC) 8 % solution      • metoprolol SR (TOPROL XL) 25 MG TABLET SR 24 HR TAKE ONE TABLET BY MOUTH EVERY DAY (Patient taking differently: Patient taking 12.5 MG daily) 90 tablet 3   • finasteride (PROSCAR) 5 MG Tab Take 1 tablet every day      • triamcinolone acetonide (KENALOG) 0.025 % Cream Apply thin layer to affected area(s) twice daily  for 7-14 days. 15 g 0   • aspirin 81 MG EC tablet Take  by mouth every day.       No current facility-administered medications for this visit.         Physical Exam:   Gen:           Alert and oriented, No apparent distress. Mood and affect appropriate, normal interaction with examiner.  Eyes:          PERRL, EOM intact, sclere white, conjunctive moist.  Ears:          Not examined.   Hearing:     Grossly intact.  Nose:          Normal, no lesions or deformities.  Dentition:    Good dentition.  Oropharynx:   Tongue normal, posterior pharynx without erythema or exudate.  Neck:        Supple, trachea midline, no masses.  Respiratory Effort: No intercostal retractions or use of accessory muscles.   Lung Auscultation:      Clear to auscultation bilaterally; no rales, rhonchi or wheezing.  CV:            Regular rate and rhythm. No murmurs, rubs or gallops.  Abd:           Not examined.   Lymphadenopathy: Not examined.  Gait and Station: Normal.  Digits and Nails: No clubbing, cyanosis, petechiae, or nodes.   Cranial Nerves: II-XII grossly intact.  Skin:        No rashes, lesions or ulcers noted.               Ext:           No cyanosis or edema.      Assessment:  1. SHERRY (obstructive sleep apnea)         Plan:  1.  Patient is using and benefiting from CPAP therapy at this time.  Compliance review shows adequate use and control of SHERRY with a resultant AHI of 4.0.  Compliance does show some increase of periodic breathing, but we will monitor this for now and if there is an increase, we can consider possible titration.  Advised patient to monitor situation and follow-up sooner than 1 year if symptoms increase.  Otherwise annual follow-up is recommended.    Please note that this dictation was created using voice recognition software. I have made every reasonable attempt to correct obvious errors, but it is possible there are errors of grammar and possibly content that I did not discover before finalizing the note.

## 2022-07-01 ENCOUNTER — HOSPITAL ENCOUNTER (OUTPATIENT)
Dept: RADIOLOGY | Facility: MEDICAL CENTER | Age: 77
End: 2022-07-01
Attending: FAMILY MEDICINE
Payer: MEDICARE

## 2022-07-01 DIAGNOSIS — M54.50 ACUTE BILATERAL LOW BACK PAIN WITHOUT SCIATICA: ICD-10-CM

## 2022-07-01 DIAGNOSIS — G89.29 CHRONIC THORACIC BACK PAIN, UNSPECIFIED BACK PAIN LATERALITY: ICD-10-CM

## 2022-07-01 DIAGNOSIS — M54.6 CHRONIC THORACIC BACK PAIN, UNSPECIFIED BACK PAIN LATERALITY: ICD-10-CM

## 2022-07-01 PROCEDURE — 72110 X-RAY EXAM L-2 SPINE 4/>VWS: CPT

## 2022-07-01 PROCEDURE — 72070 X-RAY EXAM THORAC SPINE 2VWS: CPT

## 2022-07-18 ENCOUNTER — OFFICE VISIT (OUTPATIENT)
Dept: MEDICAL GROUP | Facility: PHYSICIAN GROUP | Age: 77
End: 2022-07-18
Payer: MEDICARE

## 2022-07-18 VITALS
SYSTOLIC BLOOD PRESSURE: 130 MMHG | DIASTOLIC BLOOD PRESSURE: 70 MMHG | HEIGHT: 74 IN | WEIGHT: 191.8 LBS | OXYGEN SATURATION: 97 % | TEMPERATURE: 97.8 F | BODY MASS INDEX: 24.62 KG/M2 | HEART RATE: 66 BPM

## 2022-07-18 DIAGNOSIS — L84 CALLUS OF FOOT: ICD-10-CM

## 2022-07-18 PROCEDURE — 11055 PARING/CUTG B9 HYPRKER LES 1: CPT | Performed by: FAMILY MEDICINE

## 2022-07-18 ASSESSMENT — ENCOUNTER SYMPTOMS
CHILLS: 0
SHORTNESS OF BREATH: 0
FEVER: 0

## 2022-07-18 ASSESSMENT — FIBROSIS 4 INDEX: FIB4 SCORE: 1.73

## 2022-07-18 NOTE — PROGRESS NOTES
"Subjective:     CC: callus    HPI:   Boo presents today with    Problem   Callus of Foot    Lateral edge of left 5th digit  Painful when rubs against shoe    Callus shaved down in 4/2022  He reports pain didn't improve at all after shaving the callus    He'd like to try shaving it again           Health Maintenance: Completed    ROS:  Review of Systems   Constitutional: Negative for chills and fever.   Respiratory: Negative for shortness of breath.    Cardiovascular: Negative for chest pain.       Objective:     Exam:  /70 (BP Location: Right arm, Patient Position: Sitting, BP Cuff Size: Adult)   Pulse 66   Temp 36.6 °C (97.8 °F) (Temporal)   Ht 1.88 m (6' 2\")   Wt 87 kg (191 lb 12.8 oz)   SpO2 97%   BMI 24.63 kg/m²  Body mass index is 24.63 kg/m².    Physical Exam  Constitutional:       Appearance: Normal appearance.   Cardiovascular:      Rate and Rhythm: Normal rate and regular rhythm.      Heart sounds: Normal heart sounds.   Pulmonary:      Effort: Pulmonary effort is normal.      Breath sounds: Normal breath sounds.   Musculoskeletal:      Cervical back: Normal range of motion and neck supple.        Feet:    Neurological:      Mental Status: He is alert.         Assessment & Plan:     77 y.o. male with the following -     1. Callus of foot  Chronic, acutely exacerbated.  He has a callus on the lateral edge of his left fifth toe.  That toe is starting to turn in so a lot of the walking surface is that lateral edge.  In April, 2022 I did shave down the callus he reports that the pain never really improved but he would like to try shaving further.  Using a #10 scalpel I gently shaved layer after layer of the callus until it feels flat and it was no longer as tender when I pressed over the area.  He tolerated the procedure well and there were no complications.  Level of pain prior and after the procedure was 0/10.  He will return when the callus starts to become painful again and needs to be " reshaved.    Referral for genetic research was offered. Patient already has a referral.    Return if symptoms worsen or fail to improve.    Please note that this dictation was created using voice recognition software. I have made every reasonable attempt to correct obvious errors, but I expect that there are errors of grammar and possibly content that I did not discover before finalizing the note.

## 2022-07-26 ENCOUNTER — OFFICE VISIT (OUTPATIENT)
Dept: CARDIOLOGY | Facility: MEDICAL CENTER | Age: 77
End: 2022-07-26
Payer: MEDICARE

## 2022-07-26 VITALS
SYSTOLIC BLOOD PRESSURE: 110 MMHG | HEART RATE: 66 BPM | RESPIRATION RATE: 16 BRPM | BODY MASS INDEX: 24.77 KG/M2 | WEIGHT: 193 LBS | OXYGEN SATURATION: 95 % | DIASTOLIC BLOOD PRESSURE: 62 MMHG | HEIGHT: 74 IN

## 2022-07-26 DIAGNOSIS — E78.2 MIXED HYPERLIPIDEMIA: ICD-10-CM

## 2022-07-26 DIAGNOSIS — Z86.79 S/P CATHETER ABLATION OF SLOW PATHWAY: ICD-10-CM

## 2022-07-26 DIAGNOSIS — I49.3 PVC (PREMATURE VENTRICULAR CONTRACTION): ICD-10-CM

## 2022-07-26 DIAGNOSIS — Z98.890 S/P ABLATION OF ATRIAL FLUTTER: ICD-10-CM

## 2022-07-26 DIAGNOSIS — I48.92 ATRIAL FLUTTER, UNSPECIFIED TYPE (HCC): ICD-10-CM

## 2022-07-26 DIAGNOSIS — Z86.79 HISTORY OF ATRIAL FLUTTER: ICD-10-CM

## 2022-07-26 DIAGNOSIS — Z86.79 S/P ABLATION OF ATRIAL FLUTTER: ICD-10-CM

## 2022-07-26 DIAGNOSIS — Z98.890 S/P CATHETER ABLATION OF SLOW PATHWAY: ICD-10-CM

## 2022-07-26 DIAGNOSIS — G47.33 OSA (OBSTRUCTIVE SLEEP APNEA): ICD-10-CM

## 2022-07-26 LAB — EKG IMPRESSION: NORMAL

## 2022-07-26 PROCEDURE — 99214 OFFICE O/P EST MOD 30 MIN: CPT | Performed by: INTERNAL MEDICINE

## 2022-07-26 PROCEDURE — 93000 ELECTROCARDIOGRAM COMPLETE: CPT | Performed by: INTERNAL MEDICINE

## 2022-07-26 ASSESSMENT — FIBROSIS 4 INDEX: FIB4 SCORE: 1.73

## 2022-07-26 NOTE — PROGRESS NOTES
"Chief Complaint   Patient presents with   • Atrial Flutter     Fv Dx: Atypical atrial flutter        Subjective     Boo Cavanaugh Jr. is a 77 y.o. male who presents today status post slow pathway and atrial flutter ablation.  No sustained arrhythmias.  Occasional PVCs but improved.  PVCs are left-sided.  Thinking about having knee replacement.  Hyperlipidemia on Crestor no recent labs.  No chest pain    Past Medical History:   Diagnosis Date   • Arrhythmia     \"flutter\", Dr. Mendoza cardiologist   • Arthritis     left thumb   • Breath shortness    • Cardiac arrhythmia    • Enlarged prostate with urinary obstruction    • GERD (gastroesophageal reflux disease)    • Hyperlipidemia    • Hypertension    • OA (osteoarthritis) of knee     bilateral    • Restless leg syndrome    • Tonsillitis    • Urinary bladder disorder 12/06/2017    savage in place     Past Surgical History:   Procedure Laterality Date   • TURP-VAPOR  12/7/2017    Procedure: TURP-VAPOR - GREEN LIGHT PHOTOSELECTIVE;  Surgeon: Cristofer Cr M.D.;  Location: SURGERY [facility];  Service: Urology   • GANGLION EXCISION Right 1966    wrist   • TONSILLECTOMY      as a child     Family History   Problem Relation Age of Onset   • Heart Disease Mother 65        MI   • Cancer Father    • Cancer Brother 50        prostate cancer   • Stroke Brother    • Diabetes Neg Hx    • Hyperlipidemia Neg Hx    • Alcohol/Drug Neg Hx    • Ovarian Cancer Neg Hx    • Tubal Cancer Neg Hx    • Peritoneal Cancer Neg Hx    • Colorectal Cancer Neg Hx    • Breast Cancer Neg Hx      Social History     Socioeconomic History   • Marital status: Single     Spouse name: Not on file   • Number of children: Not on file   • Years of education: Not on file   • Highest education level: Master's degree (e.g., MA, MS, Latha, MEd, MSW, ABAD)   Occupational History   • Not on file   Tobacco Use   • Smoking status: Never Smoker   • Smokeless tobacco: Never Used   Vaping Use   • Vaping Use: Never used "   Substance and Sexual Activity   • Alcohol use: Not Currently   • Drug use: Never   • Sexual activity: Never     Comment: 2 daughters    Other Topics Concern   • Not on file   Social History Narrative   • Not on file     Social Determinants of Health     Financial Resource Strain: Low Risk    • Difficulty of Paying Living Expenses: Not hard at all   Food Insecurity: No Food Insecurity   • Worried About Running Out of Food in the Last Year: Never true   • Ran Out of Food in the Last Year: Never true   Transportation Needs: No Transportation Needs   • Lack of Transportation (Medical): No   • Lack of Transportation (Non-Medical): No   Physical Activity: Inactive   • Days of Exercise per Week: 0 days   • Minutes of Exercise per Session: 0 min   Stress: No Stress Concern Present   • Feeling of Stress : Not at all   Social Connections: Moderately Isolated   • Frequency of Communication with Friends and Family: More than three times a week   • Frequency of Social Gatherings with Friends and Family: More than three times a week   • Attends Religion Services: 1 to 4 times per year   • Active Member of Clubs or Organizations: No   • Attends Club or Organization Meetings: Never   • Marital Status:    Intimate Partner Violence: Not on file   Housing Stability: Low Risk    • Unable to Pay for Housing in the Last Year: No   • Number of Places Lived in the Last Year: 1   • Unstable Housing in the Last Year: No     Allergies   Allergen Reactions   • Penicillins Rash     Rash as a child.      Outpatient Encounter Medications as of 7/26/2022   Medication Sig Dispense Refill   • rosuvastatin (CRESTOR) 20 MG Tab Take 1 Tablet by mouth every day. 90 Tablet 3   • betamethasone dipropionate (DIPROLENE) 0.05 % Ointment      • tizanidine (ZANAFLEX) 4 MG Tab Take 1 Tablet by mouth every 6 hours as needed. 30 Tablet 1   • ciclopirox (PENLAC) 8 % solution      • metoprolol SR (TOPROL XL) 25 MG TABLET SR 24 HR TAKE ONE TABLET BY MOUTH  "EVERY DAY (Patient taking differently: Patient taking 12.5 MG daily) 90 tablet 3   • finasteride (PROSCAR) 5 MG Tab Take 1 tablet every day      • triamcinolone acetonide (KENALOG) 0.025 % Cream Apply thin layer to affected area(s) twice daily for 7-14 days. 15 g 0   • aspirin 81 MG EC tablet Take  by mouth every day.       No facility-administered encounter medications on file as of 7/26/2022.     ROS           Objective     /62 (BP Location: Left arm, Patient Position: Sitting, BP Cuff Size: Adult)   Pulse 66   Resp 16   Ht 1.88 m (6' 2\")   Wt 87.5 kg (193 lb)   SpO2 95%   BMI 24.78 kg/m²     Physical Exam  Constitutional:       Appearance: He is well-developed.   HENT:      Head: Normocephalic and atraumatic.   Cardiovascular:      Rate and Rhythm: Normal rate and regular rhythm.      Heart sounds: No murmur heard.    No friction rub. No gallop.   Pulmonary:      Effort: Pulmonary effort is normal.      Breath sounds: Normal breath sounds.   Abdominal:      Palpations: Abdomen is soft.   Musculoskeletal:         General: Normal range of motion.      Cervical back: Normal range of motion and neck supple.   Skin:     General: Skin is warm and dry.   Neurological:      Mental Status: He is alert and oriented to person, place, and time.   Psychiatric:         Behavior: Behavior normal.         Thought Content: Thought content normal.         Judgment: Judgment normal.                Assessment & Plan     1. History of atrial flutter  EKG   2. S/P ablation of atrial flutter  EKG   3. Atrial flutter, unspecified type (HCC)  EKG   4. S/P catheter ablation of slow pathway     5. Mixed hyperlipidemia  Comp Metabolic Panel    Lipid Profile   6. SHERRY (obstructive sleep apnea)     7. PVC (premature ventricular contraction)         Medical Decision Making: Today's Assessment/Status/Plan:   1.  PVCs continue low-dose beta-blockers.  Briefly discussed ablation if worsened.  2.  Sleep apnea on CPAP.  3.  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  Status post ablation no recurrence.  4.  Follow-up 6 months.

## 2022-08-16 ENCOUNTER — HOSPITAL ENCOUNTER (OUTPATIENT)
Dept: LAB | Facility: MEDICAL CENTER | Age: 77
End: 2022-08-16
Attending: INTERNAL MEDICINE
Payer: MEDICARE

## 2022-08-16 DIAGNOSIS — E78.2 MIXED HYPERLIPIDEMIA: ICD-10-CM

## 2022-08-16 LAB
ALBUMIN SERPL BCP-MCNC: 4.2 G/DL (ref 3.2–4.9)
ALBUMIN/GLOB SERPL: 1.9 G/DL
ALP SERPL-CCNC: 76 U/L (ref 30–99)
ALT SERPL-CCNC: 20 U/L (ref 2–50)
ANION GAP SERPL CALC-SCNC: 10 MMOL/L (ref 7–16)
AST SERPL-CCNC: 24 U/L (ref 12–45)
BILIRUB SERPL-MCNC: 0.8 MG/DL (ref 0.1–1.5)
BUN SERPL-MCNC: 19 MG/DL (ref 8–22)
CALCIUM SERPL-MCNC: 9.3 MG/DL (ref 8.5–10.5)
CHLORIDE SERPL-SCNC: 106 MMOL/L (ref 96–112)
CHOLEST SERPL-MCNC: 115 MG/DL (ref 100–199)
CO2 SERPL-SCNC: 25 MMOL/L (ref 20–33)
CREAT SERPL-MCNC: 1 MG/DL (ref 0.5–1.4)
FASTING STATUS PATIENT QL REPORTED: NORMAL
GFR SERPLBLD CREATININE-BSD FMLA CKD-EPI: 78 ML/MIN/1.73 M 2
GLOBULIN SER CALC-MCNC: 2.2 G/DL (ref 1.9–3.5)
GLUCOSE SERPL-MCNC: 87 MG/DL (ref 65–99)
HDLC SERPL-MCNC: 48 MG/DL
LDLC SERPL CALC-MCNC: 59 MG/DL
POTASSIUM SERPL-SCNC: 4.1 MMOL/L (ref 3.6–5.5)
PROT SERPL-MCNC: 6.4 G/DL (ref 6–8.2)
SODIUM SERPL-SCNC: 141 MMOL/L (ref 135–145)
TRIGL SERPL-MCNC: 42 MG/DL (ref 0–149)

## 2022-08-16 PROCEDURE — 80053 COMPREHEN METABOLIC PANEL: CPT

## 2022-08-16 PROCEDURE — 36415 COLL VENOUS BLD VENIPUNCTURE: CPT

## 2022-08-16 PROCEDURE — 80061 LIPID PANEL: CPT

## 2022-08-24 ENCOUNTER — OFFICE VISIT (OUTPATIENT)
Dept: MEDICAL GROUP | Facility: PHYSICIAN GROUP | Age: 77
End: 2022-08-24
Payer: MEDICARE

## 2022-08-24 VITALS
HEIGHT: 74 IN | SYSTOLIC BLOOD PRESSURE: 106 MMHG | WEIGHT: 193 LBS | HEART RATE: 63 BPM | BODY MASS INDEX: 24.77 KG/M2 | DIASTOLIC BLOOD PRESSURE: 60 MMHG | OXYGEN SATURATION: 97 % | TEMPERATURE: 98 F

## 2022-08-24 DIAGNOSIS — R10.9 ABDOMINAL PAIN, UNSPECIFIED ABDOMINAL LOCATION: ICD-10-CM

## 2022-08-24 PROCEDURE — 99213 OFFICE O/P EST LOW 20 MIN: CPT | Performed by: FAMILY MEDICINE

## 2022-08-24 ASSESSMENT — FIBROSIS 4 INDEX: FIB4 SCORE: 2.46

## 2022-08-24 NOTE — PROGRESS NOTES
"Subjective:     CC: abdominal pain    HPI:   Boo presents today with    Problem   Abdominal Pain    Onset: 3 weeks  Location: right mid-quadrant  Duration: constant for a few days, break until this morning a slight bit  Quality: achy  Aggravating factors: nothing  Precipitating trauma: none  Associated symptoms: no n/v, no diarrhea, no constipation, no bloating, no fevers/chills, no night sweats, no weight loss  Home treatments: ibuprofen - didn't help           Health Maintenance: Completed    ROS:  See HPI    Objective:     Exam:  /60 (BP Location: Right arm, Patient Position: Sitting, BP Cuff Size: Adult)   Pulse 63   Temp 36.7 °C (98 °F) (Temporal)   Ht 1.88 m (6' 2\")   Wt 87.5 kg (193 lb)   SpO2 97%   BMI 24.78 kg/m²  Body mass index is 24.78 kg/m².    Physical Exam  Constitutional:       Appearance: Normal appearance.   Cardiovascular:      Rate and Rhythm: Normal rate and regular rhythm.      Heart sounds: Normal heart sounds.   Pulmonary:      Effort: Pulmonary effort is normal.      Breath sounds: Normal breath sounds.   Abdominal:      General: Abdomen is flat. Bowel sounds are normal. There is no distension.      Palpations: Abdomen is soft. There is no hepatomegaly, splenomegaly or mass.      Tenderness: no abdominal tenderness      Comments: Carnett's sign negative   Musculoskeletal:      Cervical back: Normal range of motion and neck supple.   Neurological:      Mental Status: He is alert.       Assessment & Plan:     77 y.o. male with the following -     1. Abdominal pain, unspecified abdominal location  Acute.  He has had 3 weeks of a constant mild ache in the right mid quadrant of his abdomen.  No precipitating trauma or aggravating factors.  He actually had a few days prior to her visit without the sensation and this morning he had a very slight version of it.  Abdominal exam is completely benign and his history provides no clues for her differential so at this point I really do not " know what the etiology of his pain is.  It could be a little bit of a muscle strain.  After discussion we have decided to perform watchful waiting.  We will give him another couple of weeks to see if the pain resolves on its own.  If it does not then we will go ahead and get an ultrasound of the abdomen.    Return if symptoms worsen or fail to improve.    Please note that this dictation was created using voice recognition software. I have made every reasonable attempt to correct obvious errors, but I expect that there are errors of grammar and possibly content that I did not discover before finalizing the note.

## 2022-09-10 ASSESSMENT — ENCOUNTER SYMPTOMS
RESPIRATORY SYMPTOMS COMMENTS: NO
HEMOPTYSIS: 0
CHEST TIGHTNESS: 0
DYSPNEA AT REST: 0
WHEEZING: 0
SHORTNESS OF BREATH: 1

## 2022-09-13 ENCOUNTER — OFFICE VISIT (OUTPATIENT)
Dept: SLEEP MEDICINE | Facility: MEDICAL CENTER | Age: 77
End: 2022-09-13
Payer: MEDICARE

## 2022-09-13 VITALS
HEIGHT: 74 IN | BODY MASS INDEX: 24.38 KG/M2 | HEART RATE: 69 BPM | OXYGEN SATURATION: 94 % | SYSTOLIC BLOOD PRESSURE: 106 MMHG | DIASTOLIC BLOOD PRESSURE: 56 MMHG | RESPIRATION RATE: 16 BRPM | WEIGHT: 190 LBS

## 2022-09-13 DIAGNOSIS — G47.33 OSA (OBSTRUCTIVE SLEEP APNEA): ICD-10-CM

## 2022-09-13 DIAGNOSIS — I25.10 CORONARY ARTERY DISEASE DUE TO LIPID RICH PLAQUE: ICD-10-CM

## 2022-09-13 DIAGNOSIS — I25.83 CORONARY ARTERY DISEASE DUE TO LIPID RICH PLAQUE: ICD-10-CM

## 2022-09-13 DIAGNOSIS — R06.02 SOB (SHORTNESS OF BREATH): ICD-10-CM

## 2022-09-13 DIAGNOSIS — I49.3 PVC (PREMATURE VENTRICULAR CONTRACTION): ICD-10-CM

## 2022-09-13 DIAGNOSIS — M17.0 OSTEOARTHRITIS OF BOTH KNEES, UNSPECIFIED OSTEOARTHRITIS TYPE: ICD-10-CM

## 2022-09-13 PROCEDURE — 99214 OFFICE O/P EST MOD 30 MIN: CPT | Performed by: INTERNAL MEDICINE

## 2022-09-13 ASSESSMENT — ENCOUNTER SYMPTOMS
TREMORS: 0
FOCAL WEAKNESS: 0
BACK PAIN: 0
HEARTBURN: 0
DEPRESSION: 0
DOUBLE VISION: 0
ORTHOPNEA: 0
FEVER: 0
CONSTIPATION: 0
PHOTOPHOBIA: 0
ABDOMINAL PAIN: 0
WEIGHT LOSS: 0
COUGH: 0
SHORTNESS OF BREATH: 1
NECK PAIN: 0
WHEEZING: 0
SINUS PAIN: 0
SORE THROAT: 0
NAUSEA: 0
SPEECH CHANGE: 0
DIARRHEA: 0
HEMOPTYSIS: 0
EYE REDNESS: 0
SPUTUM PRODUCTION: 0
HEADACHES: 0
WEAKNESS: 0
DIZZINESS: 0
VOMITING: 0
DIAPHORESIS: 0
CHILLS: 0
EYE PAIN: 0
STRIDOR: 0
PALPITATIONS: 0
EYE DISCHARGE: 0
BLURRED VISION: 0
MYALGIAS: 0
CLAUDICATION: 0
FALLS: 0
PND: 0

## 2022-09-13 ASSESSMENT — FIBROSIS 4 INDEX: FIB4 SCORE: 2.46

## 2022-09-13 NOTE — PROGRESS NOTES
"Chief Complaint   Patient presents with    Establish Care     Referral becca Coronado MD DX SOB     Results     CT Chest Thorax 3/9/22, PFt     Apnea     Last seen 6/7/22 abundio Rutledge          HPI: This patient is a 77 y.o. male presenting for evaluation of CAPPS. PMHx is significant for atrial flutter s/p ablation in 2018, non-obstructive CAD, HTN well controlled, SHERRY on CPAP and severe DJD of b/l knees which limits his mobility. He is a life-long non-smoker. He is retired from a career in mental health. No family hx of autoimmune disease. Over the past year he has noted increase CAPPS particularly when climbing up stairs. No cough or wheezing. He occasionally rides a recumbant bicycle for 10 minutes with no associated SOB. He denies CP. CT from March showed mild, subpleural scarring RUL but no emphysema, ILD or infiltrates. His PFTs from March are normal. He was having fairly frequent PVCs but these have improved over the past several months and his dyspnea does seem to be worse when he is having more frequent PVCs. He is followed by EP here. No recent TTE or stress test. Hgb wnls in April.     Past Medical History:   Diagnosis Date    Arrhythmia     \"flutter\", Dr. Mendoza cardiologist    Arthritis     left thumb    Breath shortness     Cardiac arrhythmia     Enlarged prostate with urinary obstruction     GERD (gastroesophageal reflux disease)     Hyperlipidemia     Hypertension     OA (osteoarthritis) of knee     bilateral     Restless leg syndrome     Tonsillitis     Urinary bladder disorder 12/06/2017    savage in place       Social History     Socioeconomic History    Marital status: Single     Spouse name: Not on file    Number of children: Not on file    Years of education: Not on file    Highest education level: Master's degree (e.g., MA, MS, Latha, MEd, MSW, ABAD)   Occupational History    Not on file   Tobacco Use    Smoking status: Never     Passive exposure: Past (BOTH parents smoked)    Smokeless tobacco: " Never   Vaping Use    Vaping Use: Never used   Substance and Sexual Activity    Alcohol use: Not Currently    Drug use: Never    Sexual activity: Never     Comment: 2 daughters    Other Topics Concern    Not on file   Social History Narrative    Not on file     Social Determinants of Health     Financial Resource Strain: Low Risk     Difficulty of Paying Living Expenses: Not hard at all   Food Insecurity: No Food Insecurity    Worried About Running Out of Food in the Last Year: Never true    Ran Out of Food in the Last Year: Never true   Transportation Needs: No Transportation Needs    Lack of Transportation (Medical): No    Lack of Transportation (Non-Medical): No   Physical Activity: Inactive    Days of Exercise per Week: 0 days    Minutes of Exercise per Session: 0 min   Stress: No Stress Concern Present    Feeling of Stress : Not at all   Social Connections: Moderately Isolated    Frequency of Communication with Friends and Family: More than three times a week    Frequency of Social Gatherings with Friends and Family: More than three times a week    Attends Mu-ism Services: 1 to 4 times per year    Active Member of Clubs or Organizations: No    Attends Club or Organization Meetings: Never    Marital Status:    Intimate Partner Violence: Not on file   Housing Stability: Low Risk     Unable to Pay for Housing in the Last Year: No    Number of Places Lived in the Last Year: 1    Unstable Housing in the Last Year: No       Family History   Problem Relation Age of Onset    Heart Disease Mother 65        MI    Cancer Father     Cancer Brother 50        prostate cancer    Stroke Brother     Diabetes Neg Hx     Hyperlipidemia Neg Hx     Alcohol/Drug Neg Hx     Ovarian Cancer Neg Hx     Tubal Cancer Neg Hx     Peritoneal Cancer Neg Hx     Colorectal Cancer Neg Hx     Breast Cancer Neg Hx        Current Outpatient Medications on File Prior to Visit   Medication Sig Dispense Refill    rosuvastatin (CRESTOR) 20  MG Tab Take 1 Tablet by mouth every day. 90 Tablet 3    betamethasone dipropionate (DIPROLENE) 0.05 % Ointment       tizanidine (ZANAFLEX) 4 MG Tab Take 1 Tablet by mouth every 6 hours as needed. 30 Tablet 1    ciclopirox (PENLAC) 8 % solution       metoprolol SR (TOPROL XL) 25 MG TABLET SR 24 HR TAKE ONE TABLET BY MOUTH EVERY DAY (Patient taking differently: Patient taking 12.5 MG daily) 90 tablet 3    finasteride (PROSCAR) 5 MG Tab Take 1 tablet every day       triamcinolone acetonide (KENALOG) 0.025 % Cream Apply thin layer to affected area(s) twice daily for 7-14 days. 15 g 0    aspirin 81 MG EC tablet Take  by mouth every day.       No current facility-administered medications on file prior to visit.       Penicillins      ROS:   Review of Systems   Constitutional:  Negative for chills, diaphoresis, fever, malaise/fatigue and weight loss.   HENT:  Negative for congestion, ear discharge, ear pain, hearing loss, nosebleeds, sinus pain, sore throat and tinnitus.    Eyes:  Negative for blurred vision, double vision, photophobia, pain, discharge and redness.   Respiratory:  Positive for shortness of breath. Negative for cough, hemoptysis, sputum production, wheezing and stridor.    Cardiovascular:  Negative for chest pain, palpitations, orthopnea, claudication, leg swelling and PND.   Gastrointestinal:  Negative for abdominal pain, constipation, diarrhea, heartburn, nausea and vomiting.   Genitourinary:  Negative for dysuria and urgency.   Musculoskeletal:  Positive for joint pain. Negative for back pain, falls, myalgias and neck pain.   Skin:  Negative for itching and rash.   Neurological:  Negative for dizziness, tremors, speech change, focal weakness, weakness and headaches.   Endo/Heme/Allergies:  Negative for environmental allergies.   Psychiatric/Behavioral:  Negative for depression.      /56 (BP Location: Right arm, Patient Position: Sitting, BP Cuff Size: Adult)   Pulse 69   Resp 16   Ht 1.88 m (6'  "2\")   Wt 86.2 kg (190 lb)   SpO2 94%   Physical Exam  Vitals reviewed.   Constitutional:       General: He is not in acute distress.     Appearance: Normal appearance. He is normal weight.   HENT:      Head: Normocephalic and atraumatic.      Right Ear: External ear normal.      Left Ear: External ear normal.      Nose: Nose normal. No congestion.      Mouth/Throat:      Mouth: Mucous membranes are moist.      Pharynx: Oropharynx is clear. No oropharyngeal exudate.   Eyes:      General: No scleral icterus.     Extraocular Movements: Extraocular movements intact.      Conjunctiva/sclera: Conjunctivae normal.      Pupils: Pupils are equal, round, and reactive to light.   Cardiovascular:      Rate and Rhythm: Normal rate and regular rhythm.      Heart sounds: Normal heart sounds. No murmur heard.    No gallop.   Pulmonary:      Effort: Pulmonary effort is normal. No respiratory distress.      Breath sounds: Normal breath sounds. No wheezing or rales.   Abdominal:      General: There is no distension.      Palpations: Abdomen is soft.   Musculoskeletal:         General: Normal range of motion.      Cervical back: Normal range of motion and neck supple.      Right lower leg: No edema.      Left lower leg: No edema.   Skin:     General: Skin is warm and dry.      Findings: No rash.   Neurological:      Mental Status: He is alert and oriented to person, place, and time.      Cranial Nerves: No cranial nerve deficit.   Psychiatric:         Mood and Affect: Mood normal.         Behavior: Behavior normal.       PFTs as reviewed by me personally: as per HPI    Imaging as reviewed by me personally:  as per hPI    Assessment:  1. SOB (shortness of breath)        2. Coronary artery disease due to lipid rich plaque        3. Osteoarthritis of both knees, unspecified osteoarthritis type        4. PVC (premature ventricular contraction)        5. SHERRY (obstructive sleep apnea)            Plan:  This is now chronic and he has known " issues with arrhythmia as well as CAD which was not severe enough (50-60% RCA and cirumflex in 10/2020) to stent last [facility] but pulmonary imaging and PFTs are normal so while we did discuss cardiopulmonary exercise testing, I would recommend cardiac w/u first. There is likely element of deconditioning as well given his exercise limitations due to DJD. We are happy to see him back for possible CPET if sxs persist and cardiac w/u reveals no cardiac etiology  See discussion above. He does have CAD and was having fairly frequent PVCs which may be contributing to sxs as per HPI  This is chronic and contributes limited activity which may be causing aerobic deconditioning  Sees EP. Per pt these have improved but respiratory sxs were worse with sxs were more frequent  Followed by sleep medicine  Return if symptoms worsen or fail to improve.

## 2022-10-25 ENCOUNTER — PATIENT MESSAGE (OUTPATIENT)
Dept: CARDIOLOGY | Facility: MEDICAL CENTER | Age: 77
End: 2022-10-25
Payer: MEDICARE

## 2022-10-26 DIAGNOSIS — I49.3 PVC (PREMATURE VENTRICULAR CONTRACTION): ICD-10-CM

## 2022-10-26 RX ORDER — METOPROLOL SUCCINATE 25 MG/1
25 TABLET, EXTENDED RELEASE ORAL
Qty: 90 TABLET | Refills: 3 | Status: SHIPPED | OUTPATIENT
Start: 2022-10-26 | End: 2023-10-31

## 2022-10-26 NOTE — TELEPHONE ENCOUNTER
Is the patient due for a refill? Yes    Was the patient seen the past year? Yes    Date of last office visit: 7/26/2022    Does the patient have an upcoming appointment?  No   If yes, When?     Provider to refill:DS    Does the patients insurance require a 100 day supply?  No

## 2022-11-08 ENCOUNTER — APPOINTMENT (OUTPATIENT)
Dept: MEDICAL GROUP | Facility: PHYSICIAN GROUP | Age: 77
End: 2022-11-08
Payer: MEDICARE

## 2022-11-10 ENCOUNTER — PATIENT MESSAGE (OUTPATIENT)
Dept: HEALTH INFORMATION MANAGEMENT | Facility: OTHER | Age: 77
End: 2022-11-10

## 2022-12-05 ENCOUNTER — OFFICE VISIT (OUTPATIENT)
Dept: MEDICAL GROUP | Facility: PHYSICIAN GROUP | Age: 77
End: 2022-12-05
Payer: MEDICARE

## 2022-12-05 VITALS
SYSTOLIC BLOOD PRESSURE: 132 MMHG | BODY MASS INDEX: 24.64 KG/M2 | HEART RATE: 63 BPM | DIASTOLIC BLOOD PRESSURE: 64 MMHG | WEIGHT: 192 LBS | HEIGHT: 74 IN | TEMPERATURE: 98.1 F | OXYGEN SATURATION: 96 %

## 2022-12-05 DIAGNOSIS — M17.0 PRIMARY OSTEOARTHRITIS OF BOTH KNEES: ICD-10-CM

## 2022-12-05 DIAGNOSIS — G89.29 CHRONIC BILATERAL LOW BACK PAIN WITHOUT SCIATICA: ICD-10-CM

## 2022-12-05 DIAGNOSIS — M54.50 CHRONIC BILATERAL LOW BACK PAIN WITHOUT SCIATICA: ICD-10-CM

## 2022-12-05 PROBLEM — M17.9 OSTEOARTHRITIS OF KNEE: Status: ACTIVE | Noted: 2022-09-26

## 2022-12-05 PROCEDURE — 99213 OFFICE O/P EST LOW 20 MIN: CPT | Performed by: FAMILY MEDICINE

## 2022-12-05 ASSESSMENT — ENCOUNTER SYMPTOMS
CHILLS: 0
SHORTNESS OF BREATH: 0
FEVER: 0

## 2022-12-05 ASSESSMENT — FIBROSIS 4 INDEX: FIB4 SCORE: 2.46

## 2022-12-05 NOTE — PROGRESS NOTES
"Subjective:     CC: back pain    HPI:   Boo presents today with    Problem   Osteoarthritis of Knee    Chronic. Bilateral  Occasionally he can get an increase of pain and will need to use a cane.  He would like a handicapped placard for those moments he requires a cane.      Back Pain    Chronic, recurrent.  Acutely exacerbated 3-7 days ago.  Already on the mend.    Feels like his exacerbations are occurring more regularly. PT went fairly well. Doing the home exercises. Seems more like muscular injury that is the cause of the recent exacerbations.          Health Maintenance: Completed    ROS:  Review of Systems   Constitutional:  Negative for chills and fever.   Respiratory:  Negative for shortness of breath.    Cardiovascular:  Negative for chest pain.     Objective:     Exam:  /64 (BP Location: Right arm, Patient Position: Sitting, BP Cuff Size: Adult)   Pulse 63   Temp 36.7 °C (98.1 °F) (Temporal)   Ht 1.88 m (6' 2\")   Wt 87.1 kg (192 lb)   SpO2 96%   BMI 24.65 kg/m²  Body mass index is 24.65 kg/m².    Physical Exam  Constitutional:       Appearance: Normal appearance.   Cardiovascular:      Rate and Rhythm: Normal rate and regular rhythm.      Heart sounds: Normal heart sounds.   Pulmonary:      Effort: Pulmonary effort is normal.      Breath sounds: Normal breath sounds.   Musculoskeletal:      Cervical back: Normal range of motion and neck supple.   Neurological:      Mental Status: He is alert.       Assessment & Plan:     77 y.o. male with the following -     1. Chronic bilateral low back pain without sciatica  Chronic, recurrent issue, acutely exacerbated.  He reports about 3-7 days ago he started getting back pain again.  He has been doing the home physical therapy exercises and sleeping on the floor because a hard surface helps resolve the pain.  He states he is already on the mend but is concerned that these exacerbations are happening more frequently.  We did review his thoracic and lumbar " spine x-rays which largely only showed arthritis.  He does admit that most of the pain is muscular and not over the bones and I did explain an x-ray does not evaluate the muscles.  After discussion today we have decided to perform watchful waiting.  If at any point there is an exacerbation that does not follow his usual course he will make an appointment.  In the meantime he will continue over-the-counter medications as needed and the home exercise regimen.    2. Primary osteoarthritis of both knees  Chronic, stable.  He has osteoarthritis of both knees and occasionally the pain will be quite significant.  Occasionally he will need a cane to ambulate because of the pain.  He would like a handicap placard to have available on these occasions where he requires a cane.  The form was filled out, scanned into the chart, provided back to the patient prior to discharge.    Return in about 6 months (around 6/5/2023) for Medicare Wellness.    Please note that this dictation was created using voice recognition software. I have made every reasonable attempt to correct obvious errors, but I expect that there are errors of grammar and possibly content that I did not discover before finalizing the note.

## 2023-01-16 ENCOUNTER — OFFICE VISIT (OUTPATIENT)
Dept: CARDIOLOGY | Facility: MEDICAL CENTER | Age: 78
End: 2023-01-16
Payer: MEDICARE

## 2023-01-16 VITALS
RESPIRATION RATE: 18 BRPM | OXYGEN SATURATION: 97 % | HEIGHT: 74 IN | DIASTOLIC BLOOD PRESSURE: 64 MMHG | HEART RATE: 67 BPM | SYSTOLIC BLOOD PRESSURE: 112 MMHG | WEIGHT: 199 LBS | BODY MASS INDEX: 25.54 KG/M2

## 2023-01-16 DIAGNOSIS — E78.2 MIXED HYPERLIPIDEMIA: ICD-10-CM

## 2023-01-16 DIAGNOSIS — I49.3 PVC (PREMATURE VENTRICULAR CONTRACTION): ICD-10-CM

## 2023-01-16 DIAGNOSIS — Z86.79 S/P ABLATION OF ATRIAL FLUTTER: ICD-10-CM

## 2023-01-16 DIAGNOSIS — Z98.890 S/P ABLATION OF ATRIAL FLUTTER: ICD-10-CM

## 2023-01-16 DIAGNOSIS — Z86.79 S/P CATHETER ABLATION OF SLOW PATHWAY: ICD-10-CM

## 2023-01-16 DIAGNOSIS — Z98.890 S/P CATHETER ABLATION OF SLOW PATHWAY: ICD-10-CM

## 2023-01-16 LAB — EKG IMPRESSION: NORMAL

## 2023-01-16 PROCEDURE — 93000 ELECTROCARDIOGRAM COMPLETE: CPT | Performed by: INTERNAL MEDICINE

## 2023-01-16 PROCEDURE — 99214 OFFICE O/P EST MOD 30 MIN: CPT | Mod: 25 | Performed by: INTERNAL MEDICINE

## 2023-01-16 ASSESSMENT — FIBROSIS 4 INDEX: FIB4 SCORE: 2.46

## 2023-01-16 NOTE — PROGRESS NOTES
"Chief Complaint   Patient presents with    Atrial Flutter       Subjective     Boo Cavanaugh Jr. is a 77 y.o. male who presents today with history of flutter ablation and slow pathway ablation no recurrence.  Previous PVCs on beta-blockers.  Not having too many.  On statins..  DJD may need his knees replaced.  Overall doing well..    Past Medical History:   Diagnosis Date    Arrhythmia     \"flutter\", Dr. Mendoza cardiologist    Arthritis     left thumb    Breath shortness     Cardiac arrhythmia     Enlarged prostate with urinary obstruction     GERD (gastroesophageal reflux disease)     Hyperlipidemia     Hypertension     OA (osteoarthritis) of knee     bilateral     Restless leg syndrome     Tonsillitis     Urinary bladder disorder 12/06/2017    savage in place     Past Surgical History:   Procedure Laterality Date    TURP-VAPOR  12/7/2017    Procedure: TURP-VAPOR - GREEN LIGHT PHOTOSELECTIVE;  Surgeon: Cristofer Cr M.D.;  Location: SURGERY [facility];  Service: Urology    GANGLION EXCISION Right 1966    wrist    TONSILLECTOMY      as a child     Family History   Problem Relation Age of Onset    Heart Disease Mother 65        MI    Cancer Father     Cancer Brother 50        prostate cancer    Stroke Brother     Diabetes Neg Hx     Hyperlipidemia Neg Hx     Alcohol/Drug Neg Hx     Ovarian Cancer Neg Hx     Tubal Cancer Neg Hx     Peritoneal Cancer Neg Hx     Colorectal Cancer Neg Hx     Breast Cancer Neg Hx      Social History     Socioeconomic History    Marital status: Single     Spouse name: Not on file    Number of children: Not on file    Years of education: Not on file    Highest education level: Master's degree (e.g., MA, MS, Latha, MEd, MSW, ABAD)   Occupational History    Not on file   Tobacco Use    Smoking status: Never     Passive exposure: Past (BOTH parents smoked)    Smokeless tobacco: Never   Vaping Use    Vaping Use: Never used   Substance and Sexual Activity    Alcohol use: Not Currently    Drug " use: Never    Sexual activity: Never     Comment: 2 daughters    Other Topics Concern    Not on file   Social History Narrative    Not on file     Social Determinants of Health     Financial Resource Strain: Low Risk     Difficulty of Paying Living Expenses: Not hard at all   Food Insecurity: No Food Insecurity    Worried About Running Out of Food in the Last Year: Never true    Ran Out of Food in the Last Year: Never true   Transportation Needs: No Transportation Needs    Lack of Transportation (Medical): No    Lack of Transportation (Non-Medical): No   Physical Activity: Inactive    Days of Exercise per Week: 0 days    Minutes of Exercise per Session: 0 min   Stress: No Stress Concern Present    Feeling of Stress : Not at all   Social Connections: Moderately Isolated    Frequency of Communication with Friends and Family: More than three times a week    Frequency of Social Gatherings with Friends and Family: More than three times a week    Attends Restorationism Services: 1 to 4 times per year    Active Member of Clubs or Organizations: No    Attends Club or Organization Meetings: Never    Marital Status:    Intimate Partner Violence: Not on file   Housing Stability: Low Risk     Unable to Pay for Housing in the Last Year: No    Number of Places Lived in the Last Year: 1    Unstable Housing in the Last Year: No     Allergies   Allergen Reactions    Penicillins Rash     Rash as a child.      Outpatient Encounter Medications as of 1/16/2023   Medication Sig Dispense Refill    metoprolol SR (TOPROL XL) 25 MG TABLET SR 24 HR Take 1 Tablet by mouth every day. (Patient taking differently: Take 12.5 mg by mouth every day. 1/2 a tab daily) 90 Tablet 3    rosuvastatin (CRESTOR) 20 MG Tab Take 1 Tablet by mouth every day. 90 Tablet 3    betamethasone dipropionate (DIPROLENE) 0.05 % Ointment       ciclopirox (PENLAC) 8 % solution       finasteride (PROSCAR) 5 MG Tab Take 1 tablet every day       aspirin 81 MG EC tablet  "Take  by mouth every day.      [DISCONTINUED] triamcinolone acetonide (KENALOG) 0.025 % Cream Apply thin layer to affected area(s) twice daily for 7-14 days. (Patient not taking: Reported on 1/16/2023) 15 g 0     No facility-administered encounter medications on file as of 1/16/2023.     ROS           Objective     /64 (BP Location: Left arm, Patient Position: Sitting, BP Cuff Size: Large adult)   Pulse 67   Resp 18   Ht 1.88 m (6' 2\")   Wt 90.3 kg (199 lb)   SpO2 97%   BMI 25.55 kg/m²     Physical Exam  Constitutional:       Appearance: He is well-developed.   HENT:      Head: Normocephalic and atraumatic.   Cardiovascular:      Rate and Rhythm: Normal rate and regular rhythm.      Heart sounds: No murmur heard.    No friction rub. No gallop.   Pulmonary:      Effort: Pulmonary effort is normal.      Breath sounds: Normal breath sounds.   Abdominal:      Palpations: Abdomen is soft.   Musculoskeletal:         General: Normal range of motion.      Cervical back: Normal range of motion and neck supple.   Skin:     General: Skin is warm and dry.   Neurological:      Mental Status: He is alert and oriented to person, place, and time.   Psychiatric:         Behavior: Behavior normal.         Thought Content: Thought content normal.         Judgment: Judgment normal.              Assessment & Plan     1. PVC (premature ventricular contraction)  EKG      2. S/P catheter ablation of slow pathway        3. S/P ablation of atrial flutter        4. Mixed hyperlipidemia            Medical Decision Making: Today's Assessment/Status/Plan:   1.  Atrial flutter status post ablation no recurrence.  2.  Slow pathway ablation.  3.  Hyperlipidemia on statins.  4.  Follow-up with me in 1 year.                     "

## 2023-03-30 ENCOUNTER — HOSPITAL ENCOUNTER (OUTPATIENT)
Dept: LAB | Facility: MEDICAL CENTER | Age: 78
End: 2023-03-30
Attending: UROLOGY
Payer: MEDICARE

## 2023-03-30 LAB — PSA SERPL-MCNC: 1.33 NG/ML (ref 0–4)

## 2023-03-30 PROCEDURE — 84153 ASSAY OF PSA TOTAL: CPT

## 2023-03-30 PROCEDURE — 36415 COLL VENOUS BLD VENIPUNCTURE: CPT

## 2023-06-10 SDOH — HEALTH STABILITY: PHYSICAL HEALTH: ON AVERAGE, HOW MANY MINUTES DO YOU ENGAGE IN EXERCISE AT THIS LEVEL?: 0 MIN

## 2023-06-10 SDOH — ECONOMIC STABILITY: FOOD INSECURITY: WITHIN THE PAST 12 MONTHS, YOU WORRIED THAT YOUR FOOD WOULD RUN OUT BEFORE YOU GOT MONEY TO BUY MORE.: NEVER TRUE

## 2023-06-10 SDOH — ECONOMIC STABILITY: FOOD INSECURITY: WITHIN THE PAST 12 MONTHS, THE FOOD YOU BOUGHT JUST DIDN'T LAST AND YOU DIDN'T HAVE MONEY TO GET MORE.: NEVER TRUE

## 2023-06-10 SDOH — HEALTH STABILITY: PHYSICAL HEALTH: ON AVERAGE, HOW MANY DAYS PER WEEK DO YOU ENGAGE IN MODERATE TO STRENUOUS EXERCISE (LIKE A BRISK WALK)?: 0 DAYS

## 2023-06-10 SDOH — HEALTH STABILITY: MENTAL HEALTH
STRESS IS WHEN SOMEONE FEELS TENSE, NERVOUS, ANXIOUS, OR CAN'T SLEEP AT NIGHT BECAUSE THEIR MIND IS TROUBLED. HOW STRESSED ARE YOU?: ONLY A LITTLE

## 2023-06-10 SDOH — ECONOMIC STABILITY: HOUSING INSECURITY: IN THE LAST 12 MONTHS, HOW MANY PLACES HAVE YOU LIVED?: 1

## 2023-06-10 SDOH — ECONOMIC STABILITY: INCOME INSECURITY: IN THE LAST 12 MONTHS, WAS THERE A TIME WHEN YOU WERE NOT ABLE TO PAY THE MORTGAGE OR RENT ON TIME?: NO

## 2023-06-10 SDOH — ECONOMIC STABILITY: INCOME INSECURITY: HOW HARD IS IT FOR YOU TO PAY FOR THE VERY BASICS LIKE FOOD, HOUSING, MEDICAL CARE, AND HEATING?: NOT HARD AT ALL

## 2023-06-10 ASSESSMENT — SOCIAL DETERMINANTS OF HEALTH (SDOH)
HOW OFTEN DO YOU HAVE A DRINK CONTAINING ALCOHOL: MONTHLY OR LESS
DO YOU BELONG TO ANY CLUBS OR ORGANIZATIONS SUCH AS CHURCH GROUPS UNIONS, FRATERNAL OR ATHLETIC GROUPS, OR SCHOOL GROUPS?: NO
HOW OFTEN DO YOU ATTEND CHURCH OR RELIGIOUS SERVICES?: NEVER
IN A TYPICAL WEEK, HOW MANY TIMES DO YOU TALK ON THE PHONE WITH FAMILY, FRIENDS, OR NEIGHBORS?: MORE THAN THREE TIMES A WEEK
HOW OFTEN DO YOU ATTEND CHURCH OR RELIGIOUS SERVICES?: NEVER
HOW HARD IS IT FOR YOU TO PAY FOR THE VERY BASICS LIKE FOOD, HOUSING, MEDICAL CARE, AND HEATING?: NOT HARD AT ALL
HOW OFTEN DO YOU ATTENT MEETINGS OF THE CLUB OR ORGANIZATION YOU BELONG TO?: NEVER
HOW OFTEN DO YOU ATTENT MEETINGS OF THE CLUB OR ORGANIZATION YOU BELONG TO?: NEVER
DO YOU BELONG TO ANY CLUBS OR ORGANIZATIONS SUCH AS CHURCH GROUPS UNIONS, FRATERNAL OR ATHLETIC GROUPS, OR SCHOOL GROUPS?: NO
HOW OFTEN DO YOU GET TOGETHER WITH FRIENDS OR RELATIVES?: MORE THAN THREE TIMES A WEEK
WITHIN THE PAST 12 MONTHS, YOU WORRIED THAT YOUR FOOD WOULD RUN OUT BEFORE YOU GOT THE MONEY TO BUY MORE: NEVER TRUE
HOW OFTEN DO YOU HAVE SIX OR MORE DRINKS ON ONE OCCASION: NEVER
IN A TYPICAL WEEK, HOW MANY TIMES DO YOU TALK ON THE PHONE WITH FAMILY, FRIENDS, OR NEIGHBORS?: MORE THAN THREE TIMES A WEEK
HOW OFTEN DO YOU GET TOGETHER WITH FRIENDS OR RELATIVES?: MORE THAN THREE TIMES A WEEK

## 2023-06-10 ASSESSMENT — LIFESTYLE VARIABLES
HOW OFTEN DO YOU HAVE A DRINK CONTAINING ALCOHOL: MONTHLY OR LESS
HOW OFTEN DO YOU HAVE SIX OR MORE DRINKS ON ONE OCCASION: NEVER

## 2023-06-12 ENCOUNTER — OFFICE VISIT (OUTPATIENT)
Dept: MEDICAL GROUP | Facility: PHYSICIAN GROUP | Age: 78
End: 2023-06-12
Payer: MEDICARE

## 2023-06-12 VITALS
OXYGEN SATURATION: 95 % | SYSTOLIC BLOOD PRESSURE: 106 MMHG | DIASTOLIC BLOOD PRESSURE: 60 MMHG | WEIGHT: 199.6 LBS | BODY MASS INDEX: 25.62 KG/M2 | TEMPERATURE: 97.8 F | HEART RATE: 63 BPM | HEIGHT: 74 IN

## 2023-06-12 DIAGNOSIS — M54.50 CHRONIC BILATERAL LOW BACK PAIN WITHOUT SCIATICA: ICD-10-CM

## 2023-06-12 DIAGNOSIS — G47.33 OSA (OBSTRUCTIVE SLEEP APNEA): ICD-10-CM

## 2023-06-12 DIAGNOSIS — N40.0 BENIGN PROSTATIC HYPERPLASIA WITHOUT LOWER URINARY TRACT SYMPTOMS: ICD-10-CM

## 2023-06-12 DIAGNOSIS — Z12.5 ENCOUNTER FOR SCREENING FOR MALIGNANT NEOPLASM OF PROSTATE: ICD-10-CM

## 2023-06-12 DIAGNOSIS — G89.29 CHRONIC BILATERAL LOW BACK PAIN WITHOUT SCIATICA: ICD-10-CM

## 2023-06-12 DIAGNOSIS — H93.8X2 CRACKLING SOUND IN LEFT EAR: ICD-10-CM

## 2023-06-12 DIAGNOSIS — E78.2 MIXED HYPERLIPIDEMIA: ICD-10-CM

## 2023-06-12 DIAGNOSIS — I49.3 PVC (PREMATURE VENTRICULAR CONTRACTION): ICD-10-CM

## 2023-06-12 DIAGNOSIS — Z00.00 ENCOUNTER FOR MEDICARE ANNUAL WELLNESS EXAM: Primary | ICD-10-CM

## 2023-06-12 PROBLEM — M17.9 OSTEOARTHRITIS OF KNEE: Status: RESOLVED | Noted: 2022-09-26 | Resolved: 2023-06-12

## 2023-06-12 PROBLEM — R10.9 ABDOMINAL PAIN: Status: RESOLVED | Noted: 2022-08-24 | Resolved: 2023-06-12

## 2023-06-12 PROBLEM — M25.519 SHOULDER PAIN: Status: RESOLVED | Noted: 2022-02-17 | Resolved: 2023-06-12

## 2023-06-12 PROCEDURE — G0439 PPPS, SUBSEQ VISIT: HCPCS | Performed by: FAMILY MEDICINE

## 2023-06-12 PROCEDURE — 3078F DIAST BP <80 MM HG: CPT | Performed by: FAMILY MEDICINE

## 2023-06-12 PROCEDURE — 3074F SYST BP LT 130 MM HG: CPT | Performed by: FAMILY MEDICINE

## 2023-06-12 RX ORDER — TRIAMCINOLONE ACETONIDE 1 MG/G
1 CREAM TOPICAL 2 TIMES DAILY PRN
COMMUNITY
Start: 2023-06-03 | End: 2024-03-25

## 2023-06-12 ASSESSMENT — ACTIVITIES OF DAILY LIVING (ADL): BATHING_REQUIRES_ASSISTANCE: 0

## 2023-06-12 ASSESSMENT — ENCOUNTER SYMPTOMS: GENERAL WELL-BEING: FAIR

## 2023-06-12 ASSESSMENT — PATIENT HEALTH QUESTIONNAIRE - PHQ9: CLINICAL INTERPRETATION OF PHQ2 SCORE: 0

## 2023-06-12 ASSESSMENT — FIBROSIS 4 INDEX: FIB4 SCORE: 2.49

## 2023-06-12 NOTE — PROGRESS NOTES
Chief Complaint   Patient presents with    Annual Exam       HPI:  Boo is a 78 y.o. here for Medicare Annual Wellness Visit    Patient Active Problem List    Diagnosis Date Noted    Callus of foot 04/01/2022    Shortness of breath 02/17/2022    Crackling sound in left ear 02/17/2022    TMJ syndrome 02/17/2022    Back pain 01/07/2022    Hyperlipidemia     Pain of left hip joint 01/07/2020    History of skin cancer 11/13/2019    Primary osteoarthritis involving multiple joints 03/12/2019    S/P ablation of atrial flutter 01/15/2019    S/P catheter ablation of slow pathway 01/15/2019    Prehypertension 01/08/2019    SHERRY (obstructive sleep apnea) 11/29/2018    PVC (premature ventricular contraction) 07/09/2018    Benign prostatic hyperplasia without lower urinary tract symptoms 11/15/2017    History of atrial flutter 11/11/2017       Current Outpatient Medications   Medication Sig Dispense Refill    triamcinolone acetonide (KENALOG) 0.1 % Cream       metoprolol SR (TOPROL XL) 25 MG TABLET SR 24 HR Take 1 Tablet by mouth every day. (Patient taking differently: Take 12.5 mg by mouth every day. 1/2 a tab daily) 90 Tablet 3    rosuvastatin (CRESTOR) 20 MG Tab Take 1 Tablet by mouth every day. 90 Tablet 3    ciclopirox (PENLAC) 8 % solution       finasteride (PROSCAR) 5 MG Tab Take 1 tablet every day       aspirin 81 MG EC tablet Take  by mouth every day.       No current facility-administered medications for this visit.        Patient is taking medications as noted in medication list.  Current supplements as per medication list.     Allergies: Penicillins    Current social contact/activities: walking, family     Is patient current with immunizations? Yes.    He  reports that he has never smoked. He has been exposed to tobacco smoke. He has never used smokeless tobacco. He reports that he does not currently use alcohol. He reports that he does not currently use drugs after having used the following drugs:  Marijuana.  Counseling given: Yes      ROS:    Gait: Uses a wheelchair - only on hikes with kids  Ostomy: No   Other tubes: No   Amputations: No   Chronic oxygen use No   Last eye exam 2x years ago    Wears hearing aids: No   : Denies any urinary leakage during the last 6 months    Screening:    Depression Screening  Little interest or pleasure in doing things?  0 - not at all  Feeling down, depressed, or hopeless? 0 - not at all  Patient Health Questionnaire Score: 0    If depressive symptoms identified deferred to follow up visit unless specifically addressed in assessment and plan.    Interpretation of PHQ-9 Total Score   Score Severity   1-4 No Depression   5-9 Mild Depression   10-14 Moderate Depression   15-19 Moderately Severe Depression   20-27 Severe Depression    Screening for Cognitive Impairment  Three Minute Recall (daughter, heaven, mountain)  3/3    Margarito clock face with all 12 numbers and set the hands to show 10 past 11.  Yes    If cognitive concerns identified, deferred for follow up unless specifically addressed in assessment and plan.    Fall Risk Assessment  Has the patient had two or more falls in the last year or any fall with injury in the last year?  No  If fall risk identified, deferred for follow up unless specifically addressed in assessment and plan.    Safety Assessment  Throw rugs on floor.  No  Handrails on all stairs.  Yes  Good lighting in all hallways.  Yes  Difficulty hearing.  No  Patient counseled about all safety risks that were identified.    Functional Assessment ADLs  Are there any barriers preventing you from cooking for yourself or meeting nutritional needs?  No.    Are there any barriers preventing you from driving safely or obtaining transportation?  No.    Are there any barriers preventing you from using a telephone or calling for help?  No.    Are there any barriers preventing you from shopping?  No.    Are there any barriers preventing you from taking care of your  own finances?  No.    Are there any barriers preventing you from managing your medications?  No.    Are there any barriers preventing you from showering, bathing or dressing yourself?  No.    Are you currently engaging in any exercise or physical activity?  Yes.  Stretching and cycling indoors  What is your perception of your health?  Fair.    Advance Care Planning  Do you have an Advance Directive, Living Will, Durable Power of , or POLST? Yes    Living Will     is not on file - instructed patient to bring in a copy to scan into their chart    Health Maintenance Summary            Overdue - COVID-19 Vaccine (2 - Pfizer series) Overdue since 2/3/2023      10/03/2022  Imm Admin: MODERNA BIVALENT BOOSTER SARS-COV-2 VACCINE (6+)    2021  Imm Admin: MODERNA SARS-COV-2 VACCINE (12+)    2021  Imm Admin: MODERNA SARS-COV-2 VACCINE (12+)    2021  Imm Admin: MODERNA SARS-COV-2 VACCINE (12+)              IMM INFLUENZA (Season Ended) Next due on 2023      10/20/2020  Imm Admin: Influenza Vaccine Adult               Annual Wellness Visit (Every 366 Days) Next due on 2023  Visit Dx: Encounter for Medicare annual wellness exam    2022  Level of Service: ANNUAL WELLNESS VISIT-INCLUDES PPPS SUBSEQUE*    2022  Visit Dx: Encounter for Medicare annual wellness exam    2021  Visit Dx: Encounter for Medicare annual wellness exam              IMM DTaP/Tdap/Td Vaccine (2 - Td or Tdap) Next due on 2019  Imm Admin: Tdap Vaccine              IMM PNEUMOCOCCAL VACCINE: 65+ Years (Series Information) Completed      2019  Imm Admin: Pneumococcal polysaccharide vaccine (PPSV-23)    2017  Imm Admin: Pneumococcal Conjugate Vaccine (Prevnar/PCV-13)              HEPATITIS C SCREENING  Completed      11/15/2021  HCV Scrn ( [id number] 1xLife)              IMM ZOSTER VACCINES (Series Information) Completed      2022  Imm Admin: Zoster Vaccine  Recombinant (RZV) (SHINGRIX)    04/01/2022  Imm Admin: Zoster Vaccine Recombinant (RZV) (SHINGRIX)              HPV Vaccines (Series Information) Aged Out      No completion history exists for this topic.              IMM MENINGOCOCCAL ACWY VACCINE (Series Information) Aged Out      No completion history exists for this topic.              Discontinued - COLORECTAL CANCER SCREENING  Discontinued        Frequency changed to Never automatically (Topic No Longer Applies)    05/06/2019  REFERRAL TO GI FOR COLONOSCOPY    05/06/2019  REFERRAL TO GI FOR COLONOSCOPY    10/28/2017  OCCULT BLOOD FECES IMMUNOASSAY              Discontinued - IMM HEP B VACCINE  Discontinued      No completion history exists for this topic.                    Patient Care Team:  Jerica Coronado M.D. as PCP - General (Family Medicine)  Miguel Farris D.O. as Consulting Physician (Cardiovascular Disease (Cardiology))  Dion Coronel, PT (Physical Therapy)  Dion Coronel, PT as Physical Therapist (Physical Therapy)  Yamel Bernstein, Med Ass't (DME Supplier)    Social History     Tobacco Use    Smoking status: Never     Passive exposure: Past (BOTH parents smoked)    Smokeless tobacco: Never   Vaping Use    Vaping Use: Never used   Substance Use Topics    Alcohol use: Not Currently    Drug use: Not Currently     Types: Marijuana     Family History   Problem Relation Age of Onset    Heart Disease Mother         MI    Cancer Father     Cancer Brother         prostate cancer    Stroke Brother     Stroke Brother     Diabetes Neg Hx     Hyperlipidemia Neg Hx     Alcohol/Drug Neg Hx     Ovarian Cancer Neg Hx     Tubal Cancer Neg Hx     Peritoneal Cancer Neg Hx     Colorectal Cancer Neg Hx     Breast Cancer Neg Hx      He  has a past medical history of Arrhythmia, Arthritis, Breath shortness, Cancer (HCC), Cardiac arrhythmia, Enlarged prostate with urinary obstruction, GERD (gastroesophageal reflux disease), Hyperlipidemia, Hypertension, OA  "(osteoarthritis) of knee, Restless leg syndrome, Tonsillitis, and Urinary bladder disorder (12/06/2017).   Past Surgical History:   Procedure Laterality Date    TURP-VAPOR  12/07/2017    Procedure: TURP-VAPOR - GREEN LIGHT PHOTOSELECTIVE;  Surgeon: Cristofer Cr M.D.;  Location: SURGERY [facility];  Service: Urology    GANGLION EXCISION Right 1966    wrist    APPENDECTOMY      OPEN REDUCTION      PROSTATECTOMY, RADICAL RETRO      TONSILLECTOMY      as a child    VASECTOMY         Exam:   /60 (BP Location: Right arm, Patient Position: Sitting, BP Cuff Size: Adult)   Pulse 63   Temp 36.6 °C (97.8 °F) (Temporal)   Ht 1.88 m (6' 2\")   Wt 90.5 kg (199 lb 9.6 oz)   SpO2 95%  Body mass index is 25.63 kg/m².    Hearing good.    Dentition good  Alert, oriented in no acute distress.  Eye contact is good, speech goal directed, affect calm    Assessment and Plan. The following treatment and monitoring plan is recommended:      1. Encounter for Medicare annual wellness exam  2. Encounter for screening for malignant neoplasm of prostate  Boo is a pleasant 78-year-old man here today for Medicare wellness visit.  He has no acute concerns.  Healthcare maintenance is largely up-to-date.  - PROSTATE SPECIFIC AG SCREENING; Future    3. Chronic bilateral low back pain without sciatica  Chronic, controlled.  Reports that he does exercises regularly which keeps his back pain under control.  He will continue these exercises.    4. Benign prostatic hyperplasia without lower urinary tract symptoms  Chronic, progressing.  He did undergo laser treatment in the past.  So far his symptoms are pretty good with the finasteride but he knows that his prostate is getting larger and he may need other treatment laser therapy in the future.  He will follow-up with urology as directed.  He will continue his finasteride as directed.    5. Crackling sound in left ear  Chronic, stable.  He continues to get intermittent crackling of the " left ear for more than a year.  It has not improved on its own despite measures so would like to see an ENT doctor.  Referral has been placed.  - Referral to ENT    6. Mixed hyperlipidemia  Chronic, controlled. He is on a statin for primary prevention and tolerating it well. The  last cholesterol panel in 2022 was all within normal limits so we will continue the current dosing.  - Rosuvastatin 20 mg daily  - CBC WITHOUT DIFFERENTIAL; Future  - Comp Metabolic Panel; Future  - Lipid Profile; Future    7. SHERRY (obstructive sleep apnea)  Chronic, controlled.  He will continue CPAP nightly.  He will follow-up with the medicine as directed.    8. PVC (premature ventricular contraction)  Chronic, controlled.  He will continue his beta-blocker as directed by cardiology.  - Metoprolol SR 12.5 mg daily      Services suggested: No services needed at this time  Health Care Screening recommendations as per orders if indicated.  Referrals offered: PT/OT/Nutrition counseling/Behavioral Health/Smoking cessation as per orders if indicated.    Discussion today about general wellness and lifestyle habits:    Prevent falls and reduce trip hazards; Cautioned about securing or removing rugs.  Have a working fire alarm and carbon monoxide detector;   Engage in regular physical activity and social activities     Follow-up: Return in about 6 months (around 12/12/2023) for Med check.

## 2023-07-07 ENCOUNTER — OFFICE VISIT (OUTPATIENT)
Dept: SLEEP MEDICINE | Facility: MEDICAL CENTER | Age: 78
End: 2023-07-07
Attending: NURSE PRACTITIONER
Payer: MEDICARE

## 2023-07-07 VITALS
RESPIRATION RATE: 16 BRPM | HEART RATE: 71 BPM | HEIGHT: 74 IN | BODY MASS INDEX: 25.04 KG/M2 | WEIGHT: 195.1 LBS | DIASTOLIC BLOOD PRESSURE: 78 MMHG | OXYGEN SATURATION: 95 % | SYSTOLIC BLOOD PRESSURE: 132 MMHG

## 2023-07-07 DIAGNOSIS — G47.33 OSA (OBSTRUCTIVE SLEEP APNEA): ICD-10-CM

## 2023-07-07 PROCEDURE — 3078F DIAST BP <80 MM HG: CPT | Performed by: NURSE PRACTITIONER

## 2023-07-07 PROCEDURE — 3075F SYST BP GE 130 - 139MM HG: CPT | Performed by: NURSE PRACTITIONER

## 2023-07-07 PROCEDURE — 99213 OFFICE O/P EST LOW 20 MIN: CPT | Performed by: NURSE PRACTITIONER

## 2023-07-07 PROCEDURE — 99212 OFFICE O/P EST SF 10 MIN: CPT | Performed by: NURSE PRACTITIONER

## 2023-07-07 RX ORDER — BETAMETHASONE DIPROPIONATE 0.05 %
OINTMENT (GRAM) TOPICAL
COMMUNITY
End: 2023-12-15

## 2023-07-07 RX ORDER — PIMECROLIMUS 10 MG/G
CREAM TOPICAL
COMMUNITY
End: 2023-12-15

## 2023-07-07 RX ORDER — FINASTERIDE 5 MG/1
1 TABLET, FILM COATED ORAL
COMMUNITY
Start: 2023-07-03

## 2023-07-07 ASSESSMENT — FIBROSIS 4 INDEX: FIB4 SCORE: 2.49

## 2023-07-07 NOTE — PROGRESS NOTES
"Chief Complaint   Patient presents with    Apnea       HPI:  Boo Cavanaugh Jr. is a 78 y.o. year old male here today for follow-up on  SHERRY.  Last seen 7/20/2022 by me. PMH includes BPH, PVC, SHERRY, hypertension, status post cardiac ablation in 11/2018 for atrial flutter, never smoker, tonsillectomy, shortness of breath, RLS, GERD.      Patient is currently using nasal pillows and denies any difficulty with mask fit or pressures.  Settings on CPAP or auto CPAP 5 to 15 cm/H2O. set up in 2018 with a [employer] device that has since been replaced through recall program.  He is currently using a nasal pillow with heated tubing.  He gets between 6 and half to 8 hours of sleep nightly.  He is unsure if he notes improvement in sleep quality.  He denies any excessive daytime sleepiness, morning headaches, palpitations, concentration or memory problems.  He does have sinus congestion and tinnitus and a scheduled to see ENT.  He also has occasional fatigue and will occasionally nap.    Patient did not bring in ST card, but does have johnnie.  There is evidence of 100% use with an average time of 7 hours and 30 minutes and a resultant AHI of 3.  There is no evidence of persistent mask leak.    Sleep Study History:   OPO on autoCPAP 5-15 cmH2O and RA from 6/16/20 indicated O2 Sat. jason was 86% and mean O2 sat was 90 % and baseline O2 at 92%. O2 sat was below 88% for 1.1 min of the flow evaluation time. Oxygen Desaturation (>=3%) Index was 1.9/hr. Overall it is an unremarkable oximetry.     Home sleep study from 12/13/18 indicated mild sleep apnea with an RDI of 8.5, supine RAJEEV 18.2, jason O2 sat of 85%, avg O2 was 91 % and baseline O2 at 95 %. O2 sat was below 89% for 9 min of the flow evaluation time. Avg HR 60 BPM.    ROS: As per HPI and otherwise negative if not stated.    Past Medical History:   Diagnosis Date    Arrhythmia     \"flutter\", Dr. Mendoza cardiologist    Arthritis     left thumb    Breath shortness     Cancer " "(HCC)     Cardiac arrhythmia     Enlarged prostate with urinary obstruction     GERD (gastroesophageal reflux disease)     Hyperlipidemia     Hypertension     OA (osteoarthritis) of knee     bilateral     Restless leg syndrome     Tonsillitis     Urinary bladder disorder 12/06/2017    savage in place       Past Surgical History:   Procedure Laterality Date    TURP-VAPOR  12/07/2017    Procedure: TURP-VAPOR - GREEN LIGHT PHOTOSELECTIVE;  Surgeon: Cristofer Cr M.D.;  Location: SURGERY [facility];  Service: Urology    GANGLION EXCISION Right 1966    wrist    APPENDECTOMY      OPEN REDUCTION      PROSTATECTOMY, RADICAL RETRO      TONSILLECTOMY      as a child    VASECTOMY         Family History   Problem Relation Age of Onset    Heart Disease Mother         MI    Cancer Father     Cancer Brother         prostate cancer    Stroke Brother     Stroke Brother     Diabetes Neg Hx     Hyperlipidemia Neg Hx     Alcohol/Drug Neg Hx     Ovarian Cancer Neg Hx     Tubal Cancer Neg Hx     Peritoneal Cancer Neg Hx     Colorectal Cancer Neg Hx     Breast Cancer Neg Hx        Allergies as of 07/07/2023 - Reviewed 07/07/2023   Allergen Reaction Noted    Penicillins Rash 10/25/2017        Vitals:  /78 (BP Location: Left arm, Patient Position: Sitting, BP Cuff Size: Adult)   Pulse 71   Resp 16   Ht 1.88 m (6' 2\")   Wt 88.5 kg (195 lb 1.6 oz)   SpO2 95%     Current medications as of today   Current Outpatient Medications   Medication Sig Dispense Refill    betamethasone dipropionate (DIPROLENE) 0.05 % Ointment       finasteride (PROSCAR) 5 MG Tab Take 1 Tablet by mouth every day.      pimecrolimus (ELIDEL) 1 % cream       triamcinolone acetonide (KENALOG) 0.1 % Cream       metoprolol SR (TOPROL XL) 25 MG TABLET SR 24 HR Take 1 Tablet by mouth every day. (Patient taking differently: Take 12.5 mg by mouth every day. 1/2 a tab daily) 90 Tablet 3    rosuvastatin (CRESTOR) 20 MG Tab Take 1 Tablet by mouth every day. 90 Tablet " 3    ciclopirox (PENLAC) 8 % solution       finasteride (PROSCAR) 5 MG Tab Take 1 tablet every day       aspirin 81 MG EC tablet Take  by mouth every day.       No current facility-administered medications for this visit.         Physical Exam:   Gen:           Alert and oriented, No apparent distress. Mood and affect appropriate, normal interaction with examiner.  Eyes:          PERRL, EOM intact, sclere white, conjunctive moist.  Ears:          Not examined.   Hearing:     Grossly intact.  Nose:          Normal, no lesions or deformities.  Dentition:    Good dentition.  Oropharynx:   Tongue normal, posterior pharynx without erythema or exudate.  Neck:        Supple, trachea midline, no masses.  Respiratory Effort: No intercostal retractions or use of accessory muscles.   Lung Auscultation:      Clear to auscultation bilaterally; no rales, rhonchi or wheezing.  CV:            Regular rate and rhythm. No murmurs, rubs or gallops.  Abd:           Not examined.   Lymphadenopathy: Not examined.  Gait and Station: Normal.  Digits and Nails: No clubbing, cyanosis, petechiae, or nodes.   Cranial Nerves: II-XII grossly intact.  Skin:        No rashes, lesions or ulcers noted.               Ext:           No cyanosis or edema.      Assessment:  1. SHERRY (obstructive sleep apnea)  DME Mask and Supplies    CANCELED: DME Mask and Supplies            Plan:  Is using and benefiting from CPAP therapy.  Compliance shows adequate use and control of SHERRY.  Order placed for mask and supplies through new DME company [employer].  Patient advised to follow-up in 1 year, but can be seen sooner if needed.    Please note that this dictation was created using voice recognition software. I have made every reasonable attempt to correct obvious errors, but it is possible there are errors of grammar and possibly content that I did not discover before finalizing the note.

## 2023-08-14 ENCOUNTER — TELEPHONE (OUTPATIENT)
Dept: CARDIOLOGY | Facility: MEDICAL CENTER | Age: 78
End: 2023-08-14
Payer: MEDICARE

## 2023-08-14 DIAGNOSIS — E78.2 MIXED HYPERLIPIDEMIA: ICD-10-CM

## 2023-08-14 RX ORDER — ROSUVASTATIN CALCIUM 20 MG/1
20 TABLET, COATED ORAL DAILY
Qty: 90 TABLET | Refills: 3 | Status: SHIPPED | OUTPATIENT
Start: 2023-08-14

## 2023-08-14 NOTE — TELEPHONE ENCOUNTER
DS    Caller: Boo Cavanaugh Jr.     Medication Name and Dosage: rosuvastatin (CRESTOR) 20 MG Tab       Medication amount left: out 8/15/23    Preferred Pharmacy: [id number] - [place], [place] - [address]    Other questions (Topic): new order needed     Callback Number (Will only call for issues): [phone]      Thank you   Blossom HOLLOWAY

## 2023-09-11 ENCOUNTER — HOSPITAL ENCOUNTER (OUTPATIENT)
Dept: LAB | Facility: MEDICAL CENTER | Age: 78
End: 2023-09-11
Attending: FAMILY MEDICINE
Payer: MEDICARE

## 2023-09-11 DIAGNOSIS — E78.2 MIXED HYPERLIPIDEMIA: ICD-10-CM

## 2023-09-11 DIAGNOSIS — Z12.5 ENCOUNTER FOR SCREENING FOR MALIGNANT NEOPLASM OF PROSTATE: ICD-10-CM

## 2023-09-11 LAB
ALBUMIN SERPL BCP-MCNC: 4 G/DL (ref 3.2–4.9)
ALBUMIN/GLOB SERPL: 1.6 G/DL
ALP SERPL-CCNC: 72 U/L (ref 30–99)
ALT SERPL-CCNC: 17 U/L (ref 2–50)
ANION GAP SERPL CALC-SCNC: 9 MMOL/L (ref 7–16)
AST SERPL-CCNC: 23 U/L (ref 12–45)
BILIRUB SERPL-MCNC: 0.7 MG/DL (ref 0.1–1.5)
BUN SERPL-MCNC: 19 MG/DL (ref 8–22)
CALCIUM ALBUM COR SERPL-MCNC: 9.5 MG/DL (ref 8.5–10.5)
CALCIUM SERPL-MCNC: 9.5 MG/DL (ref 8.5–10.5)
CHLORIDE SERPL-SCNC: 106 MMOL/L (ref 96–112)
CHOLEST SERPL-MCNC: 127 MG/DL (ref 100–199)
CO2 SERPL-SCNC: 25 MMOL/L (ref 20–33)
CREAT SERPL-MCNC: 1.06 MG/DL (ref 0.5–1.4)
ERYTHROCYTE [DISTWIDTH] IN BLOOD BY AUTOMATED COUNT: 46.1 FL (ref 35.9–50)
FASTING STATUS PATIENT QL REPORTED: NORMAL
GFR SERPLBLD CREATININE-BSD FMLA CKD-EPI: 72 ML/MIN/1.73 M 2
GLOBULIN SER CALC-MCNC: 2.5 G/DL (ref 1.9–3.5)
GLUCOSE SERPL-MCNC: 89 MG/DL (ref 65–99)
HCT VFR BLD AUTO: 48 % (ref 42–52)
HDLC SERPL-MCNC: 48 MG/DL
HGB BLD-MCNC: 16.3 G/DL (ref 14–18)
LDLC SERPL CALC-MCNC: 64 MG/DL
MCH RBC QN AUTO: 32.5 PG (ref 27–33)
MCHC RBC AUTO-ENTMCNC: 34 G/DL (ref 32.3–36.5)
MCV RBC AUTO: 95.8 FL (ref 81.4–97.8)
PLATELET # BLD AUTO: 170 K/UL (ref 164–446)
PMV BLD AUTO: 11.2 FL (ref 9–12.9)
POTASSIUM SERPL-SCNC: 4.2 MMOL/L (ref 3.6–5.5)
PROT SERPL-MCNC: 6.5 G/DL (ref 6–8.2)
PSA SERPL-MCNC: 1.22 NG/ML (ref 0–4)
RBC # BLD AUTO: 5.01 M/UL (ref 4.7–6.1)
SODIUM SERPL-SCNC: 140 MMOL/L (ref 135–145)
TRIGL SERPL-MCNC: 76 MG/DL (ref 0–149)
WBC # BLD AUTO: 5.7 K/UL (ref 4.8–10.8)

## 2023-09-11 PROCEDURE — 85027 COMPLETE CBC AUTOMATED: CPT

## 2023-09-11 PROCEDURE — 80053 COMPREHEN METABOLIC PANEL: CPT

## 2023-09-11 PROCEDURE — 84153 ASSAY OF PSA TOTAL: CPT | Mod: GA

## 2023-09-11 PROCEDURE — 80061 LIPID PANEL: CPT

## 2023-09-11 PROCEDURE — 36415 COLL VENOUS BLD VENIPUNCTURE: CPT

## 2023-10-10 ENCOUNTER — APPOINTMENT (OUTPATIENT)
Dept: RADIOLOGY | Facility: MEDICAL CENTER | Age: 78
End: 2023-10-10
Attending: OTOLARYNGOLOGY
Payer: MEDICARE

## 2023-10-10 ENCOUNTER — OFFICE VISIT (OUTPATIENT)
Dept: URGENT CARE | Facility: CLINIC | Age: 78
End: 2023-10-10
Payer: MEDICARE

## 2023-10-10 ENCOUNTER — APPOINTMENT (OUTPATIENT)
Dept: RADIOLOGY | Facility: MEDICAL CENTER | Age: 78
End: 2023-10-10
Attending: EMERGENCY MEDICINE
Payer: MEDICARE

## 2023-10-10 ENCOUNTER — HOSPITAL ENCOUNTER (EMERGENCY)
Facility: MEDICAL CENTER | Age: 78
End: 2023-10-10
Attending: EMERGENCY MEDICINE
Payer: MEDICARE

## 2023-10-10 VITALS
SYSTOLIC BLOOD PRESSURE: 167 MMHG | TEMPERATURE: 98.3 F | HEART RATE: 69 BPM | WEIGHT: 194 LBS | HEIGHT: 74 IN | RESPIRATION RATE: 16 BRPM | BODY MASS INDEX: 24.9 KG/M2 | DIASTOLIC BLOOD PRESSURE: 83 MMHG | OXYGEN SATURATION: 95 %

## 2023-10-10 VITALS
OXYGEN SATURATION: 96 % | SYSTOLIC BLOOD PRESSURE: 142 MMHG | RESPIRATION RATE: 16 BRPM | BODY MASS INDEX: 24.97 KG/M2 | HEIGHT: 74 IN | WEIGHT: 194.6 LBS | DIASTOLIC BLOOD PRESSURE: 64 MMHG | HEART RATE: 64 BPM | TEMPERATURE: 98 F

## 2023-10-10 DIAGNOSIS — R07.9 CHEST PAIN, UNSPECIFIED TYPE: ICD-10-CM

## 2023-10-10 DIAGNOSIS — R03.0 ELEVATED BLOOD PRESSURE READING: ICD-10-CM

## 2023-10-10 DIAGNOSIS — R07.89 OTHER CHEST PAIN: ICD-10-CM

## 2023-10-10 LAB
ALBUMIN SERPL BCP-MCNC: 4.2 G/DL (ref 3.2–4.9)
ALBUMIN/GLOB SERPL: 1.8 G/DL
ALP SERPL-CCNC: 76 U/L (ref 30–99)
ALT SERPL-CCNC: 22 U/L (ref 2–50)
ANION GAP SERPL CALC-SCNC: 11 MMOL/L (ref 7–16)
AST SERPL-CCNC: 23 U/L (ref 12–45)
BASOPHILS # BLD AUTO: 0.5 % (ref 0–1.8)
BASOPHILS # BLD: 0.04 K/UL (ref 0–0.12)
BILIRUB SERPL-MCNC: 0.9 MG/DL (ref 0.1–1.5)
BUN SERPL-MCNC: 16 MG/DL (ref 8–22)
CALCIUM ALBUM COR SERPL-MCNC: 8.5 MG/DL (ref 8.5–10.5)
CALCIUM SERPL-MCNC: 8.7 MG/DL (ref 8.5–10.5)
CHLORIDE SERPL-SCNC: 102 MMOL/L (ref 96–112)
CO2 SERPL-SCNC: 25 MMOL/L (ref 20–33)
CREAT SERPL-MCNC: 1 MG/DL (ref 0.5–1.4)
EKG IMPRESSION: NORMAL
EOSINOPHIL # BLD AUTO: 0.04 K/UL (ref 0–0.51)
EOSINOPHIL NFR BLD: 0.5 % (ref 0–6.9)
ERYTHROCYTE [DISTWIDTH] IN BLOOD BY AUTOMATED COUNT: 45.5 FL (ref 35.9–50)
GFR SERPLBLD CREATININE-BSD FMLA CKD-EPI: 77 ML/MIN/1.73 M 2
GLOBULIN SER CALC-MCNC: 2.4 G/DL (ref 1.9–3.5)
GLUCOSE SERPL-MCNC: 98 MG/DL (ref 65–99)
HCT VFR BLD AUTO: 49.8 % (ref 42–52)
HGB BLD-MCNC: 16.7 G/DL (ref 14–18)
IMM GRANULOCYTES # BLD AUTO: 0.03 K/UL (ref 0–0.11)
IMM GRANULOCYTES NFR BLD AUTO: 0.4 % (ref 0–0.9)
LYMPHOCYTES # BLD AUTO: 0.76 K/UL (ref 1–4.8)
LYMPHOCYTES NFR BLD: 9.3 % (ref 22–41)
MCH RBC QN AUTO: 32.1 PG (ref 27–33)
MCHC RBC AUTO-ENTMCNC: 33.5 G/DL (ref 32.3–36.5)
MCV RBC AUTO: 95.8 FL (ref 81.4–97.8)
MONOCYTES # BLD AUTO: 0.78 K/UL (ref 0–0.85)
MONOCYTES NFR BLD AUTO: 9.5 % (ref 0–13.4)
NEUTROPHILS # BLD AUTO: 6.52 K/UL (ref 1.82–7.42)
NEUTROPHILS NFR BLD: 79.8 % (ref 44–72)
NRBC # BLD AUTO: 0 K/UL
NRBC BLD-RTO: 0 /100 WBC (ref 0–0.2)
PLATELET # BLD AUTO: 156 K/UL (ref 164–446)
PMV BLD AUTO: 10.8 FL (ref 9–12.9)
POTASSIUM SERPL-SCNC: 4.2 MMOL/L (ref 3.6–5.5)
PROT SERPL-MCNC: 6.6 G/DL (ref 6–8.2)
RBC # BLD AUTO: 5.2 M/UL (ref 4.7–6.1)
SODIUM SERPL-SCNC: 138 MMOL/L (ref 135–145)
TROPONIN T SERPL-MCNC: 12 NG/L (ref 6–19)
TROPONIN T SERPL-MCNC: 14 NG/L (ref 6–19)
WBC # BLD AUTO: 8.2 K/UL (ref 4.8–10.8)

## 2023-10-10 PROCEDURE — 99285 EMERGENCY DEPT VISIT HI MDM: CPT

## 2023-10-10 PROCEDURE — 85025 COMPLETE CBC W/AUTO DIFF WBC: CPT

## 2023-10-10 PROCEDURE — 93005 ELECTROCARDIOGRAM TRACING: CPT | Performed by: EMERGENCY MEDICINE

## 2023-10-10 PROCEDURE — 93005 ELECTROCARDIOGRAM TRACING: CPT

## 2023-10-10 PROCEDURE — 99215 OFFICE O/P EST HI 40 MIN: CPT | Performed by: FAMILY MEDICINE

## 2023-10-10 PROCEDURE — 80053 COMPREHEN METABOLIC PANEL: CPT

## 2023-10-10 PROCEDURE — 3078F DIAST BP <80 MM HG: CPT | Performed by: FAMILY MEDICINE

## 2023-10-10 PROCEDURE — 36415 COLL VENOUS BLD VENIPUNCTURE: CPT

## 2023-10-10 PROCEDURE — 3077F SYST BP >= 140 MM HG: CPT | Performed by: FAMILY MEDICINE

## 2023-10-10 PROCEDURE — 84484 ASSAY OF TROPONIN QUANT: CPT

## 2023-10-10 PROCEDURE — 71045 X-RAY EXAM CHEST 1 VIEW: CPT

## 2023-10-10 RX ORDER — ASPIRIN 81 MG/1
324 TABLET, CHEWABLE ORAL ONCE
Status: COMPLETED | OUTPATIENT
Start: 2023-10-10 | End: 2023-10-10

## 2023-10-10 RX ORDER — ASPIRIN 325 MG
325 TABLET ORAL ONCE
Status: DISCONTINUED | OUTPATIENT
Start: 2023-10-10 | End: 2023-10-10

## 2023-10-10 RX ADMIN — ASPIRIN 324 MG: 81 TABLET, CHEWABLE ORAL at 09:13

## 2023-10-10 ASSESSMENT — FIBROSIS 4 INDEX
FIB4 SCORE: 2.53
FIB4 SCORE: 2.53

## 2023-10-10 ASSESSMENT — HEART SCORE
TROPONIN: LESS THAN OR EQUAL TO NORMAL LIMIT
AGE: 65+
ECG: NORMAL
HEART SCORE: 3
HISTORY: SLIGHTLY SUSPICIOUS
RISK FACTORS: 1-2 RISK FACTORS

## 2023-10-10 NOTE — ED TRIAGE NOTES
Chief Complaint   Patient presents with    Chest Pain     Pt report waking with mid/right CP, non radiating. Pt reports same symptoms about 2 wks ago that went away. Pt reports heart hx. Pt sent from [facility] for further eval.    .Explained to pt triage process, made pt aware to tell this RN/staff of any changes/concerns, pt verbalized understanding of process and instructions given. Pt to [facility].

## 2023-10-10 NOTE — ED PROVIDER NOTES
CHIEF COMPLAINT  Chief Complaint   Patient presents with    Chest Pain     Pt report waking with mid/right CP, non radiating. Pt reports same symptoms about 2 wks ago that went away. Pt reports heart hx. Pt sent from [facility] for further eval.        LIMITATION TO HISTORY   Select: None    HPI    Boo Cavanaugh Jr. is a 78 y.o. 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  He describes it as on the right upper chest wall last for about a few seconds.  During the history he had an event that went away after second and monitor was noted to be normal  States its been going on for last several days intermittently.  This morning he had about 15-20 episodes.  They last for seconds and then they would come back or go away.  There is no associate shortness of breath it is not tearing and sensation is gone going up to his neck there is no associated vomiting.  There is no alleviating or exacerbating factors.    He states been going on for about a month but got worse today.  Because of the car condition he was seen at the urgent care and sent here for further evaluation.    OUTSIDE HISTORIAN(S):  Select: None    EXTERNAL RECORDS REVIEWED  Select: Other   Seen at urgent care today.  Sent here over here for chest pain evaluation.  See below note    Assessment/Plan:      1. Chest pain, unspecified type  2. Elevated blood pressure reading  Patient has not yet taken his aspirin today, 324 mg has been provided.  Systemic symptoms seen through elevated blood pressure.  EKG showing a heart rate of 57 bpm, regular rhythm, no ST changes.  Despite this, patient's vitals are abnormal he has high risk with his history, I feel the patient requires a higher level of care in the ED for closer monitoring, stat lab work and/or imaging for further evaluation. This has been  "discussed with the patient and he states agreement and understanding. The patient is stable without the need for continuous monitoring and therefore will go via private vehicle to [facility] without delay.  - EKG - Clinic Performed  - aspirin (Asa) tablet 325 mg        Instructed to return to Urgent Care or nearest Emergency Department if symptoms fail to improve, for any change in condition, further concerns, or new concerning symptoms. Patient states understanding of the plan of care and discharge instructions.     Yani Ponce M.D.       REVIEW OF SYSTEMS  No leg swelling.  No history of DVT or PE    I did review the chart and the patient has no history of sympathomimetic use.  Mother had stent at 70.  He has high cholesterol and negative tobacco negative diabetes and no high blood pressure that he is aware of    PAST MEDICAL HISTORY  Past Medical History:   Diagnosis Date    Arrhythmia     \"flutter\", Dr. Mendoza cardiologist    Arthritis     left thumb    Breath shortness     Cancer (HCC)     Cardiac arrhythmia     Enlarged prostate with urinary obstruction     GERD (gastroesophageal reflux disease)     Hyperlipidemia     Hypertension     OA (osteoarthritis) of knee     bilateral     Restless leg syndrome     Tonsillitis     Urinary bladder disorder 12/06/2017    savage in place       FAMILY HISTORY  Family History   Problem Relation Age of Onset    Heart Disease Mother 65        MI    Cancer Father     Cancer Brother 50        prostate cancer    Stroke Brother     Stroke Brother     Diabetes Neg Hx     Hyperlipidemia Neg Hx     Alcohol/Drug Neg Hx     Ovarian Cancer Neg Hx     Tubal Cancer Neg Hx     Peritoneal Cancer Neg Hx     Colorectal Cancer Neg Hx     Breast Cancer Neg Hx        SOCIAL HISTORY  Social History     Tobacco Use    Smoking status: Never     Passive exposure: Past (BOTH parents smoked)    Smokeless tobacco: Never   Vaping Use    Vaping Use: Never used   Substance Use Topics    Alcohol use: " "Not Currently    Drug use: Not Currently     Types: Marijuana     Social History     Substance and Sexual Activity   Drug Use Not Currently    Types: Marijuana       SURGICAL HISTORY  Past Surgical History:   Procedure Laterality Date    TURP-VAPOR  12/07/2017    Procedure: TURP-VAPOR - GREEN LIGHT PHOTOSELECTIVE;  Surgeon: Cristofer Cr M.D.;  Location: SURGERY [facility];  Service: Urology    GANGLION EXCISION Right 1966    wrist    APPENDECTOMY      OPEN REDUCTION      PROSTATECTOMY, RADICAL RETRO      TONSILLECTOMY      as a child    VASECTOMY         CURRENT MEDICATIONS    Current Facility-Administered Medications:     aspirin (Asa) tablet 325 mg, 325 mg, Oral, Once, Yani Ponce M.D.    Current Outpatient Medications:     rosuvastatin (CRESTOR) 20 MG Tab, Take 1 Tablet by mouth every day., Disp: 90 Tablet, Rfl: 3    betamethasone dipropionate (DIPROLENE) 0.05 % Ointment, , Disp: , Rfl:     finasteride (PROSCAR) 5 MG Tab, Take 1 Tablet by mouth every day., Disp: , Rfl:     pimecrolimus (ELIDEL) 1 % cream, , Disp: , Rfl:     triamcinolone acetonide (KENALOG) 0.1 % Cream, , Disp: , Rfl:     metoprolol SR (TOPROL XL) 25 MG TABLET SR 24 HR, Take 1 Tablet by mouth every day. (Patient taking differently: Take 12.5 mg by mouth every day. 1/2 a tab daily), Disp: 90 Tablet, Rfl: 3    ciclopirox (PENLAC) 8 % solution, , Disp: , Rfl:     finasteride (PROSCAR) 5 MG Tab, Take 1 tablet every day , Disp: , Rfl:     aspirin 81 MG EC tablet, Take  by mouth every day., Disp: , Rfl:     ALLERGIES  Allergies   Allergen Reactions    Penicillins Rash     Rash as a child.        PHYSICAL EXAM  VITAL SIGNS: BP (!) 150/87   Pulse 70   Temp 36.3 °C (97.3 °F) (Temporal)   Resp 16   Ht 1.88 m (6' 2\")   Wt 88 kg (194 lb 0.1 oz)   SpO2 96%   BMI 24.91 kg/m²   Reviewed and noted.  Elevated blood pressure.  Constitutional: Well developed, Well nourished, see above..  HENT: Normocephalic, atraumatic, bilateral external ears " normal, No intraoral erythema, edema, exudate  Eyes: PERRLA, conjunctiva pink, no scleral icterus.   Cardiovascular: Regular rate and rhythm. No murmurs, rubs or gallops.  No dependent edema or calf tenderness  Respiratory: Lungs clear to auscultation bilaterally. No wheezes, rales, or rhonchi.  Abdominal:  Abdomen soft, non-tender, non distended. No rebound, or guarding.    Skin: No erythema, no rash. No wounds or bruising.  Genitourinary: No costovertebral angle tenderness.   Musculoskeletal: no deformities.  Reproducible chest pain on palpitation.  Neurologic: Alert, no facial droop noted. All extra ocular muscles intact. Moves all extremities with out weakness noted  Psychiatric: Affect normal, Judgment normal, Mood normal.         MEDICAL DECISION MAKING:  PROBLEMS EVALUATED THIS VISIT:  Chest pain.  It is nonreproducible.  Lasting seconds.  Patient has risk factors for coronary disease with a heart score of 3.  The patient has been observed here without any issues.         PLAN:  Serial troponin  Chest x-ray  EKG      RISK:  Patient is at moderate risk.  But likely with serial troponin enzymes blood work the patient be discharged home.    Diagnostic tests and prescription drugs considered including, but not limited to: Select: CT scan of chest was considered with patient by [facility] and Wells criteria has no risk for PE..    Escalation of care considered, and ultimately not performed: At this point patient is well stable and asymptomatic.     Barriers to care at this time, including but not limited to: Select: None noted he does have a cardiologist..     RESULTS    LABS Ordered and Reviewed by Me:  Results for orders placed or performed during the hospital encounter of 10/10/23   CBC with Differential   Result Value Ref Range    WBC 8.2 4.8 - 10.8 K/uL    RBC 5.20 4.70 - 6.10 M/uL    Hemoglobin 16.7 14.0 - 18.0 g/dL    Hematocrit 49.8 42.0 - 52.0 %    MCV 95.8 81.4 - 97.8 fL    MCH 32.1 27.0 - 33.0 pg    MCHC 33.5  32.3 - 36.5 g/dL    RDW 45.5 35.9 - 50.0 fL    Platelet Count 156 (L) 164 - 446 K/uL    MPV 10.8 9.0 - 12.9 fL    Neutrophils-Polys 79.80 (H) 44.00 - 72.00 %    Lymphocytes 9.30 (L) 22.00 - 41.00 %    Monocytes 9.50 0.00 - 13.40 %    Eosinophils 0.50 0.00 - 6.90 %    Basophils 0.50 0.00 - 1.80 %    Immature Granulocytes 0.40 0.00 - 0.90 %    Nucleated RBC 0.00 0.00 - 0.20 /100 WBC    Neutrophils (Absolute) 6.52 1.82 - 7.42 K/uL    Lymphs (Absolute) 0.76 (L) 1.00 - 4.80 K/uL    Monos (Absolute) 0.78 0.00 - 0.85 K/uL    Eos (Absolute) 0.04 0.00 - 0.51 K/uL    Baso (Absolute) 0.04 0.00 - 0.12 K/uL    Immature Granulocytes (abs) 0.03 0.00 - 0.11 K/uL    NRBC (Absolute) 0.00 K/uL   Complete Metabolic Panel (CMP)   Result Value Ref Range    Sodium 138 135 - 145 mmol/L    Potassium 4.2 3.6 - 5.5 mmol/L    Chloride 102 96 - 112 mmol/L    Co2 25 20 - 33 mmol/L    Anion Gap 11.0 7.0 - 16.0    Glucose 98 65 - 99 mg/dL    Bun 16 8 - 22 mg/dL    Creatinine 1.00 0.50 - 1.40 mg/dL    Calcium 8.7 8.5 - 10.5 mg/dL    Correct Calcium 8.5 8.5 - 10.5 mg/dL    AST(SGOT) 23 12 - 45 U/L    ALT(SGPT) 22 2 - 50 U/L    Alkaline Phosphatase 76 30 - 99 U/L    Total Bilirubin 0.9 0.1 - 1.5 mg/dL    Albumin 4.2 3.2 - 4.9 g/dL    Total Protein 6.6 6.0 - 8.2 g/dL    Globulin 2.4 1.9 - 3.5 g/dL    A-G Ratio 1.8 g/dL   Troponins NOW   Result Value Ref Range    Troponin T 14 6 - 19 ng/L   Troponins in two (2) hours   Result Value Ref Range    Troponin T 12 6 - 19 ng/L   ESTIMATED GFR   Result Value Ref Range    GFR (CKD-EPI) 77 >60 mL/min/1.73 m 2   EKG (NOW)   Result Value Ref Range    Report       [facility] Emergency Dept.    Test Date:  2023-10-10  Pt Name:    MADDI ANGEL                Department: ER  MRN:        [id number]                      Room:  Gender:     Male                         Technician: [id number]  :        1945                   Requested By:ER TRIAGE PROTOCOL  Order #:    [id number]                     Reading MD: Zacarias KING MD    Measurements  Intervals                                Axis  Rate:       61                           P:          23  NE:         144                          QRS:        33  QRSD:       94                           T:          17  QT:         402  QTc:        405    Interpretive Statements  Sinus rhythm  Rate of 61  Intervals: Borderline first-degree block, normal QRS, normal QTc  Axis normal  Ischemia: No ST segment elevations or depressions.  Compared with EKG 1/16/2023  Some decreased voltage noted.  Impression abnormal no acute infarct noted  Electronically Signed On 10- 10:58:52 PDT  by Zacarias KING MD           RADIOLOGY    I have independently interpreted the diagnostic imaging associated with this visit and am waiting the final reading from the radiologist.   My preliminary interpretation is a follows: We visualized the x-ray no widened mediastinum no infiltrates    Radiologist interpretation:   DX-CHEST-PORTABLE (1 VIEW)   Final Result      No acute cardiopulmonary abnormality.               ED COURSE:    ED Observation Status? No   No noted need for observation for developing issue    INTERVENTIONS BY ME:  Serial troponins  Observation.      FINAL DISPO PLAN   New Prescriptions    No medications on file         Followup:  [facility], Emergency Dept  [address] [facility] [address] [phone]  Go to   If symptoms worsen    Enoch Mendoza M.D.  [address]  [address]  [facility] [address]  [phone]    Schedule an appointment as soon as possible for a visit       Urgent cardiology referral was made to Dr. Enoch Mendoza.    Bite atypical presentation patient does have risk factors for cardiac coronary artery disease.  At this point PE dissection or other life-threatening etiology this point was not discovered from history and physical findings.  Patient this point was observed.  Serial troponins were negative.  I  feel comfortable discharging the patient home.  Patient understands that we cannot determine if this is still cardiac and out of the patient is low risk by the heart scale of serious outcome and therefore can be discharged home with prompt cardiology referral.    CONDITION: Stable.     FINAL IMPRESSION  1. Other chest pain

## 2023-10-10 NOTE — PROGRESS NOTES
"  Subjective:      78 y.o. male presents to urgent care for right sided chest pain that has been present intermittently for one month. There was no inciting event or trauma at that time. Patient decided to come in today because he has noticed increased in frequency of pain. The pain is described as \"radiating\", currently rated 3/10. No associated nausea, shortness of breath, or diaphoresis. Blood pressure is also elevated today in urgent care. He does not have a history of hypertension but takes Metoprolol for afib.    Chest pain red flags  -History of hypertension, diabetes, peripheral artery disease, hypercholesterolemia: yes  -Recent trauma, major surgery, or medical procedures: no  -Recent long periods of immobility: no  -Tobacco product use: no  -Recreational drug use such as cocaine: no  -Similar pain in the past: no  -Prior cardiac diagnostic tests (ECHO, stress test, coronary CTA): yes. Cardiac ablation, cath lab, no stent, ECHO 2017  -Family history of cardiovascular disease: yes. Mom had a stent placed in her 70s.    He denies any other questions or concerns at this time.    Current problem list, medication, and past medical/surgical history were reviewed in Epic.    ROS  See HPI     Objective:      BP (!) 142/64 (BP Location: Left arm, Patient Position: Sitting)   Pulse 64   Temp 36.7 °C (98 °F) (Temporal)   Resp 16   Ht 1.88 m (6' 2\")   Wt 88.3 kg (194 lb 9.6 oz)   SpO2 96%   BMI 24.99 kg/m²     Physical Exam  Constitutional:       General: He is not in acute distress.     Appearance: He is not diaphoretic.   Cardiovascular:      Rate and Rhythm: Normal rate and regular rhythm.      Heart sounds: Normal heart sounds.      Comments: No discolorations or deformities noted to inspection of chest wall.  I am unable to reproduce pain with palpation of chest wall.  Pulmonary:      Effort: Pulmonary effort is normal. No respiratory distress.      Breath sounds: Normal breath sounds.   Neurological:      " Mental Status: He is alert.   Psychiatric:         Mood and Affect: Affect normal.         Judgment: Judgment normal.       Assessment/Plan:     1. Chest pain, unspecified type  2. Elevated blood pressure reading  Patient has not yet taken his aspirin today, 324 mg has been provided.  Systemic symptoms seen through elevated blood pressure.  EKG showing a heart rate of 57 bpm, regular rhythm, no ST changes.  Despite this, patient's vitals are abnormal he has high risk with his history, I feel the patient requires a higher level of care in the ED for closer monitoring, stat lab work and/or imaging for further evaluation. This has been discussed with the patient and he states agreement and understanding. The patient is stable without the need for continuous monitoring and therefore will go via private vehicle to [facility] without delay.  - EKG - Clinic Performed  - aspirin (Asa) tablet 325 mg      Instructed to return to Urgent Care or nearest Emergency Department if symptoms fail to improve, for any change in condition, further concerns, or new concerning symptoms. Patient states understanding of the plan of care and discharge instructions.    Yani Ponce M.D.

## 2023-10-10 NOTE — ED NOTES
Pt given d/c instructions and f/u info with verbal understanding.  VSS at discharge.  PIV d/c'd with tip intact.  Pt dressed independently.  Pt ambulatory from the ED w/ steady gait.  All belongings in possession on discharge.  Pt escorted to the lobby by RN.

## 2023-10-13 ENCOUNTER — TELEPHONE (OUTPATIENT)
Dept: HEALTH INFORMATION MANAGEMENT | Facility: OTHER | Age: 78
End: 2023-10-13
Payer: MEDICARE

## 2023-10-30 DIAGNOSIS — I49.3 PVC (PREMATURE VENTRICULAR CONTRACTION): ICD-10-CM

## 2023-10-31 RX ORDER — METOPROLOL SUCCINATE 25 MG/1
25 TABLET, EXTENDED RELEASE ORAL
Qty: 90 TABLET | Refills: 0 | Status: SHIPPED | OUTPATIENT
Start: 2023-10-31 | End: 2024-03-05

## 2023-10-31 NOTE — TELEPHONE ENCOUNTER
Is the patient due for a refill? Yes    Was the patient seen the past year? Yes    Date of last office visit: 1/16/23    Does the patient have an upcoming appointment?  Yes   If yes, When? 1/2/24    Provider to refill:DS    Does the patients insurance require a 100 day supply?  No

## 2023-11-02 ENCOUNTER — OFFICE VISIT (OUTPATIENT)
Dept: URGENT CARE | Facility: CLINIC | Age: 78
End: 2023-11-02
Payer: MEDICARE

## 2023-11-02 VITALS
OXYGEN SATURATION: 98 % | HEART RATE: 76 BPM | DIASTOLIC BLOOD PRESSURE: 86 MMHG | SYSTOLIC BLOOD PRESSURE: 124 MMHG | TEMPERATURE: 97.6 F | RESPIRATION RATE: 14 BRPM | BODY MASS INDEX: 24.64 KG/M2 | HEIGHT: 74 IN | WEIGHT: 192 LBS

## 2023-11-02 DIAGNOSIS — S29.012A UPPER BACK STRAIN, INITIAL ENCOUNTER: ICD-10-CM

## 2023-11-02 PROCEDURE — 99213 OFFICE O/P EST LOW 20 MIN: CPT

## 2023-11-02 PROCEDURE — 3074F SYST BP LT 130 MM HG: CPT

## 2023-11-02 PROCEDURE — 3079F DIAST BP 80-89 MM HG: CPT

## 2023-11-02 ASSESSMENT — ENCOUNTER SYMPTOMS
FEVER: 0
COUGH: 0
BACK PAIN: 1
SHORTNESS OF BREATH: 0
FALLS: 1
FLANK PAIN: 0

## 2023-11-02 ASSESSMENT — FIBROSIS 4 INDEX: FIB4 SCORE: 2.42

## 2023-11-02 NOTE — PROGRESS NOTES
"Subjective:   Boo Cavanaugh Jr. is a 78 y.o. male who presents for Low Back Pain (Possible kidney day / 2 weeks )    Patient reports right side pain to his upper back for 10 days. He does report symptoms started after working in his yard earlier this week.  He reports improvement in pain after a couple days of rest, symptoms were again aggravated after doing more physical activity and yard work.  Patient denies dysuria or frequency.  Patient denies any fevers, night sweats, or history of cancer.  Patient denies any radiation of pain, no numbness, no tingling.  Patient denies any history of kidney stones.  Patient reports taking Motrin with good symptom relief.  He states that laying on a hard surface does alleviate pain.        Review of Systems   Constitutional:  Negative for fever.   Respiratory:  Negative for cough and shortness of breath.    Cardiovascular:  Negative for chest pain and leg swelling.   Genitourinary:  Negative for dysuria, flank pain, frequency, hematuria and urgency.   Musculoskeletal:  Positive for back pain and falls.       Medications, Allergies, and current problem list reviewed today in Epic.     Objective:     /86 (BP Location: Left arm, Patient Position: Sitting, BP Cuff Size: Large adult)   Pulse 76   Temp 36.4 °C (97.6 °F) (Temporal)   Resp 14   Ht 1.88 m (6' 2\")   Wt 87.1 kg (192 lb)   SpO2 98%     Physical Exam  Vitals reviewed.   Constitutional:       Appearance: Normal appearance.   Cardiovascular:      Rate and Rhythm: Normal rate and regular rhythm.      Pulses: Normal pulses.      Heart sounds: Normal heart sounds.   Pulmonary:      Effort: Pulmonary effort is normal. No respiratory distress.      Breath sounds: Normal breath sounds. No wheezing or rhonchi.   Abdominal:      General: There is no abdominal bruit.      Palpations: There is no mass or pulsatile mass.      Tenderness: There is no right CVA tenderness or left CVA tenderness.   Musculoskeletal:         " General: Tenderness present. No swelling or deformity.      Cervical back: Spasms and tenderness present. No swelling, deformity, signs of trauma, bony tenderness or crepitus.        Back:    Skin:     General: Skin is warm.      Capillary Refill: Capillary refill takes less than 2 seconds.   Neurological:      General: No focal deficit present.      Mental Status: He is alert and oriented to person, place, and time.   Psychiatric:         Mood and Affect: Mood normal.         Behavior: Behavior normal.         Assessment/Plan:     Diagnosis and associated orders:     1. Upper back strain, initial encounter           Comments/MDM:     Back: Full range of flexion, extension, rotation, lateralization. Tenderness to palpation to right lower trapezius muscle.Negative midline tenderness, bony tenderness, crepitus, deformities, or step-offs.Negative straight leg raise negative CVA tenderness.  Patient denies any frequency or dysuria.  Discussed with patient signs and symptoms consistent with a back strain.Treatment of initial rest with no heavy lifting, stooping, or strenuous activity. Massage, ice and/or heat which ever feels better, and Tylenol per manufacture's instructions. Encouraged walking, stretching, and range of motion exercises as tolerated. Avoid sitting or laying down for long periods of time except for at night during sleep.   Discussed follow-up with PCP regarding trace blood and trace protein in urine.  Will send for culture and further evaluation.  Patient is overall very well-appearing, no acute distress, and normal vital signs. Suspicions for acute emergent pathology are low.   Follow up with your PCP or return to urgent care if symptoms not improving in 1-2 weeks.           Differential diagnosis, natural history, supportive care, and indications for immediate follow-up discussed.    Advised the patient to follow-up with the primary care physician for recheck, reevaluation, and consideration of further  management.    Please note that this dictation was created using voice recognition software. I have made a reasonable attempt to correct obvious errors, but I expect that there are errors of grammar and possibly content that I did not discover before finalizing the note.    This note was electronically signed by ZARIA Toledo

## 2023-11-29 ENCOUNTER — PATIENT MESSAGE (OUTPATIENT)
Dept: HEALTH INFORMATION MANAGEMENT | Facility: OTHER | Age: 78
End: 2023-11-29

## 2023-12-12 ENCOUNTER — OFFICE VISIT (OUTPATIENT)
Dept: CARDIOLOGY | Facility: MEDICAL CENTER | Age: 78
End: 2023-12-12
Attending: NURSE PRACTITIONER
Payer: MEDICARE

## 2023-12-12 VITALS
OXYGEN SATURATION: 96 % | HEIGHT: 74 IN | WEIGHT: 193 LBS | BODY MASS INDEX: 24.77 KG/M2 | SYSTOLIC BLOOD PRESSURE: 118 MMHG | HEART RATE: 62 BPM | DIASTOLIC BLOOD PRESSURE: 66 MMHG | RESPIRATION RATE: 16 BRPM

## 2023-12-12 DIAGNOSIS — R93.1 AGATSTON CORONARY ARTERY CALCIUM SCORE GREATER THAN 400: ICD-10-CM

## 2023-12-12 DIAGNOSIS — R07.89 CHEST PAIN, ATYPICAL: ICD-10-CM

## 2023-12-12 DIAGNOSIS — I49.3 PVC (PREMATURE VENTRICULAR CONTRACTION): ICD-10-CM

## 2023-12-12 DIAGNOSIS — I48.3 TYPICAL ATRIAL FLUTTER (HCC): ICD-10-CM

## 2023-12-12 LAB — EKG IMPRESSION: NORMAL

## 2023-12-12 PROCEDURE — 99213 OFFICE O/P EST LOW 20 MIN: CPT | Performed by: NURSE PRACTITIONER

## 2023-12-12 PROCEDURE — 3078F DIAST BP <80 MM HG: CPT | Performed by: NURSE PRACTITIONER

## 2023-12-12 PROCEDURE — 99212 OFFICE O/P EST SF 10 MIN: CPT | Performed by: NURSE PRACTITIONER

## 2023-12-12 PROCEDURE — 99214 OFFICE O/P EST MOD 30 MIN: CPT | Performed by: NURSE PRACTITIONER

## 2023-12-12 PROCEDURE — 93010 ELECTROCARDIOGRAM REPORT: CPT | Performed by: INTERNAL MEDICINE

## 2023-12-12 PROCEDURE — 93005 ELECTROCARDIOGRAM TRACING: CPT | Performed by: NURSE PRACTITIONER

## 2023-12-12 PROCEDURE — 3074F SYST BP LT 130 MM HG: CPT | Performed by: NURSE PRACTITIONER

## 2023-12-12 ASSESSMENT — FIBROSIS 4 INDEX: FIB4 SCORE: 2.42

## 2023-12-12 NOTE — PROGRESS NOTES
"Chief Complaint   Patient presents with    Premature Ventricular Contractions (PVCs)       Subjective     Boo Cavanaugh Jr. is a 78 y.o. male who presents today for ER follow-up secondary to chest pain.    He is followed by Dr. Mendoza for EP.  Arrhythmia history significant for atrial flutter status post ablation as well as slow pathway ablation for AVNRT.  Has had noted PVCs which has been treated with beta-blockers.  Additional medical history significant for CAC score of 1600, hypertension, hyperlipidemia.    Reports ER visit back in October for 2 episodes of atypical chest pain.  States quick onset and offset but multiple recurrences.  No aggravating or alleviating factors.  No associated symptoms.  He does recall that day that he was exerting himself working on a project.  Since then, he notes no recurrence of that type of chest pain, however a general heaviness/pressure across his chest.  He additionally reports that his blood pressure has been elevating as high as 150s over high 80s for which she increases dose of Toprol and is now starting to come back down.    Past Medical History:   Diagnosis Date    Arrhythmia     \"flutter\", Dr. Mendoza cardiologist    Arthritis     left thumb    Breath shortness     Cancer (HCC)     Cardiac arrhythmia     Enlarged prostate with urinary obstruction     GERD (gastroesophageal reflux disease)     Hyperlipidemia     Hypertension     OA (osteoarthritis) of knee     bilateral     Restless leg syndrome     Tonsillitis     Urinary bladder disorder 12/06/2017    savage in place     Past Surgical History:   Procedure Laterality Date    TURP-VAPOR  12/07/2017    Procedure: TURP-VAPOR - GREEN LIGHT PHOTOSELECTIVE;  Surgeon: Cristofer Cr M.D.;  Location: SURGERY [facility];  Service: Urology    GANGLION EXCISION Right 1966    wrist    APPENDECTOMY      OPEN REDUCTION      PROSTATECTOMY, RADICAL RETRO      TONSILLECTOMY      as a child    VASECTOMY       Family History   Problem " Relation Age of Onset    Heart Disease Mother 65        MI    Cancer Father     Cancer Brother 50        prostate cancer    Stroke Brother     Stroke Brother     Diabetes Neg Hx     Hyperlipidemia Neg Hx     Alcohol/Drug Neg Hx     Ovarian Cancer Neg Hx     Tubal Cancer Neg Hx     Peritoneal Cancer Neg Hx     Colorectal Cancer Neg Hx     Breast Cancer Neg Hx      Social History     Socioeconomic History    Marital status: Single     Spouse name: Not on file    Number of children: Not on file    Years of education: Not on file    Highest education level: Master's degree (e.g., MA, MS, Latha, MEd, MSW, ABAD)   Occupational History    Not on file   Tobacco Use    Smoking status: Never     Passive exposure: Past (BOTH parents smoked)    Smokeless tobacco: Never   Vaping Use    Vaping Use: Never used   Substance and Sexual Activity    Alcohol use: Not Currently    Drug use: Not Currently     Types: Marijuana    Sexual activity: Not Currently     Comment: 2 daughters    Other Topics Concern    Not on file   Social History Narrative    Not on file     Social Determinants of Health     Financial Resource Strain: Low Risk  (6/10/2023)    Overall Financial Resource Strain (CARDIA)     Difficulty of Paying Living Expenses: Not hard at all   Food Insecurity: No Food Insecurity (6/10/2023)    Hunger Vital Sign     Worried About Running Out of Food in the Last Year: Never true     Ran Out of Food in the Last Year: Never true   Transportation Needs: No Transportation Needs (6/10/2023)    PRAPARE - Transportation     Lack of Transportation (Medical): No     Lack of Transportation (Non-Medical): No   Physical Activity: Inactive (6/10/2023)    Exercise Vital Sign     Days of Exercise per Week: 0 days     Minutes of Exercise per Session: 0 min   Stress: No Stress Concern Present (6/10/2023)    [place] [employer] - Occupational Stress Questionnaire     Feeling of Stress : Only a little   Social Connections: Socially  Isolated (6/10/2023)    Social Connection and Isolation Panel [NHANES]     Frequency of Communication with Friends and Family: More than three times a week     Frequency of Social Gatherings with Friends and Family: More than three times a week     Attends Protestant Services: Never     Active Member of Clubs or Organizations: No     Attends Club or Organization Meetings: Never     Marital Status:    Intimate Partner Violence: Not on file   Housing Stability: Low Risk  (6/10/2023)    Housing Stability Vital Sign     Unable to Pay for Housing in the Last Year: No     Number of Places Lived in the Last Year: 1     Unstable Housing in the Last Year: No     Allergies   Allergen Reactions    Penicillins Rash     Rash as a child.      Outpatient Encounter Medications as of 12/12/2023   Medication Sig Dispense Refill    metoprolol SR (TOPROL XL) 25 MG TABLET SR 24 HR TAKE ONE TABLET BY MOUTH EVERY DAY 90 Tablet 0    rosuvastatin (CRESTOR) 20 MG Tab Take 1 Tablet by mouth every day. 90 Tablet 3    betamethasone dipropionate (DIPROLENE) 0.05 % Ointment       finasteride (PROSCAR) 5 MG Tab Take 1 Tablet by mouth every day.      pimecrolimus (ELIDEL) 1 % cream       triamcinolone acetonide (KENALOG) 0.1 % Cream       ciclopirox (PENLAC) 8 % solution       finasteride (PROSCAR) 5 MG Tab Take 1 tablet every day       aspirin 81 MG EC tablet Take  by mouth every day.       No facility-administered encounter medications on file as of 12/12/2023.     Review of Systems   Constitutional:  Negative for chills, fever, malaise/fatigue and weight loss.   HENT:  Negative for congestion and tinnitus.    Respiratory:  Negative for cough, hemoptysis, sputum production, shortness of breath and wheezing.    Cardiovascular:  Negative for chest pain, palpitations, orthopnea, leg swelling and PND.   Gastrointestinal:  Negative for heartburn, nausea and vomiting.   Neurological:  Negative for dizziness, tingling, tremors, sensory change,  speech change, focal weakness, loss of consciousness and headaches.              Objective     There were no vitals taken for this visit.    Physical Exam  Vitals reviewed.   Constitutional:       Appearance: Normal appearance. He is well-developed.   HENT:      Head: Normocephalic and atraumatic.      Mouth/Throat:      Mouth: Mucous membranes are moist.      Pharynx: Oropharynx is clear.   Eyes:      Extraocular Movements: Extraocular movements intact.      Conjunctiva/sclera: Conjunctivae normal.      Pupils: Pupils are equal, round, and reactive to light.   Neck:      Vascular: No JVD.   Cardiovascular:      Rate and Rhythm: Normal rate and regular rhythm.      Pulses: Normal pulses.      Heart sounds: Normal heart sounds. No murmur heard.     No friction rub. No gallop.   Pulmonary:      Effort: Pulmonary effort is normal.      Breath sounds: Normal breath sounds. No wheezing or rales.   Chest:      Chest wall: No tenderness.   Musculoskeletal:         General: Normal range of motion.      Cervical back: Normal range of motion and neck supple.      Right lower leg: No edema.      Left lower leg: No edema.   Skin:     General: Skin is warm and dry.      Capillary Refill: Capillary refill takes 2 to 3 seconds.   Neurological:      Mental Status: He is alert and oriented to person, place, and time.   Psychiatric:         Mood and Affect: Mood normal.         Behavior: Behavior normal.         Thought Content: Thought content normal.         Judgment: Judgment normal.                Assessment & Plan     1. Chest pain, atypical  NM-CARDIAC STRESS TEST      2. Agatston coronary artery calcium score greater than 400  NM-CARDIAC STRESS TEST      3. PVC (premature ventricular contraction)  EKG    NM-CARDIAC STRESS TEST      4. Typical atrial flutter (HCC)            Medical Decision Making: Today's Assessment/Status/Plan:   1.  Atypical chest pain  - Last stress testing approximately 2019.  This was negative for  ischemia.  - Reports 60% nonobstructive occlusion to single vessel on cath at [facility], we will work to get records of this.  -Reviewed imaging modality with Dr. Mendoza and cardiology.  Recommendation for nuclear stress test.  Coronary CTA imaging would be affected secondary to high CAC score.  -ER precautions reviewed.  Continue Toprol-XL and ASA 81 mg.    2.  Elevated CAC score  Total score of 1600.  Continue Crestor 20 mg and aspirin 81 mg.  Lipids at goal.    3.  PVCs  - Well suppressed with beta-blockers, continue Toprol.    4.  Typical atrial flutter  - Status post ablation without clinical recurrence.    Return to clinic in January as scheduled with Dr. Mendoza, sooner if clinical condition changes.  PLEASE NOTE: This Note was created using voice recognition Software. I have made every reasonable attempt to correct obvious errors, but I expect that there are errors of grammar and possibly content that I did not discover before finalizing the note

## 2023-12-13 ENCOUNTER — PATIENT MESSAGE (OUTPATIENT)
Dept: CARDIOLOGY | Facility: MEDICAL CENTER | Age: 78
End: 2023-12-13
Payer: MEDICARE

## 2023-12-13 ASSESSMENT — ENCOUNTER SYMPTOMS
FOCAL WEAKNESS: 0
PALPITATIONS: 0
CHILLS: 0
VOMITING: 0
HEARTBURN: 0
NAUSEA: 0
TINGLING: 0
PND: 0
LOSS OF CONSCIOUSNESS: 0
SHORTNESS OF BREATH: 0
ORTHOPNEA: 0
HEADACHES: 0
WHEEZING: 0
DIZZINESS: 0
WEIGHT LOSS: 0
TREMORS: 0
SPEECH CHANGE: 0
SENSORY CHANGE: 0
COUGH: 0
SPUTUM PRODUCTION: 0
HEMOPTYSIS: 0
FEVER: 0

## 2023-12-13 NOTE — TELEPHONE ENCOUNTER
EA- Patient has a f/u with you yesterday, has a stress test coming up and was instructed to stop metoprolol for 48 hours.  Patient wants to make sure this is okay?

## 2023-12-15 ENCOUNTER — HOSPITAL ENCOUNTER (OUTPATIENT)
Dept: RADIOLOGY | Facility: MEDICAL CENTER | Age: 78
End: 2023-12-15
Attending: NURSE PRACTITIONER
Payer: MEDICARE

## 2023-12-15 ENCOUNTER — APPOINTMENT (OUTPATIENT)
Dept: RADIOLOGY | Facility: MEDICAL CENTER | Age: 78
End: 2023-12-15
Attending: EMERGENCY MEDICINE
Payer: MEDICARE

## 2023-12-15 ENCOUNTER — APPOINTMENT (OUTPATIENT)
Dept: MEDICAL GROUP | Facility: PHYSICIAN GROUP | Age: 78
End: 2023-12-15
Payer: MEDICARE

## 2023-12-15 ENCOUNTER — HOSPITAL ENCOUNTER (EMERGENCY)
Facility: MEDICAL CENTER | Age: 78
End: 2023-12-15
Attending: EMERGENCY MEDICINE
Payer: MEDICARE

## 2023-12-15 ENCOUNTER — HOSPITAL ENCOUNTER (OUTPATIENT)
Facility: MEDICAL CENTER | Age: 78
End: 2023-12-16
Attending: EMERGENCY MEDICINE | Admitting: STUDENT IN AN ORGANIZED HEALTH CARE EDUCATION/TRAINING PROGRAM
Payer: MEDICARE

## 2023-12-15 VITALS
WEIGHT: 191.8 LBS | TEMPERATURE: 97.2 F | OXYGEN SATURATION: 97 % | DIASTOLIC BLOOD PRESSURE: 83 MMHG | BODY MASS INDEX: 24.62 KG/M2 | RESPIRATION RATE: 18 BRPM | HEIGHT: 74 IN | SYSTOLIC BLOOD PRESSURE: 150 MMHG | HEART RATE: 83 BPM

## 2023-12-15 DIAGNOSIS — R07.89 CHEST PAIN, ATYPICAL: ICD-10-CM

## 2023-12-15 DIAGNOSIS — R07.9 CHEST PAIN, UNSPECIFIED TYPE: ICD-10-CM

## 2023-12-15 DIAGNOSIS — R93.1 AGATSTON CORONARY ARTERY CALCIUM SCORE GREATER THAN 400: ICD-10-CM

## 2023-12-15 DIAGNOSIS — I49.3 PVC (PREMATURE VENTRICULAR CONTRACTION): ICD-10-CM

## 2023-12-15 PROBLEM — I10 PRIMARY HYPERTENSION: Status: ACTIVE | Noted: 2023-12-15

## 2023-12-15 LAB
ALBUMIN SERPL BCP-MCNC: 4 G/DL (ref 3.2–4.9)
ALBUMIN/GLOB SERPL: 1.5 G/DL
ALP SERPL-CCNC: 76 U/L (ref 30–99)
ALT SERPL-CCNC: 16 U/L (ref 2–50)
ANION GAP SERPL CALC-SCNC: 10 MMOL/L (ref 7–16)
AST SERPL-CCNC: 13 U/L (ref 12–45)
BASOPHILS # BLD AUTO: 0.5 % (ref 0–1.8)
BASOPHILS # BLD: 0.04 K/UL (ref 0–0.12)
BILIRUB SERPL-MCNC: 0.9 MG/DL (ref 0.1–1.5)
BUN SERPL-MCNC: 12 MG/DL (ref 8–22)
CALCIUM ALBUM COR SERPL-MCNC: 9.2 MG/DL (ref 8.5–10.5)
CALCIUM SERPL-MCNC: 9.2 MG/DL (ref 8.5–10.5)
CHLORIDE SERPL-SCNC: 99 MMOL/L (ref 96–112)
CO2 SERPL-SCNC: 24 MMOL/L (ref 20–33)
CREAT SERPL-MCNC: 1.1 MG/DL (ref 0.5–1.4)
EKG IMPRESSION: NORMAL
EOSINOPHIL # BLD AUTO: 0.04 K/UL (ref 0–0.51)
EOSINOPHIL NFR BLD: 0.5 % (ref 0–6.9)
ERYTHROCYTE [DISTWIDTH] IN BLOOD BY AUTOMATED COUNT: 41.6 FL (ref 35.9–50)
GFR SERPLBLD CREATININE-BSD FMLA CKD-EPI: 69 ML/MIN/1.73 M 2
GLOBULIN SER CALC-MCNC: 2.6 G/DL (ref 1.9–3.5)
GLUCOSE SERPL-MCNC: 130 MG/DL (ref 65–99)
HCT VFR BLD AUTO: 48.3 % (ref 42–52)
HGB BLD-MCNC: 17 G/DL (ref 14–18)
IMM GRANULOCYTES # BLD AUTO: 0.02 K/UL (ref 0–0.11)
IMM GRANULOCYTES NFR BLD AUTO: 0.3 % (ref 0–0.9)
LYMPHOCYTES # BLD AUTO: 0.98 K/UL (ref 1–4.8)
LYMPHOCYTES NFR BLD: 12.3 % (ref 22–41)
MCH RBC QN AUTO: 32.6 PG (ref 27–33)
MCHC RBC AUTO-ENTMCNC: 35.2 G/DL (ref 32.3–36.5)
MCV RBC AUTO: 92.7 FL (ref 81.4–97.8)
MONOCYTES # BLD AUTO: 0.76 K/UL (ref 0–0.85)
MONOCYTES NFR BLD AUTO: 9.5 % (ref 0–13.4)
NEUTROPHILS # BLD AUTO: 6.12 K/UL (ref 1.82–7.42)
NEUTROPHILS NFR BLD: 76.9 % (ref 44–72)
NRBC # BLD AUTO: 0 K/UL
NRBC BLD-RTO: 0 /100 WBC (ref 0–0.2)
NT-PROBNP SERPL IA-MCNC: 124 PG/ML (ref 0–125)
PLATELET # BLD AUTO: 181 K/UL (ref 164–446)
PMV BLD AUTO: 10.8 FL (ref 9–12.9)
POTASSIUM SERPL-SCNC: 4.2 MMOL/L (ref 3.6–5.5)
PROT SERPL-MCNC: 6.6 G/DL (ref 6–8.2)
RBC # BLD AUTO: 5.21 M/UL (ref 4.7–6.1)
SODIUM SERPL-SCNC: 133 MMOL/L (ref 135–145)
TROPONIN T SERPL-MCNC: 18 NG/L (ref 6–19)
TROPONIN T SERPL-MCNC: 19 NG/L (ref 6–19)
WBC # BLD AUTO: 8 K/UL (ref 4.8–10.8)

## 2023-12-15 PROCEDURE — 99283 EMERGENCY DEPT VISIT LOW MDM: CPT | Mod: 27

## 2023-12-15 PROCEDURE — 700102 HCHG RX REV CODE 250 W/ 637 OVERRIDE(OP): Performed by: EMERGENCY MEDICINE

## 2023-12-15 PROCEDURE — 93005 ELECTROCARDIOGRAM TRACING: CPT | Performed by: STUDENT IN AN ORGANIZED HEALTH CARE EDUCATION/TRAINING PROGRAM

## 2023-12-15 PROCEDURE — 85025 COMPLETE CBC W/AUTO DIFF WBC: CPT

## 2023-12-15 PROCEDURE — 83880 ASSAY OF NATRIURETIC PEPTIDE: CPT

## 2023-12-15 PROCEDURE — 93010 ELECTROCARDIOGRAM REPORT: CPT | Performed by: INTERNAL MEDICINE

## 2023-12-15 PROCEDURE — 93005 ELECTROCARDIOGRAM TRACING: CPT | Mod: 77 | Performed by: EMERGENCY MEDICINE

## 2023-12-15 PROCEDURE — 84484 ASSAY OF TROPONIN QUANT: CPT

## 2023-12-15 PROCEDURE — 84484 ASSAY OF TROPONIN QUANT: CPT | Mod: 91

## 2023-12-15 PROCEDURE — 99285 EMERGENCY DEPT VISIT HI MDM: CPT

## 2023-12-15 PROCEDURE — 80053 COMPREHEN METABOLIC PANEL: CPT

## 2023-12-15 PROCEDURE — A9270 NON-COVERED ITEM OR SERVICE: HCPCS | Performed by: STUDENT IN AN ORGANIZED HEALTH CARE EDUCATION/TRAINING PROGRAM

## 2023-12-15 PROCEDURE — 93005 ELECTROCARDIOGRAM TRACING: CPT | Performed by: EMERGENCY MEDICINE

## 2023-12-15 PROCEDURE — 93005 ELECTROCARDIOGRAM TRACING: CPT

## 2023-12-15 PROCEDURE — 71045 X-RAY EXAM CHEST 1 VIEW: CPT

## 2023-12-15 PROCEDURE — G0378 HOSPITAL OBSERVATION PER HR: HCPCS

## 2023-12-15 PROCEDURE — 36415 COLL VENOUS BLD VENIPUNCTURE: CPT

## 2023-12-15 PROCEDURE — A9270 NON-COVERED ITEM OR SERVICE: HCPCS | Performed by: EMERGENCY MEDICINE

## 2023-12-15 PROCEDURE — 99223 1ST HOSP IP/OBS HIGH 75: CPT | Mod: AI | Performed by: STUDENT IN AN ORGANIZED HEALTH CARE EDUCATION/TRAINING PROGRAM

## 2023-12-15 PROCEDURE — 700102 HCHG RX REV CODE 250 W/ 637 OVERRIDE(OP): Performed by: STUDENT IN AN ORGANIZED HEALTH CARE EDUCATION/TRAINING PROGRAM

## 2023-12-15 RX ORDER — ASPIRIN 81 MG/1
81 TABLET ORAL DAILY
Status: DISCONTINUED | OUTPATIENT
Start: 2023-12-16 | End: 2023-12-16 | Stop reason: HOSPADM

## 2023-12-15 RX ORDER — ONDANSETRON 4 MG/1
4 TABLET, ORALLY DISINTEGRATING ORAL EVERY 4 HOURS PRN
Status: DISCONTINUED | OUTPATIENT
Start: 2023-12-15 | End: 2023-12-16 | Stop reason: HOSPADM

## 2023-12-15 RX ORDER — ROSUVASTATIN CALCIUM 20 MG/1
20 TABLET, COATED ORAL DAILY
Status: DISCONTINUED | OUTPATIENT
Start: 2023-12-15 | End: 2023-12-16 | Stop reason: HOSPADM

## 2023-12-15 RX ORDER — ONDANSETRON 2 MG/ML
4 INJECTION INTRAMUSCULAR; INTRAVENOUS EVERY 4 HOURS PRN
Status: DISCONTINUED | OUTPATIENT
Start: 2023-12-15 | End: 2023-12-16 | Stop reason: HOSPADM

## 2023-12-15 RX ORDER — ENOXAPARIN SODIUM 100 MG/ML
40 INJECTION SUBCUTANEOUS DAILY
Status: DISCONTINUED | OUTPATIENT
Start: 2023-12-16 | End: 2023-12-16 | Stop reason: HOSPADM

## 2023-12-15 RX ORDER — ASPIRIN 81 MG/1
81 TABLET ORAL DAILY
Status: DISCONTINUED | OUTPATIENT
Start: 2023-12-15 | End: 2023-12-15

## 2023-12-15 RX ORDER — BISACODYL 10 MG
10 SUPPOSITORY, RECTAL RECTAL
Status: DISCONTINUED | OUTPATIENT
Start: 2023-12-15 | End: 2023-12-16 | Stop reason: HOSPADM

## 2023-12-15 RX ORDER — HYDRALAZINE HYDROCHLORIDE 20 MG/ML
10 INJECTION INTRAMUSCULAR; INTRAVENOUS EVERY 4 HOURS PRN
Status: DISCONTINUED | OUTPATIENT
Start: 2023-12-15 | End: 2023-12-16 | Stop reason: HOSPADM

## 2023-12-15 RX ORDER — FINASTERIDE 5 MG/1
5 TABLET, FILM COATED ORAL DAILY
Status: DISCONTINUED | OUTPATIENT
Start: 2023-12-15 | End: 2023-12-16 | Stop reason: HOSPADM

## 2023-12-15 RX ORDER — ASPIRIN 81 MG/1
324 TABLET, CHEWABLE ORAL ONCE
Status: COMPLETED | OUTPATIENT
Start: 2023-12-15 | End: 2023-12-15

## 2023-12-15 RX ORDER — POLYETHYLENE GLYCOL 3350 17 G/17G
1 POWDER, FOR SOLUTION ORAL
Status: DISCONTINUED | OUTPATIENT
Start: 2023-12-15 | End: 2023-12-16 | Stop reason: HOSPADM

## 2023-12-15 RX ORDER — METOPROLOL SUCCINATE 25 MG/1
25 TABLET, EXTENDED RELEASE ORAL DAILY
Status: DISCONTINUED | OUTPATIENT
Start: 2023-12-15 | End: 2023-12-16 | Stop reason: HOSPADM

## 2023-12-15 RX ORDER — ACETAMINOPHEN 325 MG/1
650 TABLET ORAL EVERY 6 HOURS PRN
Status: DISCONTINUED | OUTPATIENT
Start: 2023-12-15 | End: 2023-12-16 | Stop reason: HOSPADM

## 2023-12-15 RX ADMIN — FINASTERIDE 5 MG: 5 TABLET, FILM COATED ORAL at 15:07

## 2023-12-15 RX ADMIN — METOPROLOL SUCCINATE 25 MG: 25 TABLET, EXTENDED RELEASE ORAL at 16:10

## 2023-12-15 RX ADMIN — ASPIRIN 324 MG: 81 TABLET, CHEWABLE ORAL at 15:06

## 2023-12-15 RX ADMIN — ROSUVASTATIN CALCIUM 20 MG: 20 TABLET, FILM COATED ORAL at 15:07

## 2023-12-15 ASSESSMENT — LIFESTYLE VARIABLES
EVER HAD A DRINK FIRST THING IN THE MORNING TO STEADY YOUR NERVES TO GET RID OF A HANGOVER: NO
ALCOHOL_USE: NO
ON A TYPICAL DAY WHEN YOU DRINK ALCOHOL HOW MANY DRINKS DO YOU HAVE: 0
TOTAL SCORE: 0
HAVE YOU EVER FELT YOU SHOULD CUT DOWN ON YOUR DRINKING: NO
TOTAL SCORE: 0
HAVE PEOPLE ANNOYED YOU BY CRITICIZING YOUR DRINKING: NO
EVER FELT BAD OR GUILTY ABOUT YOUR DRINKING: NO
HOW MANY TIMES IN THE PAST YEAR HAVE YOU HAD 5 OR MORE DRINKS IN A DAY: 0
AVERAGE NUMBER OF DAYS PER WEEK YOU HAVE A DRINK CONTAINING ALCOHOL: 0
TOTAL SCORE: 0
CONSUMPTION TOTAL: NEGATIVE

## 2023-12-15 ASSESSMENT — ENCOUNTER SYMPTOMS
HEARTBURN: 0
NAUSEA: 0
NECK PAIN: 0
VOMITING: 0
DIZZINESS: 0
ABDOMINAL PAIN: 1
FOCAL WEAKNESS: 0
DEPRESSION: 0
DOUBLE VISION: 0
SPUTUM PRODUCTION: 0
SHORTNESS OF BREATH: 0
FEVER: 0
MYALGIAS: 0
ORTHOPNEA: 0
COUGH: 0
BLURRED VISION: 0
PALPITATIONS: 1
CHILLS: 0
SORE THROAT: 0
HEADACHES: 0

## 2023-12-15 ASSESSMENT — FIBROSIS 4 INDEX
FIB4 SCORE: 1.38
FIB4 SCORE: 1.38
FIB4 SCORE: 2.42

## 2023-12-15 ASSESSMENT — PATIENT HEALTH QUESTIONNAIRE - PHQ9
2. FEELING DOWN, DEPRESSED, IRRITABLE, OR HOPELESS: NOT AT ALL
SUM OF ALL RESPONSES TO PHQ9 QUESTIONS 1 AND 2: 0
2. FEELING DOWN, DEPRESSED, IRRITABLE, OR HOPELESS: NOT AT ALL
1. LITTLE INTEREST OR PLEASURE IN DOING THINGS: NOT AT ALL
SUM OF ALL RESPONSES TO PHQ9 QUESTIONS 1 AND 2: 0
1. LITTLE INTEREST OR PLEASURE IN DOING THINGS: NOT AT ALL

## 2023-12-15 ASSESSMENT — PAIN DESCRIPTION - PAIN TYPE
TYPE: ACUTE PAIN
TYPE: ACUTE PAIN

## 2023-12-15 NOTE — PROGRESS NOTES
4 Eyes Skin Assessment Completed by EDER Conte and EDER Zhang.    Head WDL  Ears WDL  Nose WDL  Mouth WDL  Neck WDL  Breast/Chest WDL  Shoulder Blades WDL  Spine WDL  (R) Arm/Elbow/Hand Bruising  (L) Arm/Elbow/Hand Bruising  Abdomen WDL  Groin WDL  Scrotum/Coccyx/Buttocks WDL  (R) Leg Bruising  (L) Leg Bruising  (R) Heel/Foot/Toe WDL  (L) Heel/Foot/Toe WDL          Devices In Places Tele Box, Blood Pressure Cuff, and Pulse Ox      Interventions In Place Pressure Redistribution Mattress    Possible Skin Injury No    Pictures Uploaded Into Epic N/A  Wound Consult Placed N/A  RN Wound Prevention Protocol Ordered No

## 2023-12-15 NOTE — H&P
Hospital Medicine History & Physical Note    Date of Service  12/15/2023    Primary Care Physician  Jerica Coronado M.D.    Consultants  N/A    Code Status  Full Code    Chief Complaint  Chief Complaint   Patient presents with    Chest Pain     Pt. Seen here this am, pt. Left ama for a stress test and then told to come back to ER and unable to complete test       History of Presenting Illness  Boo Cavanaugh Jr. is a 78 y.o. male with HTN, PVCs, Aflutter (s/p ablation), elevated CAC and HLD who presented 12/15/2023 with atypical chest pain with pressure and mild SOB. He was also concerned about BP all over in the last 2 days. Pt was seen at cardiology clinic for this chest pain and NST was ordered as outpatient for this morning. Pt was scheduled to have NST as outpatient this AM but due to chest pain and erratic BP - Pt came to ED instead. Pt left AMA to go to NST; however, Pt was sent back to ED in the setting of active chest pain. In ED 2nd time upon return, Pt was chest pain free. At this point, Pt was referred to Hospital Medicine for chest pain observation. Pt reported some abdominal pain with movement.     I discussed the plan of care with patient, bedside RN, and ERP .    Review of Systems  Review of Systems   Constitutional:  Negative for chills, fever and malaise/fatigue.   HENT:  Negative for sore throat.    Eyes:  Negative for blurred vision and double vision.   Respiratory:  Negative for cough, sputum production and shortness of breath.    Cardiovascular:  Positive for chest pain and palpitations. Negative for orthopnea and leg swelling.   Gastrointestinal:  Positive for abdominal pain. Negative for heartburn, nausea and vomiting.   Genitourinary:  Negative for dysuria, frequency and urgency.   Musculoskeletal:  Negative for myalgias and neck pain.   Neurological:  Negative for dizziness, focal weakness and headaches.   Psychiatric/Behavioral:  Negative for depression.        Past Medical History   has  a past medical history of Arrhythmia, Arthritis, Breath shortness, Cancer (HCC), Cardiac arrhythmia, Enlarged prostate with urinary obstruction, GERD (gastroesophageal reflux disease), Hyperlipidemia, Hypertension, OA (osteoarthritis) of knee, Restless leg syndrome, Tonsillitis, and Urinary bladder disorder (12/06/2017).    Surgical History   has a past surgical history that includes tonsillectomy; ganglion excision (Right, 1966); turp-vapor (12/07/2017); appendectomy; open reduction; vasectomy; and prostatectomy, radical retro.     Family History  family history includes Cancer in his father; Cancer (age of onset: 50) in his brother; Heart Disease (age of onset: 65) in his mother; Stroke in his brother and brother.   Family history reviewed with patient. There is family history that is pertinent to the chief complaint.     Social History   reports that he has never smoked. He has been exposed to tobacco smoke. He has never used smokeless tobacco. He reports that he does not currently use alcohol. He reports that he does not currently use drugs after having used the following drugs: Marijuana.    Allergies  Allergies   Allergen Reactions    Penicillins Rash     Rash as a child.        Medications  Prior to Admission Medications   Prescriptions Last Dose Informant Patient Reported? Taking?   aspirin 81 MG EC tablet 12/14/2023 at 0800 Patient Yes No   Sig: Take 81 mg by mouth every day.   ciclopirox (PENLAC) 8 % solution 12/11/2023 at [facility] Patient Yes No   Sig: Apply 1 Application topically two times a week. Apply to affected toenails on both feet.   finasteride (PROSCAR) 5 MG Tab 12/14/2023 at 0800 Patient Yes No   Sig: Take 1 Tablet by mouth every day.   metoprolol SR (TOPROL XL) 25 MG TABLET SR 24 HR 12/14/2023 at 0800 Patient No No   Sig: TAKE ONE TABLET BY MOUTH EVERY DAY   rosuvastatin (CRESTOR) 20 MG Tab 12/14/2023 at 0800 Patient No No   Sig: Take 1 Tablet by mouth every day.   triamcinolone acetonide (KENALOG)  0.1 % Cream 12/13/2023 at [facility] Patient Yes No   Sig: Apply 1 Application topically 2 times a day as needed (Eczema).      Facility-Administered Medications: None       Physical Exam  Temp:  [36.2 °C (97.2 °F)-36.9 °C (98.4 °F)] 36.9 °C (98.4 °F)  Pulse:  [64-92] 64  Resp:  [13-18] 18  BP: (141-162)/(79-88) 162/82  SpO2:  [95 %-98 %] 95 %  Blood Pressure : (!) 141/79   Temperature: 36.2 °C (97.2 °F)   Pulse: 69   Respiration: 13   Pulse Oximetry: 98 %       Physical Exam  Vitals and nursing note reviewed.   Constitutional:       General: He is not in acute distress.     Appearance: Normal appearance. He is not ill-appearing.   HENT:      Head: Normocephalic and atraumatic.      Mouth/Throat:      Mouth: Mucous membranes are moist.      Pharynx: Oropharynx is clear.   Eyes:      General: No scleral icterus.     Conjunctiva/sclera: Conjunctivae normal.   Cardiovascular:      Rate and Rhythm: Normal rate and regular rhythm.      Pulses: Normal pulses.      Heart sounds: Normal heart sounds.   Pulmonary:      Effort: Pulmonary effort is normal. No respiratory distress.      Breath sounds: Normal breath sounds. No wheezing.   Abdominal:      General: Bowel sounds are normal. There is no distension.      Palpations: Abdomen is soft.      Tenderness: There is no abdominal tenderness.      Comments: No gross deformity or hernia in abd even with increased abd pressure   Musculoskeletal:         General: No swelling or tenderness. Normal range of motion.   Skin:     General: Skin is warm and dry.      Capillary Refill: Capillary refill takes less than 2 seconds.   Neurological:      General: No focal deficit present.      Mental Status: He is alert and oriented to person, place, and time. Mental status is at baseline.   Psychiatric:         Mood and Affect: Mood normal.         Laboratory:  Recent Labs     12/15/23  0814   WBC 8.0   RBC 5.21   HEMOGLOBIN 17.0   HEMATOCRIT 48.3   MCV 92.7   MCH 32.6   MCHC 35.2   RDW 41.6    PLATELETCT 181   MPV 10.8     Recent Labs     12/15/23  0814   SODIUM 133*   POTASSIUM 4.2   CHLORIDE 99   CO2 24   GLUCOSE 130*   BUN 12   CREATININE 1.10   CALCIUM 9.2     Recent Labs     12/15/23  0814   ALTSGPT 16   ASTSGOT 13   ALKPHOSPHAT 76   TBILIRUBIN 0.9   GLUCOSE 130*         Recent Labs     12/15/23  1253   NTPROBNP 124         Recent Labs     12/15/23  0814 12/15/23  1253   TROPONINT 18 19       Imaging:  DX-CHEST-PORTABLE (1 VIEW)   Final Result      No acute cardiopulmonary disease evident.      NM-CARDIAC STRESS TEST    (Results Pending)       X-Ray:  I have personally reviewed the images and compared with prior images. and My impression is: no acute cardiopulm abnormalities  EKG:  I have personally reviewed the images and compared with prior images. and My impression is: NSR and no acute ischemic changes, PVC    Assessment/Plan:  Justification for Admission Status  I anticipate this patient is appropriate for observation status at this time because expect to complete chest pain observation within Obs time frame    Patient will need a Telemetry bed on MEDICAL service .  The need is secondary to as above.    * Chest pain- (present on admission)  Assessment & Plan  Heart Score - moderate   Atypical symptoms  Chest pain order set in place  Will trend trop and EKG   NST in AM  ASA daily and statin    Recent echo 9/2023 reviewed   Meanwhile, supportive care and nitro prn      Primary hypertension- (present on admission)  Assessment & Plan  C/w home metoprolol     Hyperlipidemia- (present on admission)  Assessment & Plan  C/w statin    SHERRY (obstructive sleep apnea)- (present on admission)  Assessment & Plan  C/w CPAP  Reported having some issues recently with using CPAP  In proper fitment or setting may be triggering erratic BP    PVC (premature ventricular contraction)- (present on admission)  Assessment & Plan  C/w beta-blocker  Telemonitor     Benign prostatic hyperplasia without lower urinary tract  symptoms- (present on admission)  Assessment & Plan  C/w proscar    History of atrial flutter- (present on admission)  Assessment & Plan  S/p ablation  Now on anticoag        VTE prophylaxis: therapeutic anticoagulation with Eliquis

## 2023-12-15 NOTE — ED NOTES
Med rec completed per patient at bedside.  Allergies reviewed with patient.  Patient's preferred pharmacy: [facility].    Outpatient antibiotics within the last 30 days: NONE.    ANTICOAGULATION: NONE.    Dexter Diallo, PhT

## 2023-12-15 NOTE — ED NOTES
ERP bedside. Pt decided to leave AMA. ERP spoke with pt about risks of leaving AMA. Pt GCS 15 a/o x 4 upon leaving ED AMA. Pt ambulatory without assistance with steady gait upon leaving AMA. Pt signed paperwork for leaving AMA.

## 2023-12-15 NOTE — DISCHARGE INSTRUCTIONS
We have advised further testing for your chest pain if you change your mind please feel free to return at any time

## 2023-12-15 NOTE — ED TRIAGE NOTES
".  Chief Complaint   Patient presents with    Chest Pain     Pt. Seen here this am, pt. Left ama for a stress test and then told to come back to ER and unable to complete test         .BP (!) 148/83   Pulse 68   Temp 36.2 °C (97.2 °F) (Temporal)   Resp 18   Ht 1.88 m (6' 2\")   Wt 86.6 kg (191 lb)   SpO2 98%   BMI 24.52 kg/m²       .Pt is alert and oriented, speaking in full sentences, follows commands and responds appropriately to questions Resp are even and unlabored.  Skin pink warm and dry     Pt placed in lobby. Pt educated on triage process. Pt encouraged to alert staff for any changes.    "

## 2023-12-15 NOTE — ED TRIAGE NOTES
Pt ambulated to triage with   Chief Complaint   Patient presents with    Chest Pain     Describes as pressure, and also SOB.  Has stress test scheduled at 1015 this morning, reports BP all over the place at home for the last 2 days and pt concerned.  Recent medication changes.        Protocol ordered.  Pt Informed regarding triage process and verbalized understanding to inform triage tech or RN for any changes in condition. Placed in lobby.

## 2023-12-15 NOTE — ED PROVIDER NOTES
ED Provider Note    CHIEF COMPLAINT  Chief Complaint   Patient presents with    Chest Pain     Describes as pressure, and also SOB.  Has stress test scheduled at 1015 this morning, reports BP all over the place at home for the last 2 days and pt concerned.  Recent medication changes.        EXTERNAL RECORDS REVIEWED  Outpatient Notes through cardiology clinic for chest pain with PVCs, recommendation for nuclear stress test    HPI/ROS  LIMITATION TO HISTORY   Select: : None  OUTSIDE HISTORIAN(S):  none    Boo Cavanaugh Jr. is a 78 y.o. male who presents with intermittent chest pressure.  Patient reports he has had intermittent episodes of chest pressure for the last 10 days.  He states it is central without radiation.  He reports no real precipitating or alleviating factors, does not seem to be exertional, pleuritic or positional.  He did have an episode this morning.  He does have some associated shortness of breath.  No nausea or vomiting, no diaphoresis.  He reports no leg pain or swelling.  No recent illness fevers chills cough or congestion.  He does report his blood pressure has been inconsistent as well    He does have a stress test is over 10:00 today    PAST MEDICAL HISTORY   has a past medical history of Arrhythmia, Arthritis, Breath shortness, Cancer (HCC), Cardiac arrhythmia, Enlarged prostate with urinary obstruction, GERD (gastroesophageal reflux disease), Hyperlipidemia, Hypertension, OA (osteoarthritis) of knee, Restless leg syndrome, Tonsillitis, and Urinary bladder disorder (12/06/2017).    SURGICAL HISTORY   has a past surgical history that includes tonsillectomy; ganglion excision (Right, 1966); turp-vapor (12/07/2017); appendectomy; open reduction; vasectomy; and prostatectomy, radical retro.    FAMILY HISTORY  Family History   Problem Relation Age of Onset    Heart Disease Mother 65        MI    Cancer Father     Cancer Brother 50        prostate cancer    Stroke Brother     Stroke Brother      "Diabetes Neg Hx     Hyperlipidemia Neg Hx     Alcohol/Drug Neg Hx     Ovarian Cancer Neg Hx     Tubal Cancer Neg Hx     Peritoneal Cancer Neg Hx     Colorectal Cancer Neg Hx     Breast Cancer Neg Hx        SOCIAL HISTORY  Social History     Tobacco Use    Smoking status: Never     Passive exposure: Past (BOTH parents smoked)    Smokeless tobacco: Never   Vaping Use    Vaping Use: Never used   Substance and Sexual Activity    Alcohol use: Not Currently    Drug use: Not Currently     Types: Marijuana    Sexual activity: Not Currently     Comment: 2 daughters        CURRENT MEDICATIONS  Home Medications       Reviewed by Vera Sena R.N. (Registered Nurse) on 12/15/23 at 0803  Med List Status: Partial     Medication Last Dose Status   aspirin 81 MG EC tablet  Active   betamethasone dipropionate (DIPROLENE) 0.05 % Ointment  Active   ciclopirox (PENLAC) 8 % solution  Active   finasteride (PROSCAR) 5 MG Tab  Active   metoprolol SR (TOPROL XL) 25 MG TABLET SR 24 HR  Active   pimecrolimus (ELIDEL) 1 % cream  Active   rosuvastatin (CRESTOR) 20 MG Tab  Active   triamcinolone acetonide (KENALOG) 0.1 % Cream  Active                    ALLERGIES  Allergies   Allergen Reactions    Penicillins Rash     Rash as a child.        PHYSICAL EXAM  VITAL SIGNS: BP (!) 150/83   Pulse 83   Temp 36.2 °C (97.2 °F) (Temporal)   Resp 18   Ht 1.88 m (6' 2\")   Wt 87 kg (191 lb 12.8 oz)   SpO2 97%   BMI 24.63 kg/m²      Exam is not performed as patient has remained clothed and sitting in his chair throughout our discussion and states he is not want further evaluation in the emergency department and just wants to go to his stress test              DIAGNOSTIC STUDIES / PROCEDURES  Results for orders placed or performed during the hospital encounter of 12/15/23   CBC with Differential   Result Value Ref Range    WBC 8.0 4.8 - 10.8 K/uL    RBC 5.21 4.70 - 6.10 M/uL    Hemoglobin 17.0 14.0 - 18.0 g/dL    Hematocrit 48.3 42.0 - 52.0 %    " MCV 92.7 81.4 - 97.8 fL    MCH 32.6 27.0 - 33.0 pg    MCHC 35.2 32.3 - 36.5 g/dL    RDW 41.6 35.9 - 50.0 fL    Platelet Count 181 164 - 446 K/uL    MPV 10.8 9.0 - 12.9 fL    Neutrophils-Polys 76.90 (H) 44.00 - 72.00 %    Lymphocytes 12.30 (L) 22.00 - 41.00 %    Monocytes 9.50 0.00 - 13.40 %    Eosinophils 0.50 0.00 - 6.90 %    Basophils 0.50 0.00 - 1.80 %    Immature Granulocytes 0.30 0.00 - 0.90 %    Nucleated RBC 0.00 0.00 - 0.20 /100 WBC    Neutrophils (Absolute) 6.12 1.82 - 7.42 K/uL    Lymphs (Absolute) 0.98 (L) 1.00 - 4.80 K/uL    Monos (Absolute) 0.76 0.00 - 0.85 K/uL    Eos (Absolute) 0.04 0.00 - 0.51 K/uL    Baso (Absolute) 0.04 0.00 - 0.12 K/uL    Immature Granulocytes (abs) 0.02 0.00 - 0.11 K/uL    NRBC (Absolute) 0.00 K/uL   Complete Metabolic Panel (CMP)   Result Value Ref Range    Sodium 133 (L) 135 - 145 mmol/L    Potassium 4.2 3.6 - 5.5 mmol/L    Chloride 99 96 - 112 mmol/L    Co2 24 20 - 33 mmol/L    Anion Gap 10.0 7.0 - 16.0    Glucose 130 (H) 65 - 99 mg/dL    Bun 12 8 - 22 mg/dL    Creatinine 1.10 0.50 - 1.40 mg/dL    Calcium 9.2 8.5 - 10.5 mg/dL    Correct Calcium 9.2 8.5 - 10.5 mg/dL    AST(SGOT) 13 12 - 45 U/L    ALT(SGPT) 16 2 - 50 U/L    Alkaline Phosphatase 76 30 - 99 U/L    Total Bilirubin 0.9 0.1 - 1.5 mg/dL    Albumin 4.0 3.2 - 4.9 g/dL    Total Protein 6.6 6.0 - 8.2 g/dL    Globulin 2.6 1.9 - 3.5 g/dL    A-G Ratio 1.5 g/dL   ESTIMATED GFR   Result Value Ref Range    GFR (CKD-EPI) 69 >60 mL/min/1.73 m 2   EKG   Result Value Ref Range    Report       [facility] Emergency Dept.    Test Date:  2023-12-15  Pt Name:    MADDI ANGEL                Department: ER  MRN:        [id number]                      Room:  Gender:     Male                         Technician: [id number]  :        1945                   Requested By:ER TRIAGE PROTOCOL  Order #:    [id number]                    Reading MD: ANMOL TAVAREZ MD    Measurements  Intervals                                 Axis  Rate:       77                           P:          59  AK:         191                          QRS:        83  QRSD:       93                           T:          50  QT:         350  QTc:        397    Interpretive Statements  Sinus rhythm  Ventricular premature complex  Borderline right axis deviation  Compared to ECG 12/12/2023 15:23:57  Ventricular premature complex(es) now present  Sinus bradycardia no longer present  Electronically Signed On 12- 09:03:48 PST by ANMOL TAVAREZ MD               COURSE & MEDICAL DECISION MAKING        INITIAL ASSESSMENT, COURSE AND PLAN  Care Narrative: 9:03 AM    Patient is evaluated at the bedside.  Presenting with chest pain.  Certainly differential includes ACS, arrhythmia, GI cause for pain including reflux, dissection is considered but seems unlikely given the nature of symptoms, consideration for pulmonary embolism as well although he is not tachycardic or hypoxemic or have persistent symptoms.  As above, patient did not to undergo physical exam, stating that he would just like to go get his stress test.  I discussed with him that I cannot evaluate his chest pain to rule out emergent process without a full emergency department visit would likely need at least 2 hours for serial troponins.  After further discussion, patient has decided to leave AGAINST MEDICAL ADVICE    I discussed with the patient the risks of their decision to leave without receiving the appropriate medical care, including diagnostic testing and/or therapy. I discussed with the patient the risks of their decision to refuse or withhold consent to receive appropriate medical care. The patient is of sound and clear mind and has the capacity to understand the risks and benefits described above, and is able to convey this understanding verbally to me. The patient is not altered in any way, whether from chemical intoxicant or medical condition clinically, is alert and oriented and able to  make a good decision in my opinion. I discussed alternative treatments with the patient. The patient was given discharge instructions and a followup plan as documented in the medical record. I have asked the patient to return at any time to the emergency department for any reason.            PROBLEM LIST  # Chest pain.  Unclear cause as above, patient has left AGAINST MEDICAL ADVICE as above    # Hypertension, history of  DISPOSITION AND DISCUSSIONS      Escalation of care considered, and ultimately not performed:after discussion with the patient / family, they have elected to decline an escalation in care    Barriers to care at this time, including but not limited to:  none .     Patient is discharged AGAINST MEDICAL ADVICE    FINAL DIAGNOSIS  1. Chest pain, unspecified type           Electronically signed by: Reuben Massey M.D., 12/15/2023 9:02 AM

## 2023-12-15 NOTE — ED PROVIDER NOTES
"ED PHYSICIAN NOTE    CHIEF COMPLAINT  Chief Complaint   Patient presents with    Chest Pain     Pt. Seen here this am, pt. Left ama for a stress test and then told to come back to ER and unable to complete test       EXTERNAL RECORDS REVIEWED  Outpatient Notes patient seen by cardiology on the 12th.  Evaluated for atypical chest pain.  Scheduled for nuclear stress test    ANALIA/JODY      Boo Cavanaugh Jr. is a 78 y.o. male who presents with chest pain.  Patient has a history of off-and-on chest pain for quite a while he has been seen by his cardiologist.  Was scheduled for a stress test.  He states over the last week he has been having more frequent episodes feels like a fullness in his central lower chest.  Does not radiate.  No associated shortness of breath.  Not necessarily exertional.  He had another episode this morning and came to the ER.  He left the hospital AMA because his stress test was scheduled for 10 AM but when he went to have this done he was told that it could not be done because of his active pain.  Patient denies pleuritic pain.  He reports he had an episode of abdominal pain a week ago but no abdominal pain since.  No leg swelling leg pain hemoptysis back pain.    PAST MEDICAL HISTORY  Past Medical History:   Diagnosis Date    Arrhythmia     \"flutter\", Dr. Mendoza cardiologist    Arthritis     left thumb    Breath shortness     Cancer (HCC)     Cardiac arrhythmia     Enlarged prostate with urinary obstruction     GERD (gastroesophageal reflux disease)     Hyperlipidemia     Hypertension     OA (osteoarthritis) of knee     bilateral     Restless leg syndrome     Tonsillitis     Urinary bladder disorder 12/06/2017    [facility] in [facility]       SOCIAL HISTORY  Social History     Tobacco Use    Smoking status: Never     Passive exposure: Past (BOTH parents smoked)    Smokeless tobacco: Never   Vaping Use    Vaping Use: Never used   Substance Use Topics    Alcohol use: Not Currently    Drug use: Not Currently " "    Types: Marijuana       CURRENT MEDICATIONS  Home Medications       Reviewed by Holly Farrell R.N. (Registered Nurse) on 12/15/23 at 1033  Med List Status: Not Addressed     Medication Last Dose Status   aspirin 81 MG EC tablet  Active   betamethasone dipropionate (DIPROLENE) 0.05 % Ointment  Active   ciclopirox (PENLAC) 8 % solution  Active   finasteride (PROSCAR) 5 MG Tab  Active   metoprolol SR (TOPROL XL) 25 MG TABLET SR 24 HR  Active   pimecrolimus (ELIDEL) 1 % cream  Active   rosuvastatin (CRESTOR) 20 MG Tab  Active   triamcinolone acetonide (KENALOG) 0.1 % Cream  Active                    ALLERGIES  Allergies   Allergen Reactions    Penicillins Rash     Rash as a child.        PHYSICAL EXAM  VITAL SIGNS: BP (!) 148/83   Pulse 68   Temp 36.2 °C (97.2 °F) (Temporal)   Resp 18   Ht 1.88 m (6' 2\")   Wt 86.6 kg (191 lb)   SpO2 98%   BMI 24.52 kg/m²    Constitutional: Awake and alert  HENT: Normal inspection  Eyes: Normal inspection  Neck: Grossly normal range of motion.  Cardiovascular: Normal heart rate, Normal rhythm.  Symmetric peripheral pulses.   Thorax & Lungs: No respiratory distress, No wheezing, No rales, No rhonchi, No chest tenderness.   Abdomen: Bowel sounds normal, soft, non-distended, nontender, no mass  Skin: No obvious rash.  Back: No tenderness, No CVA tenderness.   Extremities: No clubbing, cyanosis, edema, no Homans or cords.  Neurologic: Grossly normal   Psychiatric: Normal for situation     DIAGNOSTIC STUDIES / PROCEDURES  LABS/EKG  Results for orders placed or performed during the hospital encounter of 12/15/23   TROPONIN   Result Value Ref Range    Troponin T 19 6 - 19 ng/L   proBrain Natriuretic Peptide, NT   Result Value Ref Range    NT-proBNP 124 0 - 125 pg/mL   EKG   Result Value Ref Range    Report       [facility] Emergency Dept.    Test Date:  2023-12-15  Pt Name:    MADDI ANGEL                Department: ER  MRN:        [id number]                    "   Room:       [facility]  Gender:     Male                         Technician: [id number]  :        1945                   Requested By:KESHAWN LLANES  Order #:    [id number]                    Reading MD: KESHAWN LLANES MD    Measurements  Intervals                                Axis  Rate:       71                           P:          39  DC:         187                          QRS:        70  QRSD:       94                           T:          24  QT:         380  QTc:        413    Interpretive Statements  Sinus rhythm  Ventricular premature complex  Compared to ECG 12/15/2023 07:52:49  No significant changes  Electronically Signed On 12- 13:07:06 PST by KESHAWN LLANES MD        I have independently interpreted this EKG as documented above  Rhythm strip interpretation-sinus rhythm    RADIOLOGY  I have independently interpreted the diagnostic imaging associated with this visit and am waiting the final reading from the radiologist.   My preliminary interpretation is as follows: Chest x-ray without infiltrate  Radiologist interpretation:   DX-CHEST-PORTABLE (1 VIEW)    (Results Pending)         COURSE & MEDICAL DECISION MAKING      INITIAL ASSESSMENT, COURSE AND PLAN  Care Narrative: Patient presents with chest pain.  He is currently chest pain-free.  His EKG does not show acute ischemia.  He was given aspirin.  He has no clinical suggestion of dissection pulmonary embolism.  His laboratory data from this morning was reviewed.  Will obtain serial troponin.  Obtain chest x-ray.  He will be admitted for further testing.    DISPOSITION AND DISCUSSIONS  I have discussed management of the patient with the following physicians and JADIEL's: Dr. Chacon, hospitalist    Escalation of care considered, and ultimately not performed: Considered CT imaging, but no suggestion of dissection or pulmonary embolism.    FINAL IMPRESSION  1.  Chest pain    This dictation was created using voice recognition software. The  accuracy of the dictation is limited to the abilities of the software. I expect there may be some errors of grammar and possibly content. The nursing notes were reviewed and certain aspects of this information were incorporated into this note.    Electronically signed by: Javier Sanabria M.D., 12/15/2023

## 2023-12-16 ENCOUNTER — APPOINTMENT (OUTPATIENT)
Dept: RADIOLOGY | Facility: MEDICAL CENTER | Age: 78
End: 2023-12-16
Attending: STUDENT IN AN ORGANIZED HEALTH CARE EDUCATION/TRAINING PROGRAM
Payer: MEDICARE

## 2023-12-16 VITALS
DIASTOLIC BLOOD PRESSURE: 69 MMHG | HEART RATE: 56 BPM | HEIGHT: 74 IN | OXYGEN SATURATION: 97 % | SYSTOLIC BLOOD PRESSURE: 141 MMHG | TEMPERATURE: 97.7 F | WEIGHT: 192.24 LBS | RESPIRATION RATE: 14 BRPM | BODY MASS INDEX: 24.67 KG/M2

## 2023-12-16 LAB
CHOLEST SERPL-MCNC: 93 MG/DL (ref 100–199)
HDLC SERPL-MCNC: 48 MG/DL
LDLC SERPL CALC-MCNC: 36 MG/DL
TRIGL SERPL-MCNC: 44 MG/DL (ref 0–149)
TROPONIN T SERPL-MCNC: 16 NG/L (ref 6–19)

## 2023-12-16 PROCEDURE — 700111 HCHG RX REV CODE 636 W/ 250 OVERRIDE (IP)

## 2023-12-16 PROCEDURE — 99239 HOSP IP/OBS DSCHRG MGMT >30: CPT | Performed by: STUDENT IN AN ORGANIZED HEALTH CARE EDUCATION/TRAINING PROGRAM

## 2023-12-16 PROCEDURE — A9270 NON-COVERED ITEM OR SERVICE: HCPCS | Performed by: STUDENT IN AN ORGANIZED HEALTH CARE EDUCATION/TRAINING PROGRAM

## 2023-12-16 PROCEDURE — G0378 HOSPITAL OBSERVATION PER HR: HCPCS

## 2023-12-16 PROCEDURE — 700102 HCHG RX REV CODE 250 W/ 637 OVERRIDE(OP): Performed by: STUDENT IN AN ORGANIZED HEALTH CARE EDUCATION/TRAINING PROGRAM

## 2023-12-16 PROCEDURE — A9502 TC99M TETROFOSMIN: HCPCS

## 2023-12-16 PROCEDURE — 80061 LIPID PANEL: CPT

## 2023-12-16 PROCEDURE — 84484 ASSAY OF TROPONIN QUANT: CPT

## 2023-12-16 RX ORDER — REGADENOSON 0.08 MG/ML
INJECTION, SOLUTION INTRAVENOUS
Status: COMPLETED
Start: 2023-12-16 | End: 2023-12-16

## 2023-12-16 RX ORDER — AMINOPHYLLINE 25 MG/ML
100 INJECTION, SOLUTION INTRAVENOUS
Status: DISCONTINUED | OUTPATIENT
Start: 2023-12-16 | End: 2023-12-16 | Stop reason: HOSPADM

## 2023-12-16 RX ORDER — REGADENOSON 0.08 MG/ML
0.4 INJECTION, SOLUTION INTRAVENOUS ONCE
Status: COMPLETED | OUTPATIENT
Start: 2023-12-16 | End: 2023-12-16

## 2023-12-16 RX ADMIN — FINASTERIDE 5 MG: 5 TABLET, FILM COATED ORAL at 05:34

## 2023-12-16 RX ADMIN — ASPIRIN 81 MG: 81 TABLET, COATED ORAL at 05:34

## 2023-12-16 RX ADMIN — ROSUVASTATIN CALCIUM 20 MG: 20 TABLET, FILM COATED ORAL at 05:34

## 2023-12-16 RX ADMIN — REGADENOSON 0.4 MG: 0.08 INJECTION, SOLUTION INTRAVENOUS at 08:13

## 2023-12-16 NOTE — PROGRESS NOTES
Received report from day shift RN. Assumed patient care. Pt A&Ox4, NAD, VSS. Denies any chest pain but states having increased work of breathing with ambulation. Tele monitor in place. POC discussed with patient, all questions addressed. Call light within reach.

## 2023-12-16 NOTE — CARE PLAN
The patient is Stable - Low risk of patient condition declining or worsening    Shift Goals  Clinical Goals: CArdiac obs  Patient Goals: test results, dc  Family Goals: NA    Progress made toward(s) clinical / shift goals:    Problem: Knowledge Deficit - Standard  Goal: Patient and family/care givers will demonstrate understanding of plan of care, disease process/condition, diagnostic tests and medications  Description: Target End Date:  1-3 days or as soon as patient condition allows    Document in Patient Education    1.  Patient and family/caregiver oriented to unit, equipment, visitation policy and means for communicating concern  2.  Complete/review Learning Assessment  3.  Assess knowledge level of disease process/condition, treatment plan, diagnostic tests and medications  4.  Explain disease process/condition, treatment plan, diagnostic tests and medications  Outcome: Progressing       Patient is not progressing towards the following goals:

## 2023-12-16 NOTE — DISCHARGE INSTRUCTIONS
Discharge Instructions    Discharged to home by car with self. Discharged via walking, hospital escort: Yes.  Special equipment needed: Not Applicable    Be sure to schedule a follow-up appointment with your primary care doctor or any specialists as instructed.     Discharge Plan:   Diet Plan: Discussed  Activity Level: Discussed  Confirmed Follow up Appointment: Appointment Scheduled  Confirmed Symptoms Management: Discussed  Medication Reconciliation Updated: Yes  Influenza Vaccine Indication: Patient Refuses    I understand that a diet low in cholesterol, fat, and sodium is recommended for good health. Unless I have been given specific instructions below for another diet, I accept this instruction as my diet prescription.   Other diet: heart healthy diet    Special Instructions: None    -Is this patient being discharged with medication to prevent blood clots?  No    Is patient discharged on Warfarin / Coumadin?   No

## 2023-12-16 NOTE — CARE PLAN
The patient is Stable - Low risk of patient condition declining or worsening    Shift Goals  Clinical Goals: BP control, Stress test tomorrow  Patient Goals: Stress test  Family Goals: No family present    Progress made toward(s) clinical / shift goals:    Problem: Knowledge Deficit - Standard  Goal: Patient and family/care givers will demonstrate understanding of plan of care, disease process/condition, diagnostic tests and medications  Outcome: Progressing       Patient is not progressing towards the following goals:

## 2023-12-16 NOTE — PROGRESS NOTES
Notified by monitors patient sustaining 43/44. Updated MD. MD states to set parameters to sustaining 40. Monitor room notified.

## 2023-12-16 NOTE — DISCHARGE SUMMARY
Discharge Summary    CHIEF COMPLAINT ON ADMISSION  Chief Complaint   Patient presents with    Chest Pain     Pt. Seen here this am, pt. Left ama for a stress test and then told to come back to ER and unable to complete test       Reason for Admission  Chest pain     Admission Date  12/15/2023    CODE STATUS  Full Code    HPI & HOSPITAL COURSE  Boo Cavanaugh Jr. is a 78 y.o. male with HTN, PVCs, Aflutter (s/p ablation), elevated CAC and HLD who presented 12/15/2023 with atypical chest pain with pressure and mild SOB. He was also concerned about BP all over in the last 2 days. Pt was seen at cardiology clinic for this chest pain and NST was ordered as outpatient for this morning. Pt was scheduled to have NST as outpatient this AM but due to chest pain and erratic BP - Pt came to ED instead. Pt left AMA to go to NST; however, Pt was sent back to ED in the setting of active chest pain. In ED 2nd time upon return, Pt was chest pain free. At this point, Pt was referred to Hospital Medicine for chest pain observation. Pt reported some abdominal pain with movement.      Hospital course 12/16: afebrile and vitals wnl. BP improved after resumption of his home metoprolol. Exam at bedside was grossly unremarkable including his abd. Labs and imagings findings discussed with patient. Serial trop x3 neg and lipid LDL 36 (at goal). EKG were without gross acute ischemic changes. Pt underwent NST - No evidence of significant jeopardized viable myocardium or prior myocardial infarction.    At this point, ACS is unlikely - advised continue monitoring of BP and a close outpatient follow up with Pulm clinic for a sleep study or cardiology. With relatively normal BP here, we agree not to start additional BP meds here.     Therefore, he is discharged in fair and stable condition to home with close outpatient follow-up.    The patient recovered much more quickly than anticipated on admission.    Discharge Date  12/16/23    FOLLOW UP ITEMS  POST DISCHARGE  N/A    DISCHARGE DIAGNOSES  Principal Problem:    Chest pain (POA: Yes)  Active Problems:    History of atrial flutter (POA: Yes)      Benign prostatic hyperplasia without lower urinary tract symptoms (POA: Yes)      Overview: S/p laser    PVC (premature ventricular contraction) (POA: Yes)    SHERRY (obstructive sleep apnea) (POA: Yes)      Overview: CPAP    Hyperlipidemia (POA: Yes)    Primary hypertension (POA: Yes)  Resolved Problems:    * No resolved hospital problems. *      FOLLOW UP  Future Appointments   Date Time Provider Department Center   12/18/2023 10:40 AM Jerica Coronado M.D. [facility]   1/2/2024 10:40 AM ER[facility]ST[facility] None   1/4/2024  3:40 PM Brenda Conde P.A.-C. [facility] None     No follow-up provider specified.    MEDICATIONS ON DISCHARGE     Medication List        ASK your doctor about these medications        Instructions   aspirin 81 MG EC tablet   Take 81 mg by mouth every day.  Dose: 81 mg     ciclopirox 8 % solution  Commonly known as: Penlac   Apply 1 Application topically two times a week. Apply to affected toenails on both feet.  Dose: 1 Application     finasteride 5 MG Tabs  Commonly known as: Proscar   Take 1 Tablet by mouth every day.  Dose: 1 Tablet     metoprolol SR 25 MG Tb24  Commonly known as: Toprol XL   TAKE ONE TABLET BY MOUTH EVERY DAY  Dose: 25 mg     rosuvastatin 20 MG Tabs  Commonly known as: Crestor   Take 1 Tablet by mouth every day.  Dose: 20 mg     triamcinolone acetonide 0.1 % Crea  Commonly known as: Kenalog   Apply 1 Application topically 2 times a day as needed (Eczema).  Dose: 1 Application              Allergies  Allergies   Allergen Reactions    Penicillins Rash     Rash as a child.        DIET  Cardiac      ACTIVITY  As tolerated.  Weight bearing as tolerated    CONSULTATIONS  N/A    PROCEDURES  NST    LABORATORY  Lab Results   Component Value Date    SODIUM 133 (L) 12/15/2023    POTASSIUM 4.2 12/15/2023    CHLORIDE 99 12/15/2023     CO2 24 12/15/2023    GLUCOSE 130 (H) 12/15/2023    BUN 12 12/15/2023    CREATININE 1.10 12/15/2023        Lab Results   Component Value Date    WBC 8.0 12/15/2023    HEMOGLOBIN 17.0 12/15/2023    HEMATOCRIT 48.3 12/15/2023    PLATELETCT 181 12/15/2023        Total time of the discharge process exceeds 36 minutes.

## 2023-12-16 NOTE — CARE PLAN
The patient is Stable - Low risk of patient condition declining or worsening    Shift Goals  Clinical Goals: NPo at midnight for stress test, tele monitoring, pain control  Patient Goals: Comfort, rest  Family Goals: Not at bedside    Progress made toward(s) clinical / shift goals:      Problem: Knowledge Deficit - Standard  Goal: Patient and family/care givers will demonstrate understanding of plan of care, disease process/condition, diagnostic tests and medications  Outcome: Progressing     Problem: Pain - Standard  Goal: Alleviation of pain or a reduction in pain to the patient’s comfort goal  Outcome: Progressing       Patient is not progressing towards the following goals:

## 2023-12-16 NOTE — PROGRESS NOTES
Patient discharged home with follow up instructions. Discharge information and education provided by this RN, all questions answered. PIV removed. All belongings returned.

## 2023-12-16 NOTE — ASSESSMENT & PLAN NOTE
C/w CPAP  Reported having some issues recently with using CPAP  In proper fitment or setting may be triggering erratic BP

## 2023-12-16 NOTE — ASSESSMENT & PLAN NOTE
Heart Score - moderate   Atypical symptoms  Chest pain order set in place  Will trend trop and EKG   NST in AM  ASA daily and statin    Recent echo 9/2023 reviewed   Meanwhile, supportive care and nitro prn

## 2023-12-18 ENCOUNTER — OFFICE VISIT (OUTPATIENT)
Dept: MEDICAL GROUP | Facility: PHYSICIAN GROUP | Age: 78
End: 2023-12-18
Payer: MEDICARE

## 2023-12-18 ENCOUNTER — PATIENT OUTREACH (OUTPATIENT)
Dept: MEDICAL GROUP | Facility: PHYSICIAN GROUP | Age: 78
End: 2023-12-18

## 2023-12-18 VITALS
WEIGHT: 193.6 LBS | DIASTOLIC BLOOD PRESSURE: 62 MMHG | TEMPERATURE: 98.1 F | BODY MASS INDEX: 24.85 KG/M2 | HEIGHT: 74 IN | HEART RATE: 68 BPM | OXYGEN SATURATION: 93 % | SYSTOLIC BLOOD PRESSURE: 112 MMHG

## 2023-12-18 DIAGNOSIS — I10 HYPERTENSION, UNSPECIFIED TYPE: Primary | ICD-10-CM

## 2023-12-18 DIAGNOSIS — R06.02 SHORTNESS OF BREATH: ICD-10-CM

## 2023-12-18 DIAGNOSIS — R07.89 OTHER CHEST PAIN: ICD-10-CM

## 2023-12-18 PROCEDURE — 3078F DIAST BP <80 MM HG: CPT | Performed by: FAMILY MEDICINE

## 2023-12-18 PROCEDURE — 99214 OFFICE O/P EST MOD 30 MIN: CPT | Performed by: FAMILY MEDICINE

## 2023-12-18 PROCEDURE — 3074F SYST BP LT 130 MM HG: CPT | Performed by: FAMILY MEDICINE

## 2023-12-18 ASSESSMENT — FIBROSIS 4 INDEX: FIB4 SCORE: 1.38

## 2023-12-18 NOTE — PROGRESS NOTES
Pt already seen by pcp s/p hospital discharge today, 12/18/23 @1040. No call by rn for tcm is required.

## 2023-12-18 NOTE — PROGRESS NOTES
"Subjective:     CC: BP, chest pain, SOB    HPI:   Boo presents today with    Problem   Chest Pain    Getting episodes of chest pain/pressure  3-4 days/week in the last month  Associated with SOB  Recently admitted, stress test negative, troponins x3     Shortness of Breath    Onset: , worse in last month  Quality: shortness of breath, feels more labored  Duration: intermittent, sometimes noted more and sometimes noted less  Associated symptoms: no wheezing, no palpitations    Can occur just with walking around when it didn't before.  Used to do more cardio but stopped ~2 years prior to onset of SOB.    Rest does not always improve the symptoms. He has noticed SOB at rest when having chest prssure.     3/2022: PFTs wnl   12/15/23: stress echo wnl, CXR wnl    Appts with cardiology and pulmonology in 2 weeks     Prehypertension    Current med: metoprolol SR 25 mg daily  Home los-170s/70s-100s (avg 146/88)    Position  Cuff: arm  Seated upright: yes  Back supported: yes  Feet flat: yes  Cuff at heart height: yes  Wait 3-5 min: yes     Primary Hypertension (Resolved)       Health Maintenance: Completed    ROS:  See HPI    Objective:     Exam:  /62 (BP Location: Left arm, Patient Position: Sitting, BP Cuff Size: Adult)   Pulse 68   Temp 36.7 °C (98.1 °F) (Temporal)   Ht 1.88 m (6' 2\")   Wt 87.8 kg (193 lb 9.6 oz)   SpO2 93%   BMI 24.86 kg/m²  Body mass index is 24.86 kg/m².    Physical Exam  Constitutional:       Appearance: Normal appearance.   Cardiovascular:      Rate and Rhythm: Normal rate and regular rhythm.      Heart sounds: Normal heart sounds.   Pulmonary:      Effort: Pulmonary effort is normal.      Breath sounds: Normal breath sounds.   Musculoskeletal:      Cervical back: Normal range of motion and neck supple.   Neurological:      Mental Status: He is alert.               Assessment & Plan:     78 y.o. male with the following -     1. Hypertension, unspecified type  New diagnosis.  He " has had a history of prehypertension and has been monitoring his blood pressure at home and has been getting elevated.  Blood pressure at home is on average above 140/90.  Blood pressure appears well-controlled in the office today.  I did recommend starting an ACE inhibitor but he would rather not start until I discuss it with his cardiologist first.  So for now he is going to continue the metoprolol that was for his palpitations and I will follow-up with him when I hear back from his cardiologist.  He will continue to check his blood pressure at home regularly.    2. Other chest pain  Chronic, uncontrolled.  For last month has been getting frequent episodes of chest pressure and pain.  He seen cardiology and they ordered an outpatient stress test for atypical chest pain.  However, he ended getting severe chest pain and was admitted and had a stress test inpatient and it was negative.  Troponins were also negative.  He has appoint with cardiology in 2 weeks.  Today I reassured him that likely the chest pain has nothing to do with his heart based on the stress test.  I have encouraged her to follow-up with cardiology as directed.    3. Shortness of breath  Chronic, uncontrolled.  Since approximately 2020 he has been noticing episodes of shortness of breath.  The episodes can occur with exertion or at rest.  He cannot specifically state if the shortness of breath at rest only occurs with the chest pressure/plane or feel get the shortness of breath without chest pressure/pain.  He also notes that when he is having shortness of breath at rest his blood pressures are elevated but again he cannot specify if the shortness of breath is what he notices first and then checks his blood pressure or if the blood pressure is elevated and then he becomes short of breath.  We have pulmonary function test from March, 2022 that were normal.  Since is unlikely this is secondary to the cardiac system based on his stress test we will  send him for repeat pulmonary function test and a referral to pulmonology has been placed.  In the meantime, I recommended he keep a detailed symptom journal as this may help us isolate patterns of his symptoms.  - PULMONARY FUNCTION TESTS -Test requested: Complete Pulmonary Function Test; Include MIPS/MEPS? No; Future  - Referral to Pulmonary and Sleep Medicine    Return in about 6 weeks (around 1/29/2024) for f/u BP, symptom log.    Please note that this dictation was created using voice recognition software. I have made every reasonable attempt to correct obvious errors, but I expect that there are errors of grammar and possibly content that I did not discover before finalizing the note.

## 2023-12-19 ENCOUNTER — NON-PROVIDER VISIT (OUTPATIENT)
Dept: SLEEP MEDICINE | Facility: MEDICAL CENTER | Age: 78
End: 2023-12-19
Attending: FAMILY MEDICINE
Payer: MEDICARE

## 2023-12-19 VITALS — WEIGHT: 191 LBS | HEIGHT: 74 IN | BODY MASS INDEX: 24.51 KG/M2

## 2023-12-19 DIAGNOSIS — R06.02 SHORTNESS OF BREATH: ICD-10-CM

## 2023-12-19 PROCEDURE — 94726 PLETHYSMOGRAPHY LUNG VOLUMES: CPT | Performed by: FAMILY MEDICINE

## 2023-12-19 PROCEDURE — 94060 EVALUATION OF WHEEZING: CPT | Performed by: FAMILY MEDICINE

## 2023-12-19 PROCEDURE — 94729 DIFFUSING CAPACITY: CPT | Performed by: FAMILY MEDICINE

## 2023-12-19 PROCEDURE — 94060 EVALUATION OF WHEEZING: CPT | Mod: 26 | Performed by: INTERNAL MEDICINE

## 2023-12-19 PROCEDURE — 94726 PLETHYSMOGRAPHY LUNG VOLUMES: CPT | Mod: 26 | Performed by: INTERNAL MEDICINE

## 2023-12-19 PROCEDURE — 94729 DIFFUSING CAPACITY: CPT | Mod: 26 | Performed by: INTERNAL MEDICINE

## 2023-12-19 RX ORDER — LISINOPRIL 10 MG/1
10 TABLET ORAL DAILY
Qty: 90 TABLET | Refills: 0 | Status: SHIPPED | OUTPATIENT
Start: 2023-12-19 | End: 2024-03-05

## 2023-12-19 ASSESSMENT — PULMONARY FUNCTION TESTS
FEV1: 3.86
FEV1: 3.8
FVC_PERCENT_PREDICTED: 111
FEV1/FVC_PERCENT_PREDICTED: 100
FEV1_PERCENT_CHANGE: 0
FVC_LLN: 3.79
FEV1/FVC: 76.44
FEV1/FVC: 75
FEV1/FVC_PERCENT_PREDICTED: 74
FEV1_PERCENT_PREDICTED: 115
FEV1_PERCENT_CHANGE: 1
FEV1/FVC_PERCENT_CHANGE: 1
FVC_PREDICTED: 4.54
FVC: 5.07
FVC_PERCENT_PREDICTED: 111
FEV1/FVC_PERCENT_PREDICTED: 104
FEV1_LLN: 2.79
FEV1/FVC: 76
FEV1/FVC: 75
FEV1/FVC_PREDICTED: 74
FVC: 5.05
FEV1_PERCENT_PREDICTED: 113
FEV1/FVC_PERCENT_PREDICTED: 102
FEV1_PREDICTED: 3.34
FEV1/FVC_PERCENT_PREDICTED: 102
FEV1/FVC_PERCENT_LLN: 62

## 2023-12-19 ASSESSMENT — FIBROSIS 4 INDEX: FIB4 SCORE: 1.38

## 2023-12-19 NOTE — PROCEDURES
Technician: Izabela Ambrosio RRT, CPFT  Good patient effort & cooperation.  The results of this test meet the ATS/ERS standards for acceptability & reproducibility.  Test was performed on the [employer] Body Plethysmograph-Elite DX system.  Predicted equations for Spirometry are GLI-2012, ITS for lung volumes, and GLI-2017 for DLCO.  The DLCO was uncorrected for Hgb.  A bronchodilator of Ventolin HFA -2puffs via spacer administered.  DLCO performed during dilation period.    Interpretation:  There is no significant obstructive ventilatory defect on spirometry.  There is no significant response to bronchodilators.  This does not rule out transient obstructive airway disorders such as asthma/reactive airway disease.    Lung volumes are normal.    Diffusion capacity is preserved.    Flow volume loop is consistent with the above interpretation.    Compared to prior testing in 2022, FEV1 has improved from 3.58 L to 3.80 L, 113% predicted.    IGarrison M.D. am the author of this note (PFT interpretation).  __________  Garrison Monique MD  Pulmonary and Critical Care Medicine  [facility]

## 2023-12-23 ENCOUNTER — PATIENT MESSAGE (OUTPATIENT)
Dept: SLEEP MEDICINE | Facility: MEDICAL CENTER | Age: 78
End: 2023-12-23
Payer: MEDICARE

## 2024-01-01 ASSESSMENT — ENCOUNTER SYMPTOMS
CHEST TIGHTNESS: 1
HEMOPTYSIS: 0
RESPIRATORY SYMPTOMS COMMENTS: NO
DYSPNEA AT REST: 1
SHORTNESS OF BREATH: 1
WHEEZING: 0

## 2024-01-02 ENCOUNTER — OFFICE VISIT (OUTPATIENT)
Dept: CARDIOLOGY | Facility: MEDICAL CENTER | Age: 79
End: 2024-01-02
Attending: EMERGENCY MEDICINE
Payer: MEDICARE

## 2024-01-02 VITALS
HEART RATE: 86 BPM | DIASTOLIC BLOOD PRESSURE: 66 MMHG | HEIGHT: 74 IN | SYSTOLIC BLOOD PRESSURE: 110 MMHG | WEIGHT: 197 LBS | OXYGEN SATURATION: 96 % | BODY MASS INDEX: 25.28 KG/M2 | RESPIRATION RATE: 14 BRPM

## 2024-01-02 DIAGNOSIS — Z86.79 HISTORY OF ATRIAL FLUTTER: ICD-10-CM

## 2024-01-02 DIAGNOSIS — Z86.79 S/P CATHETER ABLATION OF SLOW PATHWAY: ICD-10-CM

## 2024-01-02 DIAGNOSIS — Z86.79 S/P ABLATION OF ATRIAL FLUTTER: ICD-10-CM

## 2024-01-02 DIAGNOSIS — E78.2 MIXED HYPERLIPIDEMIA: ICD-10-CM

## 2024-01-02 DIAGNOSIS — Z98.890 S/P ABLATION OF ATRIAL FLUTTER: ICD-10-CM

## 2024-01-02 DIAGNOSIS — I49.3 PVC (PREMATURE VENTRICULAR CONTRACTION): ICD-10-CM

## 2024-01-02 DIAGNOSIS — Z98.890 S/P CATHETER ABLATION OF SLOW PATHWAY: ICD-10-CM

## 2024-01-02 LAB — EKG IMPRESSION: NORMAL

## 2024-01-02 PROCEDURE — 93005 ELECTROCARDIOGRAM TRACING: CPT | Performed by: INTERNAL MEDICINE

## 2024-01-02 PROCEDURE — 3074F SYST BP LT 130 MM HG: CPT | Performed by: INTERNAL MEDICINE

## 2024-01-02 PROCEDURE — 3078F DIAST BP <80 MM HG: CPT | Performed by: INTERNAL MEDICINE

## 2024-01-02 PROCEDURE — 93010 ELECTROCARDIOGRAM REPORT: CPT | Performed by: INTERNAL MEDICINE

## 2024-01-02 PROCEDURE — 99214 OFFICE O/P EST MOD 30 MIN: CPT | Performed by: INTERNAL MEDICINE

## 2024-01-02 PROCEDURE — 99212 OFFICE O/P EST SF 10 MIN: CPT | Performed by: INTERNAL MEDICINE

## 2024-01-02 ASSESSMENT — FIBROSIS 4 INDEX: FIB4 SCORE: 1.38

## 2024-01-02 NOTE — PROGRESS NOTES
"Chief Complaint   Patient presents with    Premature Ventricular Contractions (PVCs)       Subjective     Boo Cavanaugh Jr. is a 78 y.o. male who presents today with recent hospitalization for chest pain negative troponins.  .  Negative stress imaging.  Continues to have some intermittent chest pressure.  Seeing pulmonary tomorrow.  No arrhythmias.  On hypertension medications.  On statins.  On aspirin.    Past Medical History:   Diagnosis Date    Arrhythmia     \"flutter\", Dr. Mendoza cardiologist    Arthritis     left thumb    Breath shortness     Cancer (HCC)     Cardiac arrhythmia     Enlarged prostate with urinary obstruction     GERD (gastroesophageal reflux disease)     Hyperlipidemia     Hypertension     OA (osteoarthritis) of knee     bilateral     Restless leg syndrome     Tonsillitis     Urinary bladder disorder 12/06/2017    savage in place     Past Surgical History:   Procedure Laterality Date    TURP-VAPOR  12/07/2017    Procedure: TURP-VAPOR - GREEN LIGHT PHOTOSELECTIVE;  Surgeon: Cristofer Cr M.D.;  Location: SURGERY [facility];  Service: Urology    GANGLION EXCISION Right 1966    wrist    APPENDECTOMY      OPEN REDUCTION      PROSTATECTOMY, RADICAL RETRO      TONSILLECTOMY      as a child    VASECTOMY       Family History   Problem Relation Age of Onset    Heart Disease Mother 65        MI    Cancer Father     Cancer Brother 50        prostate cancer    Stroke Brother     Stroke Brother     Diabetes Neg Hx     Hyperlipidemia Neg Hx     Alcohol/Drug Neg Hx     Ovarian Cancer Neg Hx     Tubal Cancer Neg Hx     Peritoneal Cancer Neg Hx     Colorectal Cancer Neg Hx     Breast Cancer Neg Hx      Social History     Socioeconomic History    Marital status: Single     Spouse name: Not on file    Number of children: Not on file    Years of education: Not on file    Highest education level: Master's degree (e.g., MA, MS, Latha, MEd, MSW, ABAD)   Occupational History    Not on file   Tobacco Use    " Smoking status: Never     Passive exposure: Past (BOTH parents smoked)    Smokeless tobacco: Never   Vaping Use    Vaping Use: Never used   Substance and Sexual Activity    Alcohol use: Not Currently    Drug use: Not Currently     Types: Marijuana    Sexual activity: Not Currently     Comment: 2 daughters    Other Topics Concern    Not on file   Social History Narrative    Not on file     Social Determinants of Health     Financial Resource Strain: Low Risk  (12/16/2023)    Overall Financial Resource Strain (CARDIA)     Difficulty of Paying Living Expenses: Not hard at all   Food Insecurity: No Food Insecurity (12/16/2023)    Hunger Vital Sign     Worried About Running Out of Food in the Last Year: Never true     Ran Out of Food in the Last Year: Never true   Transportation Needs: No Transportation Needs (12/16/2023)    PRAPARE - Transportation     Lack of Transportation (Medical): No     Lack of Transportation (Non-Medical): No   Physical Activity: Inactive (12/16/2023)    Exercise Vital Sign     Days of Exercise per Week: 0 days     Minutes of Exercise per Session: 0 min   Stress: No Stress Concern Present (12/16/2023)    [place] [employer] - Occupational Stress Questionnaire     Feeling of Stress : Only a little   Social Connections: Socially Isolated (12/16/2023)    Social Connection and Isolation Panel [NHANES]     Frequency of Communication with Friends and Family: More than three times a week     Frequency of Social Gatherings with Friends and Family: More than three times a week     Attends Mandaen Services: Never     Active Member of Clubs or Organizations: No     Attends Club or Organization Meetings: Never     Marital Status:    Intimate Partner Violence: Not on file   Housing Stability: Low Risk  (12/16/2023)    Housing Stability Vital Sign     Unable to Pay for Housing in the Last Year: No     Number of Places Lived in the Last Year: 1     Unstable Housing in the Last  "Year: No     Allergies   Allergen Reactions    Penicillins Rash     Rash as a child.      Outpatient Encounter Medications as of 1/2/2024   Medication Sig Dispense Refill    lisinopril (PRINIVIL) 10 MG Tab Take 1 Tablet by mouth every day. 90 Tablet 0    metoprolol SR (TOPROL XL) 25 MG TABLET SR 24 HR TAKE ONE TABLET BY MOUTH EVERY DAY 90 Tablet 0    rosuvastatin (CRESTOR) 20 MG Tab Take 1 Tablet by mouth every day. 90 Tablet 3    finasteride (PROSCAR) 5 MG Tab Take 1 Tablet by mouth every day.      triamcinolone acetonide (KENALOG) 0.1 % Cream Apply 1 Application topically 2 times a day as needed (Eczema).      ciclopirox (PENLAC) 8 % solution Apply 1 Application topically two times a week. Apply to affected toenails on both feet.      aspirin 81 MG EC tablet Take 81 mg by mouth every day.       No facility-administered encounter medications on file as of 1/2/2024.     ROS           Objective     /66 (BP Location: Left arm, Patient Position: Sitting, BP Cuff Size: Adult)   Pulse 86   Resp 14   Ht 1.88 m (6' 2\")   Wt 89.4 kg (197 lb)   SpO2 96%   BMI 25.29 kg/m²     Physical Exam           Assessment & Plan     1. PVC (premature ventricular contraction)  EKG      2. S/P catheter ablation of slow pathway        3. S/P ablation of atrial flutter        4. Mixed hyperlipidemia        5. History of atrial flutter            Medical Decision Making: Today's Assessment/Status/Plan:   1.  Chest pain negative workup.  Could consider angiogram if continues to have chest discomfort.  2.  Atrial arrhythmias no recurrence.  3.  Continue statins and aspirin.  4.  Continue blood pressure medications.  5.  Follow-up in 6 months.                     "

## 2024-01-02 NOTE — PROGRESS NOTES
CC: Follow-up for SHERRY on APAP 5-15 CWP        HPI:  Boo Cavanaugh Jr. is a 78 y.o.male  kindly referred by Jerica Coronado M.D. and last seen by THEODORA Mcgovern on 7/7/2023 when his compliance was appropriate and his AHI normalized at 3.  He was advised to return in a year but is going back sooner because his AHI has often exceeded 5 and sometimes has been as high as 12.    Today, 1/3/2024, his 30-day compliance was 96.7% for 6 hours and 47 minutes on days used.  On APAP 5-15 CWP his average residual estimated apnea-hypopnea index is 5.0.  His auto CPAP mean pressure 6.0 and his auto CPAP peak pressure is 11.6 cm water.  A detailed view shows that on nights where his AHI is greater than 5 he often has more central events.  Most nights he does not have that many central events however.    He wakes up at 3 to 4 AM he may read until he gets sleepy again.  He has noted though that he has a much higher AHI under the circumstances.  It is likely that he is experiencing more REM sleep early in the morning with a consequent higher AHI.    Home sleep study from 12/13/18 indicated mild sleep apnea with an RDI of 8.5, a supine RAJEEV 18.2, a jason O2 sat of 85%, avg O2 was 91 % and baseline O2 at 95 %. O2 sat was below 89% for 9 min of the flow evaluation time. Avg HR 60 BPM.     The patient reports improved symptoms using positive airway pressure.  Has experienced no significant problems with claustrophobia, nosebleeds, sore throat, dry eyes, mask fit, air leaks around the mask, pressure tolerance, excessive airway dryness, dry or runny nose, nasal congestion, facial irritation, aerophagia, or chest pain.     Past medical history significant for elevated coronary artery calcium score 2019, never smoker, TMJ syndrome, dyslipidemia, status post ablation of atrial flutter, status post catheter ablation of slow pathway, BPH, recent several ER visits for chest pain and erratic blood pressure, and hypertension      Patient  "Active Problem List    Diagnosis Date Noted    Chest pain 12/15/2023    Callus of foot 04/01/2022    Shortness of breath 02/17/2022    Crackling sound in left ear 02/17/2022    TMJ syndrome 02/17/2022    Back pain 01/07/2022    Hyperlipidemia     Pain of left hip joint 01/07/2020    History of skin cancer 11/13/2019    Primary osteoarthritis involving multiple joints 03/12/2019    S/P ablation of atrial flutter 01/15/2019    S/P catheter ablation of slow pathway 01/15/2019    Prehypertension 01/08/2019    SHERRY (obstructive sleep apnea) 11/29/2018    PVC (premature ventricular contraction) 07/09/2018    Benign prostatic hyperplasia without lower urinary tract symptoms 11/15/2017    History of atrial flutter 11/11/2017       Past Medical History:   Diagnosis Date    Arrhythmia     \"flutter\", Dr. Mendoza cardiologist    Arthritis     left thumb    Breath shortness     Cancer (HCC)     Cardiac arrhythmia     Enlarged prostate with urinary obstruction     GERD (gastroesophageal reflux disease)     Hyperlipidemia     Hypertension     OA (osteoarthritis) of knee     bilateral     Restless leg syndrome     Tonsillitis     Urinary bladder disorder 12/06/2017    savage in place        Past Surgical History:   Procedure Laterality Date    TURP-VAPOR  12/07/2017    Procedure: TURP-VAPOR - GREEN LIGHT PHOTOSELECTIVE;  Surgeon: Cristofer Cr M.D.;  Location: SURGERY [facility];  Service: Urology    GANGLION EXCISION Right 1966    wrist    APPENDECTOMY      OPEN REDUCTION      PROSTATECTOMY, RADICAL RETRO      TONSILLECTOMY      as a child    VASECTOMY         Family History   Problem Relation Age of Onset    Heart Disease Mother 65        MI    Cancer Father     Cancer Brother 50        prostate cancer    Stroke Brother     Stroke Brother     Diabetes Neg Hx     Hyperlipidemia Neg Hx     Alcohol/Drug Neg Hx     Ovarian Cancer Neg Hx     Tubal Cancer Neg Hx     Peritoneal Cancer Neg Hx     Colorectal Cancer Neg Hx     Breast " Cancer Neg Hx        Social History     Socioeconomic History    Marital status: Single     Spouse name: Not on file    Number of children: Not on file    Years of education: Not on file    Highest education level: Master's degree (e.g., MA, MS, Latha, MEd, MSW, ABAD)   Occupational History    Not on file   Tobacco Use    Smoking status: Never     Passive exposure: Past (BOTH parents smoked)    Smokeless tobacco: Never   Vaping Use    Vaping Use: Never used   Substance and Sexual Activity    Alcohol use: Not Currently    Drug use: Not Currently     Types: Marijuana    Sexual activity: Not Currently     Comment: 2 daughters    Other Topics Concern    Not on file   Social History Narrative    Not on file     Social Determinants of Health     Financial Resource Strain: Low Risk  (12/16/2023)    Overall Financial Resource Strain (CARDIA)     Difficulty of Paying Living Expenses: Not hard at all   Food Insecurity: No Food Insecurity (12/16/2023)    Hunger Vital Sign     Worried About Running Out of Food in the Last Year: Never true     Ran Out of Food in the Last Year: Never true   Transportation Needs: No Transportation Needs (12/16/2023)    PRAPARE - Transportation     Lack of Transportation (Medical): No     Lack of Transportation (Non-Medical): No   Physical Activity: Inactive (12/16/2023)    Exercise Vital Sign     Days of Exercise per Week: 0 days     Minutes of Exercise per Session: 0 min   Stress: No Stress Concern Present (12/16/2023)    [place] [employer]ational [employer] - Occupational Stress Questionnaire     Feeling of Stress : Only a little   Social Connections: Socially Isolated (12/16/2023)    Social Connection and Isolation Panel [NHANES]     Frequency of Communication with Friends and Family: More than three times a week     Frequency of Social Gatherings with Friends and Family: More than three times a week     Attends Hinduism Services: Never     Active Member of Clubs or Organizations: No      "Attends Club or Organization Meetings: Never     Marital Status:    Intimate Partner Violence: Not on file   Housing Stability: Low Risk  (12/16/2023)    Housing Stability Vital Sign     Unable to Pay for Housing in the Last Year: No     Number of Places Lived in the Last Year: 1     Unstable Housing in the Last Year: No       Current Outpatient Medications   Medication Sig Dispense Refill    lisinopril (PRINIVIL) 10 MG Tab Take 1 Tablet by mouth every day. 90 Tablet 0    metoprolol SR (TOPROL XL) 25 MG TABLET SR 24 HR TAKE ONE TABLET BY MOUTH EVERY DAY 90 Tablet 0    rosuvastatin (CRESTOR) 20 MG Tab Take 1 Tablet by mouth every day. 90 Tablet 3    finasteride (PROSCAR) 5 MG Tab Take 1 Tablet by mouth every day.      triamcinolone acetonide (KENALOG) 0.1 % Cream Apply 1 Application topically 2 times a day as needed (Eczema).      ciclopirox (PENLAC) 8 % solution Apply 1 Application topically two times a week. Apply to affected toenails on both feet.      aspirin 81 MG EC tablet Take 81 mg by mouth every day.       No current facility-administered medications for this visit.    \"CURRENT RX\"    ALLERGIES: Penicillins    ROS    Constitutional: Denies fever, chills, sweats,  weight loss, fatigue  Cardiovascular: Denies chest pain, tightness, palpitations, swelling in legs/feet, fainting, difficulty breathing when lying down but gets better when sitting up.   Respiratory: Denies shortness of breath, cough, sputum, wheezing, painful breathing, coughing up blood.   Sleep: per HPI  Gastrointestinal: Denies  difficulty swallowing, nausea, abdominal pain, diarrhea, constipation, heartburn.  Musculoskeletal: Denies painful joints, sore muscles, back pain.        PHYSICAL EXAM      /82 (BP Location: Left arm, Patient Position: Sitting, BP Cuff Size: Adult)   Pulse 72   Resp 14   Ht 1.88 m (6' 2\")   Wt 87.1 kg (192 lb)   SpO2 95%   BMI 24.65 kg/m²   Appearance: Well-nourished, well-developed, no acute " distress  Eyes:  PERRLA, EOMI  ENMT: without change  Neck: Supple, trachea midline, no masses  Respiratory effort:  No intercostal retractions or use of accessory muscles  Lung auscultation:  No wheezes rhonchi rubs or rales  Cardiac: No murmurs, rubs, or gallops; regular rhythm, normal rate; no edema  Abdomen:  No tenderness, no organomegaly.  Musculoskeletal:  Grossly normal; gait and station normal; digits and nails normal  Skin:  No rashes, petechiae, cyanosis  Neurologic: without focal signs; oriented to person, time, place, and purpose; judgement intact  Psychiatric:  No depression, anxiety, agitation      Medical Decision Making       The medical record was reviewed in its entirety including the referral notes, records from primary care, consultants notes, hospital records, lab, imaging, microbiology, immunology, and immunizations.  Care gaps identified and reviewed with the patient.    Diagnostic and titration nocturnal polysomnogram's, home sleep apnea tests, continuous nocturnal oximetry results, multiple sleep latency tests, and compliance reports reviewed.  1. SHERRY (obstructive sleep apnea)  - DME Mask and Supplies      PLAN:   Has been advised to continue the current APAP 5-15 CWP, clean equipment frequently, and get new mask and supplies as allowed by insurance and DME. Advised about potential problems including nasal obstruction/stuffiness, mask leak or discomfort , frequent awakenings, chill or dryness of the upper airway, noise, inconvenience, and lack of benefit. Recommend an earlier appointment, if significant treatment barriers develop.    The risks of untreated sleep apnea were discussed with the patient at length. Patients with SHERRY are at increased risk of cardiovascular disease including systemic arterial hypertension, pulmonary arterial hypertension (transient or fixed), TIA, and an elevated risk of stroke, heart attack, sudden death, or arrhythmia between the hours of 11 PM and 6 AM. SHERRY  patients have an increased risk of motor vehicle accidents, type 2 diabetes, GERD, repetitive mechanical trauma to pharyngeal muscles with inflammation and denervation, frequent EEG arousals leading to nonrestorative sleep, excessive daytime sleepiness, fatigue, depression, poor pain control, irritability, and lower quality of life.  The patient was advised to avoid driving a motor vehicle when drowsy.    Positive airway pressure will favorably impact many of the adverse conditions and effects provoked by SHERRY.    Have advised the patient to follow up with the appropriate healthcare practitioners for all other medical problems and issues.    Return in about 1 year (around 1/3/2025).    Total time 30 minutes

## 2024-01-03 ENCOUNTER — OFFICE VISIT (OUTPATIENT)
Dept: SLEEP MEDICINE | Facility: MEDICAL CENTER | Age: 79
End: 2024-01-03
Attending: INTERNAL MEDICINE
Payer: MEDICARE

## 2024-01-03 VITALS
WEIGHT: 192 LBS | RESPIRATION RATE: 14 BRPM | BODY MASS INDEX: 24.64 KG/M2 | HEART RATE: 72 BPM | HEIGHT: 74 IN | DIASTOLIC BLOOD PRESSURE: 82 MMHG | OXYGEN SATURATION: 95 % | SYSTOLIC BLOOD PRESSURE: 124 MMHG

## 2024-01-03 DIAGNOSIS — G47.33 OSA (OBSTRUCTIVE SLEEP APNEA): ICD-10-CM

## 2024-01-03 PROCEDURE — 99214 OFFICE O/P EST MOD 30 MIN: CPT | Performed by: INTERNAL MEDICINE

## 2024-01-03 PROCEDURE — 99213 OFFICE O/P EST LOW 20 MIN: CPT | Performed by: INTERNAL MEDICINE

## 2024-01-03 PROCEDURE — 3074F SYST BP LT 130 MM HG: CPT | Performed by: INTERNAL MEDICINE

## 2024-01-03 PROCEDURE — 3079F DIAST BP 80-89 MM HG: CPT | Performed by: INTERNAL MEDICINE

## 2024-01-03 ASSESSMENT — PATIENT HEALTH QUESTIONNAIRE - PHQ9: CLINICAL INTERPRETATION OF PHQ2 SCORE: 0

## 2024-01-03 ASSESSMENT — FIBROSIS 4 INDEX: FIB4 SCORE: 1.38

## 2024-01-04 ENCOUNTER — OFFICE VISIT (OUTPATIENT)
Dept: SLEEP MEDICINE | Facility: MEDICAL CENTER | Age: 79
End: 2024-01-04
Attending: FAMILY MEDICINE
Payer: MEDICARE

## 2024-01-04 ENCOUNTER — APPOINTMENT (OUTPATIENT)
Dept: SLEEP MEDICINE | Facility: MEDICAL CENTER | Age: 79
End: 2024-01-04
Attending: PHYSICIAN ASSISTANT
Payer: MEDICARE

## 2024-01-04 VITALS
HEIGHT: 74 IN | SYSTOLIC BLOOD PRESSURE: 122 MMHG | OXYGEN SATURATION: 96 % | HEART RATE: 71 BPM | BODY MASS INDEX: 24.64 KG/M2 | DIASTOLIC BLOOD PRESSURE: 58 MMHG | WEIGHT: 192 LBS

## 2024-01-04 DIAGNOSIS — R53.83 OTHER FATIGUE: ICD-10-CM

## 2024-01-04 DIAGNOSIS — R06.02 SOB (SHORTNESS OF BREATH): ICD-10-CM

## 2024-01-04 PROCEDURE — 99212 OFFICE O/P EST SF 10 MIN: CPT | Performed by: INTERNAL MEDICINE

## 2024-01-04 PROCEDURE — 99214 OFFICE O/P EST MOD 30 MIN: CPT | Performed by: INTERNAL MEDICINE

## 2024-01-04 PROCEDURE — 3078F DIAST BP <80 MM HG: CPT | Performed by: INTERNAL MEDICINE

## 2024-01-04 PROCEDURE — 3074F SYST BP LT 130 MM HG: CPT | Performed by: INTERNAL MEDICINE

## 2024-01-04 ASSESSMENT — ENCOUNTER SYMPTOMS
CLAUDICATION: 0
HEADACHES: 0
DIARRHEA: 0
HEMOPTYSIS: 0
PND: 0
SPUTUM PRODUCTION: 0
EYE REDNESS: 0
NAUSEA: 0
PHOTOPHOBIA: 0
SPEECH CHANGE: 0
FOCAL WEAKNESS: 0
CONSTIPATION: 0
HEARTBURN: 0
WHEEZING: 0
MYALGIAS: 0
DIAPHORESIS: 0
PALPITATIONS: 0
ORTHOPNEA: 0
FEVER: 0
EYE DISCHARGE: 0
COUGH: 0
ABDOMINAL PAIN: 0
TREMORS: 0
BACK PAIN: 0
NECK PAIN: 0
WEAKNESS: 0
SHORTNESS OF BREATH: 1
EYE PAIN: 0
CHILLS: 0
SINUS PAIN: 0
BLURRED VISION: 0
DEPRESSION: 0
FALLS: 0
SORE THROAT: 0
WEIGHT LOSS: 0
STRIDOR: 0
DOUBLE VISION: 0
DIZZINESS: 0
VOMITING: 0

## 2024-01-04 ASSESSMENT — FIBROSIS 4 INDEX: FIB4 SCORE: 1.38

## 2024-01-04 NOTE — PROGRESS NOTES
Chief Complaint   Patient presents with    New Patient     REF BY DR. WISEMAN FOR SOB    Results     PFT 12/19/23, 3/16/22  CXR 12/15/23, 10/10/23  CT CHEST 3/9/22           HPI: This patient is a 78 y.o. male senting for evaluation of shortness of breath.  The patient's past medical history significant for history of atrial flutter status post ablation on low-dose beta-blockade and followed by cardiology, hypertension well-controlled after recent addition of lisinopril to beta-blocker, dyslipidemia on statin therapy and obstructive sleep apnea on CPAP therapy followed by our sleep medicine clinic.  He has degenerative joint disease complicated by bilateral knee pain which limits his level of activity.  He is a lifelong non-smoker.  He is retired from a career in mental health as of 2020.  His father suffered from cancer he believes which began in the bladder and he has a brother who suffered from prostate cancer.  The patient's recent symptoms began at the end of September early October when he began experiencing shortness of breath with associated chest pressure and was actually seen in the emergency department in October noted to have mildly elevated blood pressure, clear chest x-ray at the time.  Symptoms worsened over Thanksgiving and he was seen again in the emergency department on December 15 where he was admitted and ruled out for ischemic heart disease with nuclear stress test.  Labs were notable only for mild hyponatremia with a sodium of 133 and elevated glucose but this was not fasting.  Pulmonary function testing from December 19 showed normal airflows with normal lung volumes and normal DLCO.  Chest x-ray again on December 15 was clear.  CT chest from March 2022 showed no concerning lung nodules or lymphadenopathy.  He has some areas of atelectasis but no parenchymal lung disease.  No recent echo but nuclear stress test done on December 18 as per above showed no evidence of ischemia with normal left  "ventricular size and function.  ECG at hospital admission showed normal sinus rhythm and he has seen cardiology since his hospital stay.  Overall symptoms have improved with better blood pressure control but he reports waking up daily feeling poorly all over.  He reports some stuffiness in his head for which she has tried some intranasal therapies in addition to shortness of breath and just general fatigue with lack of energy.  No cough.  No wheezing.  Recent compliance data from his CPAP machine dating 12/4/2023 to 1/2/2024 shows excellent compliance of greater than 90% with average CPAP pressure of 6 cm of water and average AHI of 5.    Past Medical History:   Diagnosis Date    Arrhythmia     \"flutter\", Dr. Mendoza cardiologist    Arthritis     left thumb    Breath shortness     Cancer (HCC)     Cardiac arrhythmia     Enlarged prostate with urinary obstruction     GERD (gastroesophageal reflux disease)     Hyperlipidemia     Hypertension     OA (osteoarthritis) of knee     bilateral     Restless leg syndrome     Tonsillitis     Urinary bladder disorder 12/06/2017    savage in place       Social History     Socioeconomic History    Marital status: Single     Spouse name: Not on file    Number of children: Not on file    Years of education: Not on file    Highest education level: Master's degree (e.g., MA, MS, Latha, MEd, MSW, ABAD)   Occupational History    Not on file   Tobacco Use    Smoking status: Never     Passive exposure: Past (BOTH parents smoked)    Smokeless tobacco: Never   Vaping Use    Vaping Use: Never used   Substance and Sexual Activity    Alcohol use: Not Currently    Drug use: Not Currently     Types: Marijuana    Sexual activity: Not Currently     Comment: 2 daughters    Other Topics Concern    Not on file   Social History Narrative    Not on file     Social Determinants of Health     Financial Resource Strain: Low Risk  (12/16/2023)    Overall Financial Resource Strain (CARDIA)     Difficulty of " Paying Living Expenses: Not hard at all   Food Insecurity: No Food Insecurity (12/16/2023)    Hunger Vital Sign     Worried About Running Out of Food in the Last Year: Never true     Ran Out of Food in the Last Year: Never true   Transportation Needs: No Transportation Needs (12/16/2023)    PRAPARE - Transportation     Lack of Transportation (Medical): No     Lack of Transportation (Non-Medical): No   Physical Activity: Inactive (12/16/2023)    Exercise Vital Sign     Days of Exercise per Week: 0 days     Minutes of Exercise per Session: 0 min   Stress: No Stress Concern Present (12/16/2023)    [employer] - Occupational Stress Questionnaire     Feeling of Stress : Only a little   Social Connections: Socially Isolated (12/16/2023)    Social Connection and Isolation Panel [NHANES]     Frequency of Communication with Friends and Family: More than three times a week     Frequency of Social Gatherings with Friends and Family: More than three times a week     Attends Mu-ism Services: Never     Active Member of Clubs or Organizations: No     Attends Club or Organization Meetings: Never     Marital Status:    Intimate Partner Violence: Not on file   Housing Stability: Low Risk  (12/16/2023)    Housing Stability Vital Sign     Unable to Pay for Housing in the Last Year: No     Number of Places Lived in the Last Year: 1     Unstable Housing in the Last Year: No       Family History   Problem Relation Age of Onset    Heart Disease Mother 65        MI    Cancer Father     Cancer Brother 50        prostate cancer    Stroke Brother     Stroke Brother     Diabetes Neg Hx     Hyperlipidemia Neg Hx     Alcohol/Drug Neg Hx     Ovarian Cancer Neg Hx     Tubal Cancer Neg Hx     Peritoneal Cancer Neg Hx     Colorectal Cancer Neg Hx     Breast Cancer Neg Hx        Current Outpatient Medications on File Prior to Visit   Medication Sig Dispense Refill    lisinopril (PRINIVIL) 10 MG Tab Take 1 Tablet  "by mouth every day. 90 Tablet 0    metoprolol SR (TOPROL XL) 25 MG TABLET SR 24 HR TAKE ONE TABLET BY MOUTH EVERY DAY 90 Tablet 0    rosuvastatin (CRESTOR) 20 MG Tab Take 1 Tablet by mouth every day. 90 Tablet 3    finasteride (PROSCAR) 5 MG Tab Take 1 Tablet by mouth every day.      triamcinolone acetonide (KENALOG) 0.1 % Cream Apply 1 Application topically 2 times a day as needed (Eczema).      ciclopirox (PENLAC) 8 % solution Apply 1 Application topically two times a week. Apply to affected toenails on both feet.      aspirin 81 MG EC tablet Take 81 mg by mouth every day.       No current facility-administered medications on file prior to visit.       Penicillins      ROS:   Review of Systems   Constitutional:  Positive for malaise/fatigue. Negative for chills, diaphoresis, fever and weight loss.   HENT:  Negative for congestion, ear discharge, ear pain, hearing loss, nosebleeds, sinus pain, sore throat and tinnitus.    Eyes:  Negative for blurred vision, double vision, photophobia, pain, discharge and redness.   Respiratory:  Positive for shortness of breath. Negative for cough, hemoptysis, sputum production, wheezing and stridor.    Cardiovascular:  Negative for chest pain, palpitations, orthopnea, claudication, leg swelling and PND.   Gastrointestinal:  Negative for abdominal pain, constipation, diarrhea, heartburn, nausea and vomiting.   Genitourinary:  Negative for dysuria and urgency.   Musculoskeletal:  Negative for back pain, falls, joint pain, myalgias and neck pain.   Skin:  Negative for itching and rash.   Neurological:  Negative for dizziness, tremors, speech change, focal weakness, weakness and headaches.   Endo/Heme/Allergies:  Negative for environmental allergies.   Psychiatric/Behavioral:  Negative for depression.        /58 (BP Location: Right arm, Patient Position: Sitting, BP Cuff Size: Adult)   Pulse 71   Ht 1.88 m (6' 2\")   Wt 87.1 kg (192 lb)   SpO2 96%   Physical Exam  Vitals " reviewed.   Constitutional:       General: He is not in acute distress.     Appearance: Normal appearance. He is normal weight.   HENT:      Head: Normocephalic and atraumatic.      Right Ear: External ear normal.      Left Ear: External ear normal.      Nose: Nose normal. No congestion.      Mouth/Throat:      Mouth: Mucous membranes are moist.      Pharynx: Oropharynx is clear. No oropharyngeal exudate.   Eyes:      General: No scleral icterus.     Extraocular Movements: Extraocular movements intact.      Conjunctiva/sclera: Conjunctivae normal.      Pupils: Pupils are equal, round, and reactive to light.   Cardiovascular:      Rate and Rhythm: Normal rate and regular rhythm.      Heart sounds: Normal heart sounds. No murmur heard.     No gallop.   Pulmonary:      Effort: Pulmonary effort is normal. No respiratory distress.      Breath sounds: Normal breath sounds. No wheezing or rales.   Abdominal:      General: There is no distension.      Palpations: Abdomen is soft.   Musculoskeletal:         General: Normal range of motion.      Cervical back: Normal range of motion and neck supple.      Right lower leg: No edema.      Left lower leg: No edema.   Skin:     General: Skin is warm and dry.      Findings: No rash.   Neurological:      Mental Status: He is alert and oriented to person, place, and time.      Cranial Nerves: No cranial nerve deficit.   Psychiatric:         Mood and Affect: Mood normal.         Behavior: Behavior normal.         PFTs as reviewed by me personally: As per HPI    Imaging as reviewed by me personally: As per HPI    Assessment:  1. SOB (shortness of breath)  Overnight Oximetry    PULMONARY FUNCTION TESTS -Test requested: Cardiopulmonary Exercise Testing (CPX)      2. Other fatigue  TSH WITH REFLEX TO FT4    PULMONARY FUNCTION TESTS -Test requested: Cardiopulmonary Exercise Testing (CPX)    Basic Metabolic Panel          Plan:  This patient symptoms sound more consistent with fatigue and  they do simple shortness of breath.  He has normal lung function testing and good cardiac function although he did not reach predicted maximum heart rate on stress test likely due to beta-blockade.  He has good treatment of sleep apnea but not ideal given AHI of 5.  We also do not know what his oxygen is doing so I am recommending overnight oximetry on PAP therapy to see if he may benefit from repeat titration study.  I am also recommending cardiopulmonary exercise test to see if we bring out any dynamic abnormalities and pulmonary function or cardiac abilities to respond to exercise.  See discussion above.  I will also repeat basic metabolic panel to check sodium and glucose in addition to TSH level.  Return in about 3 months (around 4/4/2024) for OPO on PAP, labs, CPET.

## 2024-01-05 ENCOUNTER — HOSPITAL ENCOUNTER (OUTPATIENT)
Dept: LAB | Facility: MEDICAL CENTER | Age: 79
End: 2024-01-05
Attending: FAMILY MEDICINE
Payer: MEDICARE

## 2024-01-05 DIAGNOSIS — R53.83 OTHER FATIGUE: ICD-10-CM

## 2024-01-05 LAB
ANION GAP SERPL CALC-SCNC: 12 MMOL/L (ref 7–16)
BUN SERPL-MCNC: 24 MG/DL (ref 8–22)
CALCIUM SERPL-MCNC: 9.1 MG/DL (ref 8.5–10.5)
CHLORIDE SERPL-SCNC: 104 MMOL/L (ref 96–112)
CO2 SERPL-SCNC: 24 MMOL/L (ref 20–33)
CREAT SERPL-MCNC: 0.98 MG/DL (ref 0.5–1.4)
FASTING STATUS PATIENT QL REPORTED: NORMAL
GFR SERPLBLD CREATININE-BSD FMLA CKD-EPI: 79 ML/MIN/1.73 M 2
GLUCOSE SERPL-MCNC: 84 MG/DL (ref 65–99)
POTASSIUM SERPL-SCNC: 4.5 MMOL/L (ref 3.6–5.5)
SODIUM SERPL-SCNC: 140 MMOL/L (ref 135–145)
TSH SERPL DL<=0.005 MIU/L-ACNC: 3.53 UIU/ML (ref 0.38–5.33)

## 2024-01-05 PROCEDURE — 84443 ASSAY THYROID STIM HORMONE: CPT

## 2024-01-05 PROCEDURE — 36415 COLL VENOUS BLD VENIPUNCTURE: CPT

## 2024-01-05 PROCEDURE — 80048 BASIC METABOLIC PNL TOTAL CA: CPT

## 2024-01-18 ENCOUNTER — HOME STUDY (OUTPATIENT)
Dept: SLEEP MEDICINE | Facility: MEDICAL CENTER | Age: 79
End: 2024-01-18
Attending: INTERNAL MEDICINE
Payer: MEDICARE

## 2024-01-18 DIAGNOSIS — R06.02 SOB (SHORTNESS OF BREATH): ICD-10-CM

## 2024-01-18 PROCEDURE — 94762 N-INVAS EAR/PLS OXIMTRY CONT: CPT | Performed by: INTERNAL MEDICINE

## 2024-01-23 ENCOUNTER — PATIENT MESSAGE (OUTPATIENT)
Dept: SLEEP MEDICINE | Facility: MEDICAL CENTER | Age: 79
End: 2024-01-23
Payer: MEDICARE

## 2024-01-23 DIAGNOSIS — G47.33 OSA (OBSTRUCTIVE SLEEP APNEA): ICD-10-CM

## 2024-01-24 ENCOUNTER — APPOINTMENT (OUTPATIENT)
Dept: PULMONOLOGY | Facility: MEDICAL CENTER | Age: 79
End: 2024-01-24
Attending: INTERNAL MEDICINE
Payer: MEDICARE

## 2024-02-09 ENCOUNTER — HOSPITAL ENCOUNTER (OUTPATIENT)
Dept: PULMONOLOGY | Facility: MEDICAL CENTER | Age: 79
End: 2024-02-09
Attending: FAMILY MEDICINE
Payer: MEDICARE

## 2024-02-09 DIAGNOSIS — R06.02 SOB (SHORTNESS OF BREATH): ICD-10-CM

## 2024-02-09 DIAGNOSIS — R53.83 OTHER FATIGUE: ICD-10-CM

## 2024-02-09 PROCEDURE — 94621 CARDIOPULM EXERCISE TESTING: CPT | Mod: 26,MISDOCU | Performed by: FAMILY MEDICINE

## 2024-02-09 PROCEDURE — 94726 PLETHYSMOGRAPHY LUNG VOLUMES: CPT | Mod: 26,MISDOCU | Performed by: FAMILY MEDICINE

## 2024-02-09 PROCEDURE — 94621 CARDIOPULM EXERCISE TESTING: CPT

## 2024-02-14 NOTE — PROCEDURES
DATE OF SERVICE:  02/09/2024      PULMONARY FUNCTION TEST INTERPRETATION     The maximal oxygen uptake is normal at 1710 ml/min (20.2 ml/min/kg) or 82% of maximum predicted (predicted maximum ± 95% CI= 2080 ± 550 ml/min).  Heart rate is 84 at rest and increases excessively with exercise.  Heart rate reserve = 0 bpm.  The blood pressure  is 101/73 at rest and while the systolic BP increased within the normal range with exercise, the diastolic pressure >90 is abnormal and can be seen with diastolic dysfunction.  EKG shows normal sinus rhythm throughout the test.  The EKG does show some mild ST depressions in the anteriorlateral leads but there is also a lot of artifact. Oxygen pulse is 4 at rest and somewhat shallow during exercise when compared to HR response.  Minute ventilation is 13.9 at rest and increases appropriately to a maximum of 76.8 L/min at 144 ware or 58% maximum voluntary ventilation.  Breathing reserve = 33.2.  Analysis of breathing pattern during exercise shows a significant increase in tidal volume compared to respiratory rate.  Respiratory quotient is 1.15 at peak exercise.  Oxygen saturation is 97% at rest and ranged from 91% to 97% during exercise(increased to the high 90s as exercise continued). An anaerobic threshold is detected at 89 ware by V-slope technique or 64% of maximum predicted VO2.     INTERPRETATION:  This test is considered maximal since it meets end of test criteria for HR reserve <10 bpm.     There are significant cardiovascular abnormalities.  The cardiovascular abnormalities are seen in the increased heart rate response to the point where he reaches maximal heart rate with a shallower response in O2 pulse in comparison. He also has an increased diastolic BP response to exercise which can be seen with diastolic dysfunction. EKG was also concerning for some potential ST depressions in the anterolateral leads but there is significant artifact.          ______________________________  MD ANAMIKA Lincoln/SERGIO    DD:  02/13/2024 16:37  DT:  02/13/2024 17:05    Job#:  [id number]

## 2024-02-15 ENCOUNTER — OFFICE VISIT (OUTPATIENT)
Dept: MEDICAL GROUP | Facility: PHYSICIAN GROUP | Age: 79
End: 2024-02-15
Payer: MEDICARE

## 2024-02-15 ENCOUNTER — PATIENT MESSAGE (OUTPATIENT)
Dept: HEALTH INFORMATION MANAGEMENT | Facility: OTHER | Age: 79
End: 2024-02-15

## 2024-02-15 VITALS
WEIGHT: 195.4 LBS | HEART RATE: 78 BPM | BODY MASS INDEX: 25.08 KG/M2 | RESPIRATION RATE: 16 BRPM | TEMPERATURE: 97.8 F | SYSTOLIC BLOOD PRESSURE: 118 MMHG | HEIGHT: 74 IN | DIASTOLIC BLOOD PRESSURE: 66 MMHG | OXYGEN SATURATION: 97 %

## 2024-02-15 DIAGNOSIS — I49.3 PVC (PREMATURE VENTRICULAR CONTRACTION): ICD-10-CM

## 2024-02-15 DIAGNOSIS — I10 PRIMARY HYPERTENSION: Primary | ICD-10-CM

## 2024-02-15 DIAGNOSIS — G89.29 CHRONIC BILATERAL LOW BACK PAIN WITHOUT SCIATICA: ICD-10-CM

## 2024-02-15 DIAGNOSIS — M54.50 CHRONIC BILATERAL LOW BACK PAIN WITHOUT SCIATICA: ICD-10-CM

## 2024-02-15 DIAGNOSIS — G44.229 CHRONIC TENSION-TYPE HEADACHE, NOT INTRACTABLE: ICD-10-CM

## 2024-02-15 PROBLEM — R51.9 HEADACHE: Status: ACTIVE | Noted: 2024-02-15

## 2024-02-15 PROCEDURE — 3078F DIAST BP <80 MM HG: CPT | Performed by: FAMILY MEDICINE

## 2024-02-15 PROCEDURE — 1126F AMNT PAIN NOTED NONE PRSNT: CPT | Performed by: FAMILY MEDICINE

## 2024-02-15 PROCEDURE — 99214 OFFICE O/P EST MOD 30 MIN: CPT | Performed by: FAMILY MEDICINE

## 2024-02-15 PROCEDURE — 3074F SYST BP LT 130 MM HG: CPT | Performed by: FAMILY MEDICINE

## 2024-02-15 ASSESSMENT — FIBROSIS 4 INDEX: FIB4 SCORE: 1.38

## 2024-02-15 ASSESSMENT — PAIN SCALES - GENERAL: PAINLEVEL: NO PAIN

## 2024-02-15 NOTE — PROGRESS NOTES
Verbal consent was acquired by the patient to use [employer] ambient listening note generation during this visit     Subjective:     HPI:   Boo is a 78 y.o.male established patient who presents today for:    History of Present Illness  The patient presents for evaluation of multiple medical concerns.    He was started on lisinopril 10 mg a day after I spoke with cardiology on 12/19/2023. He denies any side effects from the medication. His blood pressure quickly went down and stayed down. He has had 2 events over 140 since starting the lisinopril. He went down to 12.5 mg of metoprolol at the end of 12/2023. He saw Dr. Mendoza, his cardiologist, who told him that he should not notice any difference between 12.5 mg and 25 mg of metoprolol. Decreasing the metoprolol didn't affect his blood pressure much. He is currently taking 12.5 mg of metoprolol. He denies any dizziness or lightheadedness, but sometimes he feels a little bit unsteady. He pays more attention to falling at that point because he feels a little bit unstable. He denies any chest pain or trouble breathing. Sometimes, he has a little gas, which seems to cause a little bit of pain in his heart area. Usually, he burps or something released, and it does not seem to be related to it. Dr. Mendoza told him that he would stay at 12.5 mg of metoprolol.     He had an overnight oximeter done, but it never showed up in his chart. He had a cardiac stress test done last Friday, and it has not shown up yet.     He had an ablation for tachycardia, and it resolved. He has not received the Season's COVID-19 booster yet.     He is worried about his head. He has been experiencing pressure behind his eyes and head every day. It has been intermittent, but it has gotten worse in the last 1 to 2 months. He does not take any medication for the head pain. He denies any triggers such as coughing, sneezing, or exercise. It is not triggered alcohol or caffeine. He feels the pain behind  "both eyes and right temple. He describes it as a pressure sensation. It is not like a sharp pain, throbbing, or aching. The pressure comes and goes for a couple of hours, sometimes all day. He gets the pressure pain daily. It is slightly more tender to touch. He has very slight nausea. He denies any vomiting. He has not noticed any bright light or loud sound making the pressure worse. He denies any neurological symptoms before the pressure starts such as vision change, numbness, or tingling. He does get some tearing of the right eye. He denies any lacrimation with the pressure. He denies any runny nose. He does not get sweaty on the forehead with the pressure. He denies any fevers. It does not seem to be associated with high blood pressure. He denies any trauma to the head. He has been using Flonase 2 sprays in each nostril twice a day for 2 to 3 months. He has not tried any sinus rinses or antihistamines. The pressure pain does not get worse when he bends forward.   He takes Tylenol for his back pain.    Health Maintenance: Completed    Objective:     Exam:  /66 (BP Location: Left arm, Patient Position: Sitting, BP Cuff Size: Adult)   Pulse 78   Temp 36.6 °C (97.8 °F) (Temporal)   Resp 16   Ht 1.88 m (6' 2\")   Wt 88.6 kg (195 lb 6.4 oz)   SpO2 97%   BMI 25.09 kg/m²  Body mass index is 25.09 kg/m².    Physical Exam  Constitutional:       Appearance: Normal appearance.   Cardiovascular:      Rate and Rhythm: Normal rate and regular rhythm.      Heart sounds: Normal heart sounds.   Pulmonary:      Effort: Pulmonary effort is normal.      Breath sounds: Normal breath sounds.   Musculoskeletal:      Cervical back: Normal range of motion and neck supple.   Lymphadenopathy:      Cervical: No cervical adenopathy.   Neurological:      Mental Status: He is alert.             Results      Assessment & Plan:     Assessment & Plan  1. Hypertension.  Chronic, stable.  His blood pressure is well controlled. His " average blood pressure in the last 10 days is 115/72. He will continue lisinopril 10 mg once a day and metoprolol 12.5 mg every day.     2. Premature ventricular contractions.  Chronic, stable.  He will continue the metoprolol 12.5 mg daily.  Dr. Mendoza will decide if he needs to go back up on the metoprolol for the PVCs.    3.  Headache  Chronic, stable.  For an unknown amount of time he has gotten pressure pain behind his eyes and on the right temple that is gotten worse in the last 1-2 months.  He has been using Flonase 2 sprays per nostril twice daily for last 2-3 weeks with no improvement in the pressure pain so he can discontinue that medication.  Seems like it may be a tension type headache since it is more of a pressure sensation with no features consistent with cluster headache or migraine.  I have asked him to keep a symptom journal and use Tylenol as needed and will follow-up in a month.      4. Back pain.  He can use ibuprofen as needed.    Follow-up  The patient will follow up in 1 month.    I spent a total of 30 minutes with record review, exam, communication with the patient, and documentation of this encounter.      Please note that this dictation was created using voice recognition software. I have made every reasonable attempt to correct obvious errors, but I expect that there are errors of grammar and possibly content that I did not discover before finalizing the note.

## 2024-02-19 NOTE — PROCEDURES
Takes this is an overnight oximetry performed on January 18, 2024 for duration of 8 hours and 40 minutes on AutoPap from 5 to 15 cm of water.    Basal SpO2 was 91%.  No significant desaturation.

## 2024-03-05 DIAGNOSIS — I49.3 PVC (PREMATURE VENTRICULAR CONTRACTION): ICD-10-CM

## 2024-03-05 RX ORDER — METOPROLOL SUCCINATE 25 MG/1
25 TABLET, EXTENDED RELEASE ORAL
Qty: 90 TABLET | Refills: 3 | Status: SHIPPED | OUTPATIENT
Start: 2024-03-05

## 2024-03-05 RX ORDER — LISINOPRIL 10 MG/1
10 TABLET ORAL
Qty: 90 TABLET | Refills: 3 | Status: SHIPPED | OUTPATIENT
Start: 2024-03-05

## 2024-03-05 NOTE — TELEPHONE ENCOUNTER
Is the patient due for a refill? Yes    Was the patient seen the past year? Yes    Date of last office visit: 1/2/24    Does the patient have an upcoming appointment?  Yes   If yes, When? 7/3/24    Provider to refill:DS    Does the patients insurance require a 100 day supply?  No

## 2024-03-05 NOTE — TELEPHONE ENCOUNTER
Received request via: Pharmacy    Was the patient seen in the last year in this department? Yes    Does the patient have an active prescription (recently filled or refills available) for medication(s) requested? No    Pharmacy Name:   [id number] [place] [address] [facility] [phone]       Does the patient have [facility] Plus and need 100 day supply (blood pressure, diabetes and cholesterol meds only)? Patient does not have SCP

## 2024-03-12 NOTE — PROGRESS NOTES
CONSULTATION NOTE - NEUROLOGY    CHIEF COMPLAINT    Vertigo  HISTORY OF PRESENT ILLNESS  Mr. Schmidt is a  48 year old year old with a PMH of DM, HTN, CHF, HLD and IRINA who presented to the ED after a vertiginous episode. He stated that at 11:30 am, he was in [employer], walking, when he felt as though his head was moving side to side, and went down on a knee. He stated that he was able to bike to the [employer] without issue prior to the episode. On repeat questioning, he possibly had some dizziness earlier in the day, slowly building up to the episode in the [employer]. When he sat down, he stated he felt a heaviness in his head.  He denied any weakness, change in sensation or nausea with the episode. He denied any slurred speech or facial droop. He stated that he has not taken any of his medications for the past 2 days, due to nausea. He stated that all he took was furosemide.     He presented to the ED on 2/15 with similar symptoms, and was prescribed 25 mg Meclizine PRN. He stated that he had not taken any meclizine today. He stated that Meclizine does help alleviate his vertigo symptoms. He has chronic numbness in ring fingers bilaterally due to diabetic neuropathy.    In the ED he stated that he started to notice some double vision when sitting upright. He stated that when he changed position, he saw two images side to side. He stated that when hit sits down, he does not feel vertigo, but with changing position, he feels as though the room is spinning. He stated that he currently has a right sided headache, with photophobia.     He reports hx of occasional migraine headaches, but usually not associated with vertigo.    He denied any alcohol, smoking or recreational drug use.     HISTORIES:    Past Medical History:   Diagnosis Date    Congestive cardiac failure (CMD)     Depression     Diabetes mellitus (CMD)     Failed moderate sedation during procedure     Fracture     Lt Tibia & Fibula    High cholesterol     Sleep  Cardiology agrees with plan to start lisinopril 10 mg daily for blood pressure. Rx sent.    apnea     Vertigo        Past Surgical History:   Procedure Laterality Date    Fracture surgery      Hand finger surgery unlisted Right        Family History   Problem Relation Age of Onset    Diabetes Mother     Premature birth Daughter     Myocardial Infarction Maternal Uncle     Congestive Heart Failure Maternal Grandmother        Social History     Tobacco Use    Smoking status: Never    Smokeless tobacco: Never   Vaping Use    Vaping Use: never used   Substance Use Topics    Alcohol use: Not Currently     Alcohol/week: 1.0 standard drink of alcohol     Types: 1 Cans of beer per week     Comment: once a month last 1st of Oct.    Drug use: Never       INPATIENT MEDICATIONS:  Current Facility-Administered Medications   Medication    sodium chloride 0.9 % flush bag 25 mL    sodium chloride 0.9 % injection 2 mL    insulin glargine (LANTUS) injection 5 Units    atorvastatin (LIPITOR) tablet 40 mg    meclizine (ANTIVERT) tablet 25 mg     Current Outpatient Medications   Medication Sig Dispense Refill    linaGLIPtin (TRADJENTA) 5 MG tablet Take 5 mg by mouth.      Stimulant Laxative 8.6-50 MG per tablet Take 2 tablets by mouth at bedtime as needed.      glipiZIDE (GLUCOTROL XL) 10 MG 24 hr tablet Take 10 mg by mouth at bedtime.      atorvastatin (LIPITOR) 40 MG tablet Take 40 mg by mouth at bedtime.      folic acid (FOLATE) 1 MG tablet Take 1 mg by mouth at bedtime.      gabapentin (NEURONTIN) 300 MG capsule Take 300 mg by mouth at bedtime.      losartan (COZAAR) 25 MG tablet Take 25 mg by mouth at bedtime.      metoPROLOL succinate (TOPROL-XL) 25 MG 24 hr tablet Take 25 mg by mouth at bedtime.      Aspirin Low Dose 81 MG EC tablet Take 81 mg by mouth at bedtime.       furosemide (LASIX) 20 MG tablet Take 20 mg by mouth at bedtime.      meclizine (ANTIVERT) 25 MG tablet Take 1 tablet by mouth 3 times daily as needed for Dizziness. 15 tablet 0       HOME MEDICATIONS:  (Not in a hospital  admission)      ALLERGIES:  Allergies as of 03/12/2024 - Reviewed 03/12/2024   Allergen Reaction Noted    Mushroom   (food) ANAPHYLAXIS 03/12/2024    Morphine Dermatitis and PRURITUS 04/19/2013        REVIEW OF SYSTEMS :   10 point review of systems was unremarkable other than as documented in the HPI.    PHYSICAL EXAM  Visit Vitals  /81   Pulse 79   Resp 13   Wt 107 kg (236 lb)   SpO2 97%   BMI 34.85 kg/m²     General: Well-developed, well-nourished male.  In no acute distress.  Head & Neck:  Normocephalic and atraumatic.   No carotid bruits.   EENT: Normal conjunctiva.  No nasal drainage.   Normal appearance to ear structures. Eye pain when looking up or to the right. Had double vision when looking to the right. No blurry vision  Heart: Normal rate, regular rhythm without murmur.  Lungs:  Clear to auscultation bilaterally.  No rales, rhonchi or rubs.  Abdomen: Normoactive bowel sounds; no tenderness, no distention  Extremities:  No edema in lower extremities. No amputations.  Musculoskeletal:  No bony tenderness or deformities.   Skin:  Warm and dry.  No obvious rashes noted.  Psychiatric:  Affect was appropriate to situation and mood was normal.  Neurologic:   Mentation: Alert and awake. Oriented to person and place. Speech is fluent without any anomia or dysarthria.  Patient is able to provide adequate history with a good fund of knowledge.  Short and long term memory was within normal range for age.  No evidence of obvious cognitive impairment.   Cranial Nerves:    CN I: Not tested.  CN II: Visual acuity grossly intact. Visual fields full to confrontational testing.  Pupils equal and reactive (CN II/III)  CN III, IV, VI: . Had pain when looking up and to right. Reported double vision when looking to right; no obvious nystagmus or dysconjugate gaze appreciated. Had a negative test of skew. Head impulse test also elicited no nystagmus, but felt worsening vertigo with head impulse to the left.  CN V: Could  not perceive pinprick sensation in V1 distribution of left face, stated he only felt pressure. Light tough intact bilaterally.  Muscles of mastication normal.  CN VII: Facial movement full and symmetric.  CN VIII: Hearing intact to a speaking voice.  CN IX, X: Palate elevates in the midline, normal gag, swallow, and phonation.  CN XI: Sternocleidomastoid and trapezius strength is symmetric and full  CN XII: Tongue protrudes midline without atrophy or fasciculations.  Cerebellar exam:  Finger-nose-finger and rapid alternating movements are intact. No drifting noted.   Motor exam (including gait and reflexes):  Patient able to slowly stand, than sat down because of vertigo; gait not assessed.   Strength in all 4 extremities is 5/5.    Reflex examination was 2/4 in upper and lower extremities.  Plantar response was flexor. No clonus or mack sign.  Sensory exam:  Normal sensation to pin prick all 4 extremities.  Proprioception intact in in bilat feet.    LABORATORY:  I have reviewed the pertinent laboratory tests:    Recent Labs   Lab 03/12/24  1424   WBC 4.2   RBC 5.39   HGB 12.6*   HCT 35.9*        Recent Labs   Lab 03/12/24  1424   SODIUM 137   POTASSIUM 3.9   CHLORIDE 104   CO2 22   BUN 9   CREATININE 0.68   CALCIUM 8.9   ALBUMIN 3.6   BILIRUBIN 1.7*   ALKPT 92   GPT 11   AST <5   GLUCOSE 268*     Recent Labs   Lab 03/12/24  1424   PTT 25   PT 11.2   INR 1.0     Recent Labs   Lab 03/12/24  1424   CHOLESTEROL 97   TRIGLYCERIDE 434*   HDL 33*     No results found for: \"HGBA1C\"]    IMAGING/OTHER TESTING:  I have reviewed the pertinent imaging/study reports.    XR CHEST PA OR AP 1 VIEW   Final Result      Overlying EKG leads. Stable heart size and mediastinal contours. No dense   focal airspace consolidation, pleural effusion, or detectable pneumothorax.               Electronically Signed by: KEYSHA WING M.D.    Signed on: 3/12/2024 3:26 PM    Workstation ID: [id number]      CTA HEAD AND NECK LEVEL 1    Final Result   Unremarkable CTA appearance of the head and neck vessels.   Left thyroid nodule, 2 cm. Follow-up ultrasound is advised.      Electronically Signed by: PAT SIMMONS M.D.    Signed on: 3/12/2024 2:57 PM    Workstation ID: [id number]      CT HEAD LEVEL 1   Final Result   No acute intracranial abnormality.      Findings were discussed with caring provider KISHAN DEGROOT by Dr. Odom via phone at 3/12/2024 2:48 PM.      Dictated by: VESTA ODOM MD   Dictated on: 3/12/2024 2:36 PM          I, YOLI HUGO MD, have reviewed the images and report and concur   with these findings interpreted by VESTA ODOM MD.      Electronically Signed by: YOLI HUGO MD    Signed on: 3/12/2024 2:59 PM    Workstation ID: [id number]      MRI BRAIN WO CONTRAST    (Results Pending)       ASSESSMENT/PLAN  This is a  48 year old male, with a sig PMHx of DM, HLD, HTN, IRINA , and CHF , being seen in neurologic consultation on 3/12/2024 for  CVA rule out and vertigo. he was orginally admitted on 3/12/2024 for Vertigo and CVA rule out.    The patient has had similar presentation in 2/15/24 and 10/2023, though this current episode may be the most severe. He describes the dizziness as the room is spinning whenever he moves his head, and with meclizine he gets some relief, which might suggest BPPV. No ear pain or fullness or difficulty hearing, making Meniere's unlikely. No recent recent illnesses or URI-associated symptoms, but can still consider vestibular neuronitis. He does not usually have migraine and vertigo at the same time, not likely vestibular migraine.   With HINTs assessment, head impulse/thrust was unremarkable, and hence not suggestive of peripheral vertigo. No convincing nystagmus observed on exam, but ER staff observed ?intermittent L lateral rectus palsy and ?horizontal nystagmus. Given pt's continued sensation of dizziness, this may suggest vertigo of central etiology,  and possibly TIA/stroke.    Plan  - Will consider MRI Patricio and IAC to rule out CVA or other CNS lesion, given his multiple risk factors and recurrent episodes of vertigo. The patient will likely need generalized anesthesia, as he has extreme claustrophobia, and ativan has not worked in the past  - Physical/vestibular therapy  - Can continue 25 mg Meclizine TID  - Check fasting lipid panel and A1C   - Consider TTE      [facility] - Neurology Attending  Date: 3/12/2024

## 2024-03-25 ENCOUNTER — OFFICE VISIT (OUTPATIENT)
Dept: MEDICAL GROUP | Facility: PHYSICIAN GROUP | Age: 79
End: 2024-03-25
Payer: MEDICARE

## 2024-03-25 VITALS
HEART RATE: 62 BPM | TEMPERATURE: 97.6 F | DIASTOLIC BLOOD PRESSURE: 62 MMHG | OXYGEN SATURATION: 96 % | WEIGHT: 195.2 LBS | BODY MASS INDEX: 25.05 KG/M2 | HEIGHT: 74 IN | SYSTOLIC BLOOD PRESSURE: 116 MMHG

## 2024-03-25 DIAGNOSIS — G44.229 CHRONIC TENSION-TYPE HEADACHE, NOT INTRACTABLE: ICD-10-CM

## 2024-03-25 DIAGNOSIS — M20.62 DEFORMITY OF TOE OF LEFT FOOT: Primary | ICD-10-CM

## 2024-03-25 DIAGNOSIS — H00.011 HORDEOLUM EXTERNUM OF RIGHT UPPER EYELID: ICD-10-CM

## 2024-03-25 DIAGNOSIS — I10 PRIMARY HYPERTENSION: ICD-10-CM

## 2024-03-25 PROCEDURE — 3078F DIAST BP <80 MM HG: CPT | Performed by: FAMILY MEDICINE

## 2024-03-25 PROCEDURE — 99214 OFFICE O/P EST MOD 30 MIN: CPT | Performed by: FAMILY MEDICINE

## 2024-03-25 PROCEDURE — 3074F SYST BP LT 130 MM HG: CPT | Performed by: FAMILY MEDICINE

## 2024-03-25 RX ORDER — BETAMETHASONE DIPROPIONATE 0.5 MG/G
OINTMENT, AUGMENTED TOPICAL
COMMUNITY
Start: 2024-02-26

## 2024-03-25 ASSESSMENT — FIBROSIS 4 INDEX: FIB4 SCORE: 1.38

## 2024-03-25 NOTE — PROGRESS NOTES
Verbal consent was acquired by the patient to use [employer] ambient listening note generation during this visit     Subjective:     HPI:   History of Present Illness  The patient is a 78-year-old male who presents for evaluation of multiple medical concerns.    The tip of his left 2nd and 3rd toes are pointing upward now. It happened 2 to 3 months ago. It is not painful, but it gets uncomfortable when he takes a shower. It does not seem like it is flat on a slick surface. At one time, he had spasm and severe pain, but it immediately went away. It is uncomfortable once in a while, but most of the time he does not even notice it. It feels tight along the top part of the foot. He denies any pain in the ball of the foot or underneath. It feels like the toes do not want to go flat. It is more of an ache. It is not severe enough that he needs to take medication to help with the pain. He denies any swelling. He used to walk around with socks, but he stopped doing that. He got a pair of slippers.    He has a stye on his right upper eyelid that started 1 month ago. He thinks it is healing. He feels it every once in a while and it is still hard. He was using warm compresses when he had it. He popped it when it first came out. He denies any fevers or chills in the last few days.    His blood pressure stays under 120 systolic. If he exerts too much, his heart does not want to keep going and he gets fatigued after 4 to 5 hours. He is on metoprolol 25 mg.    He has an appointment for his sleep apnea.    His head has not been a major issue. It is not an ache, but it is more of a pressure. It is less frequent than when he was last seen.    Supplemental Information  He denies any chest pain or trouble breathing. He has been getting on the bicycle for 15 minutes.    Health Maintenance: Completed    Objective:     Exam:  /62 (BP Location: Right arm, Patient Position: Sitting, BP Cuff Size: Adult)   Pulse 62   Temp 36.4 °C  "(97.6 °F) (Temporal)   Ht 1.88 m (6' 2\")   Wt 88.5 kg (195 lb 3.2 oz)   SpO2 96%   BMI 25.06 kg/m²  Body mass index is 25.06 kg/m².    Physical Exam  Constitutional:       Appearance: Normal appearance.   Cardiovascular:      Rate and Rhythm: Normal rate and regular rhythm.      Heart sounds: Normal heart sounds.   Pulmonary:      Effort: Pulmonary effort is normal.      Breath sounds: Normal breath sounds.   Musculoskeletal:      Cervical back: Normal range of motion and neck supple.   Lymphadenopathy:      Cervical: No cervical adenopathy.   Neurological:      Mental Status: He is alert.             Results      Assessment & Plan:     1. Deformity of toe of left foot  Referral to Podiatry      2. Hordeolum externum of right upper eyelid        3. Primary hypertension            Assessment & Plan  1. Deformity of toes in left foot.  This is chronic and stable. About 2 to 3 months ago while walking, he noticed a very sharp tearing like pain in the ball of his left foot and after that pain, he noticed that the DIPs of the 2nd and 3rd toes of the left foot are hyperextended with the very distal phalanx pointed upwards. Ever since then, he has only had a minimal ache occasionally in the dorsal aspect of the foot over the MTPs. I am wondering if he had a flexor tendon injury. I will send him to podiatry for further evaluation and treatment.    2. Hordeolum of the right upper eyelid.  This is chronic and improving for 2 months. He has been noticing a bump on the upper eyelid that has been improving and is barely noticeable today. We did discuss measures he can take at home if it returns.    3. Hypertension.  This is chronic and controlled. Blood pressure is well under 140/90 in the office today and his home log correlates with that reading. He will continue the lisinopril 10 mg daily and metoprolol SR 25 mg daily.    4. Chronic tension type headache.  This is chronic and stable. At his last appointment, he had been " noticing a pressure pain behind his eyes and on his right temple that had been worse for the last 1 to 2 months prior to our last visit. He states it has actually gotten better. The frequency of this pressure pain has improved and it is possible it is related to the high blood pressure. Now, this blood pressure is controlled, this pressure pain is better as well. For now, we will continue to monitor.    Follow-up  The patient will follow up in 6 months for his Medicare wellness visit.      Please note that this dictation was created using voice recognition software. I have made every reasonable attempt to correct obvious errors, but I expect that there are errors of grammar and possibly content that I did not discover before finalizing the note.

## 2024-04-05 ENCOUNTER — OFFICE VISIT (OUTPATIENT)
Dept: SLEEP MEDICINE | Facility: MEDICAL CENTER | Age: 79
End: 2024-04-05
Attending: NURSE PRACTITIONER
Payer: MEDICARE

## 2024-04-05 VITALS
HEART RATE: 62 BPM | RESPIRATION RATE: 14 BRPM | DIASTOLIC BLOOD PRESSURE: 58 MMHG | HEIGHT: 74 IN | BODY MASS INDEX: 24.38 KG/M2 | WEIGHT: 190 LBS | OXYGEN SATURATION: 95 % | SYSTOLIC BLOOD PRESSURE: 116 MMHG

## 2024-04-05 DIAGNOSIS — G47.33 OSA (OBSTRUCTIVE SLEEP APNEA): ICD-10-CM

## 2024-04-05 PROCEDURE — 3078F DIAST BP <80 MM HG: CPT | Performed by: NURSE PRACTITIONER

## 2024-04-05 PROCEDURE — 99213 OFFICE O/P EST LOW 20 MIN: CPT | Performed by: NURSE PRACTITIONER

## 2024-04-05 PROCEDURE — 3074F SYST BP LT 130 MM HG: CPT | Performed by: NURSE PRACTITIONER

## 2024-04-05 PROCEDURE — 99214 OFFICE O/P EST MOD 30 MIN: CPT | Performed by: NURSE PRACTITIONER

## 2024-04-05 ASSESSMENT — FIBROSIS 4 INDEX: FIB4 SCORE: 1.38

## 2024-04-05 NOTE — PATIENT INSTRUCTIONS
"Order placed for Cpap to the following DME. Contact in 1 week to in inquire about order status/ availity    DME : [facility]    Once you receive your new PAP machine from the [employer] Medical [employer] company you're referred to, we must see you back for an office visit between 30-90 days of you using the machine to review compliance.  If you were ordered an ASV machine, we need to see you in office no sooner than your 60th day on therapy.     This is a very important time frame for insurance purposes. If you do not follow up with our office for compliance your insurance may not continue to pay for the machine. Also if you do not use the machine for at least 4 hours each night, you may be deemed \"Incompliant\", in which case the insurance may also not continue to pay for the machine.    If you are incompliant, you may have to surrender your machine to the DME company and start the process over if you wish to continue therapy after that. Meaning a new office consult and new sleep studies.    For your first visit back with our office, please bring the whole machine with the power cord. We will download the compliance off the SD card in the machine, and are able to make changes if need be in office. Some machines have a modem and we can access the data wirelessly, if this is the case please make sure the [employer] company grants us access to your machine.  For all follow up appointments after that, you will only need to bring the SD card to the appointment.    If you are having any issues with the mask, you have a 30 day window to exchange and try something else with your DME company.  If you are having issues with the pressure, please call our office at [phone].  These issues can cause you to not be able to use machine appropriately, there for make you incompliant.    Please call our office if you have any questions.    Thank you, [facility].  [phone]    "

## 2024-04-05 NOTE — PROGRESS NOTES
Chief Complaint   Patient presents with    Follow-Up     Last seen 01/04/2024 Elidia Lindsay  OPO ON PAP LABS, CPET RESULTS.       HPI:  Boo Cavanaugh Jr. is a 78 y.o. year old male here today for follow-up on SHERRY with follow-up on testing ordered by Dr. Neftali zapien of shortness of breath.  Last seen 1/4/2024.    The patient's recent symptoms began at the end of September early October when he began experiencing shortness of breath with associated chest pressure and was actually seen in the emergency department in October noted to have mildly elevated blood pressure, clear chest x-ray at the time. Symptoms worsened over Thanksgiving and he was seen again in the emergency department on December 15 where he was admitted and ruled out for ischemic heart disease with nuclear stress test. Labs were notable only for mild hyponatremia with a sodium of 133 and elevated glucose but this was not fasting. Pulmonary function testing from December 19 showed normal airflows with normal lung volumes and normal DLCO. Chest x-ray again on December 15 was clear. CT chest from March 2022 showed no concerning lung nodules or lymphadenopathy. He has some areas of atelectasis but no parenchymal lung disease. No recent echo but nuclear stress test done on December 18 as per above showed no evidence of ischemia with normal left ventricular size and function. ECG at hospital admission showed normal sinus rhythm and he has seen cardiology since his hospital stay. Overall symptoms have improved with better blood pressure control but he reports waking up daily feeling poorly all over. He reports some stuffiness in his head for which she has tried some intranasal therapies in addition to shortness of breath and just general fatigue with lack of energy. No cough.     With CPAP use, patient monitors his oximetry with home recording device.  He states that he wakes up frequently with dry mouth.  He does use a nasal mask or alternate between a  "hybrid fullface mask.  He states on days where he is relatively active, he feels like he sleeps better.  But when he is usually not doing much activity wise, he notes increase in AHI with BiPAP use.  He is currently feeling that the last week has been adequate, he is current device is greater than 5 years old and he is eligible for new device.  He does have past occurrences of increased central apneic events with low O2 jason.  We did order OPO on his CPAP, which was within normal limits.    30-day compliance reviewed with patient shows 100% use with an average time of 7 hours and 12 minutes and resultant AHI of 4.8 with no evidence of persistent mask leak.      TSH and BMP within normal limits   OPO (1/18/2024):   this is an overnight oximetry performed on January 18, 2024 for duration of 8 hours and 40 minutes on AutoPap from 5 to 15 cm of water.   Basal SpO2 was 91%.  No significant desaturation    CPet (2/9/2024):  There are significant cardiovascular abnormalities. The cardiovascular abnormalities are seen in the increased heart rate response to the point where he reaches maximal heart rate with a shallower response in O2 pulse in comparison. He also has an increased diastolic BP response to exercise which can be seen with diastolic dysfunction. EKG was also concerning for some potential ST depressions in the anterolateral leads but there is significant artifact.     ROS: As per HPI and otherwise negative if not stated.    Past Medical History:   Diagnosis Date    Arrhythmia     \"flutter\", Dr. Mendoza cardiologist    Arthritis     left thumb    Breath shortness     Cancer (HCC)     Cardiac arrhythmia     Enlarged prostate with urinary obstruction     GERD (gastroesophageal reflux disease)     Hyperlipidemia     Hypertension     OA (osteoarthritis) of knee     bilateral     Restless leg syndrome     Tonsillitis     Urinary bladder disorder 12/06/2017    savage in place       Past Surgical History:   Procedure " "Laterality Date    TURP-VAPOR  12/07/2017    Procedure: TURP-VAPOR - GREEN LIGHT PHOTOSELECTIVE;  Surgeon: Cristofer Cr M.D.;  Location: SURGERY [facility];  Service: Urology    GANGLION EXCISION Right 1966    wrist    APPENDECTOMY      OPEN REDUCTION      PROSTATECTOMY, RADICAL RETRO      TONSILLECTOMY      as a child    VASECTOMY         Family History   Problem Relation Age of Onset    Heart Disease Mother 65        MI    Cancer Father     Cancer Brother 50        prostate cancer    Stroke Brother     Stroke Brother     Diabetes Neg Hx     Hyperlipidemia Neg Hx     Alcohol/Drug Neg Hx     Ovarian Cancer Neg Hx     Tubal Cancer Neg Hx     Peritoneal Cancer Neg Hx     Colorectal Cancer Neg Hx     Breast Cancer Neg Hx        Allergies as of 04/05/2024 - Reviewed 04/05/2024   Allergen Reaction Noted    Penicillins Rash 10/25/2017        Vitals:  /58 (BP Location: Left arm, Patient Position: Sitting, BP Cuff Size: Adult)   Pulse 62   Resp 14   Ht 1.88 m (6' 2\")   Wt 86.2 kg (190 lb)   SpO2 95%     Current medications as of today   Current Outpatient Medications   Medication Sig Dispense Refill    augmented betamethasone dipropionate (DIPROLENE-AF) 0.05 % ointment       lisinopril (PRINIVIL) 10 MG Tab TAKE ONE TABLET BY MOUTH EVERY DAY 90 Tablet 3    metoprolol SR (TOPROL XL) 25 MG TABLET SR 24 HR TAKE ONE TABLET BY MOUTH EVERY DAY 90 Tablet 3    rosuvastatin (CRESTOR) 20 MG Tab Take 1 Tablet by mouth every day. 90 Tablet 3    finasteride (PROSCAR) 5 MG Tab Take 1 Tablet by mouth every day.      ciclopirox (PENLAC) 8 % solution Apply 1 Application topically two times a week. Apply to affected toenails on both feet.      aspirin 81 MG EC tablet Take 81 mg by mouth every day.       No current facility-administered medications for this visit.         Physical Exam:   Gen:           Alert and oriented, No apparent distress. Mood and affect appropriate, normal interaction with examiner.  Eyes:        "   PERRL, EOM intact, sclere white, conjunctive moist.  Ears:          Not examined.   Hearing:     Grossly intact.  Nose:          Normal, no lesions or deformities.  Dentition:    Good dentition.  Oropharynx:   Tongue normal, posterior pharynx without erythema or exudate.  Neck:        Supple, trachea midline, no masses.  Respiratory Effort: No intercostal retractions or use of accessory muscles.   Lung Auscultation:      Clear to auscultation bilaterally; no rales, rhonchi or wheezing.  CV:            Regular rate and rhythm. No murmurs, rubs or gallops.  Abd:           Not examined.   Lymphadenopathy: Not examined.  Gait and Station: Normal.  Digits and Nails: No clubbing, cyanosis, petechiae, or nodes.   Cranial Nerves: II-XII grossly intact.  Skin:        No rashes, lesions or ulcers noted.               Ext:           No cyanosis or edema.      Assessment:  1. SHERRY (obstructive sleep apnea)          Plan:  Cussed treatment options and recommendations with patient.  I did recommend titration study with sleep aid to ensure sleep quality is provision for night of sleep study.  Question is whether current CPAP settings elicit either hypoxia or increased central events.  At this time, patient defers.  Patient would like to pursue new CPAP device with reassessment of AHI and symptoms.  If AHI remains elevated with increased incidence of central events or hypoxia, can proceed with titration study.  Order placed for auto CPAP with recommended follow-up within 90 days.  Will reassess when patient returns.    Please note that this dictation was created using voice recognition software. I have made every reasonable attempt to correct obvious errors, but it is possible there are errors of grammar and possibly content that I did not discover before finalizing the note.

## 2024-04-14 NOTE — LETTER
"     [facility]   [address], [address]  [place] [address]  Phone: [phone]  Fax: [phone]              Boo Cavanaugh Jr.  1945    Encounter Date: 2/2/2018    Enoch Mendoza M.D.          PROGRESS NOTE:  Subjective:   Boo Cavanaugh Jr. is a 72 y.o. male who presents today with previous sustained flutter and frequent outflow tract PVC's. Felt poorly with beta blockers. Has about 8 % on Holter. No syncope. No a flutter seen. Has had PVC's for sometime. Has worsened. No chest pressure. Nl echo.    Chief Complaint: palps    Past Medical History:   Diagnosis Date   • Arrhythmia     \"flutter\", Dr. Mendoza cardiologist   • Enlarged prostate with urinary obstruction    • OA (osteoarthritis) of knee     bilateral    • Prostate enlargement    • Urinary bladder disorder 12/06/2017    savage in place   • Urinary retention      Past Surgical History:   Procedure Laterality Date   • TURP-VAPOR  12/7/2017    Procedure: TURP-VAPOR - GREEN LIGHT PHOTOSELECTIVE;  Surgeon: Cristofer Cr M.D.;  Location: SURGERY [facility];  Service: Urology   • GANGLION EXCISION Right 1966    wrist   • TONSILLECTOMY      as a child     Family History   Problem Relation Age of Onset   • Cancer Father    • Cancer Brother 50     prostate cancer     History   Smoking Status   • Never Smoker   Smokeless Tobacco   • Never Used     Allergies   Allergen Reactions   • Penicillins Rash     Rash as a child.      Outpatient Encounter Prescriptions as of 2/2/2018   Medication Sig Dispense Refill   • Cholecalciferol (VITAMIN D3) 5000 units Cap Take 1 Cap by mouth every day.     • magnesium oxide (MAG-OX) 400 MG Tab Take 400 mg by mouth every evening.     • tamsulosin (FLOMAX) 0.4 MG capsule Take 1 Cap by mouth ONE-HALF HOUR AFTER BREAKFAST. (Patient not taking: Reported on 1/15/2018) 90 Cap 3   • metoprolol (LOPRESSOR) 25 MG Tab Take 1 Tab by mouth 2 Times a Day. 60 Tab 3     No facility-administered " "encounter medications on file as of 2/2/2018.      ROS     Objective:   /72   Pulse 83   Ht 1.88 m (6' 2.02\")   Wt 91.2 kg (201 lb)   SpO2 95%   BMI 25.80 kg/m²      Physical Exam   Constitutional: He is oriented to person, place, and time. He appears well-developed. No distress.   HENT:   Mouth/Throat: Mucous membranes are normal.   Eyes: Conjunctivae and EOM are normal.   Neck: No JVD present. No tracheal deviation present. No thyroid mass and no thyromegaly present.   Cardiovascular: Normal rate, regular rhythm and intact distal pulses.    No murmur heard.  Pulmonary/Chest: Effort normal and breath sounds normal. No respiratory distress. He exhibits no tenderness.   Abdominal: Soft. There is no tenderness.   Musculoskeletal: Normal range of motion. He exhibits no edema.   Neurological: He is alert and oriented to person, place, and time. He has normal strength. He displays no tremor.   Skin: Skin is warm and dry. He is not diaphoretic.   Psychiatric: He has a normal mood and affect. His behavior is normal.   Vitals reviewed.      Assessment:     1. Frequent unifocal PVCs     2. Typical atrial flutter (CMS-HCC)         Medical Decision Making:  Today's Assessment / Status / Plan:   1. PVC RVOT presumed discussed living with them, AA meds or catheter RFA . He will think about options.  2. Atrial flutter no obvious recurrence.  3. F/U with me in 6 weeks.      Hellen Ayala M.D.  [address]  [place] [address]  VIA In Basket                 " accepted

## 2024-05-20 ENCOUNTER — OFFICE VISIT (OUTPATIENT)
Dept: MEDICAL GROUP | Facility: PHYSICIAN GROUP | Age: 79
End: 2024-05-20
Payer: MEDICARE

## 2024-05-20 ENCOUNTER — HOSPITAL ENCOUNTER (OUTPATIENT)
Dept: LAB | Facility: MEDICAL CENTER | Age: 79
End: 2024-05-20
Attending: FAMILY MEDICINE
Payer: MEDICARE

## 2024-05-20 VITALS
OXYGEN SATURATION: 96 % | DIASTOLIC BLOOD PRESSURE: 66 MMHG | BODY MASS INDEX: 24.31 KG/M2 | WEIGHT: 189.4 LBS | TEMPERATURE: 97.3 F | SYSTOLIC BLOOD PRESSURE: 120 MMHG | HEART RATE: 60 BPM | HEIGHT: 74 IN

## 2024-05-20 DIAGNOSIS — R53.83 OTHER FATIGUE: ICD-10-CM

## 2024-05-20 DIAGNOSIS — R19.8 GI SYMPTOMS: ICD-10-CM

## 2024-05-20 DIAGNOSIS — R19.8 GI SYMPTOMS: Primary | ICD-10-CM

## 2024-05-20 LAB
ALBUMIN SERPL BCP-MCNC: 4.4 G/DL (ref 3.2–4.9)
ALBUMIN/GLOB SERPL: 1.9 G/DL
ALP SERPL-CCNC: 85 U/L (ref 30–99)
ALT SERPL-CCNC: 21 U/L (ref 2–50)
ANION GAP SERPL CALC-SCNC: 10 MMOL/L (ref 7–16)
AST SERPL-CCNC: 27 U/L (ref 12–45)
BILIRUB SERPL-MCNC: 0.7 MG/DL (ref 0.1–1.5)
BUN SERPL-MCNC: 16 MG/DL (ref 8–22)
CALCIUM ALBUM COR SERPL-MCNC: 8.8 MG/DL (ref 8.5–10.5)
CALCIUM SERPL-MCNC: 9.1 MG/DL (ref 8.5–10.5)
CHLORIDE SERPL-SCNC: 104 MMOL/L (ref 96–112)
CO2 SERPL-SCNC: 25 MMOL/L (ref 20–33)
CREAT SERPL-MCNC: 0.97 MG/DL (ref 0.5–1.4)
ERYTHROCYTE [DISTWIDTH] IN BLOOD BY AUTOMATED COUNT: 45 FL (ref 35.9–50)
GFR SERPLBLD CREATININE-BSD FMLA CKD-EPI: 80 ML/MIN/1.73 M 2
GLOBULIN SER CALC-MCNC: 2.3 G/DL (ref 1.9–3.5)
GLUCOSE SERPL-MCNC: 94 MG/DL (ref 65–99)
HCT VFR BLD AUTO: 47.1 % (ref 42–52)
HGB BLD-MCNC: 16.3 G/DL (ref 14–18)
MCH RBC QN AUTO: 33.4 PG (ref 27–33)
MCHC RBC AUTO-ENTMCNC: 34.6 G/DL (ref 32.3–36.5)
MCV RBC AUTO: 96.5 FL (ref 81.4–97.8)
PLATELET # BLD AUTO: 177 K/UL (ref 164–446)
PMV BLD AUTO: 11.6 FL (ref 9–12.9)
POTASSIUM SERPL-SCNC: 4.6 MMOL/L (ref 3.6–5.5)
PROT SERPL-MCNC: 6.7 G/DL (ref 6–8.2)
RBC # BLD AUTO: 4.88 M/UL (ref 4.7–6.1)
SODIUM SERPL-SCNC: 139 MMOL/L (ref 135–145)
TSH SERPL DL<=0.005 MIU/L-ACNC: 2.13 UIU/ML (ref 0.38–5.33)
WBC # BLD AUTO: 6.6 K/UL (ref 4.8–10.8)

## 2024-05-20 PROCEDURE — 3074F SYST BP LT 130 MM HG: CPT | Performed by: FAMILY MEDICINE

## 2024-05-20 PROCEDURE — 99214 OFFICE O/P EST MOD 30 MIN: CPT | Performed by: FAMILY MEDICINE

## 2024-05-20 PROCEDURE — 3078F DIAST BP <80 MM HG: CPT | Performed by: FAMILY MEDICINE

## 2024-05-20 RX ORDER — OMEPRAZOLE 20 MG/1
20 CAPSULE, DELAYED RELEASE ORAL DAILY
Qty: 30 CAPSULE | Refills: 0 | Status: SHIPPED | OUTPATIENT
Start: 2024-05-20

## 2024-05-20 ASSESSMENT — FIBROSIS 4 INDEX: FIB4 SCORE: 1.4

## 2024-05-20 NOTE — LETTER
[facility]  Jerica Coronado M.D.  [address]  [place] [address]  Fax: [phone]   Authorization for Release/Disclosure of   Protected Health Information   Name: BOO ANGEL JR. : 1945 SSN: [id number]   Address: [address] [place] [address]  [place] [address] Phone:    [phone] (home)    I authorize the entity listed below to release/disclose the PHI below to:   [facility]/Jerica Coronado M.D. and Jerica Coronado M.D.   Provider or Entity Name:  [facility]   Address   City, State, Zip   Phone:      Fax:     Reason for request: continuity of care   Information to be released:    [  ] LAST COLONOSCOPY,  including any PATH REPORT and follow-up  [  ] LAST FIT/COLOGUARD RESULT [  ] LAST DEXA  [  ] LAST MAMMOGRAM  [  ] LAST PAP  [  ] LAST LABS [  ] RETINA EXAM REPORT  [  ] IMMUNIZATION RECORDS  [XX] Release all info      [  ] Check here and initial the line next to each item to release ALL health information INCLUDING  _____ Care and treatment for drug and / or alcohol abuse  _____ HIV testing, infection status, or AIDS  _____ Genetic Testing    DATES OF SERVICE OR TIME PERIOD TO BE DISCLOSED: _____________  I understand and acknowledge that:  * This Authorization may be revoked at any time by you in writing, except if your health information has already been used or disclosed.  * Your health information that will be used or disclosed as a result of you signing this authorization could be re-disclosed by the recipient. If this occurs, your re-disclosed health information may no longer be protected by State or Federal laws.  * You may refuse to sign this Authorization. Your refusal will not affect your ability to obtain treatment.  * This Authorization becomes effective upon signing and will  on (date) __________.      If no date is indicated, this Authorization will  one (1) year from the signature date.    Name: Boo Angel Jr.  Signature: Date:   2024     PLEASE FAX  REQUESTED RECORDS BACK TO: [phone]

## 2024-05-20 NOTE — PROGRESS NOTES
Verbal consent was acquired by the patient to use N[employer] ambient listening note generation during this visit     Subjective:     HPI:   History of Present Illness  The patient presents for evaluation of multiple medical concerns.    The patient reports experiencing low energy and mental deterioration on certain days. He experienced significant gas and discomfort in his abdomen, particularly beneath his rib cage, after returning from shopping yesterday. Despite the absence of abdominal distension, he consumed a small lunch and drank, and his condition improved by the afternoon. He is currently feeling unwell, albeit less impaired than before. The severity of these symptoms fluctuates, with a feeling of impending death. His memory is deteriorating, which he attributes to his feeling of fogginess. His symptoms have been intermittent, but have increased in frequency recently. He has days where his energy levels are good for 3 to 4 days, and his memory was improved during those days. He occasionally experiences word-finding difficulties, but these are not dysfunctional. His symptoms have been present throughout the spring, but have progressively worsened over the past few weeks. His stomach problems are intermittent, albeit less severe than before. He denies vomiting, but reports mild nausea and fogginess. He denies abdominal pain, but reports discomfort below his ribs on both sides, which he describes as muscular in nature. His stools are typically loose, but he noticed a small amount of diarrhea yesterday. He denies hematochezia or melena. He denies fevers or chills. He recalls a severe episode after Thanksgiving when he increased his diet. Recently, he ate hammered with his grandchildren, a veggie burger, and orange chicken for 3 to 4 days consecutively, after which he felt unwell for 1 to 2 days. He avoids fatty and greasy foods. He denies heartburn, reflux, or regurgitation. He has a history of flatulence,  "which occasionally worsens. He occasionally experiences a buildup of gas in his abdomen, which requires him to stand up and move around to release. The discomfort typically lasts for approximately an hour. He has not tried Gas-X. He was referred to a gastroenterologist at [facility] for a swallowing issue, which he has not yet done.    Health Maintenance: Completed    Objective:     Exam:  /66 (BP Location: Right arm, Patient Position: Sitting, BP Cuff Size: Adult)   Pulse 60   Temp 36.3 °C (97.3 °F) (Temporal)   Ht 1.88 m (6' 2\")   Wt 85.9 kg (189 lb 6.4 oz)   SpO2 96%   BMI 24.32 kg/m²  Body mass index is 24.32 kg/m².    Physical Exam  Constitutional:       Appearance: Normal appearance.   Cardiovascular:      Rate and Rhythm: Normal rate and regular rhythm.      Heart sounds: Normal heart sounds.   Pulmonary:      Effort: Pulmonary effort is normal.      Breath sounds: Normal breath sounds.   Abdominal:      General: Abdomen is flat.      Palpations: Abdomen is soft.      Tenderness: There is abdominal tenderness in the epigastric area. There is no guarding or rebound. Negative signs include Kim's sign.   Musculoskeletal:      Cervical back: Normal range of motion and neck supple.   Lymphadenopathy:      Cervical: No cervical adenopathy.   Neurological:      Mental Status: He is alert.             Results      Assessment & Plan:     1. GI symptoms  CBC WITHOUT DIFFERENTIAL    Comp Metabolic Panel    omeprazole (PRILOSEC) 20 MG delayed-release capsule      2. Other fatigue  TSH WITH REFLEX TO FT4          Assessment & Plan  1. Chronic gastrointestinal symptoms.  The patient has been experiencing intermittent episodes of increased gas production, burping, and discomfort in the left and right upper quadrant of the abdomen for the past few months. Despite these symptoms, there is no abdominal distention or other associated symptoms, except for mild nausea. The symptoms may be associated with greasy " "or fatty foods, however, the patient is uncertain. He also reports fatigue and \"brain fog.\" When these gastrointestinal symptoms are absent, he does not experience significant fatigue or brain fog. His abdominal examination today was benign, except for slight tenderness upon palpation in the epigastric region, without rebound or guarding. We will proceed with acid suppression to potentially alleviate his symptoms. I have prescribed omeprazole 20 mg daily to be taken daily for a month. I will request records from his gastroenterologist. If his symptoms do not improve or recur after discontinuing omeprazole, we may consider H. pylori testing and potential upper endoscopy with Gastroenterology.    2. Chronic fatigue.  The patient's fatigue is chronic and stable, as previously discussed. He reports fatigue in conjunction with abdominal discomfort. He has also been experiencing fatigue for several years, which he associates with shortness of breath or other chest symptoms. Further lab work will be conducted to further evaluate the fatigue.    Return in about 4 weeks (around 6/17/2024) for f/u GI symptoms.    Please note that this dictation was created using voice recognition software. I have made every reasonable attempt to correct obvious errors, but I expect that there are errors of grammar and possibly content that I did not discover before finalizing the note.        "

## 2024-06-07 ENCOUNTER — OFFICE VISIT (OUTPATIENT)
Dept: CARDIOLOGY | Facility: MEDICAL CENTER | Age: 79
End: 2024-06-07
Attending: INTERNAL MEDICINE
Payer: MEDICARE

## 2024-06-07 VITALS
HEIGHT: 74 IN | HEART RATE: 67 BPM | RESPIRATION RATE: 16 BRPM | BODY MASS INDEX: 24.38 KG/M2 | DIASTOLIC BLOOD PRESSURE: 60 MMHG | SYSTOLIC BLOOD PRESSURE: 90 MMHG | OXYGEN SATURATION: 95 % | WEIGHT: 190 LBS

## 2024-06-07 DIAGNOSIS — Z98.890 S/P CATHETER ABLATION OF SLOW PATHWAY: ICD-10-CM

## 2024-06-07 DIAGNOSIS — Z86.79 HISTORY OF ATRIAL FLUTTER: ICD-10-CM

## 2024-06-07 DIAGNOSIS — I10 PRIMARY HYPERTENSION: ICD-10-CM

## 2024-06-07 DIAGNOSIS — I49.3 PVC (PREMATURE VENTRICULAR CONTRACTION): ICD-10-CM

## 2024-06-07 DIAGNOSIS — I10 ESSENTIAL HYPERTENSION, BENIGN: ICD-10-CM

## 2024-06-07 DIAGNOSIS — Z86.79 S/P CATHETER ABLATION OF SLOW PATHWAY: ICD-10-CM

## 2024-06-07 DIAGNOSIS — E78.2 MIXED HYPERLIPIDEMIA: ICD-10-CM

## 2024-06-07 LAB — EKG IMPRESSION: NORMAL

## 2024-06-07 PROCEDURE — 93005 ELECTROCARDIOGRAM TRACING: CPT | Performed by: INTERNAL MEDICINE

## 2024-06-07 PROCEDURE — 3078F DIAST BP <80 MM HG: CPT | Performed by: INTERNAL MEDICINE

## 2024-06-07 PROCEDURE — 99212 OFFICE O/P EST SF 10 MIN: CPT | Performed by: INTERNAL MEDICINE

## 2024-06-07 PROCEDURE — 3074F SYST BP LT 130 MM HG: CPT | Performed by: INTERNAL MEDICINE

## 2024-06-07 PROCEDURE — 93010 ELECTROCARDIOGRAM REPORT: CPT | Performed by: INTERNAL MEDICINE

## 2024-06-07 PROCEDURE — 99214 OFFICE O/P EST MOD 30 MIN: CPT | Mod: 25 | Performed by: INTERNAL MEDICINE

## 2024-06-07 ASSESSMENT — FIBROSIS 4 INDEX: FIB4 SCORE: 2.6

## 2024-06-07 NOTE — PROGRESS NOTES
"Chief Complaint   Patient presents with    Atrial Flutter    Hypertension       Subjective     Boo Cavanaugh Jr. is a 79 y.o. male who presents today with chronic shortness of breath normal stress imaging.  Pulmonary testing.  Has follow-up with pulmonary.  Minimal elevation in BNP.  No significant arrhythmias.  On low-dose beta-blockers.    Past Medical History:   Diagnosis Date    Arrhythmia     \"flutter\", Dr. Mendoza cardiologist    Arthritis     left thumb    Breath shortness     Cancer (HCC)     Cardiac arrhythmia     Enlarged prostate with urinary obstruction     GERD (gastroesophageal reflux disease)     Hyperlipidemia     Hypertension     OA (osteoarthritis) of knee     bilateral     Restless leg syndrome     Tonsillitis     Urinary bladder disorder 12/06/2017    savage in place     Past Surgical History:   Procedure Laterality Date    TURP-VAPOR  12/07/2017    Procedure: TURP-VAPOR - GREEN LIGHT PHOTOSELECTIVE;  Surgeon: Cristofer Cr M.D.;  Location: SURGERY [facility];  Service: Urology    GANGLION EXCISION Right 1966    wrist    APPENDECTOMY      OPEN REDUCTION      PROSTATECTOMY, RADICAL RETRO      TONSILLECTOMY      as a child    VASECTOMY       Family History   Problem Relation Age of Onset    Heart Disease Mother 65        MI    Cancer Father     Cancer Brother 50        prostate cancer    Stroke Brother     Stroke Brother     Diabetes Neg Hx     Hyperlipidemia Neg Hx     Alcohol/Drug Neg Hx     Ovarian Cancer Neg Hx     Tubal Cancer Neg Hx     Peritoneal Cancer Neg Hx     Colorectal Cancer Neg Hx     Breast Cancer Neg Hx      Social History     Socioeconomic History    Marital status: Single     Spouse name: Not on file    Number of children: Not on file    Years of education: Not on file    Highest education level: Master's degree (e.g., MA, MS, Latha, MEd, MSW, ABAD)   Occupational History    Not on file   Tobacco Use    Smoking status: Never     Passive exposure: Past (BOTH parents smoked)    " Smokeless tobacco: Never   Vaping Use    Vaping status: Never Used   Substance and Sexual Activity    Alcohol use: Not Currently    Drug use: Not Currently     Types: Marijuana    Sexual activity: Not Currently     Comment: 2 daughters    Other Topics Concern    Not on file   Social History Narrative    Not on file     Social Determinants of Health     Financial Resource Strain: Low Risk  (12/16/2023)    Overall Financial Resource Strain (CARDIA)     Difficulty of Paying Living Expenses: Not hard at all   Food Insecurity: No Food Insecurity (12/16/2023)    Hunger Vital Sign     Worried About Running Out of Food in the Last Year: Never true     Ran Out of Food in the Last Year: Never true   Transportation Needs: No Transportation Needs (12/16/2023)    PRAPARE - Transportation     Lack of Transportation (Medical): No     Lack of Transportation (Non-Medical): No   Physical Activity: Inactive (12/16/2023)    Exercise Vital Sign     Days of Exercise per Week: 0 days     Minutes of Exercise per Session: 0 min   Stress: No Stress Concern Present (12/16/2023)    [employer] - Occupational Stress Questionnaire     Feeling of Stress : Only a little   Social Connections: Socially Isolated (12/16/2023)    Social Connection and Isolation Panel [NHANES]     Frequency of Communication with Friends and Family: More than three times a week     Frequency of Social Gatherings with Friends and Family: More than three times a week     Attends Gnosticist Services: Never     Active Member of Clubs or Organizations: No     Attends Club or Organization Meetings: Never     Marital Status:    Intimate Partner Violence: Not on file   Housing Stability: Low Risk  (12/16/2023)    Housing Stability Vital Sign     Unable to Pay for Housing in the Last Year: No     Number of Places Lived in the Last Year: 1     Unstable Housing in the Last Year: No     Allergies   Allergen Reactions    Penicillins Rash     Rash  "as a child.      Outpatient Encounter Medications as of 6/7/2024   Medication Sig Dispense Refill    omeprazole (PRILOSEC) 20 MG delayed-release capsule Take 1 Capsule by mouth every day. 30 Capsule 0    augmented betamethasone dipropionate (DIPROLENE-AF) 0.05 % ointment       lisinopril (PRINIVIL) 10 MG Tab TAKE ONE TABLET BY MOUTH EVERY DAY 90 Tablet 3    metoprolol SR (TOPROL XL) 25 MG TABLET SR 24 HR TAKE ONE TABLET BY MOUTH EVERY DAY 90 Tablet 3    rosuvastatin (CRESTOR) 20 MG Tab Take 1 Tablet by mouth every day. 90 Tablet 3    finasteride (PROSCAR) 5 MG Tab Take 1 Tablet by mouth every day.      ciclopirox (PENLAC) 8 % solution Apply 1 Application topically two times a week. Apply to affected toenails on both feet.      aspirin 81 MG EC tablet Take 81 mg by mouth every day.       No facility-administered encounter medications on file as of 6/7/2024.     ROS           Objective     BP 90/60 (BP Location: Left arm, Patient Position: Sitting, BP Cuff Size: Adult)   Pulse 67   Resp 16   Ht 1.88 m (6' 2\")   Wt 86.2 kg (190 lb)   SpO2 95%   BMI 24.39 kg/m²     Physical Exam  Constitutional:       Appearance: He is well-developed.   HENT:      Head: Normocephalic and atraumatic.   Cardiovascular:      Rate and Rhythm: Normal rate and regular rhythm.      Heart sounds: No murmur heard.     No friction rub. No gallop.   Pulmonary:      Effort: Pulmonary effort is normal.      Breath sounds: Normal breath sounds.   Abdominal:      Palpations: Abdomen is soft.   Musculoskeletal:         General: Normal range of motion.      Cervical back: Normal range of motion and neck supple.   Skin:     General: Skin is warm and dry.   Neurological:      Mental Status: He is alert and oriented to person, place, and time.   Psychiatric:         Behavior: Behavior normal.         Thought Content: Thought content normal.         Judgment: Judgment normal.                Assessment & Plan     1. Essential hypertension, benign  EKG "      2. S/P catheter ablation of slow pathway        3. PVC (premature ventricular contraction)        4. History of atrial flutter  EC-ECHOCARDIOGRAM COMPLETE W/O CONT      5. Mixed hyperlipidemia        6. Primary hypertension            Medical Decision Making: Today's Assessment/Status/Plan:   1.  Mild dyspnea continue current regimen.  Can try coming down on beta-blocker.  Check echocardiogram.  2.  Pulmonary being worked up.  3.  Status post flutter ablation symptomatically ablation.  4.  Follow-up with me in 6 months.

## 2024-06-27 ENCOUNTER — OFFICE VISIT (OUTPATIENT)
Dept: SLEEP MEDICINE | Facility: MEDICAL CENTER | Age: 79
End: 2024-06-27
Attending: INTERNAL MEDICINE
Payer: MEDICARE

## 2024-06-27 VITALS
HEART RATE: 60 BPM | DIASTOLIC BLOOD PRESSURE: 60 MMHG | OXYGEN SATURATION: 94 % | WEIGHT: 182 LBS | BODY MASS INDEX: 24.12 KG/M2 | HEIGHT: 73 IN | SYSTOLIC BLOOD PRESSURE: 118 MMHG

## 2024-06-27 DIAGNOSIS — G47.33 OSA (OBSTRUCTIVE SLEEP APNEA): ICD-10-CM

## 2024-06-27 DIAGNOSIS — R06.02 SOB (SHORTNESS OF BREATH): ICD-10-CM

## 2024-06-27 PROCEDURE — 99211 OFF/OP EST MAY X REQ PHY/QHP: CPT | Performed by: INTERNAL MEDICINE

## 2024-06-27 PROCEDURE — 99214 OFFICE O/P EST MOD 30 MIN: CPT | Performed by: INTERNAL MEDICINE

## 2024-06-27 PROCEDURE — 3074F SYST BP LT 130 MM HG: CPT | Performed by: INTERNAL MEDICINE

## 2024-06-27 PROCEDURE — 3078F DIAST BP <80 MM HG: CPT | Performed by: INTERNAL MEDICINE

## 2024-06-27 ASSESSMENT — FIBROSIS 4 INDEX: FIB4 SCORE: 2.6

## 2024-06-27 ASSESSMENT — ENCOUNTER SYMPTOMS
FEVER: 0
HEADACHES: 0
BACK PAIN: 0
WEAKNESS: 0
HEMOPTYSIS: 0
BLURRED VISION: 0
SORE THROAT: 0
DOUBLE VISION: 0
ORTHOPNEA: 0
WEIGHT LOSS: 0
CHILLS: 0
SPUTUM PRODUCTION: 0
PND: 0
CLAUDICATION: 0
ABDOMINAL PAIN: 0
DIAPHORESIS: 0
NECK PAIN: 0
DIZZINESS: 0
DIARRHEA: 0
WHEEZING: 0
NAUSEA: 0
MYALGIAS: 0
TREMORS: 0
FALLS: 0
FOCAL WEAKNESS: 0
DEPRESSION: 0
EYE DISCHARGE: 0
EYE PAIN: 0
PHOTOPHOBIA: 0
PALPITATIONS: 0
VOMITING: 0
SINUS PAIN: 0
COUGH: 0
HEARTBURN: 0
CONSTIPATION: 0
SHORTNESS OF BREATH: 0
EYE REDNESS: 0
STRIDOR: 0
SPEECH CHANGE: 0

## 2024-06-27 NOTE — PROGRESS NOTES
Chief Complaint   Patient presents with    Follow-Up     LAST SEEN 1/4/24    Results     OPO 1/18/24  CPET 2/9/24  LABS 1/5/24         HPI: This patient is a 79 y.o. male whom is followed in our clinic for SOB last seen by me on 1/4/24.  PMHx is significant for history of atrial flutter status post ablation on low-dose beta-blockade and followed by cardiology, hypertension well-controlled after recent addition of lisinopril to beta-blocker, dyslipidemia on statin therapy and obstructive sleep apnea on CPAP therapy followed by our sleep medicine clinic. He has degenerative joint disease complicated by bilateral knee pain which limits his level of activity. He is a lifelong non-smoker. He is retired from a career in mental health as of 2020.  He was referred to me for SOB that began in October of last year at which point he was seen in the emergency department and noted to have mildly elevated blood pressure, clear chest x-ray at the time. Symptoms worsened over Thanksgiving and he was seen again in the emergency department on December 15 where he was admitted and ruled out for ischemic heart disease with nuclear stress test. Labs were notable only for mild hyponatremia with a sodium of 133 and elevated glucose but this was not fasting. Pulmonary function testing from December 19 showed normal airflows with normal lung volumes and normal DLCO. Chest x-ray again on December 15 was clear. CT chest from March 2022 showed no concerning lung nodules or lymphadenopathy. Nuclear stress test done on December 18 as per above showed no evidence of ischemia with normal left ventricular size and function. ECG at hospital admission showed normal sinus rhythm. He has since had CPET showed no respiratory or ventilatory abnormalities however the was an abnormal O2 pulse suggesting cardiac limitations. He does not exercise routinely due to knee pain and he has seen cardiology who okay'd a reduction in metoprolol to see if this helps  "his sxs. No new sxs.     Past Medical History:   Diagnosis Date    Arrhythmia     \"flutter\", Dr. Mendoza cardiologist    Arthritis     left thumb    Breath shortness     Cancer (HCC)     Cardiac arrhythmia     Enlarged prostate with urinary obstruction     GERD (gastroesophageal reflux disease)     Hyperlipidemia     Hypertension     OA (osteoarthritis) of knee     bilateral     Restless leg syndrome     Tonsillitis     Urinary bladder disorder 12/06/2017    savage in place       Social History     Socioeconomic History    Marital status: Single     Spouse name: Not on file    Number of children: Not on file    Years of education: Not on file    Highest education level: Master's degree (e.g., MA, MS, Latha, MEd, MSW, ABAD)   Occupational History    Not on file   Tobacco Use    Smoking status: Never     Passive exposure: Past (BOTH parents smoked)    Smokeless tobacco: Never   Vaping Use    Vaping status: Never Used   Substance and Sexual Activity    Alcohol use: Not Currently    Drug use: Not Currently     Types: Marijuana    Sexual activity: Not Currently     Comment: 2 daughters    Other Topics Concern    Not on file   Social History Narrative    Not on file     Social Determinants of Health     Financial Resource Strain: Low Risk  (12/16/2023)    Overall Financial Resource Strain (CARDIA)     Difficulty of Paying Living Expenses: Not hard at all   Food Insecurity: No Food Insecurity (12/16/2023)    Hunger Vital Sign     Worried About Running Out of Food in the Last Year: Never true     Ran Out of Food in the Last Year: Never true   Transportation Needs: No Transportation Needs (12/16/2023)    PRAPARE - Transportation     Lack of Transportation (Medical): No     Lack of Transportation (Non-Medical): No   Physical Activity: Inactive (12/16/2023)    Exercise Vital Sign     Days of Exercise per Week: 0 days     Minutes of Exercise per Session: 0 min   Stress: No Stress Concern Present (12/16/2023)    [facility] of " Occupational Health - Occupational Stress Questionnaire     Feeling of Stress : Only a little   Social Connections: Socially Isolated (12/16/2023)    Social Connection and Isolation Panel [NHANES]     Frequency of Communication with Friends and Family: More than three times a week     Frequency of Social Gatherings with Friends and Family: More than three times a week     Attends Congregational Services: Never     Active Member of Clubs or Organizations: No     Attends Club or Organization Meetings: Never     Marital Status:    Intimate Partner Violence: Not on file   Housing Stability: Low Risk  (12/16/2023)    Housing Stability Vital Sign     Unable to Pay for Housing in the Last Year: No     Number of Places Lived in the Last Year: 1     Unstable Housing in the Last Year: No       Family History   Problem Relation Age of Onset    Heart Disease Mother 65        MI    Cancer Father     Cancer Brother 50        prostate cancer    Stroke Brother     Stroke Brother     Diabetes Neg Hx     Hyperlipidemia Neg Hx     Alcohol/Drug Neg Hx     Ovarian Cancer Neg Hx     Tubal Cancer Neg Hx     Peritoneal Cancer Neg Hx     Colorectal Cancer Neg Hx     Breast Cancer Neg Hx        Current Outpatient Medications on File Prior to Visit   Medication Sig Dispense Refill    omeprazole (PRILOSEC) 20 MG delayed-release capsule Take 1 Capsule by mouth every day. 30 Capsule 0    lisinopril (PRINIVIL) 10 MG Tab TAKE ONE TABLET BY MOUTH EVERY DAY 90 Tablet 3    metoprolol SR (TOPROL XL) 25 MG TABLET SR 24 HR TAKE ONE TABLET BY MOUTH EVERY DAY 90 Tablet 3    rosuvastatin (CRESTOR) 20 MG Tab Take 1 Tablet by mouth every day. 90 Tablet 3    finasteride (PROSCAR) 5 MG Tab Take 1 Tablet by mouth every day.      aspirin 81 MG EC tablet Take 81 mg by mouth every day.      augmented betamethasone dipropionate (DIPROLENE-AF) 0.05 % ointment       ciclopirox (PENLAC) 8 % solution Apply 1 Application topically two times a week. Apply to affected  "toenails on both feet.       No current facility-administered medications on file prior to visit.       Penicillins      ROS:   Review of Systems   Constitutional:  Negative for chills, diaphoresis, fever, malaise/fatigue and weight loss.   HENT:  Negative for congestion, ear discharge, ear pain, hearing loss, nosebleeds, sinus pain, sore throat and tinnitus.    Eyes:  Negative for blurred vision, double vision, photophobia, pain, discharge and redness.   Respiratory:  Negative for cough, hemoptysis, sputum production, shortness of breath, wheezing and stridor.    Cardiovascular:  Negative for chest pain, palpitations, orthopnea, claudication, leg swelling and PND.   Gastrointestinal:  Negative for abdominal pain, constipation, diarrhea, heartburn, nausea and vomiting.   Genitourinary:  Negative for dysuria and urgency.   Musculoskeletal:  Negative for back pain, falls, joint pain, myalgias and neck pain.   Skin:  Negative for itching and rash.   Neurological:  Negative for dizziness, tremors, speech change, focal weakness, weakness and headaches.   Endo/Heme/Allergies:  Negative for environmental allergies.   Psychiatric/Behavioral:  Negative for depression.        /60 (BP Location: Right arm, Patient Position: Sitting, BP Cuff Size: Adult)   Pulse 60   Ht 1.854 m (6' 1\")   Wt 82.6 kg (182 lb)   SpO2 94%   Physical Exam  Vitals reviewed.   Constitutional:       General: He is not in acute distress.     Appearance: Normal appearance. He is normal weight.   HENT:      Head: Normocephalic and atraumatic.      Right Ear: External ear normal.      Left Ear: External ear normal.      Nose: Nose normal. No congestion.      Mouth/Throat:      Mouth: Mucous membranes are moist.      Pharynx: Oropharynx is clear. No oropharyngeal exudate.   Eyes:      General: No scleral icterus.     Extraocular Movements: Extraocular movements intact.      Conjunctiva/sclera: Conjunctivae normal.      Pupils: Pupils are equal, " round, and reactive to light.   Cardiovascular:      Rate and Rhythm: Normal rate and regular rhythm.      Heart sounds: Normal heart sounds. No murmur heard.     No gallop.   Pulmonary:      Effort: Pulmonary effort is normal. No respiratory distress.      Breath sounds: Normal breath sounds. No wheezing or rales.   Abdominal:      General: There is no distension.      Palpations: Abdomen is soft.   Musculoskeletal:         General: Normal range of motion.      Cervical back: Normal range of motion and neck supple.      Right lower leg: No edema.      Left lower leg: No edema.   Skin:     General: Skin is warm and dry.      Findings: No rash.   Neurological:      Mental Status: He is alert and oriented to person, place, and time.      Cranial Nerves: No cranial nerve deficit.   Psychiatric:         Mood and Affect: Mood normal.         Behavior: Behavior normal.       PFTs as reviewed by me personally:as per hPI    Imaging as reviewed by me personally:  as per hPI    Assessment:  1. SOB (shortness of breath)        2. SHERRY (obstructive sleep apnea)            Plan:  NO clear pulmonary abnormalities. Encouraged pt to see if he can slowly improved exercise tolerance and f/u cardiology.  Followed by sleep medicine. Excellent compliance and clinical efficacy  Return if symptoms worsen or fail to improve.

## 2024-07-01 ENCOUNTER — APPOINTMENT (OUTPATIENT)
Dept: MEDICAL GROUP | Facility: PHYSICIAN GROUP | Age: 79
End: 2024-07-01
Payer: MEDICARE

## 2024-07-01 VITALS
BODY MASS INDEX: 24.76 KG/M2 | WEIGHT: 186.8 LBS | DIASTOLIC BLOOD PRESSURE: 64 MMHG | OXYGEN SATURATION: 96 % | TEMPERATURE: 98 F | HEART RATE: 60 BPM | SYSTOLIC BLOOD PRESSURE: 108 MMHG | HEIGHT: 73 IN

## 2024-07-01 DIAGNOSIS — R19.8 GI SYMPTOMS: ICD-10-CM

## 2024-07-01 PROCEDURE — 99214 OFFICE O/P EST MOD 30 MIN: CPT | Performed by: FAMILY MEDICINE

## 2024-07-01 PROCEDURE — 3074F SYST BP LT 130 MM HG: CPT | Performed by: FAMILY MEDICINE

## 2024-07-01 PROCEDURE — 3078F DIAST BP <80 MM HG: CPT | Performed by: FAMILY MEDICINE

## 2024-07-01 ASSESSMENT — FIBROSIS 4 INDEX: FIB4 SCORE: 2.6

## 2024-07-08 ENCOUNTER — HOSPITAL ENCOUNTER (OUTPATIENT)
Facility: MEDICAL CENTER | Age: 79
End: 2024-07-08
Attending: FAMILY MEDICINE
Payer: MEDICARE

## 2024-07-08 DIAGNOSIS — R19.8 GI SYMPTOMS: ICD-10-CM

## 2024-07-08 PROCEDURE — 87338 HPYLORI STOOL AG IA: CPT

## 2024-07-09 DIAGNOSIS — R19.8 GI SYMPTOMS: ICD-10-CM

## 2024-07-09 LAB — H PYLORI AG STL QL IA: NOT DETECTED

## 2024-07-15 ENCOUNTER — APPOINTMENT (OUTPATIENT)
Dept: SLEEP MEDICINE | Facility: MEDICAL CENTER | Age: 79
End: 2024-07-15
Attending: PHYSICIAN ASSISTANT
Payer: MEDICARE

## 2024-07-22 ENCOUNTER — ANCILLARY PROCEDURE (OUTPATIENT)
Dept: CARDIOLOGY | Facility: MEDICAL CENTER | Age: 79
End: 2024-07-22
Attending: INTERNAL MEDICINE
Payer: MEDICARE

## 2024-07-22 DIAGNOSIS — Z86.79 HISTORY OF ATRIAL FLUTTER: ICD-10-CM

## 2024-07-22 PROCEDURE — 93306 TTE W/DOPPLER COMPLETE: CPT

## 2024-07-23 LAB
LV EJECT FRACT  99904: 65
LV EJECT FRACT MOD 2C 99903: 67.88
LV EJECT FRACT MOD 4C 99902: 66.84
LV EJECT FRACT MOD BP 99901: 66.97

## 2024-07-23 PROCEDURE — 93306 TTE W/DOPPLER COMPLETE: CPT | Mod: 26 | Performed by: INTERNAL MEDICINE

## 2024-08-05 DIAGNOSIS — E78.2 MIXED HYPERLIPIDEMIA: ICD-10-CM

## 2024-08-06 RX ORDER — ROSUVASTATIN CALCIUM 20 MG/1
20 TABLET, COATED ORAL
Qty: 90 TABLET | Refills: 3 | Status: SHIPPED | OUTPATIENT
Start: 2024-08-06

## 2024-08-06 NOTE — PROGRESS NOTES
[facility] Follow-up Visit    Date of Visit: 8/8/2024     CC:  Follow-up for SHERRY management      HPI:  Boo Cavanaugh Jr. is a very pleasant 79 y.o. year old male never smoker, with a PMHx of SHERRY, a flutter s/p ablation, TMJ, headache, HTN, BPH who presented to the [facility] for a regular follow up. Last seen in the office on 4/5/2024 with THEODORA Kruse.     Patient presents for first compliance after receiving the machine.  Patient reports that he is concerned about his oxygenation levels, as he has a home oximeter and reports his oxygen levels at night running around 88%.  OPO during January 2024 did not show any desaturations.  He also continues to work with pulmonology and cardiology regarding his shortness of breath today.  He will have daytime drowsiness and will rarely take naps.  He will rarely have issues when asleep.  He will have an occasional dry mouth, he does titrate his humidity and is frustrated that it is not more consistent but it is worse on the auto setting.  He will have a occasional mask leak due to positioning.  He denies any significant morning headaches, drowsiness while driving, snoring, gasping, apneas, palpitations, aerophagia, or skin irritation.  Patient will go to bed at 11 PM and wakes up at 5 AM.  He will not have any awakenings.     DME provider: [facility]  Device: Sentient AirSense 11  Settings: Auto CPAP 5-15 CWP  Oxygen: None  When: May 2024  Mask: Nasal pillows  Chin strap: No     Cleaning regimen: Mask once a day and tubing once a week with soap and water    Compliance:  Compliance data reviewed showing 100% usage > 4hours in last 30 days. Average AHI 1.3 events/hour. 95% pressure 10.3 CWP. 95% leaks 19.6 L/min. Patient continues to use and benefit from machine.      Sleep History:  HSS 12/13/2018-        Patient Active Problem List    Diagnosis Date Noted    SHERRY on CPAP 11/29/2018    Headache 02/15/2024    Chest pain 12/15/2023    Callus of foot 04/01/2022     "Shortness of breath 02/17/2022    Crackling sound in left ear 02/17/2022    TMJ syndrome 02/17/2022    Back pain 01/07/2022    Hyperlipidemia     Pain of left hip joint 01/07/2020    History of skin cancer 11/13/2019    Primary osteoarthritis involving multiple joints 03/12/2019    S/P ablation of atrial flutter 01/15/2019    S/P catheter ablation of slow pathway 01/15/2019    Primary hypertension 01/08/2019    PVC (premature ventricular contraction) 07/09/2018    Benign prostatic hyperplasia without lower urinary tract symptoms 11/15/2017    History of atrial flutter 11/11/2017     Past Medical History:   Diagnosis Date    Arrhythmia     \"flutter\", Dr. Mendoza cardiologist    Arthritis     left thumb    Breath shortness     Cancer (HCC)     Cardiac arrhythmia     Enlarged prostate with urinary obstruction     GERD (gastroesophageal reflux disease)     Hyperlipidemia     Hypertension     OA (osteoarthritis) of knee     bilateral     Restless leg syndrome     Tonsillitis     Urinary bladder disorder 12/06/2017    savage in place      Past Surgical History:   Procedure Laterality Date    TURP-VAPOR  12/07/2017    Procedure: TURP-VAPOR - GREEN LIGHT PHOTOSELECTIVE;  Surgeon: Cristofer Cr M.D.;  Location: SURGERY [facility];  Service: Urology    GANGLION EXCISION Right 1966    wrist    APPENDECTOMY      OPEN REDUCTION      PROSTATECTOMY, RADICAL RETRO      TONSILLECTOMY      as a child    VASECTOMY       Family History   Problem Relation Age of Onset    Heart Disease Mother 65        MI    Cancer Father     Cancer Brother 50        prostate cancer    Stroke Brother     Stroke Brother     Diabetes Neg Hx     Hyperlipidemia Neg Hx     Alcohol/Drug Neg Hx     Ovarian Cancer Neg Hx     Tubal Cancer Neg Hx     Peritoneal Cancer Neg Hx     Colorectal Cancer Neg Hx     Breast Cancer Neg Hx      Social History     Socioeconomic History    Marital status: Single     Spouse name: Not on file    Number of children: Not on " file    Years of education: Not on file    Highest education level: Master's degree (e.g., MA, MS, Latha, MEd, MSW, ABAD)   Occupational History    Not on file   Tobacco Use    Smoking status: Never     Passive exposure: Past (BOTH parents smoked)    Smokeless tobacco: Never   Vaping Use    Vaping status: Never Used   Substance and Sexual Activity    Alcohol use: Not Currently    Drug use: Not Currently     Types: Marijuana    Sexual activity: Not Currently     Comment: 2 daughters    Other Topics Concern    Not on file   Social History Narrative    Not on file     Social Determinants of Health     Financial Resource Strain: Low Risk  (12/16/2023)    Overall Financial Resource Strain (CARDIA)     Difficulty of Paying Living Expenses: Not hard at all   Food Insecurity: No Food Insecurity (12/16/2023)    Hunger Vital Sign     Worried About Running Out of Food in the Last Year: Never true     Ran Out of Food in the Last Year: Never true   Transportation Needs: No Transportation Needs (12/16/2023)    PRAPARE - Transportation     Lack of Transportation (Medical): No     Lack of Transportation (Non-Medical): No   Physical Activity: Inactive (12/16/2023)    Exercise Vital Sign     Days of Exercise per Week: 0 days     Minutes of Exercise per Session: 0 min   Stress: No Stress Concern Present (12/16/2023)    [employer] [place] [employer] - Occupational Stress Questionnaire     Feeling of Stress : Only a little   Social Connections: Socially Isolated (12/16/2023)    Social Connection and Isolation Panel [NHANES]     Frequency of Communication with Friends and Family: More than three times a week     Frequency of Social Gatherings with Friends and Family: More than three times a week     Attends [employer] Services: Never     Active Member of Clubs or Organizations: No     Attends Club or Organization Meetings: Never     Marital Status:    Intimate Partner Violence: Not on file   Housing Stability: Low Risk   "(12/16/2023)    Housing Stability Vital Sign     Unable to Pay for Housing in the Last Year: No     Number of Places Lived in the Last Year: 1     Unstable Housing in the Last Year: No     Current Outpatient Medications   Medication Sig Dispense Refill    rosuvastatin (CRESTOR) 20 MG Tab TAKE ONE TABLET BY MOUTH EVERY DAY 90 Tablet 3    omeprazole (PRILOSEC) 20 MG delayed-release capsule Take 1 Capsule by mouth every day. 30 Capsule 0    augmented betamethasone dipropionate (DIPROLENE-AF) 0.05 % ointment       lisinopril (PRINIVIL) 10 MG Tab TAKE ONE TABLET BY MOUTH EVERY DAY 90 Tablet 3    metoprolol SR (TOPROL XL) 25 MG TABLET SR 24 HR TAKE ONE TABLET BY MOUTH EVERY DAY 90 Tablet 3    finasteride (PROSCAR) 5 MG Tab Take 1 Tablet by mouth every day.      ciclopirox (PENLAC) 8 % solution Apply 1 Application topically two times a week. Apply to affected toenails on both feet.      aspirin 81 MG EC tablet Take 81 mg by mouth every day.       No current facility-administered medications for this visit.      ALLERGIES: Penicillins    ROS:  Constitutional: Denies fever, chills, sweats,  weight loss, fatigue  Cardiovascular: Denies chest pain, tightness, palpitations, swelling in legs/feet  Respiratory: Denies shortness of breath, cough, sputum, wheezing, painful breathing   Sleep: per HPI  Gastrointestinal: Denies  difficulty swallowing, nausea, abdominal pain, diarrhea, constipation, heartburn.  Musculoskeletal: Denies painful joints, sore muscles,       PHYSICAL EXAM:  /56 (BP Location: Right arm, Patient Position: Sitting, BP Cuff Size: Adult)   Pulse 68   Ht 1.854 m (6' 1\")   Wt 83.9 kg (185 lb)   SpO2 94%   BMI 24.41 kg/m²   Appearance: Well-nourished, well-developed, no acute distress  Eyes:  No scleral icterus , EOMI  ENMT: No redness of the oropharynx  Lung auscultation:  No wheezes rhonchi rubs or rales  Cardiac: No murmurs, rubs, or gallops; regular rhythm, normal rate; no edema  Musculoskeletal:  " Grossly normal; gait and station normal; digits and nails normal  Skin:  No rashes, petechiae, cyanosis  Neurologic: without focal signs; oriented to person, time, place, and purpose; judgement intact  Psychiatric:  No depression, anxiety, agitation  Mallampati score: Class II    Assessment and Plan:    The medical record was reviewed.    Diagnostic and titration nocturnal polysomnogram's, home sleep apnea tests, continuous nocturnal oximetry results, multiple sleep latency tests, and compliance reports reviewed.    Problem List Items Addressed This Visit          Pulmonary/Sleep Medicine Problems    SHERRY on CPAP     Sleep Apnea:    The pathophysiology of sleep anea and the increased risk of cardiovascular morbidity from untreated sleep apnea is discussed in detail with the patient.  Urged to avoid supine sleep, weight gain and alcoholic beverages since all of these can worsen sleep apnea. Cautioned against drowsy driving. If feeling sleepy while driving, pull over for a break/nap, rather than persist on the road, in the interest of own safety and that of others on the road.  The risks of untreated sleep apnea were discussed with the patient at length. Patients with sleep apnea are at increased risk of cardiovascular disease including coronary artery disease, systemic arterial hypertension, pulmonary arterial hypertension, cardiac arrythmias, and stroke.  Positive airway pressure will favorably impact many of the adverse conditions and effects provoked by sleep apnea.    Plan:    Compliance download was reviewed and discussed with the patient.  Patient is compliant and AHI is well-controlled.  He is concerned about his oxygen saturations and his home oximeter does show saturations around 88%.  Will test his oxygen on CPAP and message him with results.  Will see him back in 1 year for annual compliance.  Advised patient to continue to follow-up with cardiology and pulmonology.    - Order placed for mask and supplies  to [facility]  - OPO on APAP 5-15 CWP to [facility]  - Compliance was reinforced  - Clean supplies a least once a week with dish soap and water and air dry  - Recommended the patient against the use of Ozone , such as SoClean  - Recommended the patient change out supplies as recommended for best mask fit and usage of the machine  - Equipment replacement schedule:  Mask cushion every month  Nasal pillows 2 times per month  Mask every 6 months  Head gear every 6 months  Tubing every 3 months  Ultra-fine filters 2 times per month  Foam filter every 6 months  Humidifier chamber every 6 months  Chin strap every 6 months    Has been advised to continue the current CPAP, clean equipment frequently, and get new mask and supplies as allowed by insurance and DME. Recommend an earlier appointment, if significant treatment barriers develop.    Advised patient to reach out via [employer]hart if any questions or concerns should arise.          Relevant Orders    Overnight Oximetry    DME Mask and Supplies     Have advised the patient to follow up with the appropriate healthcare practitioners for all other medical problems and issues.    Return in about 1 year (around 8/8/2025), or if symptoms worsen or fail to improve, for compliance, with [facility].    Please note portions of this record was created using voice recognition software. I have made every reasonable attempt to correct obvious errors, but I expect that there are errors of grammar and possibly content I did not discover before finalizing the note.    Time spent in record review prior to patient arrival, reviewing results, and in face-to-face encounter totaled 20 min.  __________  THEODORA Camargo  Pulmonary & Sleep Medicine  [facility]

## 2024-08-06 NOTE — TELEPHONE ENCOUNTER
Is the patient due for a refill? Yes    Was the patient seen the past year? Yes    Date of last office visit: 6/7/2024    Does the patient have an upcoming appointment?  Yes   If yes, When? 12/3/2024    Provider to refill:NIXON    Does the patients insurance require a 100 day supply?  No

## 2024-08-08 ENCOUNTER — APPOINTMENT (OUTPATIENT)
Dept: SLEEP MEDICINE | Facility: MEDICAL CENTER | Age: 79
End: 2024-08-08
Payer: MEDICARE

## 2024-08-08 VITALS
OXYGEN SATURATION: 94 % | HEIGHT: 73 IN | DIASTOLIC BLOOD PRESSURE: 56 MMHG | BODY MASS INDEX: 24.52 KG/M2 | SYSTOLIC BLOOD PRESSURE: 106 MMHG | HEART RATE: 68 BPM | WEIGHT: 185 LBS

## 2024-08-08 DIAGNOSIS — G47.33 OSA ON CPAP: ICD-10-CM

## 2024-08-08 PROCEDURE — 3078F DIAST BP <80 MM HG: CPT

## 2024-08-08 PROCEDURE — 99213 OFFICE O/P EST LOW 20 MIN: CPT

## 2024-08-08 PROCEDURE — 3074F SYST BP LT 130 MM HG: CPT

## 2024-08-08 ASSESSMENT — FIBROSIS 4 INDEX: FIB4 SCORE: 2.6

## 2024-08-08 NOTE — ASSESSMENT & PLAN NOTE
Sleep Apnea:    The pathophysiology of sleep anea and the increased risk of cardiovascular morbidity from untreated sleep apnea is discussed in detail with the patient.  Urged to avoid supine sleep, weight gain and alcoholic beverages since all of these can worsen sleep apnea. Cautioned against drowsy driving. If feeling sleepy while driving, pull over for a break/nap, rather than persist on the road, in the interest of own safety and that of others on the road.  The risks of untreated sleep apnea were discussed with the patient at length. Patients with sleep apnea are at increased risk of cardiovascular disease including coronary artery disease, systemic arterial hypertension, pulmonary arterial hypertension, cardiac arrythmias, and stroke.  Positive airway pressure will favorably impact many of the adverse conditions and effects provoked by sleep apnea.    Plan:    Compliance download was reviewed and discussed with the patient.  Patient is compliant and AHI is well-controlled.  He is concerned about his oxygen saturations and his home oximeter does show saturations around 88%.  Will test his oxygen on CPAP and message him with results.  Will see him back in 1 year for annual compliance.  Advised patient to continue to follow-up with cardiology and pulmonology.    - Order placed for mask and supplies to [facility]  - OPO on APAP 5-15 CWP to [facility]  - Compliance was reinforced  - Clean supplies a least once a week with dish soap and water and air dry  - Recommended the patient against the use of Ozone , such as SoClean  - Recommended the patient change out supplies as recommended for best mask fit and usage of the machine  - Equipment replacement schedule:  Mask cushion every month  Nasal pillows 2 times per month  Mask every 6 months  Head gear every 6 months  Tubing every 3 months  Ultra-fine filters 2 times per month  Foam filter every 6 months  Humidifier chamber every 6 months  Chin strap every 6  months    Has been advised to continue the current CPAP, clean equipment frequently, and get new mask and supplies as allowed by insurance and DME. Recommend an earlier appointment, if significant treatment barriers develop.    Advised patient to reach out via MyChart if any questions or concerns should arise.

## 2024-08-27 ENCOUNTER — APPOINTMENT (OUTPATIENT)
Dept: SLEEP MEDICINE | Facility: MEDICAL CENTER | Age: 79
End: 2024-08-27
Payer: MEDICARE

## 2024-08-27 DIAGNOSIS — G47.33 OSA ON CPAP: ICD-10-CM

## 2024-08-27 PROCEDURE — 94762 N-INVAS EAR/PLS OXIMTRY CONT: CPT

## 2024-08-27 PROCEDURE — 94762 N-INVAS EAR/PLS OXIMTRY CONT: CPT | Performed by: INTERNAL MEDICINE

## 2024-09-02 NOTE — PROCEDURES
This is an overnight oximetry study performed on August 28, 2024 for duration of 7 hours and 20 minutes on continuous positive airway pressure between 5 and 15 cm of water.    Basal SpO2 was 92%.  No significant desaturation.  Treatment appears adequate.

## 2024-09-04 ENCOUNTER — OFFICE VISIT (OUTPATIENT)
Dept: MEDICAL GROUP | Facility: PHYSICIAN GROUP | Age: 79
End: 2024-09-04
Payer: MEDICARE

## 2024-09-04 VITALS
DIASTOLIC BLOOD PRESSURE: 60 MMHG | OXYGEN SATURATION: 97 % | WEIGHT: 183.8 LBS | BODY MASS INDEX: 24.36 KG/M2 | SYSTOLIC BLOOD PRESSURE: 98 MMHG | TEMPERATURE: 98.2 F | HEART RATE: 65 BPM | HEIGHT: 73 IN

## 2024-09-04 DIAGNOSIS — I49.3 PVC (PREMATURE VENTRICULAR CONTRACTION): ICD-10-CM

## 2024-09-04 DIAGNOSIS — R19.8 GI SYMPTOMS: ICD-10-CM

## 2024-09-04 PROCEDURE — 3078F DIAST BP <80 MM HG: CPT | Performed by: FAMILY MEDICINE

## 2024-09-04 PROCEDURE — 3074F SYST BP LT 130 MM HG: CPT | Performed by: FAMILY MEDICINE

## 2024-09-04 PROCEDURE — 99214 OFFICE O/P EST MOD 30 MIN: CPT | Performed by: FAMILY MEDICINE

## 2024-09-04 ASSESSMENT — FIBROSIS 4 INDEX: FIB4 SCORE: 2.6

## 2024-09-04 NOTE — PROGRESS NOTES
Verbal consent was acquired by the patient to use [employer] ambient listening note generation during this visit     Subjective:     HPI:   History of Present Illness  The patient presents for evaluation of multiple medical concerns.    He is experiencing an irregular heartbeat, which has escalated to the point of nearly necessitating an ambulance call on several occasions. His next cardiology appointment is not until 12/2024. He has been on metoprolol 25 mg for an extended period, with a brief reduction to 12.5 mg before returning to the higher dose after a hospital visit in 12/2023. His heart rate occasionally drops to the 40s or 35s, and his blood pressure also tends to be low. He is currently on lisinopril for blood pressure management. He was informed that metoprolol could contribute to his irregular heartbeat, but discontinuation was not advised during his last visit in 06/2024. He now takes three heart medications instead of two. He experiences skipped heartbeats, but no rapid heart rate. Recently, he had a severe episode where his blood pressure spiked to 151/94, even though it was 105/62 earlier that day. This occurred without any physical exertion. He noticed premature ventricular contractions (PVCs) and had to rest and practice deep breathing. His blood pressure has been stable since starting medication, with only a few instances of high readings. On 08/30/2024, he experienced chest pain, frequent spikes, mild cold symptoms, and slight shivering. His diastolic blood pressure occasionally rises to around 90. It took a few days for his blood pressure to return to normal, typically around 110. He sometimes feels worse than when he was hospitalized with a blood pressure of 190. He does not check his blood pressure until he starts feeling unwell. He once experienced full-body shivering and irregular heartbeat, but his oxygen levels remained between 90 and 93. His heart rate increased slightly from the usual  "60s to the 70s. He has not had a repeat episode of shivering and leg weakness. He is considering reducing his metoprolol dosage. His cardiologist has suggested ablation as the next step. He feels PVCs every 2 to 4 heartbeats, which makes him feel like he is not getting enough oxygen. He wonders if oxygen would help manage his PVCs.    He is on a waitlist to see a gastroenterologist. He has been losing weight due to difficulty eating, especially when he experiences PVCs. He often has to force himself to eat and avoids foods like nuts that could help maintain his weight. He occasionally experiences discomfort at the top of his abdomen and constant gas production. When his stomach hurts more, it becomes harder for him to eat. He consumes a lot of vegetable soup. He rarely experiences mild nausea. He occasionally has loose stools, especially when his heart rate increases. On 08/30/2024, he had 7 or 8 bowel movements, but there was no change in color, blood, or black stools.    Health Maintenance: Completed    Objective:     Exam:  BP 98/60 (BP Location: Left arm, Patient Position: Sitting, BP Cuff Size: Adult)   Pulse 65   Temp 36.8 °C (98.2 °F) (Temporal)   Ht 1.854 m (6' 1\")   Wt 83.4 kg (183 lb 12.8 oz)   SpO2 97%   BMI 24.25 kg/m²  Body mass index is 24.25 kg/m².    Physical Exam  Constitutional:       Appearance: Normal appearance.   Cardiovascular:      Rate and Rhythm: Normal rate. Rhythm irregular.      Heart sounds: Normal heart sounds.   Pulmonary:      Effort: Pulmonary effort is normal.      Breath sounds: Normal breath sounds.   Musculoskeletal:      Cervical back: Normal range of motion and neck supple.   Lymphadenopathy:      Cervical: No cervical adenopathy.   Neurological:      Mental Status: He is alert.             Results  Laboratory Studies  H. pylori test was negative.    Imaging  Heart ultrasound was unchanged compared to the last ultrasound back in 2017.    Testing  24-hour heart monitor in " 2020 showed lowest heart rate was 42, average heart rate was 62, the max was 106, some couplets, some premature ventricular contractions, some bigeminy and trigeminy.    Assessment & Plan:     1. PVC (premature ventricular contraction)        2. GI symptoms            Assessment & Plan  1. Premature Ventricular Contractions (PVCs).  This is chronic and uncontrolled. A heart monitor in 2020 showed frequent PVCs. He is currently on metoprolol SR 25 mg daily, but he reports that the PVCs have recently increased in frequency. He experiences a lot of skipped heartbeats and finds the symptoms very disconcerting. He monitors his oxygen, blood pressure, and heart rate regularly. He cannot get into cardiology until December 2024. He used to be on a lower dose of metoprolol. Decreasing the dose was offered as 1% of people report palpitations on metoprolol, but he decided against it. He will try to get on a cancellation list for the cardiologist and wait to see them. If symptoms worsen, he is advised to consider going to the ER, which might facilitate quicker testing or treatment.    2. Gastrointestinal Symptoms.  This is chronic and stable. For several months, he has been experiencing intermittent episodes of increased gas production, burping, and discomfort in the upper abdomen. There are no other associated symptoms except for mild nausea, which has improved overall. Previous treatment with omeprazole was unsuccessful. H. pylori testing was negative. He is currently on a wait list to see a GI specialist. Further testing, such as an upper endoscopy and possibly SIBO testing, may be needed. He will have to wait until he sees the GI specialist.      I spent a total of 32 minutes with record review, exam, communication with the patient, and documentation of this encounter.    Return if symptoms worsen or fail to improve.    Please note that this dictation was created using voice recognition software. I have made every  reasonable attempt to correct obvious errors, but I expect that there are errors of grammar and possibly content that I did not discover before finalizing the note.

## 2024-09-11 ENCOUNTER — OFFICE VISIT (OUTPATIENT)
Dept: CARDIOLOGY | Facility: MEDICAL CENTER | Age: 79
End: 2024-09-11
Payer: MEDICARE

## 2024-09-11 ENCOUNTER — NON-PROVIDER VISIT (OUTPATIENT)
Dept: CARDIOLOGY | Facility: MEDICAL CENTER | Age: 79
End: 2024-09-11
Attending: INTERNAL MEDICINE
Payer: MEDICARE

## 2024-09-11 VITALS
WEIGHT: 182 LBS | BODY MASS INDEX: 24.12 KG/M2 | HEART RATE: 65 BPM | HEIGHT: 73 IN | DIASTOLIC BLOOD PRESSURE: 60 MMHG | RESPIRATION RATE: 12 BRPM | SYSTOLIC BLOOD PRESSURE: 122 MMHG | OXYGEN SATURATION: 96 %

## 2024-09-11 DIAGNOSIS — Z86.79 HISTORY OF ATRIAL FLUTTER: ICD-10-CM

## 2024-09-11 DIAGNOSIS — R93.1 AGATSTON CORONARY ARTERY CALCIUM SCORE GREATER THAN 400: ICD-10-CM

## 2024-09-11 DIAGNOSIS — I10 PRIMARY HYPERTENSION: ICD-10-CM

## 2024-09-11 DIAGNOSIS — Z86.79 S/P CATHETER ABLATION OF SLOW PATHWAY: ICD-10-CM

## 2024-09-11 DIAGNOSIS — I49.3 PVC (PREMATURE VENTRICULAR CONTRACTION): ICD-10-CM

## 2024-09-11 DIAGNOSIS — E78.2 MIXED HYPERLIPIDEMIA: ICD-10-CM

## 2024-09-11 DIAGNOSIS — Z98.890 S/P CATHETER ABLATION OF SLOW PATHWAY: ICD-10-CM

## 2024-09-11 LAB — EKG IMPRESSION: NORMAL

## 2024-09-11 PROCEDURE — 93005 ELECTROCARDIOGRAM TRACING: CPT

## 2024-09-11 PROCEDURE — 99214 OFFICE O/P EST MOD 30 MIN: CPT

## 2024-09-11 PROCEDURE — 93010 ELECTROCARDIOGRAM REPORT: CPT | Performed by: INTERNAL MEDICINE

## 2024-09-11 PROCEDURE — 3074F SYST BP LT 130 MM HG: CPT

## 2024-09-11 PROCEDURE — 3078F DIAST BP <80 MM HG: CPT

## 2024-09-11 PROCEDURE — 93246 EXT ECG>7D<15D RECORDING: CPT

## 2024-09-11 PROCEDURE — 99212 OFFICE O/P EST SF 10 MIN: CPT

## 2024-09-11 RX ORDER — METOPROLOL SUCCINATE 25 MG/1
25 TABLET, EXTENDED RELEASE ORAL 2 TIMES DAILY
Qty: 180 TABLET | Refills: 3 | Status: SHIPPED | OUTPATIENT
Start: 2024-09-11

## 2024-09-11 ASSESSMENT — ENCOUNTER SYMPTOMS
HEADACHES: 0
ORTHOPNEA: 0
NEAR-SYNCOPE: 0
PND: 0
DYSPNEA ON EXERTION: 1
SYNCOPE: 0
LIGHT-HEADEDNESS: 0
DIZZINESS: 0
PALPITATIONS: 1
SHORTNESS OF BREATH: 0

## 2024-09-11 ASSESSMENT — FIBROSIS 4 INDEX: FIB4 SCORE: 2.6

## 2024-09-11 NOTE — PROGRESS NOTES
Patient enrolled in the 14 day [facility] Holter monitoring program per Enoch Mendoza MD.  >Office hook-up, serial # [id number].  >Currently pending EOS.

## 2024-09-11 NOTE — PROGRESS NOTES
"Chief Complaint   Patient presents with    Hypertension     F/V DX: Hypertention           Subjective:   Boo Cavanaugh Jr. is a 79 y.o. male who presents today for follow-up.     Patient of Dr. Mendoza.  Current medical problems include hyperlipidemia, HTN, atrial flutter s/p ablation and PVCs. Their last clinic visit was 6/7/2024 with Dr. Mendoza.    Today's visit:  Patient reports for the last several weeks feeling increased fatigue and shortness of breath.  He states he feels PVCs a daily and frequently.  He says he feels them every 4-9 beats.  It is increased with exertion.  He also reports pressure in his head.  He denies chest pain, orthopnea, PND, syncope or lightheaded or dizziness.  He does wear a CPAP to sleep at night and monitors his oxygen.  He recently had a pulmonary workup with Dr. Lindsay who did not find a reason for his shortness of breath.  He has been taking his blood pressures at home which have been well-controlled.  He takes all his medications without any reported side effects. He does not drink caffeine or alcohol.      Cardiovascular Risk Factors:  1. Smoking status: Never  2. Type II Diabetes Mellitus: No No results found for: \"HBA1C\"  3. Hypertension: Yes  4. Dyslipidemia: Yes   Cholesterol,Tot   Date Value Ref Range Status   12/16/2023 93 (L) 100 - 199 mg/dL Final     LDL   Date Value Ref Range Status   12/16/2023 36 <100 mg/dL Final     HDL   Date Value Ref Range Status   12/16/2023 48 >=40 mg/dL Final     Triglycerides   Date Value Ref Range Status   12/16/2023 44 0 - 149 mg/dL Final       Past Medical History:   Diagnosis Date    Arrhythmia     \"flutter\", Dr. Mendoza cardiologist    Arthritis     left thumb    Breath shortness     Cancer (HCC)     Cardiac arrhythmia     Enlarged prostate with urinary obstruction     GERD (gastroesophageal reflux disease)     Hyperlipidemia     Hypertension     OA (osteoarthritis) of knee     bilateral     Restless leg syndrome     Tonsillitis     Urinary " Hemigard Postcare Instructions: The HEMIGARD strips are to remain completely dry for at least 5-7 days. bladder disorder 12/06/2017    Hunt in place         Family History   Problem Relation Age of Onset    Heart Disease Mother 65        MI    Cancer Father     Cancer Brother 50        prostate cancer    Stroke Brother     Stroke Brother     Diabetes Neg Hx     Hyperlipidemia Neg Hx     Alcohol/Drug Neg Hx     Ovarian Cancer Neg Hx     Tubal Cancer Neg Hx     Peritoneal Cancer Neg Hx     Colorectal Cancer Neg Hx     Breast Cancer Neg Hx          Social History     Tobacco Use    Smoking status: Never     Passive exposure: Past (BOTH parents smoked)    Smokeless tobacco: Never   Vaping Use    Vaping status: Never Used   Substance Use Topics    Alcohol use: Not Currently    Drug use: Not Currently     Types: Marijuana         Allergies   Allergen Reactions    Penicillins Rash     Rash as a child.          Current Outpatient Medications   Medication Sig    metoprolol SR (TOPROL XL) 25 MG TABLET SR 24 HR Take 1 Tablet by mouth 2 times a day.    rosuvastatin (CRESTOR) 20 MG Tab TAKE ONE TABLET BY MOUTH EVERY DAY    omeprazole (PRILOSEC) 20 MG delayed-release capsule Take 1 Capsule by mouth every day.    augmented betamethasone dipropionate (DIPROLENE-AF) 0.05 % ointment     lisinopril (PRINIVIL) 10 MG Tab TAKE ONE TABLET BY MOUTH EVERY DAY    finasteride (PROSCAR) 5 MG Tab Take 1 Tablet by mouth every day.    ciclopirox (PENLAC) 8 % solution Apply 1 Application topically two times a week. Apply to affected toenails on both feet.    aspirin 81 MG EC tablet Take 81 mg by mouth every day.         Review of Systems   Constitutional: Positive for malaise/fatigue.   Cardiovascular:  Positive for dyspnea on exertion and palpitations. Negative for chest pain, leg swelling, near-syncope, orthopnea, paroxysmal nocturnal dyspnea and syncope.   Respiratory:  Negative for shortness of breath.    Neurological:  Negative for dizziness, headaches and light-headedness.           Objective:   /60 (BP Location: Left arm, Patient  "Position: Sitting, BP Cuff Size: Adult)   Pulse 65   Resp 12   Ht 1.854 m (6' 1\")   Wt 82.6 kg (182 lb)   SpO2 96%  Body mass index is 24.01 kg/m².         Physical Exam  Vitals reviewed.   Constitutional:       General: He is not in acute distress.     Appearance: Normal appearance.   HENT:      Head: Normocephalic and atraumatic.   Cardiovascular:      Rate and Rhythm: Normal rate and regular rhythm.      Pulses: Normal pulses.      Heart sounds: No murmur heard.  Pulmonary:      Effort: Pulmonary effort is normal. No respiratory distress.      Breath sounds: Normal breath sounds.   Musculoskeletal:      Right lower leg: No edema.      Left lower leg: No edema.   Neurological:      Mental Status: He is alert and oriented to person, place, and time.      Gait: Gait normal.   Psychiatric:         Behavior: Behavior normal.             Lab Results   Component Value Date/Time    SODIUM 139 05/20/2024 11:46 AM    POTASSIUM 4.6 05/20/2024 11:46 AM    CHLORIDE 104 05/20/2024 11:46 AM    CO2 25 05/20/2024 11:46 AM    GLUCOSE 94 05/20/2024 11:46 AM    BUN 16 05/20/2024 11:46 AM    CREATININE 0.97 05/20/2024 11:46 AM      Lab Results   Component Value Date/Time    WBC 6.6 05/20/2024 11:46 AM    RBC 4.88 05/20/2024 11:46 AM    HEMOGLOBIN 16.3 05/20/2024 11:46 AM    HEMATOCRIT 47.1 05/20/2024 11:46 AM    MCV 96.5 05/20/2024 11:46 AM    MCH 33.4 (H) 05/20/2024 11:46 AM    MCHC 34.6 05/20/2024 11:46 AM    MPV 11.6 05/20/2024 11:46 AM    NEUTSPOLYS 76.90 (H) 12/15/2023 08:14 AM    LYMPHOCYTES 12.30 (L) 12/15/2023 08:14 AM    MONOCYTES 9.50 12/15/2023 08:14 AM    EOSINOPHILS 0.50 12/15/2023 08:14 AM    BASOPHILS 0.50 12/15/2023 08:14 AM      Lab Results   Component Value Date/Time    CHOLSTRLTOT 93 (L) 12/16/2023 01:19 AM    LDL 36 12/16/2023 01:19 AM    HDL 48 12/16/2023 01:19 AM    TRIGLYCERIDE 44 12/16/2023 01:19 AM       Lab Results   Component Value Date/Time    TROPONINT 16 12/16/2023 0119     Lab Results   Component " Value Date/Time    NTPROBNP 124 12/15/2023 1253     Assessment:   1. PVC (premature ventricular contraction)  - Cardiac Event Monitor; Future  - metoprolol SR (TOPROL XL) 25 MG TABLET SR 24 HR; Take 1 Tablet by mouth 2 times a day.  Dispense: 180 Tablet; Refill: 3    2. Primary hypertension  - EKG    3. History of atrial flutter    4. S/P catheter ablation of slow pathway    5. Mixed hyperlipidemia    6. Agatston coronary artery calcium score greater than 400        Medical Decision Making:  Today's Assessment / Plan:   PVC  HX atrial flutter s/p ablation  -EKG in office showed normal rhythm. PVC heard on auscultation.  -Increase metoprolol to 25 mg twice a day for PVC suppression  -Continue to monitor blood pressure and heart rate  -Follow up with EP in 2 week to discuss if candidate for PVC ablation or AAD  -Cardiac event monitor ordered to evaluate PVC burden    Hypertension  - Good control  - continue lisinopril 10 mg daily   - goal < 130/80  -Continue to monitor blood pressure daily and keep a log    Elevated CAC score  Hyperlipidemia  -Most recent LDL 36  -Continue rosuvastatin 20 mg every evening  -Goal of less than 70  -Check lipid panel in 3 months  -Reviewed recent echocardiogram and stress test with patient and answered questions.     Return in about 2 weeks (around 9/25/2024) for Anant Nava .  Sooner if problems.    NICHO Chavez.

## 2024-09-27 SDOH — ECONOMIC STABILITY: INCOME INSECURITY: IN THE LAST 12 MONTHS, WAS THERE A TIME WHEN YOU WERE NOT ABLE TO PAY THE MORTGAGE OR RENT ON TIME?: NO

## 2024-09-27 SDOH — ECONOMIC STABILITY: FOOD INSECURITY: WITHIN THE PAST 12 MONTHS, THE FOOD YOU BOUGHT JUST DIDN'T LAST AND YOU DIDN'T HAVE MONEY TO GET MORE.: NEVER TRUE

## 2024-09-27 SDOH — ECONOMIC STABILITY: FOOD INSECURITY: WITHIN THE PAST 12 MONTHS, YOU WORRIED THAT YOUR FOOD WOULD RUN OUT BEFORE YOU GOT MONEY TO BUY MORE.: NEVER TRUE

## 2024-09-27 SDOH — HEALTH STABILITY: PHYSICAL HEALTH: ON AVERAGE, HOW MANY MINUTES DO YOU ENGAGE IN EXERCISE AT THIS LEVEL?: 0 MIN

## 2024-09-27 SDOH — HEALTH STABILITY: PHYSICAL HEALTH: ON AVERAGE, HOW MANY DAYS PER WEEK DO YOU ENGAGE IN MODERATE TO STRENUOUS EXERCISE (LIKE A BRISK WALK)?: 0 DAYS

## 2024-09-27 SDOH — ECONOMIC STABILITY: INCOME INSECURITY: HOW HARD IS IT FOR YOU TO PAY FOR THE VERY BASICS LIKE FOOD, HOUSING, MEDICAL CARE, AND HEATING?: NOT HARD AT ALL

## 2024-09-27 ASSESSMENT — SOCIAL DETERMINANTS OF HEALTH (SDOH)
HOW OFTEN DO YOU HAVE SIX OR MORE DRINKS ON ONE OCCASION: NEVER
HOW OFTEN DO YOU ATTEND CHURCH OR RELIGIOUS SERVICES?: 1 TO 4 TIMES PER YEAR
HOW OFTEN DO YOU GET TOGETHER WITH FRIENDS OR RELATIVES?: THREE TIMES A WEEK
HOW MANY DRINKS CONTAINING ALCOHOL DO YOU HAVE ON A TYPICAL DAY WHEN YOU ARE DRINKING: PATIENT DOES NOT DRINK
DO YOU BELONG TO ANY CLUBS OR ORGANIZATIONS SUCH AS CHURCH GROUPS UNIONS, FRATERNAL OR ATHLETIC GROUPS, OR SCHOOL GROUPS?: NO
WITHIN THE PAST 12 MONTHS, YOU WORRIED THAT YOUR FOOD WOULD RUN OUT BEFORE YOU GOT THE MONEY TO BUY MORE: NEVER TRUE
HOW OFTEN DO YOU HAVE A DRINK CONTAINING ALCOHOL: NEVER
HOW OFTEN DO YOU ATTEND CHURCH OR RELIGIOUS SERVICES?: 1 TO 4 TIMES PER YEAR
IN THE PAST 12 MONTHS, HAS THE ELECTRIC, GAS, OIL, OR WATER COMPANY THREATENED TO SHUT OFF SERVICE IN YOUR HOME?: NO
HOW HARD IS IT FOR YOU TO PAY FOR THE VERY BASICS LIKE FOOD, HOUSING, MEDICAL CARE, AND HEATING?: NOT HARD AT ALL
IN A TYPICAL WEEK, HOW MANY TIMES DO YOU TALK ON THE PHONE WITH FAMILY, FRIENDS, OR NEIGHBORS?: MORE THAN THREE TIMES A WEEK
HOW OFTEN DO YOU GET TOGETHER WITH FRIENDS OR RELATIVES?: THREE TIMES A WEEK
IN A TYPICAL WEEK, HOW MANY TIMES DO YOU TALK ON THE PHONE WITH FAMILY, FRIENDS, OR NEIGHBORS?: MORE THAN THREE TIMES A WEEK
HOW OFTEN DO YOU ATTENT MEETINGS OF THE CLUB OR ORGANIZATION YOU BELONG TO?: NEVER
HOW OFTEN DO YOU ATTENT MEETINGS OF THE CLUB OR ORGANIZATION YOU BELONG TO?: NEVER
DO YOU BELONG TO ANY CLUBS OR ORGANIZATIONS SUCH AS CHURCH GROUPS UNIONS, FRATERNAL OR ATHLETIC GROUPS, OR SCHOOL GROUPS?: NO

## 2024-09-27 ASSESSMENT — LIFESTYLE VARIABLES
HOW OFTEN DO YOU HAVE A DRINK CONTAINING ALCOHOL: NEVER
HOW OFTEN DO YOU HAVE SIX OR MORE DRINKS ON ONE OCCASION: NEVER
HOW MANY STANDARD DRINKS CONTAINING ALCOHOL DO YOU HAVE ON A TYPICAL DAY: PATIENT DOES NOT DRINK
AUDIT-C TOTAL SCORE: 0
SKIP TO QUESTIONS 9-10: 1

## 2024-09-30 ENCOUNTER — OFFICE VISIT (OUTPATIENT)
Dept: MEDICAL GROUP | Facility: PHYSICIAN GROUP | Age: 79
End: 2024-09-30
Payer: MEDICARE

## 2024-09-30 VITALS
HEART RATE: 50 BPM | DIASTOLIC BLOOD PRESSURE: 50 MMHG | HEIGHT: 73 IN | WEIGHT: 185 LBS | OXYGEN SATURATION: 96 % | TEMPERATURE: 97.3 F | SYSTOLIC BLOOD PRESSURE: 98 MMHG | BODY MASS INDEX: 24.52 KG/M2

## 2024-09-30 DIAGNOSIS — I49.3 PVC (PREMATURE VENTRICULAR CONTRACTION): ICD-10-CM

## 2024-09-30 DIAGNOSIS — Z23 NEED FOR VACCINATION: ICD-10-CM

## 2024-09-30 DIAGNOSIS — E78.2 MIXED HYPERLIPIDEMIA: ICD-10-CM

## 2024-09-30 DIAGNOSIS — I10 PRIMARY HYPERTENSION: ICD-10-CM

## 2024-09-30 DIAGNOSIS — Z00.00 ENCOUNTER FOR MEDICARE ANNUAL WELLNESS EXAM: Primary | ICD-10-CM

## 2024-09-30 DIAGNOSIS — N40.1 BENIGN PROSTATIC HYPERPLASIA WITH WEAK URINARY STREAM: ICD-10-CM

## 2024-09-30 DIAGNOSIS — R39.12 BENIGN PROSTATIC HYPERPLASIA WITH WEAK URINARY STREAM: ICD-10-CM

## 2024-09-30 DIAGNOSIS — G89.29 CHRONIC BILATERAL LOW BACK PAIN WITHOUT SCIATICA: ICD-10-CM

## 2024-09-30 DIAGNOSIS — M54.50 CHRONIC BILATERAL LOW BACK PAIN WITHOUT SCIATICA: ICD-10-CM

## 2024-09-30 DIAGNOSIS — G47.33 OSA ON CPAP: ICD-10-CM

## 2024-09-30 PROBLEM — R51.9 HEADACHE: Status: RESOLVED | Noted: 2024-02-15 | Resolved: 2024-09-30

## 2024-09-30 ASSESSMENT — PATIENT HEALTH QUESTIONNAIRE - PHQ9: CLINICAL INTERPRETATION OF PHQ2 SCORE: 0

## 2024-09-30 ASSESSMENT — ENCOUNTER SYMPTOMS: GENERAL WELL-BEING: FAIR

## 2024-09-30 ASSESSMENT — ACTIVITIES OF DAILY LIVING (ADL): BATHING_REQUIRES_ASSISTANCE: 0

## 2024-09-30 ASSESSMENT — FIBROSIS 4 INDEX: FIB4 SCORE: 2.6

## 2024-09-30 NOTE — PROGRESS NOTES
Chief Complaint   Patient presents with    Medicare Annual Wellness       HPI:  Boo Cavanaugh Jr. is a 79 y.o. 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    Back pain 01/07/2022    Hyperlipidemia     Pain of left hip joint 01/07/2020    History of skin cancer 11/13/2019    Primary osteoarthritis involving multiple joints 03/12/2019    S/P ablation of atrial flutter 01/15/2019    S/P catheter ablation of slow pathway 01/15/2019    Primary hypertension 01/08/2019    SHERRY on CPAP 11/29/2018    PVC (premature ventricular contraction) 07/09/2018    Benign prostatic hyperplasia without lower urinary tract symptoms 11/15/2017    History of atrial flutter 11/11/2017       Current Outpatient Medications   Medication Sig Dispense Refill    metoprolol SR (TOPROL XL) 25 MG TABLET SR 24 HR Take 1 Tablet by mouth 2 times a day. 180 Tablet 3    rosuvastatin (CRESTOR) 20 MG Tab TAKE ONE TABLET BY MOUTH EVERY DAY 90 Tablet 3    omeprazole (PRILOSEC) 20 MG delayed-release capsule Take 1 Capsule by mouth every day. 30 Capsule 0    augmented betamethasone dipropionate (DIPROLENE-AF) 0.05 % ointment Apply  topically 2 times a day as needed (rash).      lisinopril (PRINIVIL) 10 MG Tab TAKE ONE TABLET BY MOUTH EVERY DAY 90 Tablet 3    finasteride (PROSCAR) 5 MG Tab Take 1 Tablet by mouth every day.      ciclopirox (PENLAC) 8 % solution Apply 1 Application topically two times a week. Apply to affected toenails on both feet.      aspirin 81 MG EC tablet Take 81 mg by mouth every day.       No current facility-administered medications for this visit.          Current supplements as per medication list.     Allergies: Penicillins    Current social contact/activities: spend time with family go out to dinner.     He  reports that he has  never smoked. He has been exposed to tobacco smoke. He has never used smokeless tobacco. He reports that he does not currently use alcohol. He reports that he does not currently use drugs after having used the following drugs: Marijuana.  Counseling given: Yes      ROS:    Gait: Uses cane or wheelchair sometimes  Ostomy: No  Other tubes: No  Amputations: No  Chronic oxygen use: No  Last eye exam: couple years ago  Wears hearing aids: No   : Denies any urinary leakage during the last 6 months    Screening:    Depression Screening  Little interest or pleasure in doing things?  0 - not at all  Feeling down, depressed , or hopeless? 0 - not at all  Patient Health Questionnaire Score: 0     If depressive symptoms identified deferred to follow up visit unless specifically addressed in assessment and plan.    Interpretation of PHQ-9 Total Score   Score Severity   1-4 No Depression   5-9 Mild Depression   10-14 Moderate Depression   15-19 Moderately Severe Depression   20-27 Severe Depression    Screening for Cognitive Impairment  Do you or any of your friends or family members have any concern about your memory? No  Three Minute Recall (Leader, Season, Table) 3/3    Margarito clock face with all 12 numbers and set the hands to show 10 minutes after 11.  Yes    Cognitive concerns identified deferred for follow up unless specifically addressed in assessment and plan.    Fall Risk Assessment  Has the patient had two or more falls in the last year or any fall with injury in the last year?  No    Safety Assessment  Do you always wear your seatbelt?  Yes  Any changes to home needed to function safely? No  Difficulty hearing.  Yes  Patient counseled about all safety risks that were identified.    Functional Assessment ADLs  Are there any barriers preventing you from cooking for yourself or meeting nutritional needs?  No.    Are there any barriers preventing you from driving safely or obtaining transportation?  No.    Are there any  barriers preventing you from using a telephone or calling for help?  No    Are there any barriers preventing you from shopping?  No.    Are there any barriers preventing you from taking care of your own finances?  No    Are there any barriers preventing you from managing your medications?  No    Are there any barriers preventing you from showering, bathing or dressing yourself? No    Are there any barriers preventing you from doing housework or laundry? No  Are there any barriers preventing you from using the toilet?No  Are you currently engaging in any exercise or physical activity?  Yes. Weights every other day, stretching.     Self-Assessment of Health  What is your perception of your health? Fair    Do you sleep more than six hours a night? Yes    In the past 7 days, how much did pain keep you from doing your normal work? None    Do you spend quality time with family or friends (virtually or in person)? Yes    Do you usually eat a heart healthy diet that constists of a variety of fruits, vegetables, whole grains and fiber? Yes    Do you eat foods high in fat and/or Fast Food more than three times per week? No    How concerned are you that your medical conditions are not being well managed? a little    Are you worried that in the next 2 months, you may not have stable housing that you own, rent, or stay in as part of a household? No      Advance Care Planning  Do you have an Advance Directive, Living Will, Durable Power of , or POLST? Yes  Advance Directive       is not on file - instructed patient to bring in a copy to scan into their chart      Health Maintenance Summary            Postponed - COVID-19 Vaccine (4 - 2023-24 season) Postponed until 9/30/2025      10/03/2022  Imm Admin: MODERNA BIVALENT BOOSTER SARS-COV-2 VACCINE (6+)    12/07/2021  Imm Admin: MODERNA SARS-COV-2 VACCINE (12+)    02/25/2021  Imm Admin: MODERNA SARS-COV-2 VACCINE (12+)    01/28/2021  Imm Admin: MODERNA SARS-COV-2 VACCINE  (12+)              Annual Wellness Visit (Yearly) Next due on 2024  Visit Dx: Encounter for Medicare annual wellness exam    2023  Level of Service: [place] ANNUAL WELL[place] VISIT-INCLUDES PPPS SUBSEQUE*    2023  Visit Dx: Encounter for Medicare annual wellness exam    2022  Level of Service: [place] ANNUAL WELLNESS VISIT-INCLUDES PPPS SUBSEQUE*    2022  Visit Dx: Encounter for Medicare annual wellness exam    Only the first 5 history entries have been loaded, but more history exists.              IMM DTaP/Tdap/Td Vaccine (2 - Td or Tdap) Next due on 2019  Imm Admin: Tdap Vaccine              Pneumococcal Vaccine: 65+ Years (Series Information) Completed      2019  Imm Admin: Pneumococcal polysaccharide vaccine (PPSV-23)    2017  Imm Admin: Pneumococcal Conjugate Vaccine (Prevnar/PCV-13)              Hepatitis C Screening  Completed      11/15/2021  Hepatitis C Antibody component of HCV Scrn ( 4470-9127 1xLife)              Zoster (Shingles) Vaccines (Series Information) Completed      2022  Imm Admin: Zoster Vaccine Recombinant (RZV) (SHINGRIX)    2022  Imm Admin: Zoster Vaccine Recombinant (RZV) (SHINGRIX)              Influenza Vaccine (Series Information) Completed      2024  Imm Admin: Influenza high-dose trivalent (PF)    10/20/2020  Imm Admin: Influenza Vaccine Adult HD              Hepatitis A Vaccine (Hep A) (Series Information) Aged Out      No completion history exists for this topic.              HPV Vaccines (Series Information) Aged Out      No completion history exists for this topic.              Polio Vaccine (Inactivated Polio) (Series Information) Aged Out      No completion history exists for this topic.              Meningococcal Immunization (Series Information) Aged Out      No completion history exists for this topic.              Discontinued - Colorectal Cancer Screening  Discontinued        Frequency  changed to Never automatically (Topic No Longer Applies)    05/16/2019  COLONOSCOPY RESULTS    05/06/2019  REFERRAL TO GI FOR COLONOSCOPY    05/06/2019  REFERRAL TO GI FOR COLONOSCOPY    05/06/2019  COLONOSCOPY RESULTS    Only the first 5 history entries have been loaded, but more history exists.              Discontinued - Hepatitis B Vaccine (Hep B)  Discontinued      No completion history exists for this topic.                    Patient Care Team:  Jerica Coronado M.D. as PCP - General (Family Medicine)  Miguel Farris D.O. as Consulting Physician (Cardiovascular Disease (Cardiology))  Dion Coronel, PT (Inactive) (Physical Therapy)  Dion Coronel, PT (Inactive) as Physical Therapist (Physical Therapy)  [employer] (DME Supplier)  [employer] as Respiratory Therapist (DME Supplier)        Social History     Tobacco Use    Smoking status: Never     Passive exposure: Past (BOTH parents smoked)    Smokeless tobacco: Never   Vaping Use    Vaping status: Never Used   Substance Use Topics    Alcohol use: Not Currently    Drug use: Not Currently     Types: Marijuana     Family History   Problem Relation Age of Onset    Heart Disease Mother 65        MI    Cancer Father     Cancer Brother 50        prostate cancer    Stroke Brother     Stroke Brother     Diabetes Neg Hx     Hyperlipidemia Neg Hx     Alcohol/Drug Neg Hx     Ovarian Cancer Neg Hx     Tubal Cancer Neg Hx     Peritoneal Cancer Neg Hx     Colorectal Cancer Neg Hx     Breast Cancer Neg Hx      He  has a past medical history of Arrhythmia, Arthritis, Breath shortness, Cancer (HCC), Cardiac arrhythmia, Cataract, Enlarged prostate with urinary obstruction, GERD (gastroesophageal reflux disease), Hyperlipidemia, Hypertension, OA (osteoarthritis) of knee, Restless leg syndrome, Tonsillitis, and Urinary bladder disorder (12/06/2017).   Past Surgical History:   Procedure Laterality Date    TURP-VAPOR  12/07/2017    Procedure: TURP-VAPOR - GREEN  "LIGHT PHOTOSELECTIVE;  Surgeon: Cristofer Cr M.D.;  Location: SURGERY [facility];  Service: Urology    GANGLION EXCISION Right 1966    wrist    APPENDECTOMY      OPEN REDUCTION      PROSTATECTOMY, RADICAL RETRO      TONSILLECTOMY      as a child    VASECTOMY         Exam:   BP 98/50 (BP Location: Right arm, Patient Position: Sitting, BP Cuff Size: Adult)   Pulse (!) 50   Temp 36.3 °C (97.3 °F) (Temporal)   Ht 1.854 m (6' 1\")   Wt 83.9 kg (185 lb)   SpO2 96%  Body mass index is 24.41 kg/m².    Hearing good.    Dentition good  Alert, oriented in no acute distress.  Eye contact is good, speech goal directed, affect calm    Assessment and Plan. The following treatment and monitoring plan is recommended:      1. Encounter for Medicare annual wellness exam        2. Need for vaccination  Influenza Vaccine, High Dose (65+ Only)      3. Chronic bilateral low back pain without sciatica        4. Benign prostatic hyperplasia with weak urinary stream        5. Mixed hyperlipidemia        6. SHERRY on CPAP        7. Primary hypertension        8. PVC (premature ventricular contraction)          Assessment & Plan  1. Encounter for Medicare annual visit.  He has no acute concerns. Flu vaccine was provided in the office today. He is recommended to get the season's COVID-19 booster and the RSV vaccine at a pharmacy.    2. Chronic bilateral low back pain without sciatica.  This is chronic and controlled. Home exercises continue to work well to manage the pain. Monitoring will continue.    3. BPH.  This is chronic and progressing. In 2017, he underwent laser treatment for the prostate. Recently, he has noticed a slight return of symptoms, mostly a weak stream, but not severe enough to consider treatment. Monitoring will continue.    4. Mixed hyperlipidemia.  This is chronic and controlled. Cholesterol panel was very well controlled in December 2023. He will continue rosuvastatin 20 mg daily.    5. SHERRY.  This is chronic and " controlled. He will continue CPAP nightly.    6. Hypertension.  This is chronic and well controlled. Blood pressure is actually in the 90s systolic today. He will continue his current regimen of lisinopril 10 mg daily and metoprolol SR 25 mg twice a day.    7. PVCs.  This is chronic and improved. He was having incredibly frequent PVCs that were symptomatic. His cardiologist increased his metoprolol, and he has noticed some improvement in the frequency of the PVCs. He will continue the metoprolol and follow up with cardiology as prescribed.        Services suggested: No services needed at this time  Health Care Screening: Age-appropriate preventive services recommended by USPTF and ACIP covered by Medicare were discussed today. Services ordered if indicated and agreed upon by the patient.  Referrals offered: Community-based lifestyle interventions to reduce health risks and promote self-management and wellness, fall prevention, nutrition, physical activity, tobacco-use cessation, weight loss, and mental health services as per orders if indicated.    Discussion today about general wellness and lifestyle habits:    Prevent falls and reduce trip hazards; Cautioned about securing or removing rugs.  Have a working fire alarm and carbon monoxide detector;   Engage in regular physical activity and social activities     Follow-up: Return in about 4 months (around 1/30/2025) for Med check.

## 2024-10-07 ENCOUNTER — OFFICE VISIT (OUTPATIENT)
Dept: MEDICAL GROUP | Facility: PHYSICIAN GROUP | Age: 79
End: 2024-10-07
Payer: MEDICARE

## 2024-10-07 ENCOUNTER — TELEPHONE (OUTPATIENT)
Dept: CARDIOLOGY | Facility: MEDICAL CENTER | Age: 79
End: 2024-10-07

## 2024-10-07 VITALS
TEMPERATURE: 97.5 F | WEIGHT: 183 LBS | HEIGHT: 73 IN | SYSTOLIC BLOOD PRESSURE: 112 MMHG | HEART RATE: 49 BPM | BODY MASS INDEX: 24.25 KG/M2 | DIASTOLIC BLOOD PRESSURE: 58 MMHG | OXYGEN SATURATION: 96 %

## 2024-10-07 DIAGNOSIS — K59.09 OTHER CONSTIPATION: ICD-10-CM

## 2024-10-07 DIAGNOSIS — R52 PAIN: Primary | ICD-10-CM

## 2024-10-07 PROCEDURE — 3078F DIAST BP <80 MM HG: CPT | Performed by: FAMILY MEDICINE

## 2024-10-07 PROCEDURE — 3074F SYST BP LT 130 MM HG: CPT | Performed by: FAMILY MEDICINE

## 2024-10-07 PROCEDURE — 99213 OFFICE O/P EST LOW 20 MIN: CPT | Performed by: FAMILY MEDICINE

## 2024-10-07 ASSESSMENT — FIBROSIS 4 INDEX: FIB4 SCORE: 2.6

## 2024-10-08 ENCOUNTER — APPOINTMENT (OUTPATIENT)
Dept: CARDIOLOGY | Facility: MEDICAL CENTER | Age: 79
End: 2024-10-08
Attending: NURSE PRACTITIONER
Payer: MEDICARE

## 2024-10-15 ENCOUNTER — TELEPHONE (OUTPATIENT)
Dept: CARDIOLOGY | Facility: MEDICAL CENTER | Age: 79
End: 2024-10-15
Payer: MEDICARE

## 2024-10-24 ENCOUNTER — OFFICE VISIT (OUTPATIENT)
Dept: CARDIOLOGY | Facility: MEDICAL CENTER | Age: 79
End: 2024-10-24
Attending: NURSE PRACTITIONER
Payer: MEDICARE

## 2024-10-24 VITALS
OXYGEN SATURATION: 96 % | HEIGHT: 73 IN | RESPIRATION RATE: 14 BRPM | DIASTOLIC BLOOD PRESSURE: 66 MMHG | SYSTOLIC BLOOD PRESSURE: 110 MMHG | WEIGHT: 183 LBS | BODY MASS INDEX: 24.25 KG/M2 | HEART RATE: 50 BPM

## 2024-10-24 DIAGNOSIS — G47.33 OSA ON CPAP: ICD-10-CM

## 2024-10-24 DIAGNOSIS — I49.3 PVC (PREMATURE VENTRICULAR CONTRACTION): ICD-10-CM

## 2024-10-24 PROCEDURE — 99214 OFFICE O/P EST MOD 30 MIN: CPT | Performed by: NURSE PRACTITIONER

## 2024-10-24 PROCEDURE — 99212 OFFICE O/P EST SF 10 MIN: CPT | Performed by: NURSE PRACTITIONER

## 2024-10-24 PROCEDURE — 3074F SYST BP LT 130 MM HG: CPT | Performed by: NURSE PRACTITIONER

## 2024-10-24 PROCEDURE — 3078F DIAST BP <80 MM HG: CPT | Performed by: NURSE PRACTITIONER

## 2024-10-24 RX ORDER — FLECAINIDE ACETATE 50 MG/1
TABLET ORAL
Qty: 90 TABLET | Refills: 1 | Status: SHIPPED | OUTPATIENT
Start: 2024-10-24

## 2024-10-24 ASSESSMENT — FIBROSIS 4 INDEX: FIB4 SCORE: 2.6

## 2024-10-26 ASSESSMENT — ENCOUNTER SYMPTOMS: PALPITATIONS: 1

## 2024-10-29 ENCOUNTER — TELEPHONE (OUTPATIENT)
Dept: CARDIOLOGY | Facility: MEDICAL CENTER | Age: 79
End: 2024-10-29
Payer: MEDICARE

## 2024-11-16 ENCOUNTER — APPOINTMENT (OUTPATIENT)
Dept: RADIOLOGY | Facility: MEDICAL CENTER | Age: 79
End: 2024-11-16
Attending: EMERGENCY MEDICINE
Payer: MEDICARE

## 2024-11-16 ENCOUNTER — HOSPITAL ENCOUNTER (EMERGENCY)
Facility: MEDICAL CENTER | Age: 79
End: 2024-11-16
Attending: EMERGENCY MEDICINE
Payer: MEDICARE

## 2024-11-16 VITALS
WEIGHT: 175 LBS | DIASTOLIC BLOOD PRESSURE: 77 MMHG | SYSTOLIC BLOOD PRESSURE: 152 MMHG | OXYGEN SATURATION: 94 % | TEMPERATURE: 97.2 F | HEART RATE: 58 BPM | HEIGHT: 74 IN | BODY MASS INDEX: 22.46 KG/M2 | RESPIRATION RATE: 20 BRPM

## 2024-11-16 DIAGNOSIS — I49.3 PREMATURE VENTRICULAR CONTRACTIONS: ICD-10-CM

## 2024-11-16 LAB
ALBUMIN SERPL BCP-MCNC: 4.2 G/DL (ref 3.2–4.9)
ALBUMIN/GLOB SERPL: 1.6 G/DL
ALP SERPL-CCNC: 81 U/L (ref 30–99)
ALT SERPL-CCNC: 21 U/L (ref 2–50)
ANION GAP SERPL CALC-SCNC: 11 MMOL/L (ref 7–16)
AST SERPL-CCNC: 26 U/L (ref 12–45)
BASOPHILS # BLD AUTO: 0.4 % (ref 0–1.8)
BASOPHILS # BLD: 0.04 K/UL (ref 0–0.12)
BILIRUB SERPL-MCNC: 0.9 MG/DL (ref 0.1–1.5)
BUN SERPL-MCNC: 18 MG/DL (ref 8–22)
CALCIUM ALBUM COR SERPL-MCNC: 9 MG/DL (ref 8.5–10.5)
CALCIUM SERPL-MCNC: 9.2 MG/DL (ref 8.5–10.5)
CHLORIDE SERPL-SCNC: 98 MMOL/L (ref 96–112)
CO2 SERPL-SCNC: 22 MMOL/L (ref 20–33)
CREAT SERPL-MCNC: 1.01 MG/DL (ref 0.5–1.4)
EKG IMPRESSION: NORMAL
EKG IMPRESSION: NORMAL
EOSINOPHIL # BLD AUTO: 0.13 K/UL (ref 0–0.51)
EOSINOPHIL NFR BLD: 1.3 % (ref 0–6.9)
ERYTHROCYTE [DISTWIDTH] IN BLOOD BY AUTOMATED COUNT: 42.9 FL (ref 35.9–50)
GFR SERPLBLD CREATININE-BSD FMLA CKD-EPI: 76 ML/MIN/1.73 M 2
GLOBULIN SER CALC-MCNC: 2.6 G/DL (ref 1.9–3.5)
GLUCOSE SERPL-MCNC: 102 MG/DL (ref 65–99)
HCT VFR BLD AUTO: 46.2 % (ref 42–52)
HGB BLD-MCNC: 16 G/DL (ref 14–18)
IMM GRANULOCYTES # BLD AUTO: 0.02 K/UL (ref 0–0.11)
IMM GRANULOCYTES NFR BLD AUTO: 0.2 % (ref 0–0.9)
LYMPHOCYTES # BLD AUTO: 1.78 K/UL (ref 1–4.8)
LYMPHOCYTES NFR BLD: 17.9 % (ref 22–41)
MCH RBC QN AUTO: 33.2 PG (ref 27–33)
MCHC RBC AUTO-ENTMCNC: 34.6 G/DL (ref 32.3–36.5)
MCV RBC AUTO: 95.9 FL (ref 81.4–97.8)
MONOCYTES # BLD AUTO: 1.19 K/UL (ref 0–0.85)
MONOCYTES NFR BLD AUTO: 12 % (ref 0–13.4)
NEUTROPHILS # BLD AUTO: 6.77 K/UL (ref 1.82–7.42)
NEUTROPHILS NFR BLD: 68.2 % (ref 44–72)
NRBC # BLD AUTO: 0 K/UL
NRBC BLD-RTO: 0 /100 WBC (ref 0–0.2)
NT-PROBNP SERPL IA-MCNC: 351 PG/ML (ref 0–125)
PLATELET # BLD AUTO: 188 K/UL (ref 164–446)
PMV BLD AUTO: 10.8 FL (ref 9–12.9)
POTASSIUM SERPL-SCNC: 4.2 MMOL/L (ref 3.6–5.5)
PROT SERPL-MCNC: 6.8 G/DL (ref 6–8.2)
RBC # BLD AUTO: 4.82 M/UL (ref 4.7–6.1)
SODIUM SERPL-SCNC: 131 MMOL/L (ref 135–145)
TROPONIN T SERPL-MCNC: 13 NG/L (ref 6–19)
WBC # BLD AUTO: 9.9 K/UL (ref 4.8–10.8)

## 2024-11-16 PROCEDURE — 36415 COLL VENOUS BLD VENIPUNCTURE: CPT

## 2024-11-16 PROCEDURE — 93005 ELECTROCARDIOGRAM TRACING: CPT

## 2024-11-16 PROCEDURE — 99284 EMERGENCY DEPT VISIT MOD MDM: CPT

## 2024-11-16 PROCEDURE — 84484 ASSAY OF TROPONIN QUANT: CPT

## 2024-11-16 PROCEDURE — 93005 ELECTROCARDIOGRAM TRACING: CPT | Mod: 76 | Performed by: EMERGENCY MEDICINE

## 2024-11-16 PROCEDURE — 80053 COMPREHEN METABOLIC PANEL: CPT

## 2024-11-16 PROCEDURE — 83880 ASSAY OF NATRIURETIC PEPTIDE: CPT

## 2024-11-16 PROCEDURE — 71045 X-RAY EXAM CHEST 1 VIEW: CPT

## 2024-11-16 PROCEDURE — 85025 COMPLETE CBC W/AUTO DIFF WBC: CPT

## 2024-11-16 ASSESSMENT — HEART SCORE
TROPONIN: LESS THAN OR EQUAL TO NORMAL LIMIT
AGE: 65+
ECG: NON-SPECIFIC REPOLARIZATION DISTURBANCE
RISK FACTORS: 1-2 RISK FACTORS
HEART SCORE: 4
AGE: 65+
HISTORY: SLIGHTLY SUSPICIOUS

## 2024-11-16 ASSESSMENT — FIBROSIS 4 INDEX: FIB4 SCORE: 2.6

## 2024-11-17 NOTE — ED PROVIDER NOTES
"ED PHYSICIAN NOTE    CHIEF COMPLAINT  Chief Complaint   Patient presents with    Shortness of Breath     Pt reports difficulty breathing. Having increased weakness throughout.     Chest Pressure     Mid chest area. Pt reports he feels the beating change in his heart rate.        EXTERNAL RECORDS REVIEWED  Outpatient Notes patient is followed by Dr. Mendoza and THEODORA for atrial flutter status post ablation, PVCs and hypertension.  Patient is prescribed flecainide as well as metoprolol.    -Patient had a negative nuclear cardiac stress test 12/16/2023.  -Most recent echocardiogram in July of this year.  EF 65%.  No major valvular abnormality  -Patient had a Zio patch last month.  3.1% PVCs.  1 nonsustained VT event.    HPI/ROS      Boo Cavanaugh Jr. is a 79 y.o. male who presents to the emergency department feeling chest pressure.  He describes he has had this for quite some time and associates it with his PVCs.  Every time he feels this he uses his Kardia and noticed that he has PVCs.  He feels this sensation in his upper chest.  He has had this off and on with increased frequency over the last 2 days.  Is not exertional.  He does feel short of breath with it.  He has loss of appetite.  He has had nausea but this is a recurrent problem for the patient as well and he is scheduled to see gastroenterology.  He has alternating constipation and loose stool.  Denies abdominal pain.  No leg swelling.  Denies orthopnea PND.  No pleuritic pain.    Patient states he used his Kardia today.  His heart rate was higher than it normally is for him in the 60s but he did have 1 episode where the heart rate was as low as 30.    PAST MEDICAL HISTORY  Past Medical History:   Diagnosis Date    Arrhythmia     \"flutter\", Dr. Mendoza cardiologist    Arthritis     left thumb    Breath shortness     Cancer (HCC)     Cardiac arrhythmia     Cataract     Enlarged prostate with urinary obstruction     GERD (gastroesophageal reflux disease)     " "Hyperlipidemia     Hypertension     OA (osteoarthritis) of knee     bilateral     Restless leg syndrome     Tonsillitis     Urinary bladder disorder 12/06/2017    [place] [facility]       SOCIAL HISTORY  Social History     Tobacco Use    Smoking status: Never     Passive exposure: Past (BOTH parents smoked)    Smokeless tobacco: Never   Vaping Use    Vaping status: Never Used   Substance Use Topics    Alcohol use: Not Currently    Drug use: Not Currently     Types: Marijuana       CURRENT MEDICATIONS  Home Medications       Reviewed by oLis Mendoza R.N. (Registered Nurse) on 11/16/24 at 1820  Med List Status: Not Addressed     Medication Last Dose Status   aspirin 81 MG EC tablet  Active   augmented betamethasone dipropionate (DIPROLENE-AF) 0.05 % ointment  Active   ciclopirox (PENLAC) 8 % solution  Active   finasteride (PROSCAR) 5 MG Tab  Active   flecainide (TAMBOCOR) 50 MG tablet  Active   lisinopril (PRINIVIL) 10 MG Tab  Active   metoprolol SR (TOPROL XL) 25 MG TABLET SR 24 HR  Active   omeprazole (PRILOSEC) 20 MG delayed-release capsule  Active   rosuvastatin (CRESTOR) 20 MG Tab  Active                  Audit from Redirected Encounters    **Home medications have not yet been reviewed for this encounter**         ALLERGIES  Allergies   Allergen Reactions    Penicillins Rash     Rash as a child.        PHYSICAL EXAM  VITAL SIGNS: BP (!) 163/80   Pulse 63   Temp 36.6 °C (97.9 °F) (Temporal)   Resp 18   Ht 1.88 m (6' 2\")   Wt 79.4 kg (175 lb)   SpO2 94%   BMI 22.47 kg/m²    Constitutional: Awake and alert  HENT: Normal inspection  Eyes: Normal inspection  Neck: Grossly normal range of motion.  Cardiovascular: Normal heart rate, Normal rhythm.  Symmetric peripheral pulses.   Thorax & Lungs: No respiratory distress, No wheezing, No rales, No rhonchi, No chest tenderness.   Abdomen: Bowel sounds normal, soft, non-distended, nontender, no mass  Skin: No obvious rash.  Back: No tenderness, No CVA tenderness. "   Extremities: No clubbing, cyanosis, edema, no Homans or cords.  Neurologic: Grossly normal   Psychiatric: Normal for situation     DIAGNOSTIC STUDIES / PROCEDURES  LABS/EKG  Results for orders placed or performed during the hospital encounter of 24   EKG (NOW)    Collection Time: 24  6:15 PM   Result Value Ref Range    Report       [facility] Emergency Dept.    Test Date:  2024  Pt Name:    MADDI ANGEL                Department: ER  MRN:        [id number]                      Room:  Gender:     Male                         Technician: [id number]  :        1945                   Requested By:ER TRIAGE PROTOCOL  Order #:    [id number]                    Reading MD: KESHAWN LLANES MD    Measurements  Intervals                                Axis  Rate:       71                           P:          0  MN:         0                            QRS:        48  QRSD:       95                           T:          14  QT:         404  QTc:        439    Interpretive Statements  Artifact  Sinus rhythm  Premature ventricular contractions  Compared to ECG 2024 07:48:17  Ventricular premature complex(es) now present  Sinus bradycardia no longer present  Electronically Signed On 2024 19:48:25 PST by KESHAWN LLANES MD     CBC WITH DIFFERENTIAL    Collection Time: 24  6:26 PM   Result Value Ref Range    WBC 9.9 4.8 - 10.8 K/uL    RBC 4.82 4.70 - 6.10 M/uL    Hemoglobin 16.0 14.0 - 18.0 g/dL    Hematocrit 46.2 42.0 - 52.0 %    MCV 95.9 81.4 - 97.8 fL    MCH 33.2 (H) 27.0 - 33.0 pg    MCHC 34.6 32.3 - 36.5 g/dL    RDW 42.9 35.9 - 50.0 fL    Platelet Count 188 164 - 446 K/uL    MPV 10.8 9.0 - 12.9 fL    Neutrophils-Polys 68.20 44.00 - 72.00 %    Lymphocytes 17.90 (L) 22.00 - 41.00 %    Monocytes 12.00 0.00 - 13.40 %    Eosinophils 1.30 0.00 - 6.90 %    Basophils 0.40 0.00 - 1.80 %    Immature Granulocytes 0.20 0.00 - 0.90 %    Nucleated RBC 0.00 0.00 - 0.20 /100 WBC     Neutrophils (Absolute) 6.77 1.82 - 7.42 K/uL    Lymphs (Absolute) 1.78 1.00 - 4.80 K/uL    Monos (Absolute) 1.19 (H) 0.00 - 0.85 K/uL    Eos (Absolute) 0.13 0.00 - 0.51 K/uL    Baso (Absolute) 0.04 0.00 - 0.12 K/uL    Immature Granulocytes (abs) 0.02 0.00 - 0.11 K/uL    NRBC (Absolute) 0.00 K/uL   COMP METABOLIC PANEL    Collection Time: 24  6:26 PM   Result Value Ref Range    Sodium 131 (L) 135 - 145 mmol/L    Potassium 4.2 3.6 - 5.5 mmol/L    Chloride 98 96 - 112 mmol/L    Co2 22 20 - 33 mmol/L    Anion Gap 11.0 7.0 - 16.0    Glucose 102 (H) 65 - 99 mg/dL    Bun 18 8 - 22 mg/dL    Creatinine 1.01 0.50 - 1.40 mg/dL    Calcium 9.2 8.5 - 10.5 mg/dL    Correct Calcium 9.0 8.5 - 10.5 mg/dL    AST(SGOT) 26 12 - 45 U/L    ALT(SGPT) 21 2 - 50 U/L    Alkaline Phosphatase 81 30 - 99 U/L    Total Bilirubin 0.9 0.1 - 1.5 mg/dL    Albumin 4.2 3.2 - 4.9 g/dL    Total Protein 6.8 6.0 - 8.2 g/dL    Globulin 2.6 1.9 - 3.5 g/dL    A-G Ratio 1.6 g/dL   TROPONIN    Collection Time: 24  6:26 PM   Result Value Ref Range    Troponin T 13 6 - 19 ng/L   proBrain Natriuretic Peptide, NT    Collection Time: 24  6:26 PM   Result Value Ref Range    NT-proBNP 351 (H) 0 - 125 pg/mL   ESTIMATED GFR    Collection Time: 24  6:26 PM   Result Value Ref Range    GFR (CKD-EPI) 76 >60 mL/min/1.73 m 2   EKG (NOW)    Collection Time: 24  6:44 PM   Result Value Ref Range    Report       [facility] Emergency Dept.    Test Date:  2024  Pt Name:    MADDI ANGEL                Department: ER  MRN:        [id number]                      Room:       [facility]  Gender:     Male                         Technician: [id number]  :        1945                   Requested By:KESHAWN LLANES  Order #:    [id number]                    Reading MD: KESHAWN LLANES MD    Measurements  Intervals                                Axis  Rate:       60                           P:          211  GA:         166                           QRS:        67  QRSD:       97                           T:          49  QT:         406  QTc:        406    Interpretive Statements  Sinus or ectopic atrial rhythm  Paired ventricular premature complexes  Artifact  Compared to ECG 11/16/2024 18:15:19    Electronically Signed On 11- 19:47:53 PST by KESHAWN LLANES MD        I have independently interpreted this EKG as documented above    Rhythm strip interpretation-sinus rhythm with PVCs    RADIOLOGY  I have independently interpreted the diagnostic imaging associated with this visit and am waiting the final reading from the radiologist.   My preliminary interpretation is as follows: Chest x-ray without infiltrate    COURSE & MEDICAL DECISION MAKING    INITIAL ASSESSMENT, COURSE AND PLAN  Care Narrative: Patient presents with chest pressure.  He describes this as being the same as his PVCs and indeed he is identified to have PVCs.  He does not have acute ischemia on his EKG.  He has had extensive cardiac workup recently.  He suffers from chronic shortness of breath.  He has a normal pulmonary exam.  He is not hypoxic or tachycardic.  No leg swelling or signs of PE DVT.  He suffers from chronic loss of appetite with plan for GI follow-up.  He has a benign abdominal exam.  Differential is broad and multiple life-threatening problems are considered.  Evaluation is undertaken.    Laboratory data returned unremarkable.  No evidence of heart failure.  He has a normal troponin.  He has no leukocytosis or left shift.  No difficult metabolic derangement.  Normal liver and renal function.    I discussed case with Dr. Mendoza.  He is familiar with the patient.  Given reassuring workup and extensive recent cardiac evaluation outpatient follow-up was felt to be reasonable.  I have placed a referral for expedited cardiology follow-up.  The patient should be able to be seen next week.  Patient should continue with plan to follow-up with GI.  I precaution the patient  to return to the ER for any worsening symptoms, not improving, significant difficulty breathing or concern.    Measures  CHEST PAIN:   HEART Score for Major Cardiac Events  HEART Score     History: Slightly suspicious  ECG: Non-specific repolarization disturbance  Age: 65+  Risk Factors: 1-2 risk factors  Troponin: Less than or equal to normal limit    Heart Score: 4    Total Score   0-3 Points = Low Score, risk of MACE 0.9-1.7%.  4-6 Points = Moderate Score, risk of MACE 12-16.6%  7-10 Points = High Score, risk of MACE 50-65%        DISPOSITION AND DISCUSSIONS  I have discussed management of the patient with the following physicians and JADIEL's:  as noted above      Escalation of care considered, and ultimately not performed: Considered admission, but given extensive recent evaluation negative recent stress test within the last year negative evaluation in the emergency department expedited outpatient follow-up is felt to be a reasonable option    Follow up  Cardiology ASAP    FINAL IMPRESSION  1.  Chest pain  2.  Premature ventricular contractions    This dictation was created using voice recognition software. The accuracy of the dictation is limited to the abilities of the software. I expect there may be some errors of grammar and possibly content. The nursing notes were reviewed and certain aspects of this information were incorporated into this note.    Electronically signed by: Javier Sanabria M.D., 11/16/2024

## 2024-11-17 NOTE — ED NOTES
Pt aox4, skin pink warm and dry, airway patent, rr even and unlabored, NAD noted. No new complaints. Pt vss. Pt given discharge instructions without further questions. Pt assisted to wheelchair and to lobby upon discharge without new incident or distress in stable condition.

## 2024-11-17 NOTE — ED NOTES
Bedside report received from off going RN/tech: EDER Stoner assumed care of patient.  POC discussed with patient. Call light within reach, all needs addressed at this time.       Fall risk interventions in place: Keep floor surfaces clean and dry (all applicable per [place] Fall risk assessment)   Continuous monitoring: Cardiac Leads, Pulse Ox, or Blood Pressure  IVF/IV medications: Not Applicable   Oxygen: Room Air  Bedside sitter: Not Applicable   Isolation: Not Applicable

## 2024-11-17 NOTE — ED NOTES
Pt to red 8 from triage. Pt changed into gown and connected to monitor. PIV placed, blood drawn and sent to lab.   Chart up for ERP

## 2024-11-17 NOTE — ED TRIAGE NOTES
Chief Complaint   Patient presents with    Shortness of Breath     Pt reports difficulty breathing. Having increased weakness throughout.     Chest Pressure     Mid chest area. Pt reports he feels the beating change in his heart rate.      Pt wheeled to triage for above complaints. Pt reports GI issues for about 6 months, but isn't able to get in with GI until January. Pt reports feeling weak and unwell.

## 2024-11-18 ENCOUNTER — PATIENT MESSAGE (OUTPATIENT)
Dept: CARDIOLOGY | Facility: MEDICAL CENTER | Age: 79
End: 2024-11-18
Payer: MEDICARE

## 2024-11-18 DIAGNOSIS — I49.3 PVC (PREMATURE VENTRICULAR CONTRACTION): ICD-10-CM

## 2024-11-18 RX ORDER — METOPROLOL SUCCINATE 25 MG/1
25 TABLET, EXTENDED RELEASE ORAL DAILY
Qty: 90 TABLET | Refills: 3
Start: 2024-11-18

## 2024-11-20 ENCOUNTER — OFFICE VISIT (OUTPATIENT)
Dept: CARDIOLOGY | Facility: MEDICAL CENTER | Age: 79
End: 2024-11-20
Attending: NURSE PRACTITIONER
Payer: MEDICARE

## 2024-11-20 VITALS
HEART RATE: 61 BPM | WEIGHT: 178.8 LBS | HEIGHT: 73 IN | DIASTOLIC BLOOD PRESSURE: 70 MMHG | BODY MASS INDEX: 23.7 KG/M2 | OXYGEN SATURATION: 96 % | RESPIRATION RATE: 16 BRPM | SYSTOLIC BLOOD PRESSURE: 118 MMHG

## 2024-11-20 DIAGNOSIS — I49.3 PVC (PREMATURE VENTRICULAR CONTRACTION): ICD-10-CM

## 2024-11-20 PROCEDURE — 99212 OFFICE O/P EST SF 10 MIN: CPT | Performed by: NURSE PRACTITIONER

## 2024-11-20 PROCEDURE — 99215 OFFICE O/P EST HI 40 MIN: CPT | Performed by: NURSE PRACTITIONER

## 2024-11-20 PROCEDURE — 93005 ELECTROCARDIOGRAM TRACING: CPT | Performed by: NURSE PRACTITIONER

## 2024-11-20 RX ORDER — FLECAINIDE ACETATE 100 MG/1
TABLET ORAL
Qty: 200 TABLET | Refills: 3 | Status: SHIPPED | OUTPATIENT
Start: 2024-11-20

## 2024-11-20 ASSESSMENT — FIBROSIS 4 INDEX: FIB4 SCORE: 2.35

## 2024-11-20 ASSESSMENT — ENCOUNTER SYMPTOMS
PALPITATIONS: 1
NAUSEA: 1

## 2024-11-20 NOTE — PROGRESS NOTES
"Electrophysiology Follow up  Note    DOS: 11/20/2024   [id number]  Boo Cavanaugh Jr.    Chief complaint/Reason for consult: PVC    HPI: Pt is a 79 y.o. male who presents to the clinic today in follow up for PVC. Patient has a past medical history significant for but not limited to: h/o atrial flutter S/P ablation, PVC, SHERRY on CPAP, hypertension. Patient recently went to the ED because he feels his episodes of PVC have gotten worse. All labs normal. WE discuss increasing his flecainide vs looking at another monitor to reassess his burden. Still having GI issues and does not have appointment until January. On last monitor not an overwhelming burden of PVC (3%). Will try increasing flecainide to see if that helps. If continues to not have sufficient pacification can try stronger antiarrhythmics. Will first get new monitor to see if burden has increased. Long discussion about medications, influence of possible inflammatory GI problem, oxygen.       Past Medical History:   Diagnosis Date    Arrhythmia     \"flutter\", Dr. Mendoza cardiologist    Arthritis     left thumb    Breath shortness     Cancer (HCC)     Cardiac arrhythmia     Cataract     Enlarged prostate with urinary obstruction     GERD (gastroesophageal reflux disease)     Hyperlipidemia     Hypertension     OA (osteoarthritis) of knee     bilateral     Restless leg syndrome     Tonsillitis     Urinary bladder disorder 12/06/2017    savage in place       Past Surgical History:   Procedure Laterality Date    TURP-VAPOR  12/07/2017    Procedure: TURP-VAPOR - GREEN LIGHT PHOTOSELECTIVE;  Surgeon: Cristofer Cr M.D.;  Location: SURGERY [facility];  Service: Urology    GANGLION EXCISION Right 1966    wrist    APPENDECTOMY      OPEN REDUCTION      PROSTATECTOMY, RADICAL RETRO      TONSILLECTOMY      as a child    VASECTOMY         Social History     Socioeconomic History    Marital status: Single     Spouse name: Not on file    Number of children: Not on file    " Years of education: Not on file    Highest education level: Master's degree (e.g., MA, MS, [place], MEd, MSW, ABAD)   Occupational History    Not on file   Tobacco Use    Smoking status: Never     Passive exposure: Past (BOTH parents smoked)    Smokeless tobacco: Never   Vaping Use    Vaping status: Never Used   Substance and Sexual Activity    Alcohol use: Not Currently    Drug use: Not Currently     Types: Marijuana    Sexual activity: Not Currently     Comment: 2 daughters    Other Topics Concern    Not on file   Social History Narrative    Not on file     Social Drivers of Health     Financial Resource Strain: Low Risk  (9/27/2024)    Overall Financial Resource Strain (CARDIA)     Difficulty of Paying Living Expenses: Not hard at all   Food Insecurity: No Food Insecurity (9/27/2024)    Hunger Vital Sign     Worried About Running Out of Food in the Last Year: Never true     Ran Out of Food in the Last Year: Never true   Transportation Needs: No Transportation Needs (9/27/2024)    PRAPARE - Transportation     Lack of Transportation (Medical): No     Lack of Transportation (Non-Medical): No   Physical Activity: Inactive (9/27/2024)    Exercise Vital Sign     Days of Exercise per Week: 0 days     Minutes of Exercise per Session: 0 min   Stress: No Stress Concern Present (9/27/2024)    [employer] [place] [employer] - Occupational Stress Questionnaire     Feeling of Stress : Not at all   Social Connections: Moderately Isolated (9/27/2024)    Social Connection and Isolation Panel [NHANES]     Frequency of Communication with Friends and Family: More than three times a week     Frequency of Social Gatherings with Friends and Family: Three times a week     Attends Yarsanism Services: 1 to 4 times per year     Active Member of Clubs or Organizations: No     Attends Club or Organization Meetings: Never     Marital Status:    Intimate Partner Violence: Not on file   Housing Stability: Low Risk  (9/27/2024)     Housing Stability Vital Sign     Unable to Pay for Housing in the Last Year: No     Number of Times Moved in the Last Year: 0     Homeless in the Last Year: No       Family History   Problem Relation Age of Onset    Heart Disease Mother 65        MI    Cancer Father     Cancer Brother 50        prostate cancer    Stroke Brother     Stroke Brother     Diabetes Neg Hx     Hyperlipidemia Neg Hx     Alcohol/Drug Neg Hx     Ovarian Cancer Neg Hx     Tubal Cancer Neg Hx     Peritoneal Cancer Neg Hx     Colorectal Cancer Neg Hx     Breast Cancer Neg Hx        Allergies   Allergen Reactions    Penicillins Rash     Rash as a child.        Current Outpatient Medications   Medication Sig Dispense Refill    flecainide (TAMBOCOR) 100 MG Tab Take 1 Tablet by mouth 2 times a day. May also take 0.5 Tablets 1 time a day as needed (for symptomatic  episodes of PVC). 200 Tablet 3    metoprolol SR (TOPROL XL) 25 MG TABLET SR 24 HR Take 1 Tablet by mouth every day. 90 Tablet 3    rosuvastatin (CRESTOR) 20 MG Tab TAKE ONE TABLET BY MOUTH EVERY DAY 90 Tablet 3    omeprazole (PRILOSEC) 20 MG delayed-release capsule Take 1 Capsule by mouth every day. 30 Capsule 0    augmented betamethasone dipropionate (DIPROLENE-AF) 0.05 % ointment Apply  topically 2 times a day as needed (rash).      lisinopril (PRINIVIL) 10 MG Tab TAKE ONE TABLET BY MOUTH EVERY DAY 90 Tablet 3    finasteride (PROSCAR) 5 MG Tab Take 1 Tablet by mouth every day.      ciclopirox (PENLAC) 8 % solution Apply 1 Application topically two times a week. Apply to affected toenails on both feet.      aspirin 81 MG EC tablet Take 81 mg by mouth every day.       No current facility-administered medications for this visit.       Vitals:    11/20/24 0941   BP: 118/70   Pulse: 61   Resp: 16   SpO2: 96%         Review of Systems   Constitutional:  Positive for malaise/fatigue.   Cardiovascular:  Positive for palpitations.   Gastrointestinal:  Positive for nausea.            EKG  "interpreted by me: Sinus     Physical Exam  Constitutional:       Appearance: Normal appearance.   HENT:      Head: Normocephalic.   Eyes:      Pupils: Pupils are equal, round, and reactive to light.   Neck:      Vascular: No JVD.   Cardiovascular:      Rate and Rhythm: Normal rate and regular rhythm.      Pulses: Normal pulses.      Heart sounds: Normal heart sounds.   Pulmonary:      Effort: Pulmonary effort is normal.      Breath sounds: Normal breath sounds.   Abdominal:      General: Abdomen is flat.      Palpations: Abdomen is soft.   Musculoskeletal:      Cervical back: Normal range of motion.      Right lower leg: No edema.      Left lower leg: No edema.   Skin:     General: Skin is warm and dry.   Neurological:      Mental Status: He is alert and oriented to person, place, and time.   Psychiatric:         Mood and Affect: Mood normal.         Behavior: Behavior normal.          Data:  Lipids:   Lab Results   Component Value Date/Time    CHOLSTRLTOT 93 (L) 12/16/2023 01:19 AM    TRIGLYCERIDE 44 12/16/2023 01:19 AM    HDL 48 12/16/2023 01:19 AM    LDL 36 12/16/2023 01:19 AM        BMP:  Lab Results   Component Value Date/Time    SODIUM 139 05/20/2024 1146    POTASSIUM 4.6 05/20/2024 1146    CHLORIDE 104 05/20/2024 1146    CO2 25 05/20/2024 1146    GLUCOSE 94 05/20/2024 1146    BUN 16 05/20/2024 1146    CREATININE 0.97 05/20/2024 1146    CALCIUM 9.1 05/20/2024 1146    ANION 10.0 05/20/2024 1146       GFR:  Lab Results   Component Value Date/Time    IFAFRICA >60 03/31/2021 0805    IFNOTAFR >60 03/31/2021 0805        TSH:   Lab Results   Component Value Date/Time    TSHULTRASEN 2.130 05/20/2024 1146       MAGNESIUM:  Lab Results   Component Value Date/Time    MAGNESIUM 2.2 04/05/2018 0743    MAGNESIUM 2.2 12/20/2017 0901    MAGNESIUM 2.3 10/28/2017 0813        THYROXINE (T4):   No results found for: \"FREEDIR\"     CBC:   Lab Results   Component Value Date/Time    WBC 9.9 11/16/2024 06:26 PM    RBC 4.82 11/16/2024 " "06:26 PM    HEMOGLOBIN 16.0 11/16/2024 06:26 PM    HEMATOCRIT 46.2 11/16/2024 06:26 PM    MCV 95.9 11/16/2024 06:26 PM    MCH 33.2 (H) 11/16/2024 06:26 PM    MCHC 34.6 11/16/2024 06:26 PM    RDW 42.9 11/16/2024 06:26 PM    PLATELETCT 188 11/16/2024 06:26 PM    MPV 10.8 11/16/2024 06:26 PM    NEUTSPOLYS 68.20 11/16/2024 06:26 PM    LYMPHOCYTES 17.90 (L) 11/16/2024 06:26 PM    MONOCYTES 12.00 11/16/2024 06:26 PM    EOSINOPHILS 1.30 11/16/2024 06:26 PM    BASOPHILS 0.40 11/16/2024 06:26 PM    IMMGRAN 0.20 11/16/2024 06:26 PM    NRBC 0.00 11/16/2024 06:26 PM    NEUTS 6.77 11/16/2024 06:26 PM    LYMPHS 1.78 11/16/2024 06:26 PM    MONOS 1.19 (H) 11/16/2024 06:26 PM    EOS 0.13 11/16/2024 06:26 PM    BASO 0.04 11/16/2024 06:26 PM    IMMGRANAB 0.02 11/16/2024 06:26 PM    NRBCAB 0.00 11/16/2024 06:26 PM        CBC w/o DIFF  Lab Results   Component Value Date/Time    WBC 9.9 11/16/2024 06:26 PM    RBC 4.82 11/16/2024 06:26 PM    HEMOGLOBIN 16.0 11/16/2024 06:26 PM    MCV 95.9 11/16/2024 06:26 PM    MCH 33.2 (H) 11/16/2024 06:26 PM    MCHC 34.6 11/16/2024 06:26 PM    RDW 42.9 11/16/2024 06:26 PM    MPV 10.8 11/16/2024 06:26 PM       LIVER:  Lab Results   Component Value Date/Time    ALKPHOSPHAT 81 11/16/2024 06:26 PM    ASTSGOT 26 11/16/2024 06:26 PM    ALTSGPT 21 11/16/2024 06:26 PM    TBILIRUBIN 0.9 11/16/2024 06:26 PM       BNP:  No results found for: \"BNPBTYPENAT\"    PT/INR:  Lab Results   Component Value Date/Time    PROTHROMBTM 13.1 11/27/2018 11:50 AM    INR 0.98 11/27/2018 11:50 AM             Impression/Plan:  PVC (premature ventricular contraction)   - increase flecainide to 100mg twice daily with optional 50mg dose for symptomatic episodes   - continue nightly metoprolol   - can repeat monitor to reassess burden   - may need to consider stronger antiarrhythmic                A total of 40 minutes of time was spent on day of encounter reviewing medical record, performing history and examination, counseling, ordering " medication/test/consults, collaborating with referring service, and documentation.    Anant Nava AGACNP-EP  Cardiac Electrophysiology

## 2024-11-21 LAB — EKG IMPRESSION: NORMAL

## 2024-11-21 PROCEDURE — 93010 ELECTROCARDIOGRAM REPORT: CPT | Performed by: INTERNAL MEDICINE

## 2024-11-21 NOTE — ASSESSMENT & PLAN NOTE
- increase flecainide to 100mg twice daily with optional 50mg dose for symptomatic episodes   - continue nightly metoprolol   - can repeat monitor to reassess burden   - may need to consider stronger antiarrhythmic

## 2024-11-25 ENCOUNTER — PATIENT MESSAGE (OUTPATIENT)
Dept: CARDIOLOGY | Facility: MEDICAL CENTER | Age: 79
End: 2024-11-25
Payer: MEDICARE

## 2024-11-26 ENCOUNTER — PATIENT MESSAGE (OUTPATIENT)
Dept: CARDIOLOGY | Facility: MEDICAL CENTER | Age: 79
End: 2024-11-26
Payer: MEDICARE

## 2025-01-02 ENCOUNTER — APPOINTMENT (OUTPATIENT)
Dept: MEDICAL GROUP | Facility: PHYSICIAN GROUP | Age: 80
End: 2025-01-02
Payer: MEDICARE

## 2025-01-02 VITALS
OXYGEN SATURATION: 96 % | HEART RATE: 57 BPM | RESPIRATION RATE: 15 BRPM | SYSTOLIC BLOOD PRESSURE: 118 MMHG | TEMPERATURE: 97.1 F | DIASTOLIC BLOOD PRESSURE: 62 MMHG | BODY MASS INDEX: 23.36 KG/M2 | WEIGHT: 182 LBS | HEIGHT: 74 IN

## 2025-01-02 DIAGNOSIS — Z02.89 ENCOUNTER FOR COMPLETION OF FORM WITH PATIENT: ICD-10-CM

## 2025-01-02 DIAGNOSIS — M15.0 PRIMARY OSTEOARTHRITIS INVOLVING MULTIPLE JOINTS: Primary | ICD-10-CM

## 2025-01-02 DIAGNOSIS — R68.83 CHILLS: ICD-10-CM

## 2025-01-02 DIAGNOSIS — I10 PRIMARY HYPERTENSION: ICD-10-CM

## 2025-01-02 DIAGNOSIS — I49.3 PVC (PREMATURE VENTRICULAR CONTRACTION): ICD-10-CM

## 2025-01-02 PROBLEM — H93.8X2 CRACKLING SOUND IN LEFT EAR: Status: RESOLVED | Noted: 2022-02-17 | Resolved: 2025-01-02

## 2025-01-02 PROCEDURE — 3074F SYST BP LT 130 MM HG: CPT | Performed by: FAMILY MEDICINE

## 2025-01-02 PROCEDURE — 3078F DIAST BP <80 MM HG: CPT | Performed by: FAMILY MEDICINE

## 2025-01-02 PROCEDURE — 99213 OFFICE O/P EST LOW 20 MIN: CPT | Performed by: FAMILY MEDICINE

## 2025-01-02 RX ORDER — FAMOTIDINE 40 MG/1
1 TABLET, FILM COATED ORAL NIGHTLY
COMMUNITY
Start: 2024-12-11

## 2025-01-02 ASSESSMENT — FIBROSIS 4 INDEX: FIB4 SCORE: 2.35

## 2025-01-02 NOTE — PROGRESS NOTES
Verbal consent was acquired by the patient to use [employer] ambient listening note generation during this visit     Subjective:     HPI:   History of Present Illness  The patient presents for renewal of handicap placard, knee pain, GERD, PVCs, hypertension, and chills.    Handicap Placard Renewal  He is seeking a renewal of his handicap placard, which he has not utilized for some time but finds necessary due to persistent knee pain. He experiences occasional episodes of weakness and malaise, particularly when his symptoms are severe, often confining him to his home. He reports no recent chest pain. He has been managing PVCs well since a medication adjustment on 12/20/2024, with the exception of a period of increased symptoms around 12/09/2024.    Knee Pain  - Onset: Persistent  - Character: Persistent knee pain  - Alleviating/Aggravating Factors: Severe symptoms confine him to his home  - Timing: Occasional episodes of weakness and malaise  - Severity: Severe enough to require a handicap placard renewal    Hypertension and Chills  He experienced an episode of elevated blood pressure and chills on the previous Monday, which was managed with a warming blanket. He did not have a fever during this episode. His oxygen saturation was within normal limits, and his pulse rate, typically in the 50s and 40s, increased to the 60s. He also reported fatigue and leg weakness. He has been experiencing intermittent episodes of elevated blood pressure, with diastolic reading of 102. He did not experience significant anxiety during these episodes. He has been experiencing skin rashes and edema, which have shown recent improvement. His dermatologist has suggested that these symptoms could be due to an infection or medication side effect.  - Onset: Previous Monday  - Character: Elevated blood pressure, chills, fatigue, leg weakness, skin rashes, and edema  - Alleviating/Aggravating Factors: Managed with a warming blanket  - Timing:  "Intermittent episodes  - Severity: Oxygen saturation within normal limits, pulse rate increased to the 60s, systolic readings of 102 and 171    GERD  He has been taking famotidine 40 mg once daily in the evening for gastrointestinal issues.  - Medication: Famotidine 40 mg once daily in the evening    PVCs  He has been taking flecainide twice daily for PVCs, with the option to take an additional half tablet as needed. He has not experienced any PVCs recently.  - Medication: Flecainide twice daily, with an additional half tablet as needed  - Severity: No recent PVCs    MEDICATIONS  Current: Famotidine, flecainide    Health Maintenance: Completed    Objective:     Exam:  /62 (BP Location: Left arm, Patient Position: Sitting, BP Cuff Size: Adult)   Pulse (!) 57   Temp 36.2 °C (97.1 °F) (Temporal)   Resp 15   Ht 1.88 m (6' 2\")   Wt 82.6 kg (182 lb)   SpO2 96%   BMI 23.37 kg/m²  Body mass index is 23.37 kg/m².    Physical Exam  Constitutional:       Appearance: Normal appearance.   Cardiovascular:      Rate and Rhythm: Normal rate and regular rhythm.      Heart sounds: Normal heart sounds.   Pulmonary:      Effort: Pulmonary effort is normal.      Breath sounds: Normal breath sounds.   Musculoskeletal:      Cervical back: Normal range of motion and neck supple.   Lymphadenopathy:      Cervical: No cervical adenopathy.   Neurological:      Mental Status: He is alert.             Results  Laboratory Studies  Blood sugar was 102 in November. Blood cell counts were normal on 11/16/2024.    Assessment & Plan:     1. Primary osteoarthritis involving multiple joints        2. Shortness of breath        3. Encounter for completion of form with patient        4. Chills  CBC WITH DIFFERENTIAL    Basic Metabolic Panel          Assessment & Plan  1. Arthritis - Chronic. Severe knee pain limiting mobility, requiring a handicap placard  - Handicap placard form completed and will be scanned into the medical record  - " Original form to be returned to the patient for DMV submission    2. Chills - Acute. Recent chills and elevated blood pressure without fever  - Blood pressure readings: 102/93 mmHg and 171/102 mmHg noted before onset of chills  - CBC with differential and BMP to be ordered while fasting to investigate chills    3. Premature Ventricular Contractions (PVCs) - Chronic. No recent PVCs, indicating effective medication  - Continue flecainide 100 mg bid + 50 mg daily prn for breakthrough PVCs    4. Hypertension - Recent elevated blood pressure readings  - Systolic readings reached 171 mmHg, hard to tell if related to the symptoms of chills  - continue lisinopril 10 mg daily and metoprolol SR 25 mg daily    Return if symptoms worsen or fail to improve.    Please note that this dictation was created using voice recognition software. I have made every reasonable attempt to correct obvious errors, but I expect that there are errors of grammar and possibly content that I did not discover before finalizing the note.

## 2025-01-06 ENCOUNTER — HOSPITAL ENCOUNTER (OUTPATIENT)
Dept: LAB | Facility: MEDICAL CENTER | Age: 80
End: 2025-01-06
Attending: FAMILY MEDICINE
Payer: MEDICARE

## 2025-01-06 ENCOUNTER — HOSPITAL ENCOUNTER (OUTPATIENT)
Dept: RADIOLOGY | Facility: MEDICAL CENTER | Age: 80
End: 2025-01-06
Payer: MEDICARE

## 2025-01-06 DIAGNOSIS — R63.4 WEIGHT LOSS: ICD-10-CM

## 2025-01-06 DIAGNOSIS — R63.0 DECREASED APPETITE: ICD-10-CM

## 2025-01-06 DIAGNOSIS — R68.83 CHILLS: ICD-10-CM

## 2025-01-06 DIAGNOSIS — R10.13 EPIGASTRIC PAIN: ICD-10-CM

## 2025-01-06 DIAGNOSIS — R07.89 CHEST PRESSURE: ICD-10-CM

## 2025-01-06 DIAGNOSIS — K59.00 COLONIC CONSTIPATION: ICD-10-CM

## 2025-01-06 DIAGNOSIS — R19.4 ALTERED BOWEL HABITS: ICD-10-CM

## 2025-01-06 LAB
ANION GAP SERPL CALC-SCNC: 8 MMOL/L (ref 7–16)
BASOPHILS # BLD AUTO: 1 % (ref 0–1.8)
BASOPHILS # BLD: 0.06 K/UL (ref 0–0.12)
BUN SERPL-MCNC: 19 MG/DL (ref 8–22)
CALCIUM SERPL-MCNC: 9.7 MG/DL (ref 8.5–10.5)
CHLORIDE SERPL-SCNC: 102 MMOL/L (ref 96–112)
CO2 SERPL-SCNC: 27 MMOL/L (ref 20–33)
CREAT SERPL-MCNC: 1.19 MG/DL (ref 0.5–1.4)
EOSINOPHIL # BLD AUTO: 0.29 K/UL (ref 0–0.51)
EOSINOPHIL NFR BLD: 4.7 % (ref 0–6.9)
ERYTHROCYTE [DISTWIDTH] IN BLOOD BY AUTOMATED COUNT: 48.3 FL (ref 35.9–50)
GFR SERPLBLD CREATININE-BSD FMLA CKD-EPI: 62 ML/MIN/1.73 M 2
GLUCOSE SERPL-MCNC: 84 MG/DL (ref 65–99)
HCT VFR BLD AUTO: 47.1 % (ref 42–52)
HGB BLD-MCNC: 15.7 G/DL (ref 14–18)
IMM GRANULOCYTES # BLD AUTO: 0.02 K/UL (ref 0–0.11)
IMM GRANULOCYTES NFR BLD AUTO: 0.3 % (ref 0–0.9)
LYMPHOCYTES # BLD AUTO: 1.49 K/UL (ref 1–4.8)
LYMPHOCYTES NFR BLD: 24.2 % (ref 22–41)
MCH RBC QN AUTO: 33.6 PG (ref 27–33)
MCHC RBC AUTO-ENTMCNC: 33.3 G/DL (ref 32.3–36.5)
MCV RBC AUTO: 100.9 FL (ref 81.4–97.8)
MONOCYTES # BLD AUTO: 1 K/UL (ref 0–0.85)
MONOCYTES NFR BLD AUTO: 16.3 % (ref 0–13.4)
NEUTROPHILS # BLD AUTO: 3.29 K/UL (ref 1.82–7.42)
NEUTROPHILS NFR BLD: 53.5 % (ref 44–72)
NRBC # BLD AUTO: 0 K/UL
NRBC BLD-RTO: 0 /100 WBC (ref 0–0.2)
PLATELET # BLD AUTO: 177 K/UL (ref 164–446)
PMV BLD AUTO: 11.5 FL (ref 9–12.9)
POTASSIUM SERPL-SCNC: 4.8 MMOL/L (ref 3.6–5.5)
RBC # BLD AUTO: 4.67 M/UL (ref 4.7–6.1)
SODIUM SERPL-SCNC: 137 MMOL/L (ref 135–145)
WBC # BLD AUTO: 6.2 K/UL (ref 4.8–10.8)

## 2025-01-06 PROCEDURE — 36415 COLL VENOUS BLD VENIPUNCTURE: CPT

## 2025-01-06 PROCEDURE — 74220 X-RAY XM ESOPHAGUS 1CNTRST: CPT

## 2025-01-06 PROCEDURE — 80048 BASIC METABOLIC PNL TOTAL CA: CPT

## 2025-01-06 PROCEDURE — 700117 HCHG RX CONTRAST REV CODE 255

## 2025-01-06 PROCEDURE — 85025 COMPLETE CBC W/AUTO DIFF WBC: CPT

## 2025-01-06 RX ADMIN — BARIUM SULFATE 700 MG: 700 TABLET ORAL at 15:32

## 2025-01-09 ENCOUNTER — TELEPHONE (OUTPATIENT)
Dept: CARDIOLOGY | Facility: MEDICAL CENTER | Age: 80
End: 2025-01-09
Payer: MEDICARE

## 2025-01-14 ENCOUNTER — OFFICE VISIT (OUTPATIENT)
Dept: CARDIOLOGY | Facility: MEDICAL CENTER | Age: 80
End: 2025-01-14
Attending: INTERNAL MEDICINE
Payer: MEDICARE

## 2025-01-14 ENCOUNTER — TELEPHONE (OUTPATIENT)
Dept: CARDIOLOGY | Facility: MEDICAL CENTER | Age: 80
End: 2025-01-14

## 2025-01-14 VITALS
BODY MASS INDEX: 22.72 KG/M2 | DIASTOLIC BLOOD PRESSURE: 64 MMHG | WEIGHT: 177 LBS | RESPIRATION RATE: 14 BRPM | OXYGEN SATURATION: 97 % | SYSTOLIC BLOOD PRESSURE: 122 MMHG | HEART RATE: 59 BPM | HEIGHT: 74 IN

## 2025-01-14 DIAGNOSIS — I49.3 PVC (PREMATURE VENTRICULAR CONTRACTION): ICD-10-CM

## 2025-01-14 DIAGNOSIS — G47.33 OSA ON CPAP: ICD-10-CM

## 2025-01-14 DIAGNOSIS — Z86.79 S/P CATHETER ABLATION OF SLOW PATHWAY: ICD-10-CM

## 2025-01-14 DIAGNOSIS — Z86.79 HISTORY OF ATRIAL FLUTTER: ICD-10-CM

## 2025-01-14 DIAGNOSIS — I10 ESSENTIAL HYPERTENSION, BENIGN: ICD-10-CM

## 2025-01-14 DIAGNOSIS — Z98.890 S/P CATHETER ABLATION OF SLOW PATHWAY: ICD-10-CM

## 2025-01-14 LAB — EKG IMPRESSION: NORMAL

## 2025-01-14 PROCEDURE — 3074F SYST BP LT 130 MM HG: CPT | Performed by: INTERNAL MEDICINE

## 2025-01-14 PROCEDURE — 93005 ELECTROCARDIOGRAM TRACING: CPT | Mod: TC | Performed by: INTERNAL MEDICINE

## 2025-01-14 PROCEDURE — 99214 OFFICE O/P EST MOD 30 MIN: CPT | Performed by: INTERNAL MEDICINE

## 2025-01-14 PROCEDURE — 99212 OFFICE O/P EST SF 10 MIN: CPT | Performed by: INTERNAL MEDICINE

## 2025-01-14 PROCEDURE — 3078F DIAST BP <80 MM HG: CPT | Performed by: INTERNAL MEDICINE

## 2025-01-14 PROCEDURE — 93010 ELECTROCARDIOGRAM REPORT: CPT | Performed by: INTERNAL MEDICINE

## 2025-01-14 RX ORDER — LISINOPRIL 10 MG/1
10 TABLET ORAL 2 TIMES DAILY
Qty: 180 TABLET | Refills: 4 | Status: SHIPPED | OUTPATIENT
Start: 2025-01-14

## 2025-01-14 ASSESSMENT — FIBROSIS 4 INDEX: FIB4 SCORE: 2.5

## 2025-01-14 NOTE — TELEPHONE ENCOUNTER
"Last OV: 01.14.2025  Proposed Surgery: EGD with deep (propofol) sedation  Surgery Date: 02.13.2025  Requesting Office Name: [facility]  Fax Number: [phone]  Preference of Location (default is surgery center unless specified by Cardiologist or JADIEL)  Prior Clearance Addressed: No    Is this a general clearance? YES   Anticoags/Antiplatelets: Aspirin  Anticoags/Antiplatelet managed by Cardiology? YES    Outstanding Cardiac Imaging : No  Ablation, Cardioversion, Stent, Cardiac Devices, Catheterization, Watchman: Yes  Date : 11.28.2018   TAVR/Valve, Mitral Clip, Watchman (including open heart),: N/A   Recent Cardiac Hospitalization: Yes  Date:  11.16.2024            When: Hospitalized in the last 3 months. Forward to provider to review.   History (cardiac history):   Past Medical History:   Diagnosis Date    Arrhythmia     \"flutter\", Dr. Mendoza cardiologist    Arthritis     left thumb    Breath shortness     Cancer (HCC)     Cardiac arrhythmia     Cataract     Enlarged prostate with urinary obstruction     GERD (gastroesophageal reflux disease)     Hyperlipidemia     Hypertension     OA (osteoarthritis) of knee     bilateral     Restless leg syndrome     Tonsillitis     Urinary bladder disorder 12/06/2017    savage in place           Is this a dental clearance? NO  Ablation, Cardioversion, Watchman, Stents, Cath, Devices within the last 3 months? No   If yes- Send dental wait letter, do not forward to provider for review.     TAVR / Valve, Mitral clip within the last 6 months? No  If yes- Send dental wait letter, do not forward to provider for review.     If completing a general clearance, continue per protocol.           Surgical Clearance Letter Sent: No Provider to advise. DS  **Scan clearance request letter into [facility].**    "

## 2025-01-14 NOTE — LETTER
Addended by: AZRA KARIMI on: 5/28/2020 11:58 AM     Modules accepted: Orders     PROCEDURE/SURGERY CLEARANCE FORM      Encounter Date: 1/14/2025    Patient: Boo aCvanaugh Jr.  YOB: 1945    CARDIOLOGIST:  Enoch Mendoza M.D.    REFERRING DOCTOR:  No ref. provider found    The following procedure/surgery: EGD with deep (propofol) sedation                                            Additional comments:   Ok to proceed     PROCEDURE/SURGERY CLEARANCE FORM    Date: 1/14/2025   Patient Name: Boo Cavanaugh Jr.    Dear Surgeon or Proceduralist,      Thank you for your request for cardiac stratification of our mutual patient Boo Cavanaugh Jr. 1945. We have reviewed their [facility] records; and to the best of our understanding this patient has not had stenting, ablation, watchman, cardiothoracic surgery or hospitalization for cardiovascular reasons in the past 6 months.  Boo Cavanaugh Jr. has been seen within the past 15 months and is considered to have non-modifiable cardiac risk for this low-risk procedure/surgery. They may proceed from a cardiovascular standpoint and may hold their antiplatelet/anticoagulation as briefly as possible. Please have patient resume this medication when hemodynamically stable to do so.     Aspirin or Prasugrel   - hold 7 days prior to procedure/surgery, resume when hemodynamically stable      Clopidrogrel or Ticagrelor  - hold 7 days for all neurological procedures, hold 5 days prior to all other procedure/surgery,  resume when hemodynamically stable     Warfarin - hold 7 days for all neurological procedures, hold 5 days prior to all other procedure/surgery and coordinate with [facility] ([phone]) INR testing and dose management.      Pradaxa/Xarelto/Eliquis/Savesya - hold 1 day prior to procedure for low bleeding risk procedure, 2 days for high bleeding risk procedure, or consider holding 3 days or longer for patients with reduced kidney function (CrCl <30mL/min) or spinal/cranial surgeries/procedures.      If they have a mechanical  heart valve, please coordinate with [facility] ([phone]) the proper management of their anticoagulant in the periprocedural or perioperative period.      Some patients have higher risk for cardiovascular complications or holding medication. If our patient has had prior complications of holding antiplatelet or anticoagulants in the past and we have seen them after these events, we have addressed these concerns with the patient. They are at an unknown degree of increased risk for recurrent complication.  You may hold anticoagulation/antiplatelets for the procedure or surgery if the benefits of the procedure or surgery outweigh this nonmodifiable risk.      If Boo Cavanaugh Jr. 1945 has new symptoms of heart failure decompensation, unstable arrythmia, or angina please reach out and we will assess the patient.      If you have other patient-specific concerns, please feel free to reach out to the patient's cardiologist directly at [phone].     Thank you,       [facility]         Electronically Signed      MD Janeth Mendoza M.D.

## 2025-01-14 NOTE — PROGRESS NOTES
"Chief Complaint   Patient presents with    Premature Ventricular Contractions (PVCs)       Subjective     Boo Cavanaugh Jr. is a 79 y.o. male who presents today with 3% PVCs treated with flecainide by Anant.  Improvement in symptoms.  Some hypertension in afternoon.  Overall stable.  Pulmonary function test unremarkable.  Mild BNP elevation in the past.    Past Medical History:   Diagnosis Date    Arrhythmia     \"flutter\", Dr. Mendoza cardiologist    Arthritis     left thumb    Breath shortness     Cancer (HCC)     Cardiac arrhythmia     Cataract     Enlarged prostate with urinary obstruction     GERD (gastroesophageal reflux disease)     Hyperlipidemia     Hypertension     OA (osteoarthritis) of knee     bilateral     Restless leg syndrome     Tonsillitis     Urinary bladder disorder 12/06/2017    savage in place     Past Surgical History:   Procedure Laterality Date    TURP-VAPOR  12/07/2017    Procedure: TURP-VAPOR - GREEN LIGHT PHOTOSELECTIVE;  Surgeon: Cristofer Cr M.D.;  Location: SURGERY [facility];  Service: Urology    GANGLION EXCISION Right 1966    wrist    APPENDECTOMY      OPEN REDUCTION      PROSTATECTOMY, RADICAL RETRO      TONSILLECTOMY      as a child    VASECTOMY       Family History   Problem Relation Age of Onset    Heart Disease Mother 65        MI    Cancer Father     Cancer Brother 50        prostate cancer    Stroke Brother     Stroke Brother     Diabetes Neg Hx     Hyperlipidemia Neg Hx     Alcohol/Drug Neg Hx     Ovarian Cancer Neg Hx     Tubal Cancer Neg Hx     Peritoneal Cancer Neg Hx     Colorectal Cancer Neg Hx     Breast Cancer Neg Hx      Social History     Socioeconomic History    Marital status: Single     Spouse name: Not on file    Number of children: Not on file    Years of education: Not on file    Highest education level: Master's degree (e.g., MA, MS, Latha, MEd, MSW, ABAD)   Occupational History    Not on file   Tobacco Use    Smoking status: Never     Passive exposure: " Past (BOTH parents smoked)    Smokeless tobacco: Never   Vaping Use    Vaping status: Never Used   Substance and Sexual Activity    Alcohol use: Not Currently    Drug use: Not Currently     Types: Marijuana    Sexual activity: Not Currently     Comment: 2 daughters    Other Topics Concern    Not on file   Social History Narrative    Not on file     Social Drivers of Health     Financial Resource Strain: Low Risk  (9/27/2024)    Overall Financial Resource Strain (CARDIA)     Difficulty of Paying Living Expenses: Not hard at all   Food Insecurity: No Food Insecurity (9/27/2024)    Hunger Vital Sign     Worried About Running Out of Food in the Last Year: Never true     Ran Out of Food in the Last Year: Never true   Transportation Needs: No Transportation Needs (9/27/2024)    PRAPARE - Transportation     Lack of Transportation (Medical): No     Lack of Transportation (Non-Medical): No   Physical Activity: Inactive (9/27/2024)    Exercise Vital Sign     Days of Exercise per Week: 0 days     Minutes of Exercise per Session: 0 min   Stress: No Stress Concern Present (9/27/2024)    [place] [employer] - Occupational Stress Questionnaire     Feeling of Stress : Not at all   Social Connections: Moderately Isolated (9/27/2024)    Social Connection and Isolation Panel [NHANES]     Frequency of Communication with Friends and Family: More than three times a week     Frequency of Social Gatherings with Friends and Family: Three times a week     Attends [employer] Services: 1 to 4 times per year     Active Member of Clubs or Organizations: No     Attends Club or Organization Meetings: Never     Marital Status:    Intimate Partner Violence: Not on file   Housing Stability: Low Risk  (9/27/2024)    Housing Stability Vital Sign     Unable to Pay for Housing in the Last Year: No     Number of Times Moved in the Last Year: 0     Homeless in the Last Year: No     Allergies   Allergen Reactions    Penicillins  "Rash     Rash as a child.      Outpatient Encounter Medications as of 1/14/2025   Medication Sig Dispense Refill    lisinopril (PRINIVIL) 10 MG Tab Take 1 Tablet by mouth 2 times a day. 180 Tablet 4    famotidine (PEPCID) 40 MG Tab Take 1 Tablet by mouth every evening.      flecainide (TAMBOCOR) 100 MG Tab Take 1 Tablet by mouth 2 times a day. May also take 0.5 Tablets 1 time a day as needed (for symptomatic  episodes of PVC). 200 Tablet 3    metoprolol SR (TOPROL XL) 25 MG TABLET SR 24 HR Take 1 Tablet by mouth every day. 90 Tablet 3    rosuvastatin (CRESTOR) 20 MG Tab TAKE ONE TABLET BY MOUTH EVERY DAY 90 Tablet 3    augmented betamethasone dipropionate (DIPROLENE-AF) 0.05 % ointment Apply  topically 2 times a day as needed (rash).      finasteride (PROSCAR) 5 MG Tab Take 1 Tablet by mouth every day.      ciclopirox (PENLAC) 8 % solution Apply 1 Application topically two times a week. Apply to affected toenails on both feet.      aspirin 81 MG EC tablet Take 81 mg by mouth every day.      [DISCONTINUED] lisinopril (PRINIVIL) 10 MG Tab TAKE ONE TABLET BY MOUTH EVERY DAY 90 Tablet 3     No facility-administered encounter medications on file as of 1/14/2025.     ROS           Objective     /64 (BP Location: Left arm, Patient Position: Sitting, BP Cuff Size: Adult)   Pulse (!) 59   Resp 14   Ht 1.88 m (6' 2\")   Wt 80.3 kg (177 lb)   SpO2 97%   BMI 22.73 kg/m²     Physical Exam  Constitutional:       Appearance: He is well-developed.   HENT:      Head: Normocephalic and atraumatic.   Cardiovascular:      Rate and Rhythm: Normal rate and regular rhythm.      Heart sounds: No murmur heard.     No friction rub. No gallop.   Pulmonary:      Effort: Pulmonary effort is normal.      Breath sounds: Normal breath sounds.   Abdominal:      Palpations: Abdomen is soft.   Musculoskeletal:         General: Normal range of motion.      Cervical back: Normal range of motion and neck supple.   Skin:     General: Skin is " warm and dry.   Neurological:      Mental Status: He is alert and oriented to person, place, and time.   Psychiatric:         Behavior: Behavior normal.         Thought Content: Thought content normal.         Judgment: Judgment normal.                Assessment & Plan     1. PVC (premature ventricular contraction)  EKG      2. S/P catheter ablation of slow pathway        3. SHERRY on CPAP        4. History of atrial flutter        5. Essential hypertension, benign  lisinopril (PRINIVIL) 10 MG Tab          Medical Decision Making: Today's Assessment/Status/Plan:   1.  PVCs continue flecainide.  3.  Atrial flutter and slow pathway ablation no recurrence.  3.  Hypertension increase lisinopril.  4.  Follow-up with Anant in 6 months.

## 2025-01-15 ENCOUNTER — TELEPHONE (OUTPATIENT)
Dept: CARDIOLOGY | Facility: MEDICAL CENTER | Age: 80
End: 2025-01-15
Payer: MEDICARE

## 2025-02-03 ENCOUNTER — OFFICE VISIT (OUTPATIENT)
Dept: MEDICAL GROUP | Facility: PHYSICIAN GROUP | Age: 80
End: 2025-02-03
Payer: MEDICARE

## 2025-02-03 VITALS
RESPIRATION RATE: 14 BRPM | DIASTOLIC BLOOD PRESSURE: 48 MMHG | HEIGHT: 74 IN | SYSTOLIC BLOOD PRESSURE: 90 MMHG | BODY MASS INDEX: 22.72 KG/M2 | WEIGHT: 177 LBS | HEART RATE: 52 BPM | TEMPERATURE: 97.4 F | OXYGEN SATURATION: 95 %

## 2025-02-03 DIAGNOSIS — K44.9 HIATAL HERNIA: ICD-10-CM

## 2025-02-03 DIAGNOSIS — D72.821 MONOCYTOSIS: ICD-10-CM

## 2025-02-03 DIAGNOSIS — D75.89 MACROCYTOSIS: Primary | ICD-10-CM

## 2025-02-03 DIAGNOSIS — J39.2 CRICOPHARYNGEAL SPASM: ICD-10-CM

## 2025-02-03 DIAGNOSIS — I10 PRIMARY HYPERTENSION: ICD-10-CM

## 2025-02-03 DIAGNOSIS — G44.89 OTHER HEADACHE SYNDROME: ICD-10-CM

## 2025-02-03 PROBLEM — K22.5 ZENKERS DIVERTICULUM: Status: ACTIVE | Noted: 2025-02-03

## 2025-02-03 PROCEDURE — 3078F DIAST BP <80 MM HG: CPT | Performed by: FAMILY MEDICINE

## 2025-02-03 PROCEDURE — 99214 OFFICE O/P EST MOD 30 MIN: CPT | Performed by: FAMILY MEDICINE

## 2025-02-03 PROCEDURE — 3074F SYST BP LT 130 MM HG: CPT | Performed by: FAMILY MEDICINE

## 2025-02-03 ASSESSMENT — FIBROSIS 4 INDEX: FIB4 SCORE: 2.5

## 2025-02-03 ASSESSMENT — PATIENT HEALTH QUESTIONNAIRE - PHQ9: CLINICAL INTERPRETATION OF PHQ2 SCORE: 0

## 2025-02-03 NOTE — PROGRESS NOTES
Verbal consent was acquired by the patient to use [employer] ambient listening note generation during this visit     Subjective:     HPI:   History of Present Illness  The patient presents for review of lab results, cricopharyngeal muscle spasm, hiatal hernia, blood pressure management, shortness of breath, and headache.    Lab Results  - The patient has expressed interest in discussing his recent lab results, which have been a source of concern due to their prolonged abnormality.  - He reports no recent fevers or episodes of chills.    Cricopharyngeal Muscle Spasm  - Diagnosed by his gastroenterologist, which could potentially account for some of his symptoms, including chest discomfort.  - Scheduled for an upper endoscopy on 02/13/2025.  - Reports experiencing significant gas, mild pain, and pressure, which he believes can be alleviated through burping.  - Does not believe he is experiencing spasms.  - Currently on a regimen of famotidine 40 mg at night.    Blood Pressure Management  - Observed fluctuations in his blood pressure readings, with systolic values ranging from 90 to 150 and diastolic values around 90.  - These readings were taken in the morning upon waking up.  - Reports no changes in his morning routine that could account for these variations.  - Notes that his blood pressure typically normalizes within a day or two.  - Scheduled to see his cardiologist's PA in the near future.    Shortness of Breath  - Reports persistent shortness of breath, despite normal lung function tests.  - Notes that his energy levels fluctuate and are influenced by the presence of other symptoms.  - Recently started Pilates, which he finds beneficial for muscle stretching and strengthening.  - Recalls a recent instance where he felt unwell prior to a workout but experienced an improvement in his condition post-exercise.    Headache  - Reports experiencing headaches, described as pressure in his head, which are present upon  "waking and can occur throughout the day.  - Uncertain if these headaches are related to eye strain.  - Last saw his ophthalmologist 1 to 2 months ago, at which time his prescription was updated.  - Has not yet filled this new prescription.  - Considering cataract surgery and is contemplating whether to proceed with it.    SOCIAL HISTORY  He does not drink alcohol.    MEDICATIONS  Current: Famotidine, lisinopril, flecainide    Health Maintenance: Completed    Objective:     Exam:  BP 90/48 (BP Location: Left arm, Patient Position: Sitting, BP Cuff Size: Adult)   Pulse (!) 52   Temp 36.3 °C (97.4 °F) (Temporal)   Resp 14   Ht 1.88 m (6' 2\")   Wt 80.3 kg (177 lb)   SpO2 95%   BMI 22.73 kg/m²  Body mass index is 22.73 kg/m².    Physical Exam  Constitutional:       Appearance: Normal appearance.   Cardiovascular:      Rate and Rhythm: Normal rate and regular rhythm.      Heart sounds: Normal heart sounds.   Pulmonary:      Effort: Pulmonary effort is normal.      Breath sounds: Normal breath sounds.   Musculoskeletal:      Cervical back: Normal range of motion and neck supple.   Lymphadenopathy:      Cervical: No cervical adenopathy.   Neurological:      Mental Status: He is alert.             Results  Laboratory Studies  Electrolytes were normal. Kidney function appeared normal with a stable GFR. Red blood cells dropped slightly, MCV is elevated, hemoglobin levels are good. Mean corpuscular hemoglobin is slightly elevated. White blood cells were normal, monocytes were slightly elevated.    Assessment & Plan:     1. Macrocytosis        2. Monocytosis        3. Cricopharyngeal spasm        4. Hiatal hernia        5. Primary hypertension        6. Other headache syndrome            Assessment & Plan  1. Macrocytosis: New diagnosis. Hemoglobin levels are normal, indicating no anemia. The mean corpuscular hemoglobin is slightly elevated, which may be related to the enlarged red blood cells. No alcohol consumption is " reported.  - Recheck labs in a month to monitor macrocytosis  - Check Vitamin B12 and folic acid levels to rule out deficiencies    2. Monocytosis: Improving. Monocytes are slightly elevated but have been decreasing over the past 2 months. The overall white blood cell count is not elevated, suggesting no active infection.  - Recheck monocytes in a month to monitor trends    3. Cricopharyngeal spasm: New diagnosis. Scheduled for an upper endoscopy on 13 July 2024.  - Continue famotidine 40 mg at night    4. Hiatal hernia: New diagnosis. Small sliding type. This condition may mimic heart pain.  - Continue famotidine 40 mg at night  - Upper endoscopy scheduled for 13 July 2024 to inspect the hiatal hernia and esophagus more closely    5. Blood pressure management: Variable. Blood pressure readings have been as low as 90 systolic. Today's blood pressure is well-controlled.  - Monitor blood pressure regularly  - If systolic readings remain in the 150s for a short term, it may not be concerning  - Adjustments to medication may be necessary if the average blood pressure over time is consistently high  - For now continue lisinopril 10 mg twice daily and metoprolol SR 25 mg daily    6. Headache: Possible eye strain. He has not yet filled his new prescription for glasses, which was updated 1 to 2 months ago.  - Fill new prescription for glasses to see if this alleviates the headaches    Return in about 4 months (around 6/3/2025) for Med check.    Please note that this dictation was created using voice recognition software. I have made every reasonable attempt to correct obvious errors, but I expect that there are errors of grammar and possibly content that I did not discover before finalizing the note.

## 2025-02-21 ENCOUNTER — TELEPHONE (OUTPATIENT)
Dept: CARDIOLOGY | Facility: MEDICAL CENTER | Age: 80
End: 2025-02-21
Payer: MEDICARE

## 2025-02-21 NOTE — TELEPHONE ENCOUNTER
Phone Number Called: [phone]    Call outcome: Spoke to patient regarding message below.    Message: Called to follow up with patient about his symptoms. Last night at 6 he felt like he had a rapid heart rate. His HR was 70-80 all night. Last night BP was 139/98. This AM was 101/73. His HR is staying in the 70's-80's. SPO2 is in the Mid 90% each time he has checked. Advised patient that vitals are unconcerning. Patient did recently see GI for his GI issues. They want him to start sulcrafate and lidocaine. Instructions on Lidocaine are confusing to patient. Medication not mentioned in most recent note form [facility]. Unable to help clarify instructions

## 2025-02-21 NOTE — TELEPHONE ENCOUNTER
N[place]    Caller: Boo Cavanaugh Jr.    Topic/issue: Patient has been having some issues since last night at 6 pm. His heart rate was in the 70's and 80's all night long. He took some at home EKGs that showed he was in normal sinus rhythm. States his blood pressure has been a little all over the place. Patient also has been feeling short of breath and overall shaky/jittery. He is wondering if he needs to come in to the office today before the weekend. Please advise.     Callback Number: [phone]    Thank you,  Muriel JAMES

## 2025-02-23 ENCOUNTER — PATIENT MESSAGE (OUTPATIENT)
Dept: CARDIOLOGY | Facility: MEDICAL CENTER | Age: 80
End: 2025-02-23
Payer: MEDICARE

## 2025-02-24 RX ORDER — SUCRALFATE ORAL 1 G/10ML
1 SUSPENSION ORAL 4 TIMES DAILY
COMMUNITY

## 2025-02-24 NOTE — TELEPHONE ENCOUNTER
Run it through up to date with all his other medications for any interactions. We used to give sulcrafate after ablations with patents tat were on same medications he is on without much issue   
To MT: NO interactions noted. Ok to tell patient it is ok to take?  
To MT: Please advise- DS OOO until Thursday and you have seen patient  
yes  
None

## 2025-03-03 ENCOUNTER — TELEPHONE (OUTPATIENT)
Dept: CARDIOLOGY | Facility: MEDICAL CENTER | Age: 80
End: 2025-03-03
Payer: MEDICARE

## 2025-03-03 NOTE — TELEPHONE ENCOUNTER
Phone Number Called: [phone]    Call outcome: Spoke to patient regarding message below.    Message: Called to inform patient that I have no way to get him on MT schedule today. Patient is ok with waiting to see MT tomorrow. He feels ok right now on the phone. He will let me know if his symptoms reoccur or worsen. He is ok with waiting till tomorrow and thinks he will bee fine.

## 2025-03-03 NOTE — TELEPHONE ENCOUNTER
DS    Caller: Boo Cavanaugh Jr.    Topic/issue: Increase in PVC's. High resting pulse and bp 80 and 90's. Shortness of breath and fatigue. Very tired and can't get enough air. Experiencing constipation for the past week and a half. Onset of most of these were a few weeks ago. Patient called in today to request to be seen asap. Nothing open today so scheduled for tomorrow but per patient, placed on wait list and patient asked if there was anyway that he can be seen today? Patient did state that if we can work him in today, he only wants to see MT. Please advise.    Callback Number: [phone]    Thank you,  Irene ALCAZAR

## 2025-03-04 ENCOUNTER — APPOINTMENT (OUTPATIENT)
Dept: CARDIOLOGY | Facility: MEDICAL CENTER | Age: 80
End: 2025-03-04
Payer: MEDICARE

## 2025-03-04 ENCOUNTER — HOSPITAL ENCOUNTER (OUTPATIENT)
Dept: LAB | Facility: MEDICAL CENTER | Age: 80
End: 2025-03-04
Attending: FAMILY MEDICINE
Payer: MEDICARE

## 2025-03-04 ENCOUNTER — TELEPHONE (OUTPATIENT)
Dept: CARDIOLOGY | Facility: MEDICAL CENTER | Age: 80
End: 2025-03-04

## 2025-03-04 DIAGNOSIS — D75.89 MACROCYTOSIS: ICD-10-CM

## 2025-03-04 DIAGNOSIS — D72.821 MONOCYTOSIS: ICD-10-CM

## 2025-03-04 LAB
BASOPHILS # BLD AUTO: 0.5 % (ref 0–1.8)
BASOPHILS # BLD: 0.03 K/UL (ref 0–0.12)
EOSINOPHIL # BLD AUTO: 0.13 K/UL (ref 0–0.51)
EOSINOPHIL NFR BLD: 2 % (ref 0–6.9)
ERYTHROCYTE [DISTWIDTH] IN BLOOD BY AUTOMATED COUNT: 42.8 FL (ref 35.9–50)
FOLATE SERPL-MCNC: 10.7 NG/ML
HCT VFR BLD AUTO: 43.9 % (ref 42–52)
HGB BLD-MCNC: 15.4 G/DL (ref 14–18)
IMM GRANULOCYTES # BLD AUTO: 0.03 K/UL (ref 0–0.11)
IMM GRANULOCYTES NFR BLD AUTO: 0.5 % (ref 0–0.9)
LYMPHOCYTES # BLD AUTO: 0.88 K/UL (ref 1–4.8)
LYMPHOCYTES NFR BLD: 13.8 % (ref 22–41)
MCH RBC QN AUTO: 33.6 PG (ref 27–33)
MCHC RBC AUTO-ENTMCNC: 35.1 G/DL (ref 32.3–36.5)
MCV RBC AUTO: 95.9 FL (ref 81.4–97.8)
MONOCYTES # BLD AUTO: 1.02 K/UL (ref 0–0.85)
MONOCYTES NFR BLD AUTO: 16 % (ref 0–13.4)
NEUTROPHILS # BLD AUTO: 4.29 K/UL (ref 1.82–7.42)
NEUTROPHILS NFR BLD: 67.2 % (ref 44–72)
NRBC # BLD AUTO: 0 K/UL
NRBC BLD-RTO: 0 /100 WBC (ref 0–0.2)
PLATELET # BLD AUTO: 209 K/UL (ref 164–446)
PMV BLD AUTO: 10.4 FL (ref 9–12.9)
RBC # BLD AUTO: 4.58 M/UL (ref 4.7–6.1)
WBC # BLD AUTO: 6.4 K/UL (ref 4.8–10.8)

## 2025-03-04 PROCEDURE — 36415 COLL VENOUS BLD VENIPUNCTURE: CPT | Mod: GA

## 2025-03-04 PROCEDURE — 82746 ASSAY OF FOLIC ACID SERUM: CPT | Mod: GA

## 2025-03-04 PROCEDURE — 82607 VITAMIN B-12: CPT | Mod: GA

## 2025-03-04 PROCEDURE — 85025 COMPLETE CBC W/AUTO DIFF WBC: CPT

## 2025-03-04 NOTE — TELEPHONE ENCOUNTER
Phone Number Called: [phone]    Call outcome: Spoke to patient regarding message below.    Message: Called to discuss patient symptoms and concerns.  Patient's appointment with MT had to be rescheduled for today.  Patient stating since 2/20/25 he has not felt right.  Patient stating his resting HR has been in the 90's which is abnormal for him.  BP today 103/70 and HR 88.  Patient stating recent SBP as been low averaging 90's.  Patient reporting SOB w/ exertion. Palpitations and dizziness with movement.  Patient taking all medications as prescribed.  Staying hydrated.  Patient given ER precautions for worsening symptoms or for HR >140 or SBP< 100 with symptoms and he verbalized understanding.     DS- Please review and advise. Thank you.

## 2025-03-04 NOTE — TELEPHONE ENCOUNTER
----- Message from LEANNE STOKES, Med Ass't sent at 3/4/2025  7:21 AM PST -----  Regarding: PT Appointment Needed  Hello,    Called PT to reschedule appointment with MT due to he is out sick today - PT originally had an appointment on 4/22 with MT and moved it to today due to he has a lot of questions and concerns about his heart and issues he is having - I offered the times I had available and he doesn't feel comfortable waiting that long - MT is in office this Thursday and Friday but doesn't have any openings - PT is requesting a call back to discuss options as well as the concerns he is having.    Thank you!    Taina

## 2025-03-05 ENCOUNTER — OFFICE VISIT (OUTPATIENT)
Dept: MEDICAL GROUP | Facility: PHYSICIAN GROUP | Age: 80
End: 2025-03-05
Payer: MEDICARE

## 2025-03-05 ENCOUNTER — RESULTS FOLLOW-UP (OUTPATIENT)
Dept: MEDICAL GROUP | Facility: PHYSICIAN GROUP | Age: 80
End: 2025-03-05

## 2025-03-05 VITALS
TEMPERATURE: 98.6 F | BODY MASS INDEX: 23.15 KG/M2 | HEIGHT: 74 IN | DIASTOLIC BLOOD PRESSURE: 58 MMHG | WEIGHT: 180.4 LBS | HEART RATE: 76 BPM | OXYGEN SATURATION: 97 % | SYSTOLIC BLOOD PRESSURE: 98 MMHG

## 2025-03-05 DIAGNOSIS — R20.0 NUMBNESS IN RIGHT LEG: Primary | ICD-10-CM

## 2025-03-05 DIAGNOSIS — M54.50 ACUTE LEFT-SIDED LOW BACK PAIN WITHOUT SCIATICA: ICD-10-CM

## 2025-03-05 DIAGNOSIS — Z02.89 ENCOUNTER FOR COMPLETION OF FORM WITH PATIENT: ICD-10-CM

## 2025-03-05 DIAGNOSIS — R20.0 NUMBNESS OF ARM: ICD-10-CM

## 2025-03-05 DIAGNOSIS — K63.8219 SMALL INTESTINAL BACTERIAL OVERGROWTH (SIBO): ICD-10-CM

## 2025-03-05 DIAGNOSIS — D72.821 MONOCYTOSIS: ICD-10-CM

## 2025-03-05 LAB — VIT B12 SERPL-MCNC: 458 PG/ML (ref 211–911)

## 2025-03-05 PROCEDURE — 7101 PR PHYSICAL: Performed by: FAMILY MEDICINE

## 2025-03-05 PROCEDURE — 99213 OFFICE O/P EST LOW 20 MIN: CPT | Performed by: FAMILY MEDICINE

## 2025-03-05 RX ORDER — RIFAXIMIN 550 MG/1
550 TABLET ORAL 3 TIMES DAILY
COMMUNITY
Start: 2025-02-25

## 2025-03-05 ASSESSMENT — FIBROSIS 4 INDEX: FIB4 SCORE: 2.12

## 2025-03-05 NOTE — PROGRESS NOTES
Verbal consent was acquired by the patient to use [employer] ambient listening note generation during this visit    Subjective:     HPI:   History of Present Illness  The patient presents for evaluation of numbness in his right leg and arm, left lower back pain, cataracts, and abnormal lab results.    Numbness in Right Leg and Arm  - Approximately 5 to 6 days ago, he experienced an episode of numbness in his right lower leg during the night, which was alleviated upon repositioning.  - He also reported a similar incident involving his right arm a few days later, characterized by numbness and tingling upon awakening.  - These symptoms were resolved with movement.    Left Lower Back Pain  - He has been experiencing intermittent sharp pain in his left lower back for several weeks, which he describes as akin to a muscle strain.  - The pain is not constant and does not radiate.  - He reports no associated bowel or bladder incontinence, urinary retention, or saddle anesthesia.  - He has noticed a progressive weakness in his legs, but it is unclear if this is related to the onset of the back pain 3 to 4 weeks ago.  - He reports no fevers, chills, or trauma to the back.  - He does not use intravenous drugs and reports that the pain does not disrupt his sleep.  - The pain is occasionally triggered when he reaches out while in bed.    Cataracts  - He has a known diagnosis of cataracts, which have not yet been surgically corrected.    Abnormal Lab Results  - He has been on Xifaxan 550 mg three times daily since 02/28/2025, with a planned duration of 14 days.  - He is about longterm through the course.  - He is not doing well on the medication.    Abnormal Heart Rhythms  - He has been having a lot more abnormal heart rhythms lately.  - He is under the care of Dr. Mendoza, a cardiologist, and has an upcoming appointment with a general cardiologist to evaluate his medications.    Sore on Knuckle  - He has a sore on his knuckle that  "he noticed today, but it has been present for about a week.  - He usually does not pay much attention to them because his knuckles crack like that.  - He did not think it was any problem.    Supplemental information: None.    MEDICATIONS  Current: [facility]    Health Maintenance: Completed    Objective:     Exam:  BP 98/58 (BP Location: Left arm, Patient Position: Sitting, BP Cuff Size: Adult)   Pulse 76   Temp 37 °C (98.6 °F) (Temporal)   Ht 1.88 m (6' 2\")   Wt 81.8 kg (180 lb 6.4 oz)   SpO2 97%   BMI 23.16 kg/m²  Body mass index is 23.16 kg/m².    Physical Exam  Constitutional:       Appearance: Normal appearance.   Cardiovascular:      Rate and Rhythm: Normal rate and regular rhythm.      Heart sounds: Normal heart sounds.   Pulmonary:      Effort: Pulmonary effort is normal.      Breath sounds: Normal breath sounds.   Musculoskeletal:      Cervical back: Normal range of motion and neck supple.      Comments: Back:   Full ROM  5/5 LE strength    Sensation intact bilaterally in LE    Patellar reflexes 2+/2 bilaterally    No TTP over spinous processes  Paraspinals non-tender bilaterally  SI joint non-tender bilaterally  Straight leg raise: ipsilateral negative bilaterally, crossed negative bilaterally  John test negative bilaterally   Lymphadenopathy:      Cervical: No cervical adenopathy.   Neurological:      Mental Status: He is alert.             Results  Laboratory Studies  Basic metabolic panel was normal. GFR is mildly decreased. Red blood cells are normal. B12 and folic acid levels were normal. Monocytes are slightly elevated. Lymphocytes are a little bit low.    Imaging  Esophagram in January showed a small sliding tight hiatal hernia and GERD, prominent cricopharyngeus muscle indicating muscle spasm, and Zenker's diverticulum just above that muscle.    Testing  Endoscopy in February showed no H. pylori, no celiac, no eosinophilic esophagitis, and no intestinal metaplasia or Powell's " esophagus.    Assessment & Plan:     1. Numbness in right leg        2. Numbness of arm        3. Acute left-sided low back pain without sciatica        4. Encounter for completion of form with patient        5. Monocytosis        6. Small intestinal bacterial overgrowth (SIBO)            Assessment & Plan  1. Peripheral nerve compression: The symptoms of numbness in the right leg and the right arm on 2 separate occasions that resolved quickly after changing position suggest peripheral nerve compression rather than a circulatory issue. The numbness and tingling are likely due to prolonged nerve pinching, which resolves upon changing position.  - No specific treatment is required at this time.    2. Left lower back pain: Acute. The pain is likely due to a muscle spasm, which can occur suddenly and cause sharp discomfort. There are no red flag symptoms symptoms such as bladder or bowel incontinence, urinary retention, saddle anesthesia, or progressive weakness of the legs. The pain is not progressively worsening, and there is no history of trauma or IV drug use. The back examination was normal.  - Apply ice or heat to the affected area.  - Continue with exercises and stretches.  - Use over-the-counter analgesics such as Tylenol or ibuprofen for pain management.    3. Paperwork: He needs a DMV license renewal form filled out today.  - Form was filled out, scanned into the chart, and provided back to the patient prior to discharge    4. Monocytosis: Chronic, stable.  The monocyte count remains stable. The cause of the monocytosis is still uncertain, but it does not appear to be worsening.   - Monitor the condition for now.    5. Small intestinal bacterial overgrowth (SIBO): He is currently taking Xifaxan 550 mg three times a day for 14 days, starting from 02/28/2025. The treatment is approximately skilled nursing through.  - Continue the current antibiotic regimen.  - Follow up with the gastroenterologist as needed.      I  spent a total of 23 minutes with record review, exam, communication with the patient, and documentation of this encounter.    Return if symptoms worsen or fail to improve.    Please note that this dictation was created using voice recognition software. I have made every reasonable attempt to correct obvious errors, but I expect that there are errors of grammar and possibly content that I did not discover before finalizing the note.

## 2025-03-06 ENCOUNTER — OFFICE VISIT (OUTPATIENT)
Dept: CARDIOLOGY | Facility: MEDICAL CENTER | Age: 80
End: 2025-03-06
Attending: INTERNAL MEDICINE
Payer: MEDICARE

## 2025-03-06 VITALS
BODY MASS INDEX: 22.07 KG/M2 | WEIGHT: 172 LBS | HEIGHT: 74 IN | HEART RATE: 75 BPM | OXYGEN SATURATION: 97 % | DIASTOLIC BLOOD PRESSURE: 52 MMHG | SYSTOLIC BLOOD PRESSURE: 94 MMHG | RESPIRATION RATE: 14 BRPM

## 2025-03-06 DIAGNOSIS — Z98.890 S/P CATHETER ABLATION OF SLOW PATHWAY: ICD-10-CM

## 2025-03-06 DIAGNOSIS — Z86.79 S/P CATHETER ABLATION OF SLOW PATHWAY: ICD-10-CM

## 2025-03-06 DIAGNOSIS — Z86.79 HISTORY OF ATRIAL FLUTTER: ICD-10-CM

## 2025-03-06 DIAGNOSIS — R94.31 NONSPECIFIC ABNORMAL ELECTROCARDIOGRAM (ECG) (EKG): ICD-10-CM

## 2025-03-06 DIAGNOSIS — I49.3 PVC (PREMATURE VENTRICULAR CONTRACTION): ICD-10-CM

## 2025-03-06 DIAGNOSIS — R06.00 DYSPNEA, UNSPECIFIED TYPE: ICD-10-CM

## 2025-03-06 DIAGNOSIS — E78.2 MIXED HYPERLIPIDEMIA: ICD-10-CM

## 2025-03-06 DIAGNOSIS — I10 PRIMARY HYPERTENSION: ICD-10-CM

## 2025-03-06 DIAGNOSIS — Z98.890 S/P ABLATION OF ATRIAL FLUTTER: ICD-10-CM

## 2025-03-06 DIAGNOSIS — Z86.79 S/P ABLATION OF ATRIAL FLUTTER: ICD-10-CM

## 2025-03-06 LAB — EKG IMPRESSION: NORMAL

## 2025-03-06 PROCEDURE — 3078F DIAST BP <80 MM HG: CPT | Performed by: INTERNAL MEDICINE

## 2025-03-06 PROCEDURE — 99212 OFFICE O/P EST SF 10 MIN: CPT | Performed by: INTERNAL MEDICINE

## 2025-03-06 PROCEDURE — 93005 ELECTROCARDIOGRAM TRACING: CPT | Mod: TC | Performed by: INTERNAL MEDICINE

## 2025-03-06 PROCEDURE — 99214 OFFICE O/P EST MOD 30 MIN: CPT | Mod: 25 | Performed by: INTERNAL MEDICINE

## 2025-03-06 PROCEDURE — 3074F SYST BP LT 130 MM HG: CPT | Performed by: INTERNAL MEDICINE

## 2025-03-06 PROCEDURE — 99213 OFFICE O/P EST LOW 20 MIN: CPT | Performed by: INTERNAL MEDICINE

## 2025-03-06 PROCEDURE — 93010 ELECTROCARDIOGRAM REPORT: CPT | Performed by: INTERNAL MEDICINE

## 2025-03-06 RX ORDER — POLYETHYLENE GLYCOL 3350 17 G/17G
17 POWDER, FOR SOLUTION ORAL DAILY
COMMUNITY

## 2025-03-06 RX ORDER — LIDOCAINE HYDROCHLORIDE 20 MG/ML
SOLUTION OROPHARYNGEAL
COMMUNITY
Start: 2025-02-18

## 2025-03-06 ASSESSMENT — ENCOUNTER SYMPTOMS
IRREGULAR HEARTBEAT: 0
BLURRED VISION: 0
SHORTNESS OF BREATH: 0
PALPITATIONS: 0
ORTHOPNEA: 0
NEAR-SYNCOPE: 0
PND: 0
CONSTIPATION: 0
DECREASED APPETITE: 0
DEPRESSION: 0
DIZZINESS: 0
FEVER: 0
WEIGHT LOSS: 0
COUGH: 0
DIARRHEA: 0
DYSPNEA ON EXERTION: 0
NAUSEA: 0
CLAUDICATION: 0
BACK PAIN: 0
SYNCOPE: 0
ALTERED MENTAL STATUS: 0
ABDOMINAL PAIN: 0
VOMITING: 0
HEARTBURN: 0
WEIGHT GAIN: 0
FLANK PAIN: 0

## 2025-03-06 ASSESSMENT — FIBROSIS 4 INDEX: FIB4 SCORE: 2.12

## 2025-03-06 NOTE — PROGRESS NOTES
"Cardiology Note    Chief Complaint   Patient presents with    Follow-Up     FV DX:PVC    Hypertension    Hyperlipidemia       History of Present Illness: Boo Cavanaugh Jr. is a 79 y.o. male PMH PVCs, atrial flutter s/p ablation, SHERRY on cpap, HTN who presents for follow up.    Recalls since EGD feeling worse. Performed 2/13. Since then has been more run down. More fatigue and more dyspnea. Not able to exert very much at all. No other cardiac complaints. Compliant with medications and denies adverse effects.     Review of Systems   Constitutional: Negative for decreased appetite, fever, malaise/fatigue, weight gain and weight loss.   HENT:  Negative for congestion and nosebleeds.    Eyes:  Negative for blurred vision.   Cardiovascular:  Negative for chest pain, claudication, dyspnea on exertion, irregular heartbeat, leg swelling, near-syncope, orthopnea, palpitations, paroxysmal nocturnal dyspnea and syncope.   Respiratory:  Negative for cough and shortness of breath.    Endocrine: Negative for cold intolerance and heat intolerance.   Skin:  Negative for rash.   Musculoskeletal:  Negative for back pain.   Gastrointestinal:  Negative for abdominal pain, constipation, diarrhea, heartburn, melena, nausea and vomiting.   Genitourinary:  Negative for dysuria, flank pain and hematuria.   Neurological:  Negative for dizziness.   Psychiatric/Behavioral:  Negative for altered mental status and depression.          Past Medical History:   Diagnosis Date    Arrhythmia     \"flutter\", Dr. Mendoza cardiologist    Arthritis     left thumb    Breath shortness     Cancer (HCC)     Cardiac arrhythmia     Cataract     Enlarged prostate with urinary obstruction     GERD (gastroesophageal reflux disease)     Hyperlipidemia     Hypertension     OA (osteoarthritis) of knee     bilateral     Restless leg syndrome     Tonsillitis     Urinary bladder disorder 12/06/2017    savage in place         Past Surgical History:   Procedure Laterality Date "    TURP-VAPOR  12/07/2017    Procedure: TURP-VAPOR - GREEN LIGHT PHOTOSELECTIVE;  Surgeon: Cristofer Cr M.D.;  Location: SURGERY [facility];  Service: Urology    GANGLION EXCISION Right 1966    wrist    APPENDECTOMY      OPEN REDUCTION      PROSTATECTOMY, RADICAL RETRO      TONSILLECTOMY      as a child    VASECTOMY           Current Outpatient Medications   Medication Sig Dispense Refill    lidocaine (XYLOCAINE) 2 % Solution 300 ml      polyethylene glycol/lytes (MIRALAX) Pack Take 17 g by mouth every day.      XIFAXAN 550 MG Tab tablet Take 550 mg by mouth 3 times a day.      sucralfate (CARAFATE) 1 GM/10ML Suspension Take 1 g by mouth 4 times a day.      lisinopril (PRINIVIL) 10 MG Tab Take 1 Tablet by mouth 2 times a day. 180 Tablet 4    famotidine (PEPCID) 40 MG Tab Take 1 Tablet by mouth every evening.      flecainide (TAMBOCOR) 100 MG Tab Take 1 Tablet by mouth 2 times a day. May also take 0.5 Tablets 1 time a day as needed (for symptomatic  episodes of PVC). 200 Tablet 3    metoprolol SR (TOPROL XL) 25 MG TABLET SR 24 HR Take 1 Tablet by mouth every day. 90 Tablet 3    rosuvastatin (CRESTOR) 20 MG Tab TAKE ONE TABLET BY MOUTH EVERY DAY 90 Tablet 3    augmented betamethasone dipropionate (DIPROLENE-AF) 0.05 % ointment Apply  topically 2 times a day as needed (rash).      finasteride (PROSCAR) 5 MG Tab Take 1 Tablet by mouth every day.      ciclopirox (PENLAC) 8 % solution Apply 1 Application topically two times a week. Apply to affected toenails on both feet.      aspirin 81 MG EC tablet Take 81 mg by mouth every day.       No current facility-administered medications for this visit.         Allergies   Allergen Reactions    Penicillins Rash     Rash as a child.          Family History   Problem Relation Age of Onset    Heart Disease Mother 65        MI    Cancer Father     Cancer Brother 50        prostate cancer    Stroke Brother     Stroke Brother     Diabetes Neg Hx     Hyperlipidemia Neg Hx      Alcohol/Drug Neg Hx     Ovarian Cancer Neg Hx     Tubal Cancer Neg Hx     Peritoneal Cancer Neg Hx     Colorectal Cancer Neg Hx     Breast Cancer Neg Hx          Social History     Socioeconomic History    Marital status: Single     Spouse name: Not on file    Number of children: Not on file    Years of education: Not on file    Highest education level: Master's degree (e.g., MA, MS, Latha, MEd, MSW, ABAD)   Occupational History    Not on file   Tobacco Use    Smoking status: Never     Passive exposure: Past (BOTH parents smoked)    Smokeless tobacco: Never   Vaping Use    Vaping status: Never Used   Substance and Sexual Activity    Alcohol use: Not Currently    Drug use: Not Currently    Sexual activity: Not Currently     Comment: 2 daughters    Other Topics Concern    Not on file   Social History Narrative    Not on file     Social Drivers of Health     Financial Resource Strain: Low Risk  (9/27/2024)    Overall Financial Resource Strain (CARDIA)     Difficulty of Paying Living Expenses: Not hard at all   Food Insecurity: No Food Insecurity (9/27/2024)    Hunger Vital Sign     Worried About Running Out of Food in the Last Year: Never true     Ran Out of Food in the Last Year: Never true   Transportation Needs: No Transportation Needs (9/27/2024)    PRAPARE - Transportation     Lack of Transportation (Medical): No     Lack of Transportation (Non-Medical): No   Physical Activity: Inactive (9/27/2024)    Exercise Vital Sign     Days of Exercise per Week: 0 days     Minutes of Exercise per Session: 0 min   Stress: No Stress Concern Present (9/27/2024)    [place] [employer] - Occupational Stress Questionnaire     Feeling of Stress : Not at all   Social Connections: Moderately Isolated (9/27/2024)    Social Connection and Isolation Panel [NHANES]     Frequency of Communication with Friends and Family: More than three times a week     Frequency of Social Gatherings with Friends and Family: Three times  "a week     Attends [employer] Services: 1 to 4 times per year     Active Member of Clubs or Organizations: No     Attends Club or Organization Meetings: Never     Marital Status:    Intimate Partner Violence: Not on file   Housing Stability: Low Risk  (9/27/2024)    Housing Stability Vital Sign     Unable to Pay for Housing in the Last Year: No     Number of Times Moved in the Last Year: 0     Homeless in the Last Year: No         Physical Exam:  Ambulatory Vitals  BP 94/52 (BP Location: Left arm, Patient Position: Sitting, BP Cuff Size: Adult)   Pulse 75   Resp 14   Ht 1.88 m (6' 2\")   Wt 78 kg (172 lb)   SpO2 97%    BP Readings from Last 4 Encounters:   03/06/25 94/52   03/05/25 98/58   02/03/25 90/48   01/14/25 122/64     Weight/BMI:   Vitals:    03/06/25 0922   BP: 94/52   Weight: 78 kg (172 lb)   Height: 1.88 m (6' 2\")    Body mass index is 22.08 kg/m².  Wt Readings from Last 4 Encounters:   03/06/25 78 kg (172 lb)   03/05/25 81.8 kg (180 lb 6.4 oz)   02/03/25 80.3 kg (177 lb)   01/14/25 80.3 kg (177 lb)       Physical Exam  Constitutional:       General: He is not in acute distress.  HENT:      Head: Normocephalic and atraumatic.   Eyes:      Conjunctiva/sclera: Conjunctivae normal.      Pupils: Pupils are equal, round, and reactive to light.   Neck:      Vascular: No JVD.   Cardiovascular:      Rate and Rhythm: Normal rate and regular rhythm.      Heart sounds: Normal heart sounds. No murmur heard.     No friction rub. No gallop.   Pulmonary:      Effort: Pulmonary effort is normal. No respiratory distress.      Breath sounds: Normal breath sounds. No wheezing or rales.   Chest:      Chest wall: No tenderness.   Abdominal:      General: Bowel sounds are normal. There is no distension.      Palpations: Abdomen is soft.   Musculoskeletal:      Cervical back: Normal range of motion and neck supple.   Skin:     General: Skin is warm and dry.   Neurological:      Mental Status: He is alert and oriented " "to person, place, and time.   Psychiatric:         Mood and Affect: Affect normal.         Judgment: Judgment normal.         Lab Data Review:  Lab Results   Component Value Date/Time    CHOLSTRLTOT 93 (L) 12/16/2023 01:19 AM    LDL 36 12/16/2023 01:19 AM    HDL 48 12/16/2023 01:19 AM    TRIGLYCERIDE 44 12/16/2023 01:19 AM       Lab Results   Component Value Date/Time    SODIUM 137 01/06/2025 06:36 AM    POTASSIUM 4.8 01/06/2025 06:36 AM    CHLORIDE 102 01/06/2025 06:36 AM    CO2 27 01/06/2025 06:36 AM    GLUCOSE 84 01/06/2025 06:36 AM    BUN 19 01/06/2025 06:36 AM    CREATININE 1.19 01/06/2025 06:36 AM     CrCl cannot be calculated (Patient's most recent lab result is older than the maximum 7 days allowed.).  Lab Results   Component Value Date/Time    ALKPHOSPHAT 81 11/16/2024 06:26 PM    ASTSGOT 26 11/16/2024 06:26 PM    ALTSGPT 21 11/16/2024 06:26 PM    TBILIRUBIN 0.9 11/16/2024 06:26 PM      Lab Results   Component Value Date/Time    WBC 6.4 03/04/2025 09:51 AM     No results found for: \"HBA1C\"  No components found for: \"TROP\"      Cardiac Imaging and Procedures Review:      EKG 3/6/24 interpreted by me sinus, lae, nonspecific st wave change    CAC CT 10/2019  Coronary calcification:  LMA - 85.4  LCX - 476.6  LAD - 347.0  RCA - 717.4  Total Calcium Score: 1629.4    Mpi spect 12/2023   NUCLEAR IMAGING INTERPRETATION   No evidence of significant jeopardized viable myocardium or prior myocardial    infarction.   Normal left ventricular size, ejection fraction, and wall motion.   ECG INTERPRETATION   Non-diagnostic due to use of pharmacological stress agent.    TTE 7/2024  CONCLUSIONS  Compared to the prior study on 11/11/2017. Unchanged.  Normal left ventricular systolic function.       Medical Decision Making:  Problem List Items Addressed This Visit       History of atrial flutter    PVC (premature ventricular contraction)    Primary hypertension    Relevant Orders    EKG (Completed)    S/P ablation of atrial " flutter    S/P catheter ablation of slow pathway    Hyperlipidemia    Dyspnea    Relevant Orders    Cardiac Event Monitor    [id number] PET W/CT ATTENUATION    Nonspecific abnormal electrocardiogram (ECG) (EKG)    Relevant Orders    Cardiac Event Monitor    KE-YUCIL-SXTTRVL PET W/CT ATTENUATION    CBC WITHOUT DIFFERENTIAL    Basic Metabolic Panel    proBrain Natriuretic Peptide, NT       Unclear etiology of symptoms. Repeat event monitor and stress test. Repeat labs. Appears to have coincided with EGD. Follow up with GI to further assess as well.     It was my pleasure to meet with Mr. Cavanaugh.

## 2025-03-07 ENCOUNTER — TELEPHONE (OUTPATIENT)
Dept: CARDIOLOGY | Facility: MEDICAL CENTER | Age: 80
End: 2025-03-07
Payer: MEDICARE

## 2025-03-07 DIAGNOSIS — I48.92 ATRIAL FLUTTER, UNSPECIFIED TYPE (HCC): ICD-10-CM

## 2025-03-07 NOTE — TELEPHONE ENCOUNTER
Received [employer] request via [employer]  for apixaban (ELIQUIS) 5mg Tab . (Quantity:60, Day Supply:30)     Insurance: [employer]  Member ID:  [id number]  BIN: [id number]  PCN: [id number]  Group: [id number]     Ran Test claim via [place] [facility] medication  unable to obtain copay due to pt having [employer]. Will release to preferred pharmacy on file. Konstantin Goode dr

## 2025-03-08 ENCOUNTER — HOSPITAL ENCOUNTER (OUTPATIENT)
Dept: LAB | Facility: MEDICAL CENTER | Age: 80
End: 2025-03-08
Attending: INTERNAL MEDICINE
Payer: MEDICARE

## 2025-03-08 DIAGNOSIS — R94.31 NONSPECIFIC ABNORMAL ELECTROCARDIOGRAM (ECG) (EKG): ICD-10-CM

## 2025-03-08 LAB
ANION GAP SERPL CALC-SCNC: 12 MMOL/L (ref 7–16)
BUN SERPL-MCNC: 15 MG/DL (ref 8–22)
CALCIUM SERPL-MCNC: 9.4 MG/DL (ref 8.5–10.5)
CHLORIDE SERPL-SCNC: 94 MMOL/L (ref 96–112)
CO2 SERPL-SCNC: 24 MMOL/L (ref 20–33)
CREAT SERPL-MCNC: 1.32 MG/DL (ref 0.5–1.4)
ERYTHROCYTE [DISTWIDTH] IN BLOOD BY AUTOMATED COUNT: 44.1 FL (ref 35.9–50)
FASTING STATUS PATIENT QL REPORTED: NORMAL
GFR SERPLBLD CREATININE-BSD FMLA CKD-EPI: 55 ML/MIN/1.73 M 2
GLUCOSE SERPL-MCNC: 103 MG/DL (ref 65–99)
HCT VFR BLD AUTO: 47.3 % (ref 42–52)
HGB BLD-MCNC: 16.4 G/DL (ref 14–18)
MCH RBC QN AUTO: 33.6 PG (ref 27–33)
MCHC RBC AUTO-ENTMCNC: 34.7 G/DL (ref 32.3–36.5)
MCV RBC AUTO: 96.9 FL (ref 81.4–97.8)
NT-PROBNP SERPL IA-MCNC: 404 PG/ML (ref 0–125)
PLATELET # BLD AUTO: 208 K/UL (ref 164–446)
PMV BLD AUTO: 11.1 FL (ref 9–12.9)
POTASSIUM SERPL-SCNC: 4.1 MMOL/L (ref 3.6–5.5)
RBC # BLD AUTO: 4.88 M/UL (ref 4.7–6.1)
SODIUM SERPL-SCNC: 130 MMOL/L (ref 135–145)
WBC # BLD AUTO: 6.6 K/UL (ref 4.8–10.8)

## 2025-03-08 PROCEDURE — 83880 ASSAY OF NATRIURETIC PEPTIDE: CPT | Mod: GA

## 2025-03-08 PROCEDURE — 80048 BASIC METABOLIC PNL TOTAL CA: CPT

## 2025-03-08 PROCEDURE — 36415 COLL VENOUS BLD VENIPUNCTURE: CPT | Mod: GA

## 2025-03-08 PROCEDURE — 85027 COMPLETE CBC AUTOMATED: CPT

## 2025-03-10 ENCOUNTER — OFFICE VISIT (OUTPATIENT)
Dept: MEDICAL GROUP | Facility: PHYSICIAN GROUP | Age: 80
End: 2025-03-10
Payer: MEDICARE

## 2025-03-10 ENCOUNTER — RESULTS FOLLOW-UP (OUTPATIENT)
Dept: CARDIOLOGY | Facility: MEDICAL CENTER | Age: 80
End: 2025-03-10

## 2025-03-10 ENCOUNTER — HOSPITAL ENCOUNTER (OUTPATIENT)
Dept: LAB | Facility: MEDICAL CENTER | Age: 80
End: 2025-03-10
Attending: FAMILY MEDICINE
Payer: MEDICARE

## 2025-03-10 VITALS
OXYGEN SATURATION: 95 % | HEART RATE: 70 BPM | SYSTOLIC BLOOD PRESSURE: 90 MMHG | HEIGHT: 74 IN | TEMPERATURE: 98.6 F | WEIGHT: 175.2 LBS | BODY MASS INDEX: 22.48 KG/M2 | DIASTOLIC BLOOD PRESSURE: 58 MMHG

## 2025-03-10 DIAGNOSIS — R79.89 ELEVATED BRAIN NATRIURETIC PEPTIDE (BNP) LEVEL: ICD-10-CM

## 2025-03-10 DIAGNOSIS — E87.1 HYPONATREMIA: ICD-10-CM

## 2025-03-10 DIAGNOSIS — R73.9 HYPERGLYCEMIA: Primary | ICD-10-CM

## 2025-03-10 PROCEDURE — 3078F DIAST BP <80 MM HG: CPT | Performed by: FAMILY MEDICINE

## 2025-03-10 PROCEDURE — 83935 ASSAY OF URINE OSMOLALITY: CPT

## 2025-03-10 PROCEDURE — 3074F SYST BP LT 130 MM HG: CPT | Performed by: FAMILY MEDICINE

## 2025-03-10 PROCEDURE — 99213 OFFICE O/P EST LOW 20 MIN: CPT | Performed by: FAMILY MEDICINE

## 2025-03-10 PROCEDURE — 84300 ASSAY OF URINE SODIUM: CPT

## 2025-03-10 ASSESSMENT — FIBROSIS 4 INDEX: FIB4 SCORE: 2.13

## 2025-03-10 NOTE — PROGRESS NOTES
Verbal consent was acquired by the patient to use [employer] ambient listening note generation during this visit     Subjective:     HPI:   History of Present Illness  The patient presents for review of lab work.    Elevated Glucose Levels  - Underwent laboratory tests on the previous Saturday, as ordered by Dr. Mcgowan  - Scheduled for a follow-up in 3 months to discuss the results  - Expresses concern regarding elevated glucose levels, which were previously within the normal range  - Experiencing frequent episodes of shakiness, which he attributes to elevated glucose levels  - Episodes have improved postprandially  - Reports mild nausea but no vomiting    Headaches and Brain Fog  - Reports occasional headaches, described as pressure in the head  - Persistent brain fog  - Does not experience delirium, impaired consciousness, or seizures    Balance Impairment  - Admits to slight balance impairment but has not experienced any falls    Constipation  - Managing constipation with MiraLAX, used intermittently for 2 days in the past and 4 days recently  - Believes that current medications may be contributing to symptoms  - Advised to maintain a normal fiber intake and limit carbohydrate consumption  - Adhering to a healthy diet, with the recent addition of kiwi and pear  - Consuming smaller meal portions and has lost approximately 12 pounds over the past 1.5 months    Gastrointestinal Issues  - Experiencing gastrointestinal issues since April 2024 and is currently seeking resolution  - Advised to consult with a gastroenterologist regarding medications, but informed that these are cardiac-related issues  - Difficulty scheduling appointments with the gastroenterologist  - Underwent an EGD procedure and is uncertain if symptoms are a result of this procedure or its complications  - Has not fully recovered from the procedure    Medication Changes  - Referred to an EKG specialist by Dr. Mcgowan, who identified an  "underlying issue  - Medication was switched from low-dose aspirin to Eliquis    Supplemental information: He suspects that his low sodium levels may be due to excessive salt restriction in his diet. He has abstained from alcohol and illegal drug use.    SOCIAL HISTORY  He does not drink alcohol. He does not use illegal drugs.    MEDICATIONS  Current: Eliquis, MiraLAX    Health Maintenance: Completed    Objective:     Exam:  BP 90/58 (BP Location: Left arm, Patient Position: Sitting, BP Cuff Size: Adult)   Pulse 70   Temp 37 °C (98.6 °F) (Temporal)   Ht 1.88 m (6' 2\")   Wt 79.5 kg (175 lb 3.2 oz)   SpO2 95%   BMI 22.49 kg/m²  Body mass index is 22.49 kg/m².    Constitutional: Alert, no distress, well-groomed.  Skin: Warm, dry, good turgor, no rashes in visible areas.  Eye: Equal, round and reactive, conjunctiva clear, lids normal.  ENMT: Lips without lesions, good dentition, moist mucous membranes.  Neck: Trachea midline, no masses, no thyromegaly.  Respiratory: Unlabored respiratory effort, no cough.  MSK: Normal gait, moves all extremities.  Neuro: Grossly non-focal.   Psych: Alert and oriented x3, normal affect and mood.        Results  Laboratory Studies  BNP level is 404. Glucose level is 103. Sodium level is 130. BUN is 15.    Imaging  Ultrasound from July of last year did not show any heart failure.    Assessment & Plan:     1. Hyperglycemia        2. Elevated brain natriuretic peptide (BNP) level        3. Hyponatremia  URINE SODIUM RANDOM    OSMOLALITY URINE          Assessment & Plan  1. Hyperglycemia: Chronic. Fasting glucose level 103 on 03/08/2025.  - Maintain a healthy diet.  - Limit intake of simple sugars and carbohydrates.    2. Elevated B-type natriuretic peptide (BNP): 404 on 03/08/2025, previously 351 three months ago.  - Discuss results with Dr. Glass after PET CT scan of the heart.    3. Hyponatremia: Chronic, intermittent. Sodium level 130 on 03/08/2025, previously noted in November " 2024 and December 2023.  It is a hypotonic hyponatremia.  - Conduct urine testing to check sodium levels and osmolality.      I spent a total of 27 minutes with record review, exam, communication with the patient, and documentation of this encounter.      Return if symptoms worsen or fail to improve.    Please note that this dictation was created using voice recognition software. I have made every reasonable attempt to correct obvious errors, but I expect that there are errors of grammar and possibly content that I did not discover before finalizing the note.

## 2025-03-11 ENCOUNTER — RESULTS FOLLOW-UP (OUTPATIENT)
Dept: MEDICAL GROUP | Facility: PHYSICIAN GROUP | Age: 80
End: 2025-03-11
Payer: MEDICARE

## 2025-03-11 LAB
OSMOLALITY UR: 366 MOSM/KG H2O (ref 300–900)
SODIUM UR-SCNC: 30 MMOL/L

## 2025-03-12 ENCOUNTER — APPOINTMENT (OUTPATIENT)
Dept: CARDIOLOGY | Facility: MEDICAL CENTER | Age: 80
End: 2025-03-12
Attending: NURSE PRACTITIONER
Payer: MEDICARE

## 2025-03-12 ENCOUNTER — NON-PROVIDER VISIT (OUTPATIENT)
Dept: CARDIOLOGY | Facility: MEDICAL CENTER | Age: 80
End: 2025-03-12
Attending: INTERNAL MEDICINE
Payer: MEDICARE

## 2025-03-12 ENCOUNTER — PATIENT MESSAGE (OUTPATIENT)
Dept: CARDIOLOGY | Facility: MEDICAL CENTER | Age: 80
End: 2025-03-12

## 2025-03-12 DIAGNOSIS — R94.31 NONSPECIFIC ABNORMAL ELECTROCARDIOGRAM (ECG) (EKG): ICD-10-CM

## 2025-03-12 DIAGNOSIS — R06.00 DYSPNEA, UNSPECIFIED TYPE: ICD-10-CM

## 2025-03-12 DIAGNOSIS — E87.1 HYPONATREMIA: ICD-10-CM

## 2025-03-12 PROCEDURE — 93246 EXT ECG>7D<15D RECORDING: CPT

## 2025-03-12 NOTE — PROGRESS NOTES
Patient enrolled in the 14 day o Holter monitoring program per Cole Glass MD.  >Office hook-up, serial # [id number].  >Currently pending EOS.

## 2025-03-13 ENCOUNTER — PATIENT MESSAGE (OUTPATIENT)
Dept: CARDIOLOGY | Facility: MEDICAL CENTER | Age: 80
End: 2025-03-13
Payer: MEDICARE

## 2025-03-19 ENCOUNTER — HOSPITAL ENCOUNTER (OUTPATIENT)
Dept: LAB | Facility: MEDICAL CENTER | Age: 80
End: 2025-03-19
Attending: INTERNAL MEDICINE
Payer: MEDICARE

## 2025-03-19 ENCOUNTER — RESULTS FOLLOW-UP (OUTPATIENT)
Dept: CARDIOLOGY | Facility: MEDICAL CENTER | Age: 80
End: 2025-03-19
Payer: MEDICARE

## 2025-03-19 DIAGNOSIS — E87.1 HYPONATREMIA: ICD-10-CM

## 2025-03-19 LAB
ANION GAP SERPL CALC-SCNC: 12 MMOL/L (ref 7–16)
BUN SERPL-MCNC: 20 MG/DL (ref 8–22)
CALCIUM SERPL-MCNC: 9.5 MG/DL (ref 8.5–10.5)
CHLORIDE SERPL-SCNC: 96 MMOL/L (ref 96–112)
CO2 SERPL-SCNC: 24 MMOL/L (ref 20–33)
CREAT SERPL-MCNC: 1.36 MG/DL (ref 0.5–1.4)
GFR SERPLBLD CREATININE-BSD FMLA CKD-EPI: 53 ML/MIN/1.73 M 2
GLUCOSE SERPL-MCNC: 80 MG/DL (ref 65–99)
POTASSIUM SERPL-SCNC: 4.5 MMOL/L (ref 3.6–5.5)
SODIUM SERPL-SCNC: 132 MMOL/L (ref 135–145)

## 2025-03-19 PROCEDURE — 80048 BASIC METABOLIC PNL TOTAL CA: CPT

## 2025-03-19 PROCEDURE — 36415 COLL VENOUS BLD VENIPUNCTURE: CPT

## 2025-03-21 ENCOUNTER — OFFICE VISIT (OUTPATIENT)
Dept: CARDIOLOGY | Facility: MEDICAL CENTER | Age: 80
End: 2025-03-21
Attending: NURSE PRACTITIONER
Payer: MEDICARE

## 2025-03-21 VITALS
WEIGHT: 168 LBS | BODY MASS INDEX: 21.56 KG/M2 | RESPIRATION RATE: 14 BRPM | SYSTOLIC BLOOD PRESSURE: 110 MMHG | HEIGHT: 74 IN | HEART RATE: 76 BPM | OXYGEN SATURATION: 96 % | DIASTOLIC BLOOD PRESSURE: 60 MMHG

## 2025-03-21 DIAGNOSIS — I48.92 ATRIAL FLUTTER, UNSPECIFIED TYPE (HCC): ICD-10-CM

## 2025-03-21 DIAGNOSIS — Z86.79 HISTORY OF ATRIAL FLUTTER: ICD-10-CM

## 2025-03-21 PROCEDURE — 3074F SYST BP LT 130 MM HG: CPT | Performed by: NURSE PRACTITIONER

## 2025-03-21 PROCEDURE — 99213 OFFICE O/P EST LOW 20 MIN: CPT | Performed by: NURSE PRACTITIONER

## 2025-03-21 PROCEDURE — 3078F DIAST BP <80 MM HG: CPT | Performed by: NURSE PRACTITIONER

## 2025-03-21 PROCEDURE — 99215 OFFICE O/P EST HI 40 MIN: CPT | Performed by: NURSE PRACTITIONER

## 2025-03-21 PROCEDURE — 93005 ELECTROCARDIOGRAM TRACING: CPT | Mod: TC | Performed by: NURSE PRACTITIONER

## 2025-03-21 ASSESSMENT — ENCOUNTER SYMPTOMS: PALPITATIONS: 1

## 2025-03-21 ASSESSMENT — FIBROSIS 4 INDEX: FIB4 SCORE: 2.13

## 2025-03-22 NOTE — PROGRESS NOTES
"Electrophysiology Follow up  Note    DOS: 3/21/2025    [id number]  Boo Cavanaugh .    Chief complaint/Reason for consult: PVC    HPI: Pt is a 79 y.o. male who presents to the clinic today in follow up for PVC. Patient has a past medical history significant for but not limited to: h/o atrial flutter S/P ablation, PVC, SHERRY on CPAP, hypertension. Patient recently saw Dr. Glass and EKG showed slow atrial flutter. Similar findings today on EKG. Has noticed difficulty sleeping and less ability in blood pressure readings. Also some more fatigue and dyspnea. Will proceed with cardioversion to try and restore sinus. May need to consider ablation or alternative antiarrhythmic therapy for long term sinus maintenance. Blood pressure stable in clinic today. Flecainide had been helping with symptomatic PVC's.    The risks, benefits, and alternatives to electrical cardioversion were discussed in great detail. We discussed that conversion of atrial fibrillation to normal rhythm, at least transiently, is successful in 90 to 95% of patients. However, maintaining a normal rhythm depends on a number of factors, including underlying heart disease and antiarrhythmic medications. Atrial fibrillation often recurs with time and other treatments may be necessary. Risks of  cardioversion are low as long as anticoagulation issues are handled appropriately. There is a small (less than 1%) risk of embolic events, including stroke. Risks of electrical shock include mild muscle soreness and mild skin burning at the site of electrode placement. There is also a risk that cardioversion can stimulate more dangerous arrhythmias. The patient verbalized understanding of these potential complications and wishes to proceed with this procedure.    Wearing Zio patch currently and will await results before making additional plans for long term management.     Past Medical History:   Diagnosis Date    Arrhythmia     \"flutter\", Dr. Mendoza cardiologist    " Arthritis     left thumb    Breath shortness     Cancer (HCC)     Cardiac arrhythmia     Cataract     Enlarged prostate with urinary obstruction     GERD (gastroesophageal reflux disease)     Hyperlipidemia     Hypertension     OA (osteoarthritis) of knee     bilateral     Restless leg syndrome     Tonsillitis     Urinary bladder disorder 12/06/2017    savage in place       Past Surgical History:   Procedure Laterality Date    TURP-VAPOR  12/07/2017    Procedure: TURP-VAPOR - GREEN LIGHT PHOTOSELECTIVE;  Surgeon: Cristofer Cr M.D.;  Location: SURGERY [facility];  Service: Urology    GANGLION EXCISION Right 1966    wrist    APPENDECTOMY      OPEN REDUCTION      PROSTATECTOMY, RADICAL RETRO      TONSILLECTOMY      as a child    VASECTOMY         Social History     Socioeconomic History    Marital status: Single     Spouse name: Not on file    Number of children: Not on file    Years of education: Not on file    Highest education level: Master's degree (e.g., MA, MS, Latha, MEd, MSW, ABAD)   Occupational History    Not on file   Tobacco Use    Smoking status: Never     Passive exposure: Past (BOTH parents smoked)    Smokeless tobacco: Never   Vaping Use    Vaping status: Never Used   Substance and Sexual Activity    Alcohol use: Not Currently    Drug use: Not Currently    Sexual activity: Not Currently     Comment: 2 daughters    Other Topics Concern    Not on file   Social History Narrative    Not on file     Social Drivers of Health     Financial Resource Strain: Low Risk  (9/27/2024)    Overall Financial Resource Strain (CARDIA)     Difficulty of Paying Living Expenses: Not hard at all   Food Insecurity: No Food Insecurity (9/27/2024)    Hunger Vital Sign     Worried About Running Out of Food in the Last Year: Never true     Ran Out of Food in the Last Year: Never true   Transportation Needs: No Transportation Needs (9/27/2024)    PRAPARE - Transportation     Lack of Transportation (Medical): No     Lack of  Transportation (Non-Medical): No   Physical Activity: Inactive (9/27/2024)    Exercise Vital Sign     Days of Exercise per Week: 0 days     Minutes of Exercise per Session: 0 min   Stress: No Stress Concern Present (9/27/2024)    [place] [employer] - Occupational Stress Questionnaire     Feeling of Stress : Not at all   Social Connections: Moderately Isolated (9/27/2024)    Social Connection and Isolation Panel [NHANES]     Frequency of Communication with Friends and Family: More than three times a week     Frequency of Social Gatherings with Friends and Family: Three times a week     Attends Taoism Services: 1 to 4 times per year     Active Member of Clubs or Organizations: No     Attends Club or Organization Meetings: Never     Marital Status:    Intimate Partner Violence: Not on file   Housing Stability: Low Risk  (9/27/2024)    Housing Stability Vital Sign     Unable to Pay for Housing in the Last Year: No     Number of Times Moved in the Last Year: 0     Homeless in the Last Year: No       Family History   Problem Relation Age of Onset    Heart Disease Mother 65        MI    Cancer Father     Cancer Brother 50        prostate cancer    Stroke Brother     Stroke Brother     Diabetes Neg Hx     Hyperlipidemia Neg Hx     Alcohol/Drug Neg Hx     Ovarian Cancer Neg Hx     Tubal Cancer Neg Hx     Peritoneal Cancer Neg Hx     Colorectal Cancer Neg Hx     Breast Cancer Neg Hx        Allergies   Allergen Reactions    Penicillins Rash     Rash as a child.        Current Outpatient Medications   Medication Sig Dispense Refill    apixaban (ELIQUIS) 5mg Tab Take 1 Tablet by mouth 2 times a day. 180 Tablet 3    polyethylene glycol/lytes (MIRALAX) Pack Take 17 g by mouth every day.      lisinopril (PRINIVIL) 10 MG Tab Take 1 Tablet by mouth 2 times a day. 180 Tablet 4    famotidine (PEPCID) 40 MG Tab Take 1 Tablet by mouth every evening.      flecainide (TAMBOCOR) 100 MG Tab Take 1 Tablet by  mouth 2 times a day. May also take 0.5 Tablets 1 time a day as needed (for symptomatic  episodes of PVC). 200 Tablet 3    metoprolol SR (TOPROL XL) 25 MG TABLET SR 24 HR Take 1 Tablet by mouth every day. 90 Tablet 3    rosuvastatin (CRESTOR) 20 MG Tab TAKE ONE TABLET BY MOUTH EVERY DAY 90 Tablet 3    augmented betamethasone dipropionate (DIPROLENE-AF) 0.05 % ointment Apply  topically 2 times a day as needed (rash).      finasteride (PROSCAR) 5 MG Tab Take 1 Tablet by mouth every day.      ciclopirox (PENLAC) 8 % solution Apply 1 Application topically two times a week. Apply to affected toenails on both feet.      lidocaine (XYLOCAINE) 2 % Solution 300 ml (Patient not taking: Reported on 3/21/2025)      XIFAXAN 550 MG Tab tablet Take 550 mg by mouth 3 times a day.      sucralfate (CARAFATE) 1 GM/10ML Suspension Take 1 g by mouth 4 times a day.       No current facility-administered medications for this visit.       Vitals:    03/21/25 0935   BP: 110/60   Pulse: 76   Resp: 14   SpO2: 96%         Review of Systems   Constitutional:  Positive for malaise/fatigue.   Cardiovascular:  Positive for palpitations.            EKG interpreted by me: Atrial Flutter      Physical Exam  Constitutional:       Appearance: Normal appearance.   HENT:      Head: Normocephalic.   Eyes:      Pupils: Pupils are equal, round, and reactive to light.   Neck:      Vascular: No JVD.   Cardiovascular:      Rate and Rhythm: Normal rate. Rhythm irregular.      Pulses: Normal pulses.      Heart sounds: Normal heart sounds.   Pulmonary:      Effort: Pulmonary effort is normal.      Breath sounds: Normal breath sounds.   Abdominal:      General: Abdomen is flat.      Palpations: Abdomen is soft.   Musculoskeletal:      Cervical back: Normal range of motion.      Right lower leg: No edema.      Left lower leg: No edema.   Skin:     General: Skin is warm and dry.   Neurological:      Mental Status: He is alert and oriented to person, place, and time.  "  Psychiatric:         Mood and Affect: Mood normal.         Behavior: Behavior normal.          Data:  Lipids:   Lab Results   Component Value Date/Time    CHOLSTRLTOT 93 (L) 12/16/2023 01:19 AM    TRIGLYCERIDE 44 12/16/2023 01:19 AM    HDL 48 12/16/2023 01:19 AM    LDL 36 12/16/2023 01:19 AM        BMP:  Lab Results   Component Value Date/Time    SODIUM 139 05/20/2024 1146    POTASSIUM 4.6 05/20/2024 1146    CHLORIDE 104 05/20/2024 1146    CO2 25 05/20/2024 1146    GLUCOSE 94 05/20/2024 1146    BUN 16 05/20/2024 1146    CREATININE 0.97 05/20/2024 1146    CALCIUM 9.1 05/20/2024 1146    ANION 10.0 05/20/2024 1146       GFR:  Lab Results   Component Value Date/Time    IFAFRICA >60 03/31/2021 0805    IFNOTAFR >60 03/31/2021 0805        TSH:   Lab Results   Component Value Date/Time    TSHULTRASEN 2.130 05/20/2024 1146       MAGNESIUM:  Lab Results   Component Value Date/Time    MAGNESIUM 2.2 04/05/2018 0743    MAGNESIUM 2.2 12/20/2017 0901    MAGNESIUM 2.3 10/28/2017 0813        THYROXINE (T4):   No results found for: \"FREEDIR\"     CBC:   Lab Results   Component Value Date/Time    WBC 6.6 03/08/2025 07:11 AM    RBC 4.88 03/08/2025 07:11 AM    HEMOGLOBIN 16.4 03/08/2025 07:11 AM    HEMATOCRIT 47.3 03/08/2025 07:11 AM    MCV 96.9 03/08/2025 07:11 AM    MCH 33.6 (H) 03/08/2025 07:11 AM    MCHC 34.7 03/08/2025 07:11 AM    RDW 44.1 03/08/2025 07:11 AM    PLATELETCT 208 03/08/2025 07:11 AM    MPV 11.1 03/08/2025 07:11 AM    NEUTSPOLYS 67.20 03/04/2025 09:51 AM    LYMPHOCYTES 13.80 (L) 03/04/2025 09:51 AM    MONOCYTES 16.00 (H) 03/04/2025 09:51 AM    EOSINOPHILS 2.00 03/04/2025 09:51 AM    BASOPHILS 0.50 03/04/2025 09:51 AM    IMMGRAN 0.50 03/04/2025 09:51 AM    NRBC 0.00 03/04/2025 09:51 AM    NEUTS 4.29 03/04/2025 09:51 AM    LYMPHS 0.88 (L) 03/04/2025 09:51 AM    MONOS 1.02 (H) 03/04/2025 09:51 AM    EOS 0.13 03/04/2025 09:51 AM    BASO 0.03 03/04/2025 09:51 AM    IMMGRANAB 0.03 03/04/2025 09:51 AM    NRBCAB 0.00 " "03/04/2025 09:51 AM        CBC w/o DIFF  Lab Results   Component Value Date/Time    WBC 6.6 03/08/2025 07:11 AM    RBC 4.88 03/08/2025 07:11 AM    HEMOGLOBIN 16.4 03/08/2025 07:11 AM    MCV 96.9 03/08/2025 07:11 AM    MCH 33.6 (H) 03/08/2025 07:11 AM    MCHC 34.7 03/08/2025 07:11 AM    RDW 44.1 03/08/2025 07:11 AM    MPV 11.1 03/08/2025 07:11 AM       LIVER:  Lab Results   Component Value Date/Time    ALKPHOSPHAT 81 11/16/2024 06:26 PM    ASTSGOT 26 11/16/2024 06:26 PM    ALTSGPT 21 11/16/2024 06:26 PM    TBILIRUBIN 0.9 11/16/2024 06:26 PM       BNP:  No results found for: \"BNPBTYPENAT\"    PT/INR:  Lab Results   Component Value Date/Time    PROTHROMBTM 13.1 11/27/2018 11:50 AM    INR 0.98 11/27/2018 11:50 AM             Impression/Plan:  History of atrial flutter   - recurrent flutter   - pursue cardioversion   - continue flecainide    - continue Eliquis for stroke protection   - continue metoprolol for rate control   - wearing Zio   - post cardioversion and Zio may need to consider ablation or alternative long term antiarrhythmic therapy                  A total of 40 minutes of time was spent on day of encounter reviewing medical record, performing history and examination, counseling, ordering medication/test/consults, collaborating with referring service, and documentation.    Anant Nava [facility]-EP  Cardiac Electrophysiology    "

## 2025-03-23 NOTE — ASSESSMENT & PLAN NOTE
- recurrent flutter   - pursue cardioversion   - continue flecainide    - continue Eliquis for stroke protection   - continue metoprolol for rate control   - wearing Zio   - post cardioversion and Zio may need to consider ablation or alternative long term antiarrhythmic therapy

## 2025-03-24 ENCOUNTER — TELEPHONE (OUTPATIENT)
Dept: CARDIOLOGY | Facility: MEDICAL CENTER | Age: 80
End: 2025-03-24
Payer: MEDICARE

## 2025-03-25 NOTE — TELEPHONE ENCOUNTER
Patient is scheduled for a Cardioversion with anesthesia on 04- with Dr. Spivey. Patient   has been instructed to check in at 9:30 am for 10:30 procedure. No meds to hold. Patient has been advised they will need a  home and with them after since they are sedated. Message sent to authorizations. Sent [employer] message to pt with instructions. No device. FYI sent to [employer]

## 2025-03-26 ENCOUNTER — APPOINTMENT (OUTPATIENT)
Dept: ADMISSIONS | Facility: MEDICAL CENTER | Age: 80
End: 2025-03-26
Attending: STUDENT IN AN ORGANIZED HEALTH CARE EDUCATION/TRAINING PROGRAM
Payer: MEDICARE

## 2025-03-26 LAB — EKG IMPRESSION: NORMAL

## 2025-03-26 PROCEDURE — 93010 ELECTROCARDIOGRAM REPORT: CPT | Performed by: INTERNAL MEDICINE

## 2025-03-31 ENCOUNTER — TELEPHONE (OUTPATIENT)
Dept: CARDIOLOGY | Facility: MEDICAL CENTER | Age: 80
End: 2025-03-31
Payer: MEDICARE

## 2025-03-31 NOTE — TELEPHONE ENCOUNTER
Caller: Boo Cavanaugh Jr.     Topic/issue: Pt is returning phone call please advise    Callback Number: [phone] (home)      Thank you,     Jeb CURIEL

## 2025-03-31 NOTE — TELEPHONE ENCOUNTER
MT: Please advise on Cardiac event monitor per conversation below. Pt has cardioversion scheduled 4/10 and stress test scheduled 04/09, he wants to know if this is okay.      S/W patient who stated he feels that same. He stated that his heart rate data on his finger oximeter shows he is in the low 30s sometimes at rest. This occurs with sleep but it will spike up when he awakens to move. He notices that the oximeter goes up and down. I advise this is all likely related to his AFL and/or arrhythmias. Symptoms include SOB, Weakness, and general ill feeling, sometimes he has a headache and feels foggy/cognitively off. Patient states these are not new symptoms.   Patient states he is taking Apixaban, Flecainide, and Metoprolol (which is 12.5mg daily instead of 25mg). Pt states his BP is still low and  has been for months he averages in the 90 SBP, but has been in the 80s as well.  Pt wanting to know what these results mean and if Anant is okay with him to doing his stress test so close to his cardioversion. I advised him I would reach out to MT and call the patient back with an update

## 2025-03-31 NOTE — TELEPHONE ENCOUNTER
Urgent ZIO EOS to VR's nurse, Lily, on 3/31/2025    Notification criteria for Slow AFL and Symptomatic Bradycardia met    Preliminary findings:    100% AFL burden  with an avg rate of 67 bpm  Atrial Flutter may be Atrial Tachycardia with variable block    Rare Ventricular ectopics; no noted triplets    38 patient events:  AFL   VE(s)      To Patient- These findings are preliminary and HAVE NOT yet been reviewed by your provider. Please allow our team time to review these findings and someone from our office will contact you if any follow up is necessary.

## 2025-03-31 NOTE — TELEPHONE ENCOUNTER
VR OOO    JI(ADD): Please read & sign urgent EOS for VR    MT: Please advise on urgent reading, VR OOO    ============  Phone Number Called: [phone]    Call outcome: Did not leave a detailed message. Requested patient to call back.    Message: Called to discuss patient current symptoms  Awaiting phoen call back

## 2025-04-01 NOTE — TELEPHONE ENCOUNTER
Phone Number Called: [phone]    Call outcome:   Spoke to patient regarding message below.    Message: Called to discuss MK recommendations. Patient state he will keep appointments as is. ER precautions given for worsening symptoms. I advised patients we will reach back after the final read of his device if any recommendations are given from the provider. Patient appreciative of phone call follow up.

## 2025-04-02 ENCOUNTER — OFFICE VISIT (OUTPATIENT)
Dept: SLEEP MEDICINE | Facility: MEDICAL CENTER | Age: 80
End: 2025-04-02
Attending: NURSE PRACTITIONER
Payer: MEDICARE

## 2025-04-02 VITALS
OXYGEN SATURATION: 99 % | HEART RATE: 72 BPM | DIASTOLIC BLOOD PRESSURE: 62 MMHG | SYSTOLIC BLOOD PRESSURE: 104 MMHG | WEIGHT: 167.5 LBS | HEIGHT: 74 IN | BODY MASS INDEX: 21.5 KG/M2

## 2025-04-02 DIAGNOSIS — Z78.9 NONSMOKER: ICD-10-CM

## 2025-04-02 DIAGNOSIS — G47.33 OBSTRUCTIVE SLEEP APNEA: ICD-10-CM

## 2025-04-02 DIAGNOSIS — R06.02 SOB (SHORTNESS OF BREATH): ICD-10-CM

## 2025-04-02 PROCEDURE — 99213 OFFICE O/P EST LOW 20 MIN: CPT | Performed by: NURSE PRACTITIONER

## 2025-04-02 PROCEDURE — 3074F SYST BP LT 130 MM HG: CPT | Performed by: NURSE PRACTITIONER

## 2025-04-02 PROCEDURE — 3078F DIAST BP <80 MM HG: CPT | Performed by: NURSE PRACTITIONER

## 2025-04-02 ASSESSMENT — FIBROSIS 4 INDEX: FIB4 SCORE: 2.13

## 2025-04-02 NOTE — PROGRESS NOTES
"Chief Complaint   Patient presents with    Follow-Up     SHERRY on CPAP/SOB.Last seen 08/08/24      Results      OPO 08/27/24       HPI:  Boo Cavanaugh Jr. is a 79 y.o. year old male here today for follow-up on SOB secondary to cardiac changes.  Last OV 6/27/24 with Dr. Lindsay  Last OV 8/8/24 with Keven YIP for sleep     MMRC ndGndrndanddndend:nd nd2nd Interval events  4/2/2025: Patient notes shortness of breath to be worse with activity and he is limited mobility wise by chronic knee pain.  He notes having arrhythmias and currently on metoprolol flecainide and pending cardioversion and PET/CT with follow-up with cardiology.  He also has GI issues followed closely by GI.  He denies cough or phlegm, no chest pain, chest tightness or wheezing.    Pulmonary history:  Per Dr. Lindsay's note:  \"PMHx is significant for history of atrial flutter status post ablation on low-dose beta-blockade and followed by cardiology, hypertension well-controlled after recent addition of lisinopril to beta-blocker, dyslipidemia on statin therapy and obstructive sleep apnea on CPAP therapy followed by our sleep medicine clinic. He has degenerative joint disease complicated by bilateral knee pain which limits his level of activity. He is a lifelong non-smoker. He is retired from a career in mental health as of 2020.  He was referred to me for SOB that began in October of last year at which point he was seen in the emergency department and noted to have mildly elevated blood pressure, clear chest x-ray at the time. Symptoms worsened over Thanksgiving and he was seen again in the emergency department on December 15 where he was admitted and ruled out for ischemic heart disease with nuclear stress test. Labs were notable only for mild hyponatremia with a sodium of 133 and elevated glucose but this was not fasting. Pulmonary function testing from December 19 showed normal airflows with normal lung volumes and normal DLCO. Chest x-ray again on December 15 was " "clear. CT chest from March 2022 showed no concerning lung nodules or lymphadenopathy. Nuclear stress test done on December 18 as per above showed no evidence of ischemia with normal left ventricular size and function. ECG at hospital admission showed normal sinus rhythm. He has since had CPET showed no respiratory or ventilatory abnormalities however the was an abnormal O2 pulse suggesting cardiac limitations. He does not exercise routinely due to knee pain and he has seen cardiology who okay'd a reduction in metoprolol to see if this helps his sxs. \"    ROS: As per HPI and otherwise negative if not stated.    Past Medical History:   Diagnosis Date    Arrhythmia     \"flutter\", Dr. Mendoza cardiologist    Arthritis     left thumb    Breath shortness     Cancer (HCC)     Cardiac arrhythmia     Cataract     Enlarged prostate with urinary obstruction     GERD (gastroesophageal reflux disease)     Hyperlipidemia     Hypertension     OA (osteoarthritis) of knee     bilateral     Restless leg syndrome     Tonsillitis     Urinary bladder disorder 12/06/2017    savage in place       Past Surgical History:   Procedure Laterality Date    TURP-VAPOR  12/07/2017    Procedure: TURP-VAPOR - GREEN LIGHT PHOTOSELECTIVE;  Surgeon: Cristofer Cr M.D.;  Location: SURGERY [facility];  Service: Urology    GANGLION EXCISION Right 1966    wrist    APPENDECTOMY      OPEN REDUCTION      PROSTATECTOMY, RADICAL RETRO      TONSILLECTOMY      as a child    VASECTOMY         Family History   Problem Relation Age of Onset    Heart Disease Mother 65        MI    Cancer Father     Cancer Brother 50        prostate cancer    Stroke Brother     Stroke Brother     Diabetes Neg Hx     Hyperlipidemia Neg Hx     Alcohol/Drug Neg Hx     Ovarian Cancer Neg Hx     Tubal Cancer Neg Hx     Peritoneal Cancer Neg Hx     Colorectal Cancer Neg Hx     Breast Cancer Neg Hx        Social History     Socioeconomic History    Marital status: Single     Spouse name: " Not on file    Number of children: Not on file    Years of education: Not on file    Highest education level: Master's degree (e.g., MA, MS, Latha, MEd, MSW, ABAD)   Occupational History    Not on file   Tobacco Use    Smoking status: Never     Passive exposure: Past (BOTH parents smoked)    Smokeless tobacco: Never   Vaping Use    Vaping status: Never Used   Substance and Sexual Activity    Alcohol use: Not Currently    Drug use: Not Currently    Sexual activity: Not Currently     Comment: 2 daughters    Other Topics Concern    Not on file   Social History Narrative    Not on file     Social Drivers of Health     Financial Resource Strain: Low Risk  (9/27/2024)    Overall Financial Resource Strain (CARDIA)     Difficulty of Paying Living Expenses: Not hard at all   Food Insecurity: No Food Insecurity (9/27/2024)    Hunger Vital Sign     Worried About Running Out of Food in the Last Year: Never true     Ran Out of Food in the Last Year: Never true   Transportation Needs: No Transportation Needs (9/27/2024)    PRAPARE - Transportation     Lack of Transportation (Medical): No     Lack of Transportation (Non-Medical): No   Physical Activity: Inactive (9/27/2024)    Exercise Vital Sign     Days of Exercise per Week: 0 days     Minutes of Exercise per Session: 0 min   Stress: No Stress Concern Present (9/27/2024)    [employer] - Occupational Stress Questionnaire     Feeling of Stress : Not at all   Social Connections: Moderately Isolated (9/27/2024)    Social Connection and Isolation Panel [NHANES]     Frequency of Communication with Friends and Family: More than three times a week     Frequency of Social Gatherings with Friends and Family: Three times a week     Attends Mosque Services: 1 to 4 times per year     Active Member of Clubs or Organizations: No     Attends Club or Organization Meetings: Never     Marital Status:    Intimate Partner Violence: Not on file   Housing  "Stability: Low Risk  (9/27/2024)    Housing Stability Vital Sign     Unable to Pay for Housing in the Last Year: No     Number of Times Moved in the Last Year: 0     Homeless in the Last Year: No       Allergies as of 04/02/2025 - Reviewed 04/02/2025   Allergen Reaction Noted    Penicillins Rash 10/25/2017        Vitals:  /62 (BP Location: Left arm, Patient Position: Sitting, BP Cuff Size: Adult)   Pulse 72   Ht 1.88 m (6' 2\")   Wt 76 kg (167 lb 8 oz)   SpO2 99%     Current medications as of today   Current Outpatient Medications   Medication Sig Dispense Refill    apixaban (ELIQUIS) 5mg Tab Take 1 Tablet by mouth 2 times a day. 180 Tablet 3    lidocaine (XYLOCAINE) 2 % Solution       polyethylene glycol/lytes (MIRALAX) Pack Take 17 g by mouth every day.      lisinopril (PRINIVIL) 10 MG Tab Take 1 Tablet by mouth 2 times a day. 180 Tablet 4    famotidine (PEPCID) 40 MG Tab Take 1 Tablet by mouth every evening.      flecainide (TAMBOCOR) 100 MG Tab Take 1 Tablet by mouth 2 times a day. May also take 0.5 Tablets 1 time a day as needed (for symptomatic  episodes of PVC). 200 Tablet 3    metoprolol SR (TOPROL XL) 25 MG TABLET SR 24 HR Take 1 Tablet by mouth every day. 90 Tablet 3    rosuvastatin (CRESTOR) 20 MG Tab TAKE ONE TABLET BY MOUTH EVERY DAY 90 Tablet 3    augmented betamethasone dipropionate (DIPROLENE-AF) 0.05 % ointment Apply  topically 2 times a day as needed (rash).      finasteride (PROSCAR) 5 MG Tab Take 1 Tablet by mouth every day.      ciclopirox (PENLAC) 8 % solution Apply 1 Application topically two times a week. Apply to affected toenails on both feet.       No current facility-administered medications for this visit.         Physical Exam:   Gen:           Alert and oriented, No apparent distress. Mood and affect appropriate, normal interaction with examiner.  Eyes:          PERRL, EOM intact, sclere white, conjunctive moist. Glasses  Ears:          Not examined.   Hearing:     Grossly " intact.  Nose:          Normal, no lesions or deformities.  Dentition:    Not examined.   Oropharynx:   Not examined.   Mallampati Classification: Not examined.   Neck:        Supple, trachea midline, no masses.  Respiratory Effort: No intercostal retractions or use of accessory muscles.   Lung Auscultation:      Clear to auscultation bilaterally; no rales, rhonchi or wheezing.  CV:            Regular rate and rhythm. No murmurs, rubs or gallops.  Abd:           Not examined.   Lymphadenopathy: Not examined.   Gait and Station: Normal.  Digits and Nails: No clubbing, cyanosis, petechiae, or nodes.   Cranial Nerves: II-XII grossly intact.  Skin:        No rashes, lesions or ulcers noted.               Ext:           No cyanosis or edema.      Assessment:  1. SOB (shortness of breath)        2. Obstructive sleep apnea        3. BMI 21.0-21.9, adult        4. Nonsmoker                 Immunizations:    Flu:9/2024  Pneumovax 23:2017  Prevnar 13:2019  PCV 20:  not due  COVID-19: 10/2024    Plan:  Patient's shortness of breath is related to cardiac causes.  Prior testing from a pulmonary standpoint has confirmed this.  He is pending updated testing and follow-up with cardiology.  He has not required bronchodilator therapy or oxygen.  SHERRY is well-controlled using auto CPAP.  Reviewed current compliance in office.  Recent overnight oximetry in the last year also noted normal readings using CPAP.  He will continue to benefit from therapy.  Encourage healthy diet and tolerated activity for conditioning.  Follow-up as needed from a pulmonary standpoint.  Encouraged patient to follow closely with cardiology and to follow-up with sleep apnea due August 2025 for annual follow-up.    Please note that this dictation was created using voice recognition software. I have made every reasonable attempt to correct obvious errors, but it is possible there are errors of grammar and possibly content that I did not discover before finalizing  the note.

## 2025-04-03 ENCOUNTER — PRE-ADMISSION TESTING (OUTPATIENT)
Dept: ADMISSIONS | Facility: MEDICAL CENTER | Age: 80
End: 2025-04-03
Attending: STUDENT IN AN ORGANIZED HEALTH CARE EDUCATION/TRAINING PROGRAM
Payer: MEDICARE

## 2025-04-03 NOTE — PREADMIT AVS NOTE
Current Medications   Medication Instructions    apixaban (ELIQUIS) 5mg Tab Follow instructions from surgeon or specialist.              lisinopril (PRINIVIL) 10 MG Tab Stop 24 hours before surgery    famotidine (PEPCID) 40 MG Tab Continue taking medication as prescribed, including morning of procedure     flecainide (TAMBOCOR) 100 MG Tab Continue taking medication as prescribed, including morning of procedure     metoprolol SR (TOPROL XL) 25 MG TABLET SR 24 HR Continue taking medication as prescribed, including morning of procedure     rosuvastatin (CRESTOR) 20 MG Tab Continue taking medication as prescribed, including morning of procedure     augmented betamethasone dipropionate (DIPROLENE-AF) 0.05 % ointment Hold medication day of procedure    finasteride (PROSCAR) 5 MG Tab Continue taking medication as prescribed, including morning of procedure     ciclopirox (PENLAC) 8 % solution Hold medication day of procedure

## 2025-04-09 ENCOUNTER — HOSPITAL ENCOUNTER (OUTPATIENT)
Dept: RADIOLOGY | Facility: MEDICAL CENTER | Age: 80
End: 2025-04-09
Attending: INTERNAL MEDICINE
Payer: MEDICARE

## 2025-04-09 DIAGNOSIS — R06.00 DYSPNEA, UNSPECIFIED TYPE: ICD-10-CM

## 2025-04-09 DIAGNOSIS — R94.31 NONSPECIFIC ABNORMAL ELECTROCARDIOGRAM (ECG) (EKG): ICD-10-CM

## 2025-04-09 PROCEDURE — A9555 RB82 RUBIDIUM: HCPCS

## 2025-04-10 ENCOUNTER — APPOINTMENT (OUTPATIENT)
Dept: CARDIOLOGY | Facility: MEDICAL CENTER | Age: 80
End: 2025-04-10
Attending: NURSE PRACTITIONER
Payer: MEDICARE

## 2025-04-10 ENCOUNTER — ANESTHESIA (OUTPATIENT)
Dept: CARDIOLOGY | Facility: MEDICAL CENTER | Age: 80
End: 2025-04-10
Payer: MEDICARE

## 2025-04-10 ENCOUNTER — HOSPITAL ENCOUNTER (OUTPATIENT)
Facility: MEDICAL CENTER | Age: 80
End: 2025-04-10
Attending: STUDENT IN AN ORGANIZED HEALTH CARE EDUCATION/TRAINING PROGRAM | Admitting: STUDENT IN AN ORGANIZED HEALTH CARE EDUCATION/TRAINING PROGRAM
Payer: MEDICARE

## 2025-04-10 ENCOUNTER — RESULTS FOLLOW-UP (OUTPATIENT)
Dept: CARDIOLOGY | Facility: MEDICAL CENTER | Age: 80
End: 2025-04-10

## 2025-04-10 ENCOUNTER — ANESTHESIA EVENT (OUTPATIENT)
Dept: CARDIOLOGY | Facility: MEDICAL CENTER | Age: 80
End: 2025-04-10
Payer: MEDICARE

## 2025-04-10 VITALS
OXYGEN SATURATION: 96 % | HEIGHT: 74 IN | HEART RATE: 59 BPM | DIASTOLIC BLOOD PRESSURE: 80 MMHG | TEMPERATURE: 98.1 F | RESPIRATION RATE: 16 BRPM | BODY MASS INDEX: 21.45 KG/M2 | WEIGHT: 167.11 LBS | SYSTOLIC BLOOD PRESSURE: 132 MMHG

## 2025-04-10 DIAGNOSIS — I48.92 ATRIAL FLUTTER, UNSPECIFIED TYPE (HCC): ICD-10-CM

## 2025-04-10 LAB
EKG IMPRESSION: NORMAL
EKG IMPRESSION: NORMAL

## 2025-04-10 PROCEDURE — 4410588 CL-CARDIOVERSION

## 2025-04-10 PROCEDURE — 160002 HCHG RECOVERY MINUTES (STAT)

## 2025-04-10 PROCEDURE — 93005 ELECTROCARDIOGRAM TRACING: CPT | Mod: TC | Performed by: STUDENT IN AN ORGANIZED HEALTH CARE EDUCATION/TRAINING PROGRAM

## 2025-04-10 PROCEDURE — 700111 HCHG RX REV CODE 636 W/ 250 OVERRIDE (IP): Performed by: STUDENT IN AN ORGANIZED HEALTH CARE EDUCATION/TRAINING PROGRAM

## 2025-04-10 PROCEDURE — 160046 HCHG PACU - 1ST 60 MINS PHASE II

## 2025-04-10 PROCEDURE — 93018 CV STRESS TEST I&R ONLY: CPT | Performed by: INTERNAL MEDICINE

## 2025-04-10 PROCEDURE — 93010 ELECTROCARDIOGRAM REPORT: CPT | Mod: 76,59 | Performed by: STUDENT IN AN ORGANIZED HEALTH CARE EDUCATION/TRAINING PROGRAM

## 2025-04-10 PROCEDURE — 700101 HCHG RX REV CODE 250: Performed by: STUDENT IN AN ORGANIZED HEALTH CARE EDUCATION/TRAINING PROGRAM

## 2025-04-10 PROCEDURE — 92960 CARDIOVERSION ELECTRIC EXT: CPT | Performed by: STUDENT IN AN ORGANIZED HEALTH CARE EDUCATION/TRAINING PROGRAM

## 2025-04-10 PROCEDURE — 700105 HCHG RX REV CODE 258: Performed by: STUDENT IN AN ORGANIZED HEALTH CARE EDUCATION/TRAINING PROGRAM

## 2025-04-10 PROCEDURE — 93010 ELECTROCARDIOGRAM REPORT: CPT | Mod: 59 | Performed by: STUDENT IN AN ORGANIZED HEALTH CARE EDUCATION/TRAINING PROGRAM

## 2025-04-10 PROCEDURE — 160035 HCHG PACU - 1ST 60 MINS PHASE I

## 2025-04-10 PROCEDURE — 93005 ELECTROCARDIOGRAM TRACING: CPT | Mod: TC | Performed by: INTERNAL MEDICINE

## 2025-04-10 PROCEDURE — 78431 MYOCRD IMG PET RST&STRS CT: CPT | Mod: 26 | Performed by: INTERNAL MEDICINE

## 2025-04-10 RX ORDER — HYDRALAZINE HYDROCHLORIDE 20 MG/ML
5 INJECTION INTRAMUSCULAR; INTRAVENOUS
Status: DISCONTINUED | OUTPATIENT
Start: 2025-04-10 | End: 2025-04-10 | Stop reason: HOSPADM

## 2025-04-10 RX ORDER — OXYCODONE HCL 5 MG/5 ML
5 SOLUTION, ORAL ORAL
Status: DISCONTINUED | OUTPATIENT
Start: 2025-04-10 | End: 2025-04-10 | Stop reason: HOSPADM

## 2025-04-10 RX ORDER — HALOPERIDOL 5 MG/ML
1 INJECTION INTRAMUSCULAR
Status: DISCONTINUED | OUTPATIENT
Start: 2025-04-10 | End: 2025-04-10 | Stop reason: HOSPADM

## 2025-04-10 RX ORDER — SODIUM CHLORIDE, SODIUM LACTATE, POTASSIUM CHLORIDE, CALCIUM CHLORIDE 600; 310; 30; 20 MG/100ML; MG/100ML; MG/100ML; MG/100ML
INJECTION, SOLUTION INTRAVENOUS
Status: DISCONTINUED | OUTPATIENT
Start: 2025-04-10 | End: 2025-04-10 | Stop reason: SURG

## 2025-04-10 RX ORDER — MIDAZOLAM HYDROCHLORIDE 1 MG/ML
1 INJECTION INTRAMUSCULAR; INTRAVENOUS
Status: DISCONTINUED | OUTPATIENT
Start: 2025-04-10 | End: 2025-04-10 | Stop reason: HOSPADM

## 2025-04-10 RX ORDER — DIPHENHYDRAMINE HYDROCHLORIDE 50 MG/ML
12.5 INJECTION, SOLUTION INTRAMUSCULAR; INTRAVENOUS
Status: DISCONTINUED | OUTPATIENT
Start: 2025-04-10 | End: 2025-04-10 | Stop reason: HOSPADM

## 2025-04-10 RX ORDER — ONDANSETRON 2 MG/ML
4 INJECTION INTRAMUSCULAR; INTRAVENOUS
Status: DISCONTINUED | OUTPATIENT
Start: 2025-04-10 | End: 2025-04-10 | Stop reason: HOSPADM

## 2025-04-10 RX ORDER — LABETALOL HYDROCHLORIDE 5 MG/ML
5 INJECTION, SOLUTION INTRAVENOUS
Status: DISCONTINUED | OUTPATIENT
Start: 2025-04-10 | End: 2025-04-10 | Stop reason: HOSPADM

## 2025-04-10 RX ORDER — OXYCODONE HCL 5 MG/5 ML
10 SOLUTION, ORAL ORAL
Status: DISCONTINUED | OUTPATIENT
Start: 2025-04-10 | End: 2025-04-10 | Stop reason: HOSPADM

## 2025-04-10 RX ORDER — EPHEDRINE SULFATE 50 MG/ML
5 INJECTION, SOLUTION INTRAVENOUS
Status: DISCONTINUED | OUTPATIENT
Start: 2025-04-10 | End: 2025-04-10 | Stop reason: HOSPADM

## 2025-04-10 RX ORDER — LIDOCAINE HYDROCHLORIDE 20 MG/ML
INJECTION, SOLUTION EPIDURAL; INFILTRATION; INTRACAUDAL; PERINEURAL PRN
Status: DISCONTINUED | OUTPATIENT
Start: 2025-04-10 | End: 2025-04-10 | Stop reason: SURG

## 2025-04-10 RX ORDER — IPRATROPIUM BROMIDE AND ALBUTEROL SULFATE 2.5; .5 MG/3ML; MG/3ML
3 SOLUTION RESPIRATORY (INHALATION)
Status: DISCONTINUED | OUTPATIENT
Start: 2025-04-10 | End: 2025-04-10 | Stop reason: HOSPADM

## 2025-04-10 RX ADMIN — SODIUM CHLORIDE, POTASSIUM CHLORIDE, SODIUM LACTATE AND CALCIUM CHLORIDE: 600; 310; 30; 20 INJECTION, SOLUTION INTRAVENOUS at 11:08

## 2025-04-10 RX ADMIN — LIDOCAINE HYDROCHLORIDE 100 MG: 20 INJECTION, SOLUTION EPIDURAL; INFILTRATION; INTRACAUDAL; PERINEURAL at 11:14

## 2025-04-10 RX ADMIN — PROPOFOL 40 MG: 10 INJECTION, EMULSION INTRAVENOUS at 11:14

## 2025-04-10 RX ADMIN — PROPOFOL 20 MG: 10 INJECTION, EMULSION INTRAVENOUS at 11:15

## 2025-04-10 ASSESSMENT — FIBROSIS 4 INDEX: FIB4 SCORE: 2.13

## 2025-04-10 ASSESSMENT — PAIN SCALES - GENERAL: PAIN_LEVEL: 0

## 2025-04-10 NOTE — PROGRESS NOTES
1122 - patient arrived to pacu, 2 identifiers verified with RN, attached to monitors, and received report from Anesthesia and RN.  Patient lethargic but responds appropriately.  Declines pain and nausea.      1135 - phoned patient's daughter with updates.  Patient tolerating water, no other needs at this time.     1145 - handoff to Delfino GAINES

## 2025-04-10 NOTE — PROCEDURES
Procedure performed: External Direct Current Cardioversion    : Anali Spivey MD    Assistant: None    Anesthesia: per Anesthesia services    Indication: Atrial Fibrillation    Preprocedural Diagnosis:   Atrial Flutter  Postprocedural Diagnosis: Sinus rhythm      Description of procedure:  Mr. Cavanaugh was brought to the pre/post procedure area of the cath lab. Informed consent was obtained. Defibrillator pads were placed in the anterior and posterior position.  Adequate sedation was obtained with assistance of anesthesia. The patient was successfully cardioverted with  200 J synchronized biphasic energy into sinus rhythm. He was monitored in the recovery area until criteria met.    Conclusion: Successful DC cardioversion    Complications: None apparent      Electronically signed: Anali Spivey MD  Cardiologist   [facility]

## 2025-04-10 NOTE — PROGRESS NOTES
1200 Discharge instructions given to patient and daughter. Both verbalized understanding.     1235 Post op EKG complete.    1240 PIV removed with tip intact. Dressing applied. A final set of vitals done.    1245 Pt discharged via wheel chair with RN to responsible adult.

## 2025-04-10 NOTE — ANESTHESIA PREPROCEDURE EVALUATION
Date/Time: 04/10/25 1130    Scheduled providers: Anali Spivey M.D.; Barrera Morrison M.D.    Procedure: CL-CARDIOVERSION    Diagnosis: Atrial flutter, unspecified type (HCC) [I48.92]    Indications: See Associated Dx    Location: [facility] - Echocardiology [facility]            Relevant Problems   ANESTHESIA   (positive) SHERRY on CPAP      PULMONARY   (positive) Dyspnea      CARDIAC   (positive) PVC (premature ventricular contraction)   (positive) Primary hypertension      GI   (positive) Hiatal hernia      Other   (positive) Primary osteoarthritis involving multiple joints       Physical Exam    Airway   Mallampati: II  TM distance: >3 FB  Neck ROM: full       Cardiovascular - normal exam  Rhythm: regular  Rate: normal  (-) murmur     Dental    Pulmonary - normal exam  Breath sounds clear to auscultation     Abdominal    Neurological - normal exam                   Anesthesia Plan    ASA 3   ASA physical status 3 criteria: a thrombophilic disease requiring anticoagulation    Plan - MAC               Induction: intravenous    Postoperative Plan: Postoperative administration of opioids is intended.    Pertinent diagnostic labs and testing reviewed    Informed Consent:    Anesthetic plan and risks discussed with patient.    Use of blood products discussed with: patient whom consented to blood products.

## 2025-04-10 NOTE — ANESTHESIA POSTPROCEDURE EVALUATION
Patient: Boo Cavanaugh Jr.    Procedure Summary       Date: 04/10/25 Room / Location: [facility] Echocard[facility]    Anesthesia Start: 1108 Anesthesia Stop: 1127    Procedure: CL-CARDIOVERSION Diagnosis:       Atrial flutter, unspecified type (HCC)      (See Associated Dx)    Scheduled Providers: Anali Spivey M.D.; Barrera Morrison M.D. Responsible Provider: Barrera Morrison M.D.    Anesthesia Type: MAC ASA Status: 3            Final Anesthesia Type: MAC  Last vitals  BP   Blood Pressure : 110/69    Temp   (!) 35.8 °C (96.4 °F)    Pulse   (!) 53   Resp   14    SpO2   100 %      Anesthesia Post Evaluation    Patient location during evaluation: PACU  Patient participation: complete - patient participated  Level of consciousness: awake and alert  Pain score: 0    Airway patency: patent  Anesthetic complications: no  Cardiovascular status: hemodynamically stable  Respiratory status: acceptable  Hydration status: euvolemic    PONV: none          No notable events documented.     Nurse Pain Score: 0 (NPRS)

## 2025-04-10 NOTE — ANESTHESIA TIME REPORT
Anesthesia Start and Stop Event Times       Date Time Event    4/10/2025 1054 Ready for Procedure     1108 Anesthesia Start     1127 Anesthesia Stop          Responsible Staff  04/10/25      Name Role Begin End    Barrera Morrison M.D. Anesth 1108 1127          Overtime Reason:  no overtime (within assigned shift)    Comments:

## 2025-04-10 NOTE — DISCHARGE INSTRUCTIONS
What to Expect Post Anesthesia    Rest and take it easy for the first 24 hours.  A responsible adult is recommended to remain with you during that time.  It is normal to feel sleepy.  We encourage you to not do anything that requires balance, judgment or coordination.    FOR 24 HOURS DO NOT:  Drive, operate machinery or run household appliances.  Drink beer or alcoholic beverages.  Make important decisions or sign legal documents.    To avoid nausea, slowly advance diet as tolerated, avoiding spicy or greasy foods for the first day.  Add more substantial food to your diet according to your provider's instructions.  Babies can be fed formula or breast milk as soon as they are hungry.  INCREASE FLUIDS AND FIBER TO AVOID CONSTIPATION.    MILD FLU-LIKE SYMPTOMS ARE NORMAL.  YOU MAY EXPERIENCE GENERALIZED MUSCLE ACHES, THROAT IRRITATION, HEADACHE AND/OR SOME NAUSEA.        Electrical Cardioversion, Care After     This sheet gives you information about how to care for yourself after your procedure. Your health care provider may also give you more specific instructions. If you have problems or questions, contact your health care provider.     What can I expect after the procedure?     After the procedure, it is common to have:      Some redness on the skin where the shocks were given.    Follow these instructions at home:      Do not drive for 24 hours if you were given a medicine to help you relax (sedative).     Take over-the-counter and prescription medicines only as told by your health care provider.     Ask your health care provider how to check your pulse. Check it often.     Rest for 48 hours after the procedure or as told by your health care provider.     Avoid or limit your caffeine use as told by your health care provider.     If you received anesthesia, need to have someone stay with you for 24 hours.    Contact a health care provider if:      You feel like your heart is beating too quickly or your pulse is not  regular.     You have a serious muscle cramp that does not go away.    Get help right away if:      You have discomfort in your chest.     You are dizzy or you feel faint.     You have trouble breathing or you are short of breath.     Your speech is slurred.     You have trouble moving an arm or leg on one side of your body.     Your fingers or toes turn cold or blue.    Call to schedule follow-up appointment with cardiologist if one is not already made

## 2025-04-14 NOTE — Clinical Note
REFERRAL APPROVAL NOTICE         Sent on April 14, 2025                   Boo Cavanaugh   [address]  [place] [address]                   Dear Mr. Cavanaugh,    After a careful review of the medical information and benefit coverage, [facility] has processed your referral. See below for additional details.    If applicable, you must be actively enrolled with your insurance for coverage of the authorized service. If you have any questions regarding your coverage, please contact your insurance directly.    REFERRAL INFORMATION   Referral #:  [id number]  Referred-To Provider    Referred-By Provider:  Gastroenterology    Jerica Coronado M.D.   [facility]      [address] [place] [address]  [phone] [address] [place] [place] [id number] [phone]    Referral Start Date:  04/08/2025  Referral End Date:   04/08/2026             SCHEDULING  If you do not already have an appointment, please call [phone] to make an appointment.     MORE INFORMATION  If you do not already have a [employer] account, sign up at: [email or web address]  You can access your medical information, make appointments, see lab results, billing information, and more.  If you have questions regarding this referral, please contact  the [facility] Referrals department at:             [phone]. Monday - Friday 8:00AM - 5:00PM.     Sincerely,    [facility]

## 2025-04-16 ENCOUNTER — OFFICE VISIT (OUTPATIENT)
Dept: URGENT CARE | Facility: CLINIC | Age: 80
End: 2025-04-16
Payer: MEDICARE

## 2025-04-16 VITALS
RESPIRATION RATE: 16 BRPM | OXYGEN SATURATION: 94 % | TEMPERATURE: 97.5 F | SYSTOLIC BLOOD PRESSURE: 110 MMHG | BODY MASS INDEX: 21.66 KG/M2 | WEIGHT: 168.8 LBS | HEIGHT: 74 IN | DIASTOLIC BLOOD PRESSURE: 50 MMHG | HEART RATE: 56 BPM

## 2025-04-16 DIAGNOSIS — S61.209A OPEN WOUND OF FINGER, INITIAL ENCOUNTER: ICD-10-CM

## 2025-04-16 PROCEDURE — 3074F SYST BP LT 130 MM HG: CPT

## 2025-04-16 PROCEDURE — 99213 OFFICE O/P EST LOW 20 MIN: CPT

## 2025-04-16 PROCEDURE — 3078F DIAST BP <80 MM HG: CPT

## 2025-04-16 ASSESSMENT — FIBROSIS 4 INDEX: FIB4 SCORE: 2.13

## 2025-04-16 NOTE — PROGRESS NOTES
Chief Complaint   Patient presents with    Other     Finger wound 4th finger on the left side, states he is on eliquis        HISTORY OF PRESENT ILLNESS: Patient is a pleasant 79 y.o. male who presents to urgent care today avulsion to the left 4th finger that occurred yesterday.  Patient is on blood thinners and he notes that it continues to ooze.      Patient Active Problem List    Diagnosis Date Noted    Hyponatremia 03/10/2025    Nonspecific abnormal electrocardiogram (ECG) (EKG) 03/06/2025    Cricopharyngeal spasm 02/03/2025    Hiatal hernia 02/03/2025    Zenkers diverticulum 02/03/2025    Chest pain 12/15/2023    Callus of foot 04/01/2022    Dyspnea 02/17/2022    TMJ syndrome 02/17/2022    Back pain 01/07/2022    Hyperlipidemia     Pain of left hip joint 01/07/2020    History of skin cancer 11/13/2019    Primary osteoarthritis involving multiple joints 03/12/2019    S/P ablation of atrial flutter 01/15/2019    S/P catheter ablation of slow pathway 01/15/2019    Primary hypertension 01/08/2019    SHERRY on CPAP 11/29/2018    PVC (premature ventricular contraction) 07/09/2018    Benign prostatic hyperplasia with weak urinary stream 11/15/2017    History of atrial flutter 11/11/2017       Allergies:Penicillins    Current Outpatient Medications Ordered in Epic   Medication Sig Dispense Refill    apixaban (ELIQUIS) 5mg Tab Take 1 Tablet by mouth 2 times a day. 180 Tablet 3    polyethylene glycol/lytes (MIRALAX) Pack Take 17 g by mouth every day.      lisinopril (PRINIVIL) 10 MG Tab Take 1 Tablet by mouth 2 times a day. 180 Tablet 4    famotidine (PEPCID) 40 MG Tab Take 1 Tablet by mouth every evening.      flecainide (TAMBOCOR) 100 MG Tab Take 1 Tablet by mouth 2 times a day. May also take 0.5 Tablets 1 time a day as needed (for symptomatic  episodes of PVC). 200 Tablet 3    metoprolol SR (TOPROL XL) 25 MG TABLET SR 24 HR Take 1 Tablet by mouth every day. (Patient taking differently: Take 25 mg by mouth every evening.)  "90 Tablet 3    rosuvastatin (CRESTOR) 20 MG Tab TAKE ONE TABLET BY MOUTH EVERY DAY (Patient taking differently: Take 20 mg by mouth every morning.) 90 Tablet 3    augmented betamethasone dipropionate (DIPROLENE-AF) 0.05 % ointment Apply  topically 2 times a day as needed (rash).      finasteride (PROSCAR) 5 MG Tab Take 1 Tablet by mouth every morning.      ciclopirox (PENLAC) 8 % solution Apply 1 Application topically two times a week. Apply to affected toenails on both feet.      lidocaine (XYLOCAINE) 2 % Solution  (Patient not taking: Reported on 4/3/2025)       No current [facility]-ordered facility-administered medications on file.       Past Medical History:   Diagnosis Date    Arrhythmia     \"flutter\", Dr. Mendoza cardiologist    Arthritis     left thumb    Breath shortness     Cancer (HCC)     Cardiac arrhythmia     Cataract     Enlarged prostate with urinary obstruction     GERD (gastroesophageal reflux disease)     Headache     \"head pressure\" worsened since 2025    Hiatus hernia syndrome     High cholesterol     Hyperlipidemia     Hypertension     Indigestion     OA (osteoarthritis) of knee     bilateral     Restless leg syndrome     Sleep apnea     Tonsillitis     Urinary bladder disorder 2017    savage in place       Social History     Tobacco Use    Smoking status: Never     Passive exposure: Past (BOTH parents smoked)    Smokeless tobacco: Never   Vaping Use    Vaping status: Never Used   Substance Use Topics    Alcohol use: Not Currently    Drug use: Not Currently       Family Status   Relation Name Status    Mo Pat     Fa Boo     Binh Stephens Alive    Bro Fox (Not Specified)    MGMo Blondine Alive    Neg Hx  (Not Specified)   No partnership data on file     Family History   Problem Relation Age of Onset    Heart Disease Mother 65        MI    Arthritis Mother     Cancer Father     Cancer Brother 50        prostate cancer    Stroke Brother     Prostate cancer Brother     Stroke " "Brother     Dementia Maternal Grandmother     Diabetes Neg Hx     Hyperlipidemia Neg Hx     Alcohol/Drug Neg Hx     Ovarian Cancer Neg Hx     Tubal Cancer Neg Hx     Peritoneal Cancer Neg Hx     Colorectal Cancer Neg Hx     Breast Cancer Neg Hx        Review of Systems   Skin:         Avulsion laceration        Exam:  /50 (BP Location: Left arm, Patient Position: Sitting, BP Cuff Size: Adult)   Pulse (!) 56   Temp 36.4 °C (97.5 °F) (Temporal)   Resp 16   Ht 1.88 m (6' 2\")   Wt 76.6 kg (168 lb 12.8 oz)   SpO2 94%   Physical Exam  Vitals reviewed.   Constitutional:       Appearance: Normal appearance.   HENT:      Head: Normocephalic.      Right Ear: Tympanic membrane is not injected or erythematous.      Left Ear: Tympanic membrane is not injected or erythematous.      Mouth/Throat:      Mouth: Mucous membranes are moist.      Pharynx: Oropharynx is clear. No oropharyngeal exudate.      Tonsils: No tonsillar exudate. 0 on the right. 0 on the left.   Eyes:      General:         Right eye: No discharge.         Left eye: No discharge.      Extraocular Movements: Extraocular movements intact.      Conjunctiva/sclera: Conjunctivae normal.      Pupils: Pupils are equal, round, and reactive to light.   Cardiovascular:      Rate and Rhythm: Normal rate and regular rhythm.      Pulses: Normal pulses.      Heart sounds: Normal heart sounds. No murmur heard.  Pulmonary:      Effort: Pulmonary effort is normal. No respiratory distress.      Breath sounds: Normal breath sounds. No stridor. No wheezing.   Musculoskeletal:         General: Normal range of motion.      Cervical back: Normal range of motion.   Skin:     General: Skin is warm and dry.      Capillary Refill: Capillary refill takes less than 2 seconds.      Findings: Wound present. No rash.      Comments: Area is none saturable.  .25cm area.  Minimal bleeding noted.     Neurological:      General: No focal deficit present.      Mental Status: He is alert. "      Motor: No weakness.   Psychiatric:         Mood and Affect: Mood normal.         Behavior: Behavior normal.         Thought Content: Thought content normal.         Judgment: Judgment normal.             Assessment/Plan:  1. Open wound of finger, initial encounter    Dressing patient applied was removed and wound was cleaned.  Noted minimal bleeding.  No nailbed involvment.  .25cm area.  Derma bond applied and dressing reapplied.  Advised of ongoing wound care.  Patient verbalized understanding.  Dressing supplies provided.        Supportive care, differential diagnoses, and indications for immediate follow-up discussed with patient.   Pathogenesis of diagnosis discussed including typical length and natural progression.   Instructed to return to clinic or nearest emergency department for any change in condition, further concerns, or worsening of symptoms.  Patient states understanding of the plan of care and discharge instructions.  Instructed to make an appointment, for follow up, with primary care provider.    Please note that this dictation was created using voice recognition software. I have made every reasonable attempt to correct obvious errors, but I expect that there are errors of grammar and possibly content that I did not discover before finalizing the note.      Kerri YIP

## 2025-06-02 ENCOUNTER — APPOINTMENT (OUTPATIENT)
Dept: CARDIOLOGY | Facility: MEDICAL CENTER | Age: 80
End: 2025-06-02
Attending: INTERNAL MEDICINE
Payer: MEDICARE

## 2025-06-02 ENCOUNTER — APPOINTMENT (OUTPATIENT)
Dept: MEDICAL GROUP | Facility: PHYSICIAN GROUP | Age: 80
End: 2025-06-02
Payer: MEDICARE

## 2025-06-02 VITALS
WEIGHT: 170 LBS | DIASTOLIC BLOOD PRESSURE: 60 MMHG | RESPIRATION RATE: 16 BRPM | SYSTOLIC BLOOD PRESSURE: 104 MMHG | OXYGEN SATURATION: 92 % | BODY MASS INDEX: 21.82 KG/M2 | HEIGHT: 74 IN | HEART RATE: 76 BPM

## 2025-06-02 VITALS
OXYGEN SATURATION: 96 % | TEMPERATURE: 97.7 F | HEIGHT: 74 IN | WEIGHT: 170.8 LBS | SYSTOLIC BLOOD PRESSURE: 92 MMHG | HEART RATE: 52 BPM | BODY MASS INDEX: 21.92 KG/M2 | DIASTOLIC BLOOD PRESSURE: 50 MMHG

## 2025-06-02 DIAGNOSIS — E87.1 HYPONATREMIA: ICD-10-CM

## 2025-06-02 DIAGNOSIS — R19.8 GI SYMPTOMS: ICD-10-CM

## 2025-06-02 DIAGNOSIS — I10 PRIMARY HYPERTENSION: ICD-10-CM

## 2025-06-02 DIAGNOSIS — E78.2 MIXED HYPERLIPIDEMIA: ICD-10-CM

## 2025-06-02 DIAGNOSIS — Z86.79 HISTORY OF ATRIAL FLUTTER: Primary | ICD-10-CM

## 2025-06-02 DIAGNOSIS — L60.0 INGROWN TOENAIL OF RIGHT FOOT: ICD-10-CM

## 2025-06-02 DIAGNOSIS — I49.3 PVC (PREMATURE VENTRICULAR CONTRACTION): ICD-10-CM

## 2025-06-02 DIAGNOSIS — Z98.890 S/P ABLATION OF ATRIAL FLUTTER: ICD-10-CM

## 2025-06-02 DIAGNOSIS — Z86.79 S/P CATHETER ABLATION OF SLOW PATHWAY: ICD-10-CM

## 2025-06-02 DIAGNOSIS — Z98.890 S/P CATHETER ABLATION OF SLOW PATHWAY: ICD-10-CM

## 2025-06-02 DIAGNOSIS — Z86.79 S/P ABLATION OF ATRIAL FLUTTER: ICD-10-CM

## 2025-06-02 PROBLEM — R07.9 CHEST PAIN: Status: RESOLVED | Noted: 2023-12-15 | Resolved: 2025-06-02

## 2025-06-02 PROBLEM — R06.00 DYSPNEA: Status: RESOLVED | Noted: 2022-02-17 | Resolved: 2025-06-02

## 2025-06-02 PROCEDURE — 99214 OFFICE O/P EST MOD 30 MIN: CPT | Performed by: INTERNAL MEDICINE

## 2025-06-02 PROCEDURE — 93005 ELECTROCARDIOGRAM TRACING: CPT | Mod: TC | Performed by: INTERNAL MEDICINE

## 2025-06-02 PROCEDURE — 3078F DIAST BP <80 MM HG: CPT | Performed by: FAMILY MEDICINE

## 2025-06-02 PROCEDURE — 99212 OFFICE O/P EST SF 10 MIN: CPT | Performed by: INTERNAL MEDICINE

## 2025-06-02 PROCEDURE — G2211 COMPLEX E/M VISIT ADD ON: HCPCS | Performed by: INTERNAL MEDICINE

## 2025-06-02 PROCEDURE — 99214 OFFICE O/P EST MOD 30 MIN: CPT | Performed by: FAMILY MEDICINE

## 2025-06-02 PROCEDURE — 3074F SYST BP LT 130 MM HG: CPT | Performed by: FAMILY MEDICINE

## 2025-06-02 ASSESSMENT — ENCOUNTER SYMPTOMS
DIARRHEA: 0
VOMITING: 0
NEAR-SYNCOPE: 0
DECREASED APPETITE: 0
SYNCOPE: 0
WEIGHT LOSS: 0
DYSPNEA ON EXERTION: 0
NAUSEA: 0
ORTHOPNEA: 0
DIZZINESS: 0
PALPITATIONS: 0
HEARTBURN: 0
DEPRESSION: 0
CLAUDICATION: 0
WEIGHT GAIN: 0
CONSTIPATION: 0
BLURRED VISION: 0
BACK PAIN: 0
ABDOMINAL PAIN: 0
ALTERED MENTAL STATUS: 0
SHORTNESS OF BREATH: 0
PND: 0
IRREGULAR HEARTBEAT: 0
COUGH: 0
FEVER: 0
FLANK PAIN: 0

## 2025-06-02 ASSESSMENT — FIBROSIS 4 INDEX
FIB4 SCORE: 2.15
FIB4 SCORE: 2.15

## 2025-06-02 NOTE — PROGRESS NOTES
"Cardiology Note    AF    History of Present Illness: Boo Cavanaugh Jr. is a 80 y.o. male PMH PVCs, atrial flutter s/p ablation w recurrence s/p cardioversion 4/10/25, SHERRY on cpap, HTN who presents for follow up.    Much improved since returning to sinus rhythm. Had bleeding following a cut however this was ultimately controlled. No spontaneous major bleeding. Does have a cough associated with gerd. No other cardiac complaints. Compliant with medications and denies adverse effects.     Review of Systems   Constitutional: Negative for decreased appetite, fever, malaise/fatigue, weight gain and weight loss.   HENT:  Negative for congestion and nosebleeds.    Eyes:  Negative for blurred vision.   Cardiovascular:  Negative for chest pain, claudication, dyspnea on exertion, irregular heartbeat, leg swelling, near-syncope, orthopnea, palpitations, paroxysmal nocturnal dyspnea and syncope.   Respiratory:  Negative for cough and shortness of breath.    Endocrine: Negative for cold intolerance and heat intolerance.   Skin:  Negative for rash.   Musculoskeletal:  Negative for back pain.   Gastrointestinal:  Negative for abdominal pain, constipation, diarrhea, heartburn, melena, nausea and vomiting.   Genitourinary:  Negative for dysuria, flank pain and hematuria.   Neurological:  Negative for dizziness.   Psychiatric/Behavioral:  Negative for altered mental status and depression.          Past Medical History:   Diagnosis Date    Arrhythmia     \"flutter\", Dr. Mendoza cardiologist    Arthritis     left thumb    Breath shortness     Cancer (HCC)     Cardiac arrhythmia     Cataract     Enlarged prostate with urinary obstruction     GERD (gastroesophageal reflux disease)     Headache     \"head pressure\" worsened since feb 2025    Hiatus hernia syndrome 2025    High cholesterol     Hyperlipidemia     Hypertension     Indigestion 2024    OA (osteoarthritis) of knee     bilateral     Restless leg syndrome     Sleep apnea     " Tonsillitis     Urinary bladder disorder 12/06/2017    savage in place         Past Surgical History:   Procedure Laterality Date    ENDOSCOPY  02/2025    LESION EXCISION GENERAL Left 2018    wirst    TURP-VAPOR  12/07/2017    Procedure: TURP-VAPOR - GREEN LIGHT PHOTOSELECTIVE;  Surgeon: Cristofer Cr M.D.;  Location: SURGERY [facility];  Service: Urology    GANGLION EXCISION Right 1966    wrist    OPEN REDUCTION Left 1960    wrist fracture    APPENDECTOMY      OTHER      OTHER CARDIAC SURGERY  2017    ablation    PROSTATECTOMY, RADICAL RETRO      TONSILLECTOMY      as a child    VASECTOMY           Current Outpatient Medications   Medication Sig Dispense Refill    apixaban (ELIQUIS) 5mg Tab Take 1 Tablet by mouth 2 times a day. 180 Tablet 3    lidocaine (XYLOCAINE) 2 % Solution       polyethylene glycol/lytes (MIRALAX) Pack Take 17 g by mouth every day.      lisinopril (PRINIVIL) 10 MG Tab Take 1 Tablet by mouth 2 times a day. 180 Tablet 4    famotidine (PEPCID) 40 MG Tab Take 1 Tablet by mouth every evening.      flecainide (TAMBOCOR) 100 MG Tab Take 1 Tablet by mouth 2 times a day. May also take 0.5 Tablets 1 time a day as needed (for symptomatic  episodes of PVC). 200 Tablet 3    metoprolol SR (TOPROL XL) 25 MG TABLET SR 24 HR Take 1 Tablet by mouth every day. (Patient taking differently: Take 25 mg by mouth every evening.) 90 Tablet 3    rosuvastatin (CRESTOR) 20 MG Tab TAKE ONE TABLET BY MOUTH EVERY DAY (Patient taking differently: Take 20 mg by mouth every morning.) 90 Tablet 3    augmented betamethasone dipropionate (DIPROLENE-AF) 0.05 % ointment Apply  topically 2 times a day as needed (rash).      finasteride (PROSCAR) 5 MG Tab Take 1 Tablet by mouth every morning.      ciclopirox (PENLAC) 8 % solution Apply 1 Application topically two times a week. Apply to affected toenails on both feet.       No current facility-administered medications for this visit.         Allergies   Allergen Reactions     Penicillins Rash     Rash as a child.          Family History   Problem Relation Age of Onset    Heart Disease Mother 65        MI    Arthritis Mother     Cancer Father     Cancer Brother 50        prostate cancer    Stroke Brother     Prostate cancer Brother     Stroke Brother     Dementia Maternal Grandmother     Diabetes Neg Hx     Hyperlipidemia Neg Hx     Alcohol/Drug Neg Hx     Ovarian Cancer Neg Hx     Tubal Cancer Neg Hx     Peritoneal Cancer Neg Hx     Colorectal Cancer Neg Hx     Breast Cancer Neg Hx          Social History     Socioeconomic History    Marital status: Single     Spouse name: Not on file    Number of children: Not on file    Years of education: Not on file    Highest education level: Master's degree (e.g., MA, MS, Latha, MEd, MSW, ABAD)   Occupational History    Not on file   Tobacco Use    Smoking status: Never     Passive exposure: Past (BOTH parents smoked)    Smokeless tobacco: Never   Vaping Use    Vaping status: Never Used   Substance and Sexual Activity    Alcohol use: Not Currently    Drug use: Not Currently    Sexual activity: Not Currently     Comment: 2 daughters    Other Topics Concern    Not on file   Social History Narrative    Not on file     Social Drivers of Health     Financial Resource Strain: Low Risk  (9/27/2024)    Overall Financial Resource Strain (CARDIA)     Difficulty of Paying Living Expenses: Not hard at all   Food Insecurity: No Food Insecurity (9/27/2024)    Hunger Vital Sign     Worried About Running Out of Food in the Last Year: Never true     Ran Out of Food in the Last Year: Never true   Transportation Needs: No Transportation Needs (9/27/2024)    PRAPARE - Transportation     Lack of Transportation (Medical): No     Lack of Transportation (Non-Medical): No   Physical Activity: Inactive (9/27/2024)    Exercise Vital Sign     Days of Exercise per Week: 0 days     Minutes of Exercise per Session: 0 min   Stress: No Stress Concern Present (9/27/2024)    [place]  "[place] [employer] - Occupational Stress Questionnaire     Feeling of Stress : Not at all   Social Connections: Moderately Isolated (9/27/2024)    Social Connection and Isolation Panel [NHANES]     Frequency of Communication with Friends and Family: More than three times a week     Frequency of Social Gatherings with Friends and Family: Three times a week     Attends Hindu Services: 1 to 4 times per year     Active Member of Clubs or Organizations: No     Attends Club or Organization Meetings: Never     Marital Status:    Intimate Partner Violence: Not on file   Housing Stability: Low Risk  (9/27/2024)    Housing Stability Vital Sign     Unable to Pay for Housing in the Last Year: No     Number of Times Moved in the Last Year: 0     Homeless in the Last Year: No         Physical Exam:  Ambulatory Vitals  /60 (BP Location: Left arm, Patient Position: Sitting, BP Cuff Size: Adult)   Pulse 76   Resp 16   Ht 1.88 m (6' 2\")   Wt 77.1 kg (170 lb)   SpO2 92%    BP Readings from Last 4 Encounters:   06/02/25 104/60   06/02/25 92/50   04/16/25 110/50   04/10/25 132/80     Weight/BMI:   Vitals:    06/02/25 1416   BP: 104/60   Weight: 77.1 kg (170 lb)   Height: 1.88 m (6' 2\")    Body mass index is 21.83 kg/m².  Wt Readings from Last 4 Encounters:   06/02/25 77.1 kg (170 lb)   06/02/25 77.5 kg (170 lb 12.8 oz)   04/16/25 76.6 kg (168 lb 12.8 oz)   04/10/25 75.8 kg (167 lb 1.7 oz)       Physical Exam  Constitutional:       General: He is not in acute distress.  HENT:      Head: Normocephalic and atraumatic.   Eyes:      Conjunctiva/sclera: Conjunctivae normal.      Pupils: Pupils are equal, round, and reactive to light.   Neck:      Vascular: No JVD.   Cardiovascular:      Rate and Rhythm: Normal rate and regular rhythm.      Heart sounds: Normal heart sounds. No murmur heard.     No friction rub. No gallop.   Pulmonary:      Effort: Pulmonary effort is normal. No respiratory distress.      " "Breath sounds: Normal breath sounds. No wheezing or rales.   Chest:      Chest wall: No tenderness.   Abdominal:      General: Bowel sounds are normal. There is no distension.      Palpations: Abdomen is soft.   Musculoskeletal:      Cervical back: Normal range of motion and neck supple.   Skin:     General: Skin is warm and dry.   Neurological:      Mental Status: He is alert and oriented to person, place, and time.   Psychiatric:         Mood and Affect: Affect normal.         Judgment: Judgment normal.         Lab Data Review:  Lab Results   Component Value Date/Time    CHOLSTRLTOT 93 (L) 12/16/2023 01:19 AM    LDL 36 12/16/2023 01:19 AM    HDL 48 12/16/2023 01:19 AM    TRIGLYCERIDE 44 12/16/2023 01:19 AM       Lab Results   Component Value Date/Time    SODIUM 132 (L) 03/19/2025 06:14 AM    POTASSIUM 4.5 03/19/2025 06:14 AM    CHLORIDE 96 03/19/2025 06:14 AM    CO2 24 03/19/2025 06:14 AM    GLUCOSE 80 03/19/2025 06:14 AM    BUN 20 03/19/2025 06:14 AM    CREATININE 1.36 03/19/2025 06:14 AM     CrCl cannot be calculated (Patient's most recent lab result is older than the maximum 7 days allowed.).  Lab Results   Component Value Date/Time    ALKPHOSPHAT 81 11/16/2024 06:26 PM    ASTSGOT 26 11/16/2024 06:26 PM    ALTSGPT 21 11/16/2024 06:26 PM    TBILIRUBIN 0.9 11/16/2024 06:26 PM      Lab Results   Component Value Date/Time    WBC 6.6 03/08/2025 07:11 AM     No results found for: \"HBA1C\"  No components found for: \"TROP\"      Cardiac Imaging and Procedures Review:      EKG 3/6/24 interpreted by me sinus, lae, nonspecific st wave change    CAC CT 10/2019  Coronary calcification:  LMA - 85.4  LCX - 476.6  LAD - 347.0  RCA - 717.4  Total Calcium Score: 1629.4    Mpi spect 12/2023   NUCLEAR IMAGING INTERPRETATION   No evidence of significant jeopardized viable myocardium or prior myocardial    infarction.   Normal left ventricular size, ejection fraction, and wall motion.   ECG INTERPRETATION   Non-diagnostic due to use " of pharmacological stress agent.    TTE 7/2024  CONCLUSIONS  Compared to the prior study on 11/11/2017. Unchanged.  Normal left ventricular systolic function.       Medical Decision Making:  Problem List Items Addressed This Visit       History of atrial flutter - Primary    Relevant Orders    EKG    PVC (premature ventricular contraction)    Relevant Orders    EKG (Completed)    Primary hypertension    S/P ablation of atrial flutter    S/P catheter ablation of slow pathway    Hyperlipidemia         pAF/flut s/p ablation - s/p dccv. Stable. Continue rhythm and rate control. Doac for cva prevention.     HTN - goal <130/80. Continue regimen. If gerd should improve but not cough can certainly change ace to arb.    HLD - continue statin.     It was my pleasure to meet with Mr. Cavanaugh.    Today's visit is associated with medical care services that serve as the continuing focal point for all necessary health care services related to the patient's single, serious condition or multiple chronic complex conditions.  This includes providing services to the patient on an ongoing basis that results in care that is collaborative and personalized to the patient.  The services result in a comprehensive, longitudinal, and continuous relationship with the patient and involve delivery of team-based care that is accessible, coordinated with other practitioners and providers, and integrated with the broader health care landscape.

## 2025-06-02 NOTE — PROGRESS NOTES
Verbal consent was acquired by the patient to use [employer] ambient listening note generation during this visit     Subjective:     HPI:   History of Present Illness  The patient presents for evaluation of atrial flutter, gastrointestinal symptoms, hyponatremia, and an ingrown toenail.    Atrial Flutter  - He underwent a cardioversion procedure on 04/10/2025, which resulted in a significant improvement in his vital signs.  - His pulse rate, which was previously in the 70s to 80s, has normalized to the 50s and 60s at rest.  - His systolic blood pressure, which occasionally dropped to the 80s and 90s, has also returned to the low 100s, with a few instances of it being in the 90s.  - Overall, he is feeling better since the cardioversion.  - Prior to the cardioversion, he had been experiencing an improvement in his condition, including a reduction in gastrointestinal issues.  - He reports that his chest pain and breathing difficulties have ceased following the ablation procedure.  - He expresses gratitude for the thorough follow-up care provided by his healthcare team, including a second opinion on his EKG results.    Gastrointestinal Symptoms  - These symptoms have recently begun to re-emerge.  - He reports no recent fevers or chills.  - He has been referred to Dr. Smiley for evaluation of a hiatal hernia and occasionally experiences a cough.  - He has not been diagnosed with irritable bowel syndrome (IBS) but has been treated for small intestinal bacterial overgrowth (SIBO) with antibiotics.  - He did not receive any follow-up treatment due to scheduling difficulties and had questions about the medication regimen, particularly regarding the use of lidocaine.  - He has an upcoming appointment with Dr. Sampson and plans to discuss his concerns and seek a second opinion.  - He is uncertain about the resolution of his stomach symptoms following the antibiotic treatment as there was no follow-up to confirm this.  - He  "reports a slight recurrence of these symptoms post-ablation.  - He is considering dietary modifications to manage his symptoms and is contemplating whether to resume antibiotic treatment.    Hyponatremia  - He has increased his sodium intake slightly and has also increased his protein intake in an effort to regain muscle mass.  - He has noticed changes in his skin, including loose skin on his fingers, which he attributes to weight loss.  - He occasionally experiences cold sensations in his leg and intermittent numbness in his toes.    Ingrown Toenail  - He has an ingrown toenail on his right big toe, which he describes as being located on the right edge rather than the corner.  - He has been applying Neosporin to the area to prevent infection and alleviate pain, although he notes that the pain occasionally recurs.    Supplemental information: PAST SURGICAL HISTORY:  - Ablation for atrial flutter    Health Maintenance: Completed    Objective:     Exam:  BP 92/50 (BP Location: Left arm, Patient Position: Sitting, BP Cuff Size: Adult)   Pulse (!) 52   Temp 36.5 °C (97.7 °F) (Temporal)   Ht 1.88 m (6' 2\")   Wt 77.5 kg (170 lb 12.8 oz)   SpO2 96%   BMI 21.93 kg/m²  Body mass index is 21.93 kg/m².    Physical Exam  Constitutional:       Appearance: Normal appearance.   Cardiovascular:      Rate and Rhythm: Normal rate and regular rhythm.      Heart sounds: Normal heart sounds.   Pulmonary:      Effort: Pulmonary effort is normal.      Breath sounds: Normal breath sounds.   Musculoskeletal:      Cervical back: Normal range of motion and neck supple.   Lymphadenopathy:      Cervical: No cervical adenopathy.   Neurological:      Mental Status: He is alert.             Results  Labs   - Sodium level: 2025, 132    Diagnostic Testing   - [id number], Bradycardia with an AV block    Assessment & Plan:     1. History of atrial flutter        2. GI symptoms        3. Hyponatremia        4. Ingrown toenail of right foot   "          Assessment & Plan  1. History of atrial flutter: Chronic. He has a history of atrial flutter and underwent ablation in the past. Recently, he experienced a return of symptoms and was found to have atrial flutter again. He underwent successful cardioversion on 04/10/2025. Since then, he reports a complete resolution of chest pain and shortness of breath.  - Treatment plan: Follow up with cardiology as directed.    2. Gastrointestinal symptoms: Chronic. He has been experiencing GI symptoms for some time and was diagnosed with small intestinal bacterial overgrowth (SIBO). After completing antibiotic therapy, there has been an improvement but not a complete resolution of symptoms.  - Treatment plan: Follow up with GI as scheduled.    3. Hyponatremia: Chronic. Workup indicates that hyponatremia is likely due to poor dietary intake of sodium. He is trying to increase his sodium intake.  - Treatment plan: Increase sodium intake. Labs will be reevaluated in the future.    4. Ingrown toenail of the right foot:   - Treatment plan: Soaking the toes in warm water and trying to elevate the nail. If unsuccessful, see a podiatrist.    Return in about 6 months (around 12/2/2025) for Medicare Wellness.    Please note that this dictation was created using voice recognition software. I have made every reasonable attempt to correct obvious errors, but I expect that there are errors of grammar and possibly content that I did not discover before finalizing the note.

## 2025-06-03 LAB — EKG IMPRESSION: NORMAL

## 2025-06-03 PROCEDURE — 93010 ELECTROCARDIOGRAM REPORT: CPT | Performed by: INTERNAL MEDICINE

## 2025-06-12 ENCOUNTER — OFFICE VISIT (OUTPATIENT)
Dept: MEDICAL GROUP | Facility: PHYSICIAN GROUP | Age: 80
End: 2025-06-12
Payer: MEDICARE

## 2025-06-12 VITALS
TEMPERATURE: 97.8 F | DIASTOLIC BLOOD PRESSURE: 50 MMHG | OXYGEN SATURATION: 94 % | BODY MASS INDEX: 21.74 KG/M2 | HEART RATE: 54 BPM | SYSTOLIC BLOOD PRESSURE: 90 MMHG | WEIGHT: 169.4 LBS | HEIGHT: 74 IN

## 2025-06-12 DIAGNOSIS — R52 PAIN: Primary | ICD-10-CM

## 2025-06-12 PROBLEM — M19.90 ARTHRITIS: Status: ACTIVE | Noted: 2025-06-08

## 2025-06-12 PROCEDURE — 3074F SYST BP LT 130 MM HG: CPT | Performed by: FAMILY MEDICINE

## 2025-06-12 PROCEDURE — 99213 OFFICE O/P EST LOW 20 MIN: CPT | Performed by: FAMILY MEDICINE

## 2025-06-12 PROCEDURE — 3078F DIAST BP <80 MM HG: CPT | Performed by: FAMILY MEDICINE

## 2025-06-12 ASSESSMENT — FIBROSIS 4 INDEX: FIB4 SCORE: 2.15

## 2025-06-12 NOTE — PROGRESS NOTES
"Verbal consent was acquired by the patient to use [employer] ambient listening note generation during this visit     Subjective:     HPI:   History of Present Illness  The patient presents for evaluation of swelling behind the angle of the jaw on the left side.    Swelling behind the angle of the jaw  - Unusual sensation in the area behind the angle of his left jaw, first noticed approximately 1.5 weeks ago  - Initially painful upon palpation and tender, but no significant swelling perceived  - Pain fluctuated but generally improved over a week  - Increased swelling and tenderness observed two days ago upon waking  - Consultation with Dr. Sweeney regarding gastrointestinal surgery, who did not suspect a swollen gland  - Consultation with Dr. Fuller, a specialist, who recommended further evaluation    Systemic symptoms  - No fevers, chills, night sweats, or unexpected weight loss    Other symptoms  - No sore throat  - Cough due to acid reflux  - Tinnitus and crackling sounds in the left ear  - No facial pain  - Persistent runny nose  - Swelling more pronounced on the left side compared to the right, described as general puffiness rather than a discrete mass  - Tenderness and size of the swelling have decreased since this morning    Health Maintenance: Completed    Objective:     Exam:  BP 90/50 (BP Location: Right arm, Patient Position: Sitting, BP Cuff Size: Adult)   Pulse (!) 54   Temp 36.6 °C (97.8 °F) (Temporal)   Ht 1.88 m (6' 2\")   Wt 76.8 kg (169 lb 6.4 oz)   SpO2 94%   BMI 21.75 kg/m²  Body mass index is 21.75 kg/m².    Physical Exam  Constitutional:       Appearance: Normal appearance.   Cardiovascular:      Rate and Rhythm: Normal rate and regular rhythm.      Heart sounds: Normal heart sounds.   Pulmonary:      Effort: Pulmonary effort is normal.      Breath sounds: Normal breath sounds.   Musculoskeletal:      Cervical back: Normal range of motion and neck supple.   Lymphadenopathy:      Cervical: No " cervical adenopathy.   Neurological:      Mental Status: He is alert.             Results      Assessment & Plan:     1. Pain            Assessment & Plan  1. Swelling behind the angle of the jaw on the left side: Acute. No palpable masses detected upon examination and not tenderness to palpation. Swelling appears to be localized, suggesting inflammation in the area. Possible muscle strain leading to inflammation, swelling, and discomfort.  - Treatment plan: Advised to apply ice to the affected area if swelling and pain recur; if ice reduces swelling and alleviates pain, it confirms localized inflammation. Imaging studies are not necessary at this time as there is no discrete mass.      Return if symptoms worsen or fail to improve.    Please note that this dictation was created using voice recognition software. I have made every reasonable attempt to correct obvious errors, but I expect that there are errors of grammar and possibly content that I did not discover before finalizing the note.

## 2025-06-16 NOTE — Clinical Note
REFERRAL APPROVAL NOTICE         Sent on June 16, 2025                   Boo Cavanaugh   [address]  [place] [address]                   Dear Mr. Cavanaugh,    After a careful review of the medical information and benefit coverage, [facility] has processed your referral. See below for additional details.    If applicable, you must be actively enrolled with your insurance for coverage of the authorized service. If you have any questions regarding your coverage, please contact your insurance directly.    REFERRAL INFORMATION   Referral #:  [id number]  Referred-To Provider    Referred-By Provider:  Gastroenterology    Jerica Coronado M.D.   [facility]      [address] [place] [address]  [phone] [address] [place] [id number] [phone]    Referral Start Date:  06/04/2025  Referral End Date:   06/04/2026             SCHEDULING  If you do not already have an appointment, please call [phone] to make an appointment.     MORE INFORMATION  If you do not already have a [employer] account, sign up at: [email or web address]  You can access your medical information, make appointments, see lab results, billing information, and more.  If you have questions regarding this referral, please contact  the [facility] Referrals department at:             [phone]. Monday - Friday 8:00AM - 5:00PM.     Sincerely,    [facility]

## 2025-07-31 DIAGNOSIS — E78.2 MIXED HYPERLIPIDEMIA: ICD-10-CM

## 2025-07-31 RX ORDER — ROSUVASTATIN CALCIUM 20 MG/1
20 TABLET, COATED ORAL
Qty: 90 TABLET | Refills: 2 | Status: SHIPPED | OUTPATIENT
Start: 2025-07-31

## 2025-07-31 NOTE — TELEPHONE ENCOUNTER
Is the patient due for a refill? Yes    Was the patient seen the last 15 months? Yes    Date of last office visit: 06.02.2025    Does the patient have an upcoming appointment?  Yes   If yes, When? 12.16.2025    Provider to refill:[facility]    Does the patient have nursing home Plus and need 100-day supply? (This applies to ALL medications) Patient does not have SCP

## 2025-08-06 ENCOUNTER — HOSPITAL ENCOUNTER (OUTPATIENT)
Dept: LAB | Facility: MEDICAL CENTER | Age: 80
End: 2025-08-06
Attending: INTERNAL MEDICINE
Payer: MEDICARE

## 2025-08-06 ENCOUNTER — OFFICE VISIT (OUTPATIENT)
Dept: SLEEP MEDICINE | Facility: MEDICAL CENTER | Age: 80
End: 2025-08-06
Payer: MEDICARE

## 2025-08-06 VITALS
HEART RATE: 47 BPM | OXYGEN SATURATION: 97 % | SYSTOLIC BLOOD PRESSURE: 102 MMHG | RESPIRATION RATE: 14 BRPM | HEIGHT: 74 IN | DIASTOLIC BLOOD PRESSURE: 66 MMHG | BODY MASS INDEX: 21.56 KG/M2 | WEIGHT: 168 LBS

## 2025-08-06 DIAGNOSIS — G47.33 OSA ON CPAP: Primary | Chronic | ICD-10-CM

## 2025-08-06 LAB
BUN SERPL-MCNC: 19 MG/DL (ref 8–22)
CREAT SERPL-MCNC: 1.19 MG/DL (ref 0.5–1.4)
GFR SERPLBLD CREATININE-BSD FMLA CKD-EPI: 62 ML/MIN/1.73 M 2

## 2025-08-06 PROCEDURE — 99213 OFFICE O/P EST LOW 20 MIN: CPT

## 2025-08-06 PROCEDURE — 36415 COLL VENOUS BLD VENIPUNCTURE: CPT

## 2025-08-06 PROCEDURE — 99214 OFFICE O/P EST MOD 30 MIN: CPT

## 2025-08-06 PROCEDURE — 3078F DIAST BP <80 MM HG: CPT

## 2025-08-06 PROCEDURE — 84520 ASSAY OF UREA NITROGEN: CPT

## 2025-08-06 PROCEDURE — 3074F SYST BP LT 130 MM HG: CPT

## 2025-08-06 PROCEDURE — 82565 ASSAY OF CREATININE: CPT

## 2025-08-06 ASSESSMENT — FIBROSIS 4 INDEX: FIB4 SCORE: 2.15

## 2025-08-19 ENCOUNTER — OFFICE VISIT (OUTPATIENT)
Dept: CARDIOLOGY | Facility: MEDICAL CENTER | Age: 80
End: 2025-08-19
Attending: NURSE PRACTITIONER
Payer: MEDICARE

## 2025-08-19 VITALS
HEART RATE: 57 BPM | RESPIRATION RATE: 14 BRPM | WEIGHT: 168 LBS | DIASTOLIC BLOOD PRESSURE: 50 MMHG | BODY MASS INDEX: 21.56 KG/M2 | HEIGHT: 74 IN | SYSTOLIC BLOOD PRESSURE: 108 MMHG | OXYGEN SATURATION: 96 %

## 2025-08-19 DIAGNOSIS — I49.3 PVC (PREMATURE VENTRICULAR CONTRACTION): ICD-10-CM

## 2025-08-19 DIAGNOSIS — I48.92 ATRIAL FLUTTER, UNSPECIFIED TYPE (HCC): Primary | ICD-10-CM

## 2025-08-19 DIAGNOSIS — I10 PRIMARY HYPERTENSION: ICD-10-CM

## 2025-08-19 PROCEDURE — 3074F SYST BP LT 130 MM HG: CPT | Performed by: NURSE PRACTITIONER

## 2025-08-19 PROCEDURE — 99213 OFFICE O/P EST LOW 20 MIN: CPT | Performed by: NURSE PRACTITIONER

## 2025-08-19 PROCEDURE — 93005 ELECTROCARDIOGRAM TRACING: CPT | Mod: TC | Performed by: NURSE PRACTITIONER

## 2025-08-19 PROCEDURE — 3078F DIAST BP <80 MM HG: CPT | Performed by: NURSE PRACTITIONER

## 2025-08-19 PROCEDURE — 99212 OFFICE O/P EST SF 10 MIN: CPT | Performed by: NURSE PRACTITIONER

## 2025-08-19 ASSESSMENT — ENCOUNTER SYMPTOMS
ORTHOPNEA: 0
WHEEZING: 0
BRUISES/BLEEDS EASILY: 0
SHORTNESS OF BREATH: 0
PND: 0
NEUROLOGICAL NEGATIVE: 1
SENSORY CHANGE: 0
CLAUDICATION: 0
NAUSEA: 0
DIZZINESS: 0
MUSCULOSKELETAL NEGATIVE: 1
DEPRESSION: 0
EYES NEGATIVE: 1
NERVOUS/ANXIOUS: 0
GASTROINTESTINAL NEGATIVE: 1
RESPIRATORY NEGATIVE: 1
HALLUCINATIONS: 0

## 2025-08-19 ASSESSMENT — FIBROSIS 4 INDEX: FIB4 SCORE: 2.15

## 2025-08-20 LAB — EKG IMPRESSION: NORMAL

## 2025-08-20 PROCEDURE — 93010 ELECTROCARDIOGRAM REPORT: CPT | Performed by: INTERNAL MEDICINE

## 2025-08-22 ENCOUNTER — HOSPITAL ENCOUNTER (OUTPATIENT)
Dept: RADIOLOGY | Facility: MEDICAL CENTER | Age: 80
End: 2025-08-22
Attending: INTERNAL MEDICINE
Payer: MEDICARE

## 2025-08-22 DIAGNOSIS — J39.2 EDEMA OF PHARYNX OR NASOPHARYNX: ICD-10-CM

## 2025-08-22 DIAGNOSIS — K58.9 ADAPTIVE COLITIS: ICD-10-CM

## 2025-08-22 DIAGNOSIS — R14.0 GASTRIC TYMPANY: ICD-10-CM

## 2025-08-22 DIAGNOSIS — K63.8211 SMALL INTESTINAL BACTERIAL OVERGROWTH (SIBO), HYDROGEN SUBTYPE: ICD-10-CM

## 2025-08-22 DIAGNOSIS — R19.4 FREQUENT BOWEL MOVEMENTS: ICD-10-CM

## 2025-08-22 PROCEDURE — 74177 CT ABD & PELVIS W/CONTRAST: CPT

## 2025-08-22 PROCEDURE — 700117 HCHG RX CONTRAST REV CODE 255: Performed by: INTERNAL MEDICINE

## 2025-08-22 RX ADMIN — IOHEXOL 100 ML: 350 INJECTION, SOLUTION INTRAVENOUS at 09:15

## 2025-08-22 RX ADMIN — IOHEXOL 25 ML: 240 INJECTION, SOLUTION INTRATHECAL; INTRAVASCULAR; INTRAVENOUS; ORAL at 09:15

## (undated) DEVICE — TOWELS CLOTH SURGICAL - (4/PK 20PK/CA)

## (undated) DEVICE — LACTATED RINGERS INJ 1000 ML - (14EA/CA 60CA/PF)

## (undated) DEVICE — GLOVE SZ 6.5 BIOGEL PI MICRO - PF LF (50PR/BX)

## (undated) DEVICE — BAG DRAINAGE URINARY CLOSED 2000ML (20EA/CA)

## (undated) DEVICE — GOWN SURGICAL X-LARGE ULTRA - FILM-REINFORCED (20/CA)

## (undated) DEVICE — GLOVE BIOGEL SZ 7.5 SURGICAL PF LTX - (50PR/BX 4BX/CA)

## (undated) DEVICE — NEPTUNE 4 PORT MANIFOLD - (20/PK)

## (undated) DEVICE — MASK, LARYNGEAL AIRWAY #4

## (undated) DEVICE — JELLY, KY 2 0Z STERILE

## (undated) DEVICE — HEAD HOLDER JUNIOR/ADULT

## (undated) DEVICE — GLOVE BIOGEL PI INDICATOR SZ 7.0 SURGICAL PF LF - (50/BX 4BX/CA)

## (undated) DEVICE — PACK CYSTOSCOPY III - (8/CA)

## (undated) DEVICE — ELECTRODE DUAL RETURN W/ CORD - (50/PK)

## (undated) DEVICE — DETERGENT RENUZYME PLUS 10 OZ PACKET (50/BX)

## (undated) DEVICE — FIBER GREEN LIGHT SINGLE USE

## (undated) DEVICE — CATHETER FOLEY 22FR 5CC

## (undated) DEVICE — TUBING CLEARLINK DUO-VENT - C-FLO (48EA/CA)

## (undated) DEVICE — SYRINGE TOOMEY (50EA/CA)

## (undated) DEVICE — SENSOR SPO2 NEO LNCS ADHESIVE (20/BX) SEE USER NOTES

## (undated) DEVICE — SUCTION INSTRUMENT YANKAUER BULBOUS TIP W/O VENT (50EA/CA)

## (undated) DEVICE — SET IRRIGATION CYSTOSCOPY Y-TYPE L81 IN (20EA/CA)

## (undated) DEVICE — SET LEADWIRE 5 LEAD BEDSIDE DISPOSABLE ECG (1SET OF 5/EA)

## (undated) DEVICE — SODIUM CHL. IRRIGATION 0.9% 3000ML (4EA/CA 65CA/PF)

## (undated) DEVICE — CONNECTOR HOSE NEPTUNE FOR CYSTO ROOM

## (undated) DEVICE — TUBE CONNECT SUCTION CLEAR 120 X 1/4" (50EA/CA)"

## (undated) DEVICE — SOD. CHL. INJ. 0.9% 1000 ML - (14EA/CA 60CA/PF)

## (undated) DEVICE — GOWN WARMING STANDARD FLEX - (30/CA)

## (undated) DEVICE — SPONGE GAUZESTER 4 X 4 4PLY - (128PK/CA)

## (undated) DEVICE — ELECTRODE 850 FOAM ADHESIVE - HYDROGEL RADIOTRNSPRNT (50/PK)

## (undated) DEVICE — KIT ANESTHESIA W/CIRCUIT & 3/LT BAG W/FILTER (20EA/CA)

## (undated) DEVICE — PROTECTOR ULNA NERVE - (36PR/CA)

## (undated) DEVICE — SYRINGE 30 ML LL (56/BX)

## (undated) DEVICE — SET EXTENSION WITH 2 PORTS (48EA/CA) ***PART #2C8610 IS A SUBSTITUTE*****

## (undated) DEVICE — KIT ROOM DECONTAMINATION